# Patient Record
Sex: FEMALE | Race: WHITE | Employment: OTHER | URBAN - METROPOLITAN AREA
[De-identification: names, ages, dates, MRNs, and addresses within clinical notes are randomized per-mention and may not be internally consistent; named-entity substitution may affect disease eponyms.]

---

## 2017-01-13 ENCOUNTER — APPOINTMENT (OUTPATIENT)
Dept: LAB | Age: 82
End: 2017-01-13
Attending: INTERNAL MEDICINE
Payer: MEDICARE

## 2017-01-13 ENCOUNTER — TELEPHONE (OUTPATIENT)
Dept: INTERNAL MEDICINE CLINIC | Facility: CLINIC | Age: 82
End: 2017-01-13

## 2017-01-13 DIAGNOSIS — E53.8 VITAMIN B12 DEFICIENCY: ICD-10-CM

## 2017-01-13 DIAGNOSIS — D50.0 IRON DEFICIENCY ANEMIA DUE TO CHRONIC BLOOD LOSS: Primary | ICD-10-CM

## 2017-01-13 DIAGNOSIS — N30.00 ACUTE CYSTITIS WITHOUT HEMATURIA: ICD-10-CM

## 2017-01-13 PROCEDURE — 83540 ASSAY OF IRON: CPT | Performed by: INTERNAL MEDICINE

## 2017-01-13 PROCEDURE — 36415 COLL VENOUS BLD VENIPUNCTURE: CPT | Performed by: INTERNAL MEDICINE

## 2017-01-13 PROCEDURE — 84466 ASSAY OF TRANSFERRIN: CPT | Performed by: INTERNAL MEDICINE

## 2017-01-13 PROCEDURE — 87077 CULTURE AEROBIC IDENTIFY: CPT

## 2017-01-13 PROCEDURE — 87186 SC STD MICRODIL/AGAR DIL: CPT

## 2017-01-13 PROCEDURE — 87086 URINE CULTURE/COLONY COUNT: CPT

## 2017-01-13 PROCEDURE — 85025 COMPLETE CBC W/AUTO DIFF WBC: CPT | Performed by: INTERNAL MEDICINE

## 2017-01-13 PROCEDURE — 82728 ASSAY OF FERRITIN: CPT | Performed by: INTERNAL MEDICINE

## 2017-01-13 PROCEDURE — 82306 VITAMIN D 25 HYDROXY: CPT | Performed by: INTERNAL MEDICINE

## 2017-01-13 PROCEDURE — 82607 VITAMIN B-12: CPT | Performed by: INTERNAL MEDICINE

## 2017-01-13 NOTE — TELEPHONE ENCOUNTER
1/13/17–hemoglobin 10.2 with low iron saturation 5% and low ferritin 5. Patient has a known history of iron deficiency anemia. This can wait for Dr. Lora Willard to her.

## 2017-01-18 NOTE — TELEPHONE ENCOUNTER
Please call pt with labs. Hgb is lower, along with low iron levels. Vit B12 is low. Vitamin D is low. I suspect that she is not taking her supplement vitamins.   She should be taking ferrous sulfate 325mg (ideally BID), Vitamin B12 1000mcg/day, and Domenica

## 2017-01-18 NOTE — TELEPHONE ENCOUNTER
Called patient and relayed message. Patient states she has not been taking any of the supplements. She does have Rx for Vitamin D2, but keeps forgetting. Thoroughly went over each supplement and instructions regarding dosage.  Patient states she was writing

## 2017-01-19 ENCOUNTER — TELEPHONE (OUTPATIENT)
Dept: INTERNAL MEDICINE CLINIC | Facility: CLINIC | Age: 82
End: 2017-01-19

## 2017-01-19 RX ORDER — HYDROCODONE BITARTRATE AND ACETAMINOPHEN 5; 325 MG/1; MG/1
1 TABLET ORAL 2 TIMES DAILY PRN
Qty: 40 TABLET | Refills: 0 | Status: SHIPPED | OUTPATIENT
Start: 2017-01-19 | End: 2017-02-28

## 2017-01-19 NOTE — TELEPHONE ENCOUNTER
Pt requesting refill for Hydrocodone  Pt will  when ready, please call to advise  Pt is low on medication (2)  Tasked to [employer]

## 2017-01-31 PROCEDURE — 87077 CULTURE AEROBIC IDENTIFY: CPT | Performed by: UROLOGY

## 2017-01-31 PROCEDURE — 87186 SC STD MICRODIL/AGAR DIL: CPT | Performed by: UROLOGY

## 2017-01-31 PROCEDURE — 87086 URINE CULTURE/COLONY COUNT: CPT | Performed by: UROLOGY

## 2017-01-31 PROCEDURE — 81001 URINALYSIS AUTO W/SCOPE: CPT | Performed by: UROLOGY

## 2017-02-27 ENCOUNTER — LAB ENCOUNTER (OUTPATIENT)
Dept: LAB | Age: 82
End: 2017-02-27
Attending: INTERNAL MEDICINE
Payer: MEDICARE

## 2017-02-27 DIAGNOSIS — E53.8 VITAMIN B12 DEFICIENCY: ICD-10-CM

## 2017-02-27 DIAGNOSIS — D50.0 IRON DEFICIENCY ANEMIA DUE TO CHRONIC BLOOD LOSS: ICD-10-CM

## 2017-02-27 LAB
BASOPHILS # BLD: 0.1 K/UL (ref 0–0.2)
BASOPHILS NFR BLD: 1 %
EOSINOPHIL # BLD: 0.1 K/UL (ref 0–0.7)
EOSINOPHIL NFR BLD: 1 %
ERYTHROCYTE [DISTWIDTH] IN BLOOD BY AUTOMATED COUNT: 21.9 % (ref 11–15)
FERRITIN SERPL IA-MCNC: 55 NG/ML (ref 11–307)
HCT VFR BLD AUTO: 36.9 % (ref 35–48)
HGB BLD-MCNC: 11.7 G/DL (ref 12–16)
IRON SATN MFR SERPL: 6 % (ref 15–50)
IRON SERPL-MCNC: 21 MCG/DL (ref 28–170)
LYMPHOCYTES # BLD: 1.1 K/UL (ref 1–4)
LYMPHOCYTES NFR BLD: 15 %
MCH RBC QN AUTO: 24.9 PG (ref 27–32)
MCHC RBC AUTO-ENTMCNC: 31.7 G/DL (ref 32–37)
MCV RBC AUTO: 78.6 FL (ref 80–100)
MONOCYTES # BLD: 0.4 K/UL (ref 0–1)
MONOCYTES NFR BLD: 6 %
NEUTROPHILS # BLD AUTO: 5.8 K/UL (ref 1.8–7.7)
NEUTROPHILS NFR BLD: 78 %
PLATELET # BLD AUTO: 200 K/UL (ref 140–400)
PMV BLD AUTO: 9.5 FL (ref 7.4–10.3)
RBC # BLD AUTO: 4.69 M/UL (ref 3.7–5.4)
TIBC SERPL-MCNC: 330 MCG/DL (ref 228–428)
TRANSFERRIN SERPL-MCNC: 250 MG/DL (ref 192–382)
VIT B12 SERPL-MCNC: 497 PG/ML (ref 181–914)
WBC # BLD AUTO: 7.4 K/UL (ref 4–11)

## 2017-02-27 PROCEDURE — 36415 COLL VENOUS BLD VENIPUNCTURE: CPT

## 2017-02-27 PROCEDURE — 85025 COMPLETE CBC W/AUTO DIFF WBC: CPT

## 2017-02-27 PROCEDURE — 83540 ASSAY OF IRON: CPT

## 2017-02-27 PROCEDURE — 84466 ASSAY OF TRANSFERRIN: CPT

## 2017-02-27 PROCEDURE — 82728 ASSAY OF FERRITIN: CPT

## 2017-02-27 PROCEDURE — 82607 VITAMIN B-12: CPT

## 2017-02-28 NOTE — TELEPHONE ENCOUNTER
To MD:  The above refill request is for a controlled substance. Please indicate yes or no to refill 30 days supply plus one refill. If more refills are appropriate, please indicate quantity  Please advise on pending RX - to DR. Chris Samuels

## 2017-03-01 NOTE — TELEPHONE ENCOUNTER
Pt. Is calling to check status of refill for Hydrocodone    Mohs surgery on her lip  Her spouse Jenni Jackson will    Ph.  # [phone]    Routed to Rx

## 2017-03-02 RX ORDER — HYDROCODONE BITARTRATE AND ACETAMINOPHEN 5; 325 MG/1; MG/1
1 TABLET ORAL 2 TIMES DAILY PRN
Qty: 40 TABLET | Refills: 0 | Status: SHIPPED | OUTPATIENT
Start: 2017-03-02 | End: 2017-04-05

## 2017-03-02 NOTE — TELEPHONE ENCOUNTER
Called patient and notified her that script was ready for . Patient verbalized understanding. She will try to have   tonight or tomorrow. Script placed at  [address].

## 2017-03-11 ENCOUNTER — TELEPHONE (OUTPATIENT)
Dept: INTERNAL MEDICINE CLINIC | Facility: CLINIC | Age: 82
End: 2017-03-11

## 2017-03-11 DIAGNOSIS — E53.8 VITAMIN B12 DEFICIENCY: ICD-10-CM

## 2017-03-11 DIAGNOSIS — D50.0 IRON DEFICIENCY ANEMIA DUE TO CHRONIC BLOOD LOSS: Primary | ICD-10-CM

## 2017-03-12 NOTE — TELEPHONE ENCOUNTER
Hgb is better (10.2 to 11.7), as is her iron level --although both still slightly low. The iron is definitely helping, so please ask her to continue it indefinitely. Vit B12 also improved -- continue Vit B12 1000mcg every day.   Let's repeat labs in 3 mon

## 2017-04-05 ENCOUNTER — TELEPHONE (OUTPATIENT)
Dept: INTERNAL MEDICINE CLINIC | Facility: CLINIC | Age: 82
End: 2017-04-05

## 2017-04-05 RX ORDER — HYDROCODONE BITARTRATE AND ACETAMINOPHEN 5; 325 MG/1; MG/1
1 TABLET ORAL 2 TIMES DAILY PRN
Qty: 40 TABLET | Refills: 0 | Status: SHIPPED | OUTPATIENT
Start: 2017-04-05 | End: 2017-05-16

## 2017-04-05 NOTE — TELEPHONE ENCOUNTER
Called patient and notified her script ready for  at . Patient verbalized understanding. Script placed at  [address].

## 2017-04-05 NOTE — TELEPHONE ENCOUNTER
Pt. Is requesting a refill on Norco  She has enough meds until sat.   Ph. # [phone]   Routed to Rx

## 2017-05-02 ENCOUNTER — LAB ENCOUNTER (OUTPATIENT)
Dept: LAB | Age: 82
End: 2017-05-02
Attending: INTERNAL MEDICINE
Payer: MEDICARE

## 2017-05-02 DIAGNOSIS — E53.8 VITAMIN B12 DEFICIENCY: ICD-10-CM

## 2017-05-02 DIAGNOSIS — D50.0 IRON DEFICIENCY ANEMIA DUE TO CHRONIC BLOOD LOSS: ICD-10-CM

## 2017-05-02 PROCEDURE — 36415 COLL VENOUS BLD VENIPUNCTURE: CPT

## 2017-05-02 PROCEDURE — 85025 COMPLETE CBC W/AUTO DIFF WBC: CPT

## 2017-05-02 PROCEDURE — 82607 VITAMIN B-12: CPT

## 2017-05-02 PROCEDURE — 82746 ASSAY OF FOLIC ACID SERUM: CPT

## 2017-05-02 PROCEDURE — 84466 ASSAY OF TRANSFERRIN: CPT

## 2017-05-02 PROCEDURE — 82728 ASSAY OF FERRITIN: CPT

## 2017-05-02 PROCEDURE — 83540 ASSAY OF IRON: CPT

## 2017-05-04 PROCEDURE — 87186 SC STD MICRODIL/AGAR DIL: CPT | Performed by: UROLOGY

## 2017-05-04 PROCEDURE — 87086 URINE CULTURE/COLONY COUNT: CPT | Performed by: UROLOGY

## 2017-05-04 PROCEDURE — 87077 CULTURE AEROBIC IDENTIFY: CPT | Performed by: UROLOGY

## 2017-05-09 ENCOUNTER — TELEPHONE (OUTPATIENT)
Dept: INTERNAL MEDICINE CLINIC | Facility: CLINIC | Age: 82
End: 2017-05-09

## 2017-05-15 ENCOUNTER — TELEPHONE (OUTPATIENT)
Dept: INTERNAL MEDICINE CLINIC | Facility: CLINIC | Age: 82
End: 2017-05-15

## 2017-05-15 NOTE — TELEPHONE ENCOUNTER
Pt. Is calling to request a refill on: Hydrocodone   Pt. Has one pill left   Pt.  Is aware Dr. La Frey will be in tomorrow   Ph. # [phone]    Routed to Rx

## 2017-05-16 RX ORDER — HYDROCODONE BITARTRATE AND ACETAMINOPHEN 5; 325 MG/1; MG/1
1 TABLET ORAL 2 TIMES DAILY PRN
Qty: 40 TABLET | Refills: 0 | Status: SHIPPED | OUTPATIENT
Start: 2017-05-16 | End: 2017-06-13

## 2017-05-16 NOTE — TELEPHONE ENCOUNTER
Pt called again to check status on refill - has no pills left \"does not know how she will be able sleep tonight\"  Was hoping to  Rx today - please janeth [phone]

## 2017-05-28 RX ORDER — ERGOCALCIFEROL 1.25 MG/1
CAPSULE ORAL
Qty: 13 CAPSULE | Refills: 3 | Status: SHIPPED | OUTPATIENT
Start: 2017-05-28 | End: 2020-09-29

## 2017-06-13 ENCOUNTER — TELEPHONE (OUTPATIENT)
Dept: INTERNAL MEDICINE CLINIC | Facility: CLINIC | Age: 82
End: 2017-06-13

## 2017-06-13 RX ORDER — HYDROCODONE BITARTRATE AND ACETAMINOPHEN 5; 325 MG/1; MG/1
1 TABLET ORAL 2 TIMES DAILY PRN
Qty: 40 TABLET | Refills: 0 | Status: SHIPPED | OUTPATIENT
Start: 2017-06-13 | End: 2017-07-13

## 2017-06-13 NOTE — TELEPHONE ENCOUNTER
Pt is asking frefill for  hydrocodone     Pt is out and would like to pick it up tomorrow     plz call pt when ready    [phone]

## 2017-07-05 ENCOUNTER — OFFICE VISIT (OUTPATIENT)
Dept: INTERNAL MEDICINE CLINIC | Facility: CLINIC | Age: 82
End: 2017-07-05

## 2017-07-05 VITALS
BODY MASS INDEX: 22.83 KG/M2 | TEMPERATURE: 98 F | HEART RATE: 79 BPM | SYSTOLIC BLOOD PRESSURE: 132 MMHG | HEIGHT: 63.5 IN | WEIGHT: 130.5 LBS | DIASTOLIC BLOOD PRESSURE: 70 MMHG | OXYGEN SATURATION: 93 %

## 2017-07-05 DIAGNOSIS — D50.0 IRON DEFICIENCY ANEMIA DUE TO CHRONIC BLOOD LOSS: ICD-10-CM

## 2017-07-05 DIAGNOSIS — E78.00 HYPERCHOLESTEROLEMIA: Primary | ICD-10-CM

## 2017-07-05 DIAGNOSIS — E55.9 VITAMIN D DEFICIENCY: ICD-10-CM

## 2017-07-05 DIAGNOSIS — M81.0 OSTEOPOROSIS, UNSPECIFIED OSTEOPOROSIS TYPE, UNSPECIFIED PATHOLOGICAL FRACTURE PRESENCE: ICD-10-CM

## 2017-07-05 DIAGNOSIS — E53.8 VITAMIN B12 DEFICIENCY: ICD-10-CM

## 2017-07-05 DIAGNOSIS — J44.9 CHRONIC OBSTRUCTIVE PULMONARY DISEASE, UNSPECIFIED COPD TYPE (HCC): ICD-10-CM

## 2017-07-05 DIAGNOSIS — M85.80 OSTEOPENIA, UNSPECIFIED LOCATION: ICD-10-CM

## 2017-07-05 DIAGNOSIS — Z12.31 ENCOUNTER FOR SCREENING MAMMOGRAM FOR BREAST CANCER: ICD-10-CM

## 2017-07-05 DIAGNOSIS — R06.00 DYSPNEA ON EXERTION: ICD-10-CM

## 2017-07-05 PROCEDURE — 99215 OFFICE O/P EST HI 40 MIN: CPT | Performed by: INTERNAL MEDICINE

## 2017-07-05 PROCEDURE — G0009 ADMIN PNEUMOCOCCAL VACCINE: HCPCS | Performed by: INTERNAL MEDICINE

## 2017-07-05 PROCEDURE — G0463 HOSPITAL OUTPT CLINIC VISIT: HCPCS | Performed by: INTERNAL MEDICINE

## 2017-07-05 PROCEDURE — 90732 PPSV23 VACC 2 YRS+ SUBQ/IM: CPT | Performed by: INTERNAL MEDICINE

## 2017-07-05 NOTE — PROGRESS NOTES
Ladell Ganser is a 80year old female. Patient presents with:  Physical: Patient presents for annual physical. States she feels like she hasn't been as functional as she wants. Due for mammo and DEXA. Pt c/o of SOB on exertion (mostly on stairs).        HPI Calculus, kidney    • Esophageal reflux    • Hematuria    • Hypercholesterolemia    • Kidney stone    • Osteopenia    • Other and unspecified hyperlipidemia    • Recurrent UTI    • Scoliosis    • Spinal stenosis    • Squamous cell cancer of lip    • [employer]  10/29/2015: PATIENT DOCUMENTED NOT TO HAVE EXPERIENCED ANY* Right      Comment: Procedure: CYSTOSCOPY/URETEROSCOPY/STONE                EXTRACTION;  Surgeon: Edwin Hanks MD;                 Location: [address] [place] [address]  9/24/2015: PATIENT WI axillary LAD  LUNGS: clear to auscultation  CARDIO: RRR, normal S1S2, no gallops or murmurs  GI: soft, NT, ND, NABS, no HSM  EXTREMITIES: no cyanosis, clubbing or edema, +2 DP pulses        ASSESSMENT AND PLAN:     1.  Hypercholesterolemia  Cont with health

## 2017-07-11 PROCEDURE — 87077 CULTURE AEROBIC IDENTIFY: CPT | Performed by: UROLOGY

## 2017-07-11 PROCEDURE — 87086 URINE CULTURE/COLONY COUNT: CPT | Performed by: UROLOGY

## 2017-07-11 PROCEDURE — 87186 SC STD MICRODIL/AGAR DIL: CPT | Performed by: UROLOGY

## 2017-07-13 RX ORDER — HYDROCODONE BITARTRATE AND ACETAMINOPHEN 5; 325 MG/1; MG/1
1 TABLET ORAL 2 TIMES DAILY PRN
Qty: 45 TABLET | Refills: 0 | Status: SHIPPED | OUTPATIENT
Start: 2017-07-13 | End: 2017-08-24

## 2017-07-27 ENCOUNTER — TELEPHONE (OUTPATIENT)
Dept: INTERNAL MEDICINE CLINIC | Facility: CLINIC | Age: 82
End: 2017-07-27

## 2017-07-27 NOTE — TELEPHONE ENCOUNTER
As FYI to DR. DELAROSA called patient and relayed DR. DELAROSA message - verbalized understanding.  She will think about taking fosamax and will call office if she wants to take it

## 2017-07-27 NOTE — TELEPHONE ENCOUNTER
DEXA shows persistent osteoporosis. Pt was briefly on Fosamax in 2014. I recommend that she restart it if she is willing -- Fosamax 70mg qam (with full glass of water; do not lie down or eat for 30min afterwards) -- #13, 3RF.   Continue calcium, vitamin D

## 2017-08-09 PROCEDURE — 87086 URINE CULTURE/COLONY COUNT: CPT | Performed by: UROLOGY

## 2017-08-09 PROCEDURE — 87186 SC STD MICRODIL/AGAR DIL: CPT | Performed by: UROLOGY

## 2017-08-09 PROCEDURE — 87077 CULTURE AEROBIC IDENTIFY: CPT | Performed by: UROLOGY

## 2017-08-24 RX ORDER — HYDROCODONE BITARTRATE AND ACETAMINOPHEN 5; 325 MG/1; MG/1
1 TABLET ORAL 2 TIMES DAILY PRN
Qty: 45 TABLET | Refills: 0 | Status: SHIPPED | OUTPATIENT
Start: 2017-08-24 | End: 2017-10-04

## 2017-08-24 NOTE — TELEPHONE ENCOUNTER
To MD:  The above refill request is for a controlled substance. Please indicate yes or no to refill 30 days supply plus one refill. If more refills are appropriate, please indicate quantity  Pending to DR. Jeannette Dudley

## 2017-08-24 NOTE — TELEPHONE ENCOUNTER
Pt requesting refill for:  Hydrocodone  Pt is low on medication  Pt will  when ready, hoping to  Friday, will run out over the weekend  Please call to advise  Tasked to RX

## 2017-10-04 ENCOUNTER — TELEPHONE (OUTPATIENT)
Dept: INTERNAL MEDICINE CLINIC | Facility: CLINIC | Age: 82
End: 2017-10-04

## 2017-10-04 RX ORDER — HYDROCODONE BITARTRATE AND ACETAMINOPHEN 5; 325 MG/1; MG/1
1 TABLET ORAL 2 TIMES DAILY PRN
Qty: 45 TABLET | Refills: 0 | Status: SHIPPED | OUTPATIENT
Start: 2017-10-04 | End: 2017-11-15

## 2017-10-24 PROCEDURE — 87186 SC STD MICRODIL/AGAR DIL: CPT | Performed by: UROLOGY

## 2017-10-24 PROCEDURE — 81015 MICROSCOPIC EXAM OF URINE: CPT | Performed by: UROLOGY

## 2017-10-24 PROCEDURE — 87077 CULTURE AEROBIC IDENTIFY: CPT | Performed by: UROLOGY

## 2017-10-24 PROCEDURE — 87086 URINE CULTURE/COLONY COUNT: CPT | Performed by: UROLOGY

## 2017-11-15 ENCOUNTER — TELEPHONE (OUTPATIENT)
Dept: INTERNAL MEDICINE CLINIC | Facility: CLINIC | Age: 82
End: 2017-11-15

## 2017-11-15 RX ORDER — HYDROCODONE BITARTRATE AND ACETAMINOPHEN 5; 325 MG/1; MG/1
1 TABLET ORAL 2 TIMES DAILY PRN
Qty: 45 TABLET | Refills: 0 | Status: SHIPPED | OUTPATIENT
Start: 2017-11-15 | End: 2017-12-20

## 2017-11-16 NOTE — TELEPHONE ENCOUNTER
Spoke with patient and informed her that the Rx for her [facility] is ready for pick-up at the  of the office. Patient verbalized understanding.

## 2017-12-20 ENCOUNTER — TELEPHONE (OUTPATIENT)
Dept: INTERNAL MEDICINE CLINIC | Facility: CLINIC | Age: 82
End: 2017-12-20

## 2017-12-20 RX ORDER — HYDROCODONE BITARTRATE AND ACETAMINOPHEN 5; 325 MG/1; MG/1
1 TABLET ORAL 2 TIMES DAILY PRN
Qty: 45 TABLET | Refills: 0 | Status: SHIPPED | OUTPATIENT
Start: 2017-12-20 | End: 2018-01-31

## 2017-12-21 NOTE — TELEPHONE ENCOUNTER
Called patient and left a voice message notifying her that RX for Binta Reyes is ready for pickup. Reminded to have fasting blood work done. RX left at the .

## 2018-01-31 NOTE — TELEPHONE ENCOUNTER
To MD:  The above refill request is for a controlled substance. Please indicate yes or no to refill 30 days supply plus one refill.   If more refills are appropriate, please indicate quantity    Last filled 12/20/17 #45/0

## 2018-01-31 NOTE — TELEPHONE ENCOUNTER
Needs Rx for Hydrocodone refill  Has 3 tablets left - would like to  tomorrow    Please call when ready [phone]

## 2018-02-01 RX ORDER — HYDROCODONE BITARTRATE AND ACETAMINOPHEN 5; 325 MG/1; MG/1
1 TABLET ORAL 2 TIMES DAILY PRN
Qty: 45 TABLET | Refills: 0 | Status: SHIPPED | OUTPATIENT
Start: 2018-02-01 | End: 2018-02-21

## 2018-02-16 PROCEDURE — 81015 MICROSCOPIC EXAM OF URINE: CPT | Performed by: UROLOGY

## 2018-02-16 PROCEDURE — 87086 URINE CULTURE/COLONY COUNT: CPT | Performed by: UROLOGY

## 2018-02-16 PROCEDURE — 87077 CULTURE AEROBIC IDENTIFY: CPT | Performed by: UROLOGY

## 2018-02-16 PROCEDURE — 87186 SC STD MICRODIL/AGAR DIL: CPT | Performed by: UROLOGY

## 2018-02-21 ENCOUNTER — OFFICE VISIT (OUTPATIENT)
Dept: INTERNAL MEDICINE CLINIC | Facility: CLINIC | Age: 83
End: 2018-02-21

## 2018-02-21 ENCOUNTER — LAB ENCOUNTER (OUTPATIENT)
Dept: LAB | Age: 83
End: 2018-02-21
Attending: INTERNAL MEDICINE
Payer: MEDICARE

## 2018-02-21 VITALS
HEIGHT: 63 IN | TEMPERATURE: 98 F | SYSTOLIC BLOOD PRESSURE: 120 MMHG | BODY MASS INDEX: 23.39 KG/M2 | HEART RATE: 88 BPM | DIASTOLIC BLOOD PRESSURE: 68 MMHG | WEIGHT: 132 LBS

## 2018-02-21 DIAGNOSIS — E78.00 HYPERCHOLESTEROLEMIA: Primary | ICD-10-CM

## 2018-02-21 DIAGNOSIS — E55.9 VITAMIN D DEFICIENCY: ICD-10-CM

## 2018-02-21 DIAGNOSIS — D50.0 IRON DEFICIENCY ANEMIA DUE TO CHRONIC BLOOD LOSS: ICD-10-CM

## 2018-02-21 DIAGNOSIS — J44.9 CHRONIC OBSTRUCTIVE PULMONARY DISEASE, UNSPECIFIED COPD TYPE (HCC): ICD-10-CM

## 2018-02-21 DIAGNOSIS — M48.061 SPINAL STENOSIS OF LUMBAR REGION, UNSPECIFIED WHETHER NEUROGENIC CLAUDICATION PRESENT: ICD-10-CM

## 2018-02-21 DIAGNOSIS — E53.8 VITAMIN B12 DEFICIENCY: ICD-10-CM

## 2018-02-21 DIAGNOSIS — E78.00 HYPERCHOLESTEROLEMIA: ICD-10-CM

## 2018-02-21 DIAGNOSIS — M85.80 OSTEOPENIA, UNSPECIFIED LOCATION: ICD-10-CM

## 2018-02-21 DIAGNOSIS — M81.0 OSTEOPOROSIS, UNSPECIFIED OSTEOPOROSIS TYPE, UNSPECIFIED PATHOLOGICAL FRACTURE PRESENCE: ICD-10-CM

## 2018-02-21 LAB
ALBUMIN SERPL BCP-MCNC: 3.9 G/DL (ref 3.5–4.8)
ALBUMIN/GLOB SERPL: 1.2 {RATIO} (ref 1–2)
ALP SERPL-CCNC: 57 U/L (ref 32–100)
ALT SERPL-CCNC: 17 U/L (ref 14–54)
ANION GAP SERPL CALC-SCNC: 6 MMOL/L (ref 0–18)
AST SERPL-CCNC: 20 U/L (ref 15–41)
BASOPHILS # BLD: 0.1 K/UL (ref 0–0.2)
BASOPHILS NFR BLD: 2 %
BILIRUB SERPL-MCNC: 0.6 MG/DL (ref 0.3–1.2)
BUN SERPL-MCNC: 19 MG/DL (ref 8–20)
BUN/CREAT SERPL: 19.2 (ref 10–20)
CALCIUM SERPL-MCNC: 9.6 MG/DL (ref 8.5–10.5)
CHLORIDE SERPL-SCNC: 104 MMOL/L (ref 95–110)
CHOLEST SERPL-MCNC: 233 MG/DL (ref 110–200)
CO2 SERPL-SCNC: 29 MMOL/L (ref 22–32)
CREAT SERPL-MCNC: 0.99 MG/DL (ref 0.5–1.5)
EOSINOPHIL # BLD: 0.2 K/UL (ref 0–0.7)
EOSINOPHIL NFR BLD: 4 %
ERYTHROCYTE [DISTWIDTH] IN BLOOD BY AUTOMATED COUNT: 14.1 % (ref 11–15)
FERRITIN SERPL IA-MCNC: 9 NG/ML (ref 11–307)
GLOBULIN PLAS-MCNC: 3.3 G/DL (ref 2.5–3.7)
GLUCOSE SERPL-MCNC: 105 MG/DL (ref 70–99)
HCT VFR BLD AUTO: 38.4 % (ref 35–48)
HDLC SERPL-MCNC: 74 MG/DL
HGB BLD-MCNC: 12.7 G/DL (ref 12–16)
IRON SATN MFR SERPL: 12 % (ref 15–50)
IRON SERPL-MCNC: 52 MCG/DL (ref 28–170)
LDLC SERPL CALC-MCNC: 133 MG/DL (ref 0–99)
LYMPHOCYTES # BLD: 1.4 K/UL (ref 1–4)
LYMPHOCYTES NFR BLD: 25 %
MCH RBC QN AUTO: 28.3 PG (ref 27–32)
MCHC RBC AUTO-ENTMCNC: 33 G/DL (ref 32–37)
MCV RBC AUTO: 85.9 FL (ref 80–100)
MONOCYTES # BLD: 0.4 K/UL (ref 0–1)
MONOCYTES NFR BLD: 7 %
NEUTROPHILS # BLD AUTO: 3.4 K/UL (ref 1.8–7.7)
NEUTROPHILS NFR BLD: 63 %
NONHDLC SERPL-MCNC: 159 MG/DL
OSMOLALITY UR CALC.SUM OF ELEC: 291 MOSM/KG (ref 275–295)
PATIENT FASTING: YES
PLATELET # BLD AUTO: 212 K/UL (ref 140–400)
PMV BLD AUTO: 9.4 FL (ref 7.4–10.3)
POTASSIUM SERPL-SCNC: 4.8 MMOL/L (ref 3.3–5.1)
PROT SERPL-MCNC: 7.2 G/DL (ref 5.9–8.4)
RBC # BLD AUTO: 4.47 M/UL (ref 3.7–5.4)
SODIUM SERPL-SCNC: 139 MMOL/L (ref 136–144)
TIBC SERPL-MCNC: 430 MCG/DL (ref 228–428)
TRANSFERRIN SERPL-MCNC: 326 MG/DL (ref 192–382)
TRIGL SERPL-MCNC: 130 MG/DL (ref 1–149)
WBC # BLD AUTO: 5.4 K/UL (ref 4–11)

## 2018-02-21 PROCEDURE — 80061 LIPID PANEL: CPT

## 2018-02-21 PROCEDURE — 82306 VITAMIN D 25 HYDROXY: CPT

## 2018-02-21 PROCEDURE — 85025 COMPLETE CBC W/AUTO DIFF WBC: CPT

## 2018-02-21 PROCEDURE — 80053 COMPREHEN METABOLIC PANEL: CPT

## 2018-02-21 PROCEDURE — G0463 HOSPITAL OUTPT CLINIC VISIT: HCPCS | Performed by: INTERNAL MEDICINE

## 2018-02-21 PROCEDURE — 84466 ASSAY OF TRANSFERRIN: CPT

## 2018-02-21 PROCEDURE — 99214 OFFICE O/P EST MOD 30 MIN: CPT | Performed by: INTERNAL MEDICINE

## 2018-02-21 PROCEDURE — 82728 ASSAY OF FERRITIN: CPT

## 2018-02-21 PROCEDURE — 83540 ASSAY OF IRON: CPT

## 2018-02-21 PROCEDURE — 36415 COLL VENOUS BLD VENIPUNCTURE: CPT

## 2018-02-21 RX ORDER — HYDROCODONE BITARTRATE AND ACETAMINOPHEN 5; 325 MG/1; MG/1
1 TABLET ORAL NIGHTLY PRN
Qty: 45 TABLET | Refills: 0 | COMMUNITY
Start: 2018-02-21 | End: 2018-03-13

## 2018-02-21 NOTE — PROGRESS NOTES
Eder Lay is a 80year old female. Patient presents with:  Checkup: Patient is here today for a 6 month checkup (overdue) and to discuss weaning off [address]. Patient states that she has been feeling good lately.  She notes that she deals with chronic back Urinary frequency    • Vitamin D deficiency       Social History:  Smoking status: Former Smoker                                                              Packs/day: 0.25      Years: 10.00        Types: Cigarettes     Quit date: 8/22/2016  Smokeless tob vaccine at [facility] when she was treated for a scalp lac. Check fasting labs.  Had flu shot this year. Rec Shingrix shingles vaccine. 7. Hx of iron deficiency anemia  Had EGD/C-scope in 2010 (Dr. Rosendo Yi) -- negative.  Prob GI blood loss from SB

## 2018-02-23 LAB — 25(OH)D3 SERPL-MCNC: 47.4 NG/ML

## 2018-03-11 ENCOUNTER — TELEPHONE (OUTPATIENT)
Dept: INTERNAL MEDICINE CLINIC | Facility: CLINIC | Age: 83
End: 2018-03-11

## 2018-03-11 DIAGNOSIS — R73.9 HYPERGLYCEMIA: ICD-10-CM

## 2018-03-11 DIAGNOSIS — D50.0 IRON DEFICIENCY ANEMIA DUE TO CHRONIC BLOOD LOSS: Primary | ICD-10-CM

## 2018-03-11 DIAGNOSIS — E53.8 VITAMIN B12 DEFICIENCY: ICD-10-CM

## 2018-03-12 NOTE — TELEPHONE ENCOUNTER
Please call with labs. Iron levels still low, although Hgb okay. Is she taking the iron every day? If so, then I recommend increasing ferrous sulfate to twice a day. If not, then please remind her to take it every day (along with her Vitamin B12).   Oth

## 2018-03-12 NOTE — TELEPHONE ENCOUNTER
Patient needs refill for:    Hydrocodone 5/325mg      Call patient when ready for pickup. Would like before Thursday.

## 2018-03-13 RX ORDER — HYDROCODONE BITARTRATE AND ACETAMINOPHEN 5; 325 MG/1; MG/1
0.5 TABLET ORAL NIGHTLY PRN
Qty: 15 TABLET | Refills: 0 | Status: SHIPPED | OUTPATIENT
Start: 2018-03-13 | End: 2018-04-03

## 2018-03-13 NOTE — TELEPHONE ENCOUNTER
Patient wanted us to know that she has tried cutting the pills in half, per Dr. Valeri Kendall' recommendation at the last office visit. Patient has not been able to sleep the last 3 nights.     Patient has 3 procedures this month & would like to hold off cuttin

## 2018-03-13 NOTE — TELEPHONE ENCOUNTER
To Dr. Enrique Iqbal - see below - OK for full tablet this month? Pt having seeing knee dr this AM, MOHS procedure tomorrow - pt states a full tablet helps her sleep at night. Pt would like \"to just get thru this month\" due to nervousness.   Will try 0.5 tab

## 2018-03-14 NOTE — TELEPHONE ENCOUNTER
Donel Nett is NOT to be used as a sleeping pill. She can take 50mg of Benadryl or 10mg of melatonin if sleep is an issue. I will refill the norco only for 1/2 tablet at bedtime for pain, with the plan to wean her off altogether.

## 2018-03-19 NOTE — TELEPHONE ENCOUNTER
Message relayed to patient who verbalized understanding. Stated she had not been taking the iron supplement every day; she will start now and have repeat labs in 3 months. Nothing further at this time. Routed to Dr. Donya Chang as an Vicky Diop.

## 2018-04-02 NOTE — TELEPHONE ENCOUNTER
Pt called back like to clarify she do not take Norco for sleeping she takes it for pain and half of pill it not helping her. She is ok to take 1 full tab once daily. She have 1 in a half pill left.  Please advise

## 2018-04-03 NOTE — TELEPHONE ENCOUNTER
To Dr. Enrique Iqbal - pt would like to go back to a full tablet of Norco - pt takes 0.5-1 tab for pain. Needs whole tablet daily to relieve pain. Pain has worsened over the years. Pt has 0.5 tablet left.

## 2018-04-05 NOTE — TELEPHONE ENCOUNTER
Pt called to check status refill  She is out medication & would like to  today     Please advise home# [phone] or cell# [phone]

## 2018-04-05 NOTE — TELEPHONE ENCOUNTER
Pt asked that we contact her on her cell [phone] when RX ready to be picked up today if possible

## 2018-04-06 RX ORDER — HYDROCODONE BITARTRATE AND ACETAMINOPHEN 5; 325 MG/1; MG/1
0.5 TABLET ORAL NIGHTLY PRN
Qty: 30 TABLET | Refills: 0 | Status: ON HOLD | OUTPATIENT
Start: 2018-04-06 | End: 2019-04-19

## 2018-04-06 NOTE — TELEPHONE ENCOUNTER
Rx ready for  240; To [place] to call pt to let her know she will need to see DR. DELAROSA prior to next prescription

## 2018-04-06 NOTE — TELEPHONE ENCOUNTER
Patient was called, told the script was ready for  and that she had to make an appointment with Dr. Nakita Villanueva to discuss this medication. Patient was also informed that this would be her last refill till she sees Dr. Nakita Villanueva.     Patient declined to

## 2018-04-09 NOTE — TELEPHONE ENCOUNTER
Note rec'd from [address] [place] [address]  \"we filled only #15 on this RX per state law (30 days)\"  Form placed in purple folder  Tasked to [employer]

## 2018-04-18 NOTE — TELEPHONE ENCOUNTER
I will only fill Norco 1/2 tablet qhs. We need to wean her off the Norco altogether as discussed. If she is able to function all day without Norco, then she should be able to tolerate being without it at night.   She can take an extra 500mg of tylenol wit

## 2018-04-18 NOTE — TELEPHONE ENCOUNTER
To DR. Amparo Davila - called patient and relayed DR. DELAROSA message - patient does not agree with plan - she will talk to her back specialist then get back with our office .

## 2018-04-30 PROCEDURE — 87086 URINE CULTURE/COLONY COUNT: CPT | Performed by: UROLOGY

## 2018-04-30 PROCEDURE — 87186 SC STD MICRODIL/AGAR DIL: CPT | Performed by: UROLOGY

## 2018-04-30 PROCEDURE — 87077 CULTURE AEROBIC IDENTIFY: CPT | Performed by: UROLOGY

## 2018-04-30 PROCEDURE — 81015 MICROSCOPIC EXAM OF URINE: CPT | Performed by: UROLOGY

## 2018-05-22 PROCEDURE — 87186 SC STD MICRODIL/AGAR DIL: CPT | Performed by: PHYSICIAN ASSISTANT

## 2018-05-22 PROCEDURE — 87086 URINE CULTURE/COLONY COUNT: CPT | Performed by: PHYSICIAN ASSISTANT

## 2018-05-22 PROCEDURE — 87077 CULTURE AEROBIC IDENTIFY: CPT | Performed by: PHYSICIAN ASSISTANT

## 2018-06-19 RX ORDER — HYDROCODONE BITARTRATE AND ACETAMINOPHEN 5; 325 MG/1; MG/1
0.5 TABLET ORAL NIGHTLY PRN
Qty: 30 TABLET | Refills: 0 | Status: CANCELLED | OUTPATIENT
Start: 2018-06-19

## 2018-06-19 NOTE — TELEPHONE ENCOUNTER
To Dr. Rachel Bhatia MD:  The above refill request is for a controlled substance. Please indicate yes or no to refill 30 days supply plus one refill.   If more refills are appropriate, please indicate quantity

## 2018-06-19 NOTE — TELEPHONE ENCOUNTER
Patient needs refill for:      Hydrocodone 5/325mg    (Patient will do her stress test sometime soon)      Call patient when ready

## 2018-06-20 NOTE — TELEPHONE ENCOUNTER
Pt stated she is taking Norco 5 - 325mg Sig 1/2 tab a day #15 tab for 30 days. She also reports she has an appt at a pain clinic next Wednesday. Relayed information to Dr. Zina Vilchis.      Informed pt that MD is not comfortable prescribing any more pa

## 2018-09-26 ENCOUNTER — OFFICE VISIT (OUTPATIENT)
Dept: INTERNAL MEDICINE CLINIC | Facility: CLINIC | Age: 83
End: 2018-09-26
Payer: MEDICARE

## 2018-09-26 VITALS
WEIGHT: 133.81 LBS | DIASTOLIC BLOOD PRESSURE: 79 MMHG | TEMPERATURE: 98 F | SYSTOLIC BLOOD PRESSURE: 110 MMHG | HEIGHT: 63 IN | OXYGEN SATURATION: 95 % | BODY MASS INDEX: 23.71 KG/M2 | HEART RATE: 90 BPM

## 2018-09-26 DIAGNOSIS — E53.8 VITAMIN B12 DEFICIENCY: ICD-10-CM

## 2018-09-26 DIAGNOSIS — E78.00 HYPERCHOLESTEROLEMIA: ICD-10-CM

## 2018-09-26 DIAGNOSIS — J44.9 CHRONIC OBSTRUCTIVE PULMONARY DISEASE, UNSPECIFIED COPD TYPE (HCC): Primary | ICD-10-CM

## 2018-09-26 DIAGNOSIS — R92.2 DENSE BREASTS: ICD-10-CM

## 2018-09-26 DIAGNOSIS — E55.9 VITAMIN D DEFICIENCY: ICD-10-CM

## 2018-09-26 DIAGNOSIS — R06.00 DYSPNEA ON EXERTION: ICD-10-CM

## 2018-09-26 DIAGNOSIS — M85.80 OSTEOPENIA, UNSPECIFIED LOCATION: ICD-10-CM

## 2018-09-26 DIAGNOSIS — Z12.31 ENCOUNTER FOR SCREENING MAMMOGRAM FOR BREAST CANCER: ICD-10-CM

## 2018-09-26 DIAGNOSIS — M48.061 SPINAL STENOSIS OF LUMBAR REGION, UNSPECIFIED WHETHER NEUROGENIC CLAUDICATION PRESENT: ICD-10-CM

## 2018-09-26 DIAGNOSIS — Z87.440 HISTORY OF RECURRENT UTIS: ICD-10-CM

## 2018-09-26 DIAGNOSIS — D50.0 IRON DEFICIENCY ANEMIA DUE TO CHRONIC BLOOD LOSS: ICD-10-CM

## 2018-09-26 DIAGNOSIS — M81.0 AGE-RELATED OSTEOPOROSIS WITHOUT CURRENT PATHOLOGICAL FRACTURE: ICD-10-CM

## 2018-09-26 PROCEDURE — G0008 ADMIN INFLUENZA VIRUS VAC: HCPCS | Performed by: INTERNAL MEDICINE

## 2018-09-26 PROCEDURE — G0463 HOSPITAL OUTPT CLINIC VISIT: HCPCS | Performed by: INTERNAL MEDICINE

## 2018-09-26 PROCEDURE — 90653 IIV ADJUVANT VACCINE IM: CPT | Performed by: INTERNAL MEDICINE

## 2018-09-26 PROCEDURE — 99215 OFFICE O/P EST HI 40 MIN: CPT | Performed by: INTERNAL MEDICINE

## 2018-09-26 RX ORDER — OMEPRAZOLE 20 MG/1
20 CAPSULE, DELAYED RELEASE ORAL
COMMUNITY

## 2018-09-26 NOTE — PROGRESS NOTES
Jorge Moreira is a 80year old female. Patient presents with: Annual: pt in for annual px    HPI:     Here for annual physical.  Started seeing a new urologist thru [facility] b/c Dr. Allison Kirk retired.   On prophylactic Keflex which has not really b stone    • Osteopenia    • Other and unspecified hyperlipidemia    • Recurrent UTI    • Scoliosis    • Spinal stenosis    • Squamous cell cancer of lip    • Thyroid nodule    • Urinary frequency    • Vitamin D deficiency       Social History:  Social Histo B12 deficiency    Level normal (499) in 5/2017. Cont daily Vit B12 1000mcg/day -- has not been taking. Check repeat level. 6. Health maintenance    Mammo normal 7/2017.   Rx given for repeat.  Encouraged exercise.  Prevnar 13 given 4/2016 -- PPSV23 7/5/

## 2018-10-17 ENCOUNTER — LAB ENCOUNTER (OUTPATIENT)
Dept: LAB | Age: 83
End: 2018-10-17
Attending: INTERNAL MEDICINE
Payer: MEDICARE

## 2018-10-17 DIAGNOSIS — D50.0 IRON DEFICIENCY ANEMIA DUE TO CHRONIC BLOOD LOSS: ICD-10-CM

## 2018-10-17 DIAGNOSIS — E53.8 VITAMIN B12 DEFICIENCY: ICD-10-CM

## 2018-10-17 DIAGNOSIS — E55.9 VITAMIN D DEFICIENCY: ICD-10-CM

## 2018-10-17 DIAGNOSIS — E78.00 HYPERCHOLESTEROLEMIA: ICD-10-CM

## 2018-10-17 PROCEDURE — 84466 ASSAY OF TRANSFERRIN: CPT

## 2018-10-17 PROCEDURE — 84443 ASSAY THYROID STIM HORMONE: CPT | Performed by: INTERNAL MEDICINE

## 2018-10-17 PROCEDURE — 82607 VITAMIN B-12: CPT

## 2018-10-17 PROCEDURE — 85025 COMPLETE CBC W/AUTO DIFF WBC: CPT

## 2018-10-17 PROCEDURE — 80053 COMPREHEN METABOLIC PANEL: CPT

## 2018-10-17 PROCEDURE — 83540 ASSAY OF IRON: CPT

## 2018-10-17 PROCEDURE — 82728 ASSAY OF FERRITIN: CPT

## 2018-10-17 PROCEDURE — 80061 LIPID PANEL: CPT

## 2018-10-17 PROCEDURE — 82306 VITAMIN D 25 HYDROXY: CPT

## 2018-10-17 PROCEDURE — 36415 COLL VENOUS BLD VENIPUNCTURE: CPT

## 2018-10-18 ENCOUNTER — TELEPHONE (OUTPATIENT)
Dept: INTERNAL MEDICINE CLINIC | Facility: CLINIC | Age: 83
End: 2018-10-18

## 2018-10-18 DIAGNOSIS — E53.8 VITAMIN B12 DEFICIENCY: Primary | ICD-10-CM

## 2018-10-18 DIAGNOSIS — D50.0 IRON DEFICIENCY ANEMIA DUE TO CHRONIC BLOOD LOSS: ICD-10-CM

## 2018-10-19 NOTE — TELEPHONE ENCOUNTER
Please call pt with labs -- all normal except she is anemic again. Her iron and Vit B12 levels are low. Please ask her to start taking her iron and vitamin B12 (1000mcg) every day (pt had stopped taking). Let's repeat labs (nonfasting) in 3 months.   I'l

## 2019-01-22 ENCOUNTER — LAB ENCOUNTER (OUTPATIENT)
Dept: LAB | Age: 84
End: 2019-01-22
Attending: INTERNAL MEDICINE
Payer: MEDICARE

## 2019-01-22 ENCOUNTER — OFFICE VISIT (OUTPATIENT)
Dept: INTERNAL MEDICINE CLINIC | Facility: CLINIC | Age: 84
End: 2019-01-22
Payer: MEDICARE

## 2019-01-22 VITALS
BODY MASS INDEX: 23.74 KG/M2 | HEIGHT: 63 IN | OXYGEN SATURATION: 97 % | DIASTOLIC BLOOD PRESSURE: 70 MMHG | HEART RATE: 97 BPM | WEIGHT: 134 LBS | TEMPERATURE: 98 F | SYSTOLIC BLOOD PRESSURE: 122 MMHG

## 2019-01-22 DIAGNOSIS — E53.8 VITAMIN B12 DEFICIENCY: ICD-10-CM

## 2019-01-22 DIAGNOSIS — R53.83 OTHER FATIGUE: ICD-10-CM

## 2019-01-22 DIAGNOSIS — D50.0 IRON DEFICIENCY ANEMIA DUE TO CHRONIC BLOOD LOSS: Primary | ICD-10-CM

## 2019-01-22 DIAGNOSIS — D50.0 IRON DEFICIENCY ANEMIA DUE TO CHRONIC BLOOD LOSS: ICD-10-CM

## 2019-01-22 LAB
ALBUMIN SERPL BCP-MCNC: 3.8 G/DL (ref 3.5–4.8)
ALBUMIN/GLOB SERPL: 1.2 {RATIO} (ref 1–2)
ALP SERPL-CCNC: 55 U/L (ref 32–100)
ALT SERPL-CCNC: 15 U/L (ref 14–54)
ANION GAP SERPL CALC-SCNC: 11 MMOL/L (ref 0–18)
AST SERPL-CCNC: 18 U/L (ref 15–41)
BASOPHILS # BLD: 0.1 K/UL (ref 0–0.2)
BASOPHILS NFR BLD: 1 %
BILIRUB SERPL-MCNC: 0.5 MG/DL (ref 0.3–1.2)
BUN SERPL-MCNC: 15 MG/DL (ref 8–20)
BUN/CREAT SERPL: 17.6 (ref 10–20)
CALCIUM SERPL-MCNC: 9.3 MG/DL (ref 8.5–10.5)
CHLORIDE SERPL-SCNC: 102 MMOL/L (ref 95–110)
CO2 SERPL-SCNC: 27 MMOL/L (ref 22–32)
CREAT SERPL-MCNC: 0.85 MG/DL (ref 0.5–1.5)
EOSINOPHIL # BLD: 0.2 K/UL (ref 0–0.7)
EOSINOPHIL NFR BLD: 3 %
ERYTHROCYTE [DISTWIDTH] IN BLOOD BY AUTOMATED COUNT: 14.8 % (ref 11–15)
FERRITIN SERPL IA-MCNC: 11 NG/ML (ref 11–307)
GLOBULIN PLAS-MCNC: 3.2 G/DL (ref 2.5–3.7)
GLUCOSE SERPL-MCNC: 91 MG/DL (ref 70–99)
HCT VFR BLD AUTO: 37.6 % (ref 35–48)
HGB BLD-MCNC: 12 G/DL (ref 12–16)
IRON SATN MFR SERPL: 11 % (ref 15–50)
IRON SERPL-MCNC: 46 MCG/DL (ref 28–170)
LYMPHOCYTES # BLD: 1.4 K/UL (ref 1–4)
LYMPHOCYTES NFR BLD: 23 %
MCH RBC QN AUTO: 27.2 PG (ref 27–32)
MCHC RBC AUTO-ENTMCNC: 32 G/DL (ref 32–37)
MCV RBC AUTO: 84.8 FL (ref 80–100)
MONOCYTES # BLD: 0.3 K/UL (ref 0–1)
MONOCYTES NFR BLD: 5 %
NEUTROPHILS # BLD AUTO: 4.1 K/UL (ref 1.8–7.7)
NEUTROPHILS NFR BLD: 67 %
OSMOLALITY UR CALC.SUM OF ELEC: 290 MOSM/KG (ref 275–295)
PATIENT FASTING: NO
PLATELET # BLD AUTO: 241 K/UL (ref 140–400)
PMV BLD AUTO: 9.2 FL (ref 7.4–10.3)
POTASSIUM SERPL-SCNC: 4.5 MMOL/L (ref 3.3–5.1)
PROT SERPL-MCNC: 7 G/DL (ref 5.9–8.4)
RBC # BLD AUTO: 4.43 M/UL (ref 3.7–5.4)
SODIUM SERPL-SCNC: 140 MMOL/L (ref 136–144)
TIBC SERPL-MCNC: 413 MCG/DL (ref 228–428)
TRANSFERRIN SERPL-MCNC: 313 MG/DL (ref 192–382)
TSH SERPL-ACNC: 1.97 UIU/ML (ref 0.45–5.33)
VIT B12 SERPL-MCNC: 311 PG/ML (ref 181–914)
WBC # BLD AUTO: 6.1 K/UL (ref 4–11)

## 2019-01-22 PROCEDURE — 83540 ASSAY OF IRON: CPT

## 2019-01-22 PROCEDURE — 80053 COMPREHEN METABOLIC PANEL: CPT

## 2019-01-22 PROCEDURE — 82607 VITAMIN B-12: CPT

## 2019-01-22 PROCEDURE — 82728 ASSAY OF FERRITIN: CPT

## 2019-01-22 PROCEDURE — 84443 ASSAY THYROID STIM HORMONE: CPT | Performed by: INTERNAL MEDICINE

## 2019-01-22 PROCEDURE — 36415 COLL VENOUS BLD VENIPUNCTURE: CPT

## 2019-01-22 PROCEDURE — 99214 OFFICE O/P EST MOD 30 MIN: CPT | Performed by: INTERNAL MEDICINE

## 2019-01-22 PROCEDURE — G0463 HOSPITAL OUTPT CLINIC VISIT: HCPCS | Performed by: INTERNAL MEDICINE

## 2019-01-22 PROCEDURE — 85025 COMPLETE CBC W/AUTO DIFF WBC: CPT

## 2019-01-22 PROCEDURE — 84466 ASSAY OF TRANSFERRIN: CPT

## 2019-02-06 ENCOUNTER — TELEPHONE (OUTPATIENT)
Dept: INTERNAL MEDICINE CLINIC | Facility: CLINIC | Age: 84
End: 2019-02-06

## 2019-02-06 DIAGNOSIS — E53.8 VITAMIN B12 DEFICIENCY: Primary | ICD-10-CM

## 2019-02-06 DIAGNOSIS — D50.0 IRON DEFICIENCY ANEMIA DUE TO CHRONIC BLOOD LOSS: ICD-10-CM

## 2019-02-07 NOTE — TELEPHONE ENCOUNTER
Spoke to pt regarding lad levels and advised on MD message below; pt verbalizes understanding and states will start recommended medications. Advised repeat labs in 3 months; pt verbalized understanding. New medications added to epic.

## 2019-02-07 NOTE — TELEPHONE ENCOUNTER
Please call with lab results. Vit B12 still low -- please restart the OTC Vit B12 1000 mcg daily. Hgb is better but iron still low -- restart the ferrous sulfate 325mg BID. Let's repeat labs in 3 months. I'll order.

## 2019-03-27 PROCEDURE — 87086 URINE CULTURE/COLONY COUNT: CPT | Performed by: PHYSICIAN ASSISTANT

## 2019-03-27 PROCEDURE — 87186 SC STD MICRODIL/AGAR DIL: CPT | Performed by: PHYSICIAN ASSISTANT

## 2019-03-27 PROCEDURE — 81015 MICROSCOPIC EXAM OF URINE: CPT | Performed by: PHYSICIAN ASSISTANT

## 2019-03-27 PROCEDURE — 87077 CULTURE AEROBIC IDENTIFY: CPT | Performed by: PHYSICIAN ASSISTANT

## 2019-04-18 ENCOUNTER — APPOINTMENT (OUTPATIENT)
Dept: MRI IMAGING | Facility: HOSPITAL | Age: 84
DRG: 563 | End: 2019-04-18
Attending: EMERGENCY MEDICINE
Payer: MEDICARE

## 2019-04-18 ENCOUNTER — HOSPITAL ENCOUNTER (INPATIENT)
Facility: HOSPITAL | Age: 84
LOS: 3 days | Discharge: NURSING, REHAB OR LONG-TERM CARE | DRG: 563 | End: 2019-04-23
Attending: EMERGENCY MEDICINE | Admitting: INTERNAL MEDICINE
Payer: MEDICARE

## 2019-04-18 ENCOUNTER — OFFICE VISIT (OUTPATIENT)
Dept: INTERNAL MEDICINE CLINIC | Facility: CLINIC | Age: 84
End: 2019-04-18
Payer: MEDICARE

## 2019-04-18 VITALS
WEIGHT: 130 LBS | TEMPERATURE: 98 F | BODY MASS INDEX: 23.04 KG/M2 | HEART RATE: 118 BPM | SYSTOLIC BLOOD PRESSURE: 112 MMHG | HEIGHT: 63 IN | DIASTOLIC BLOOD PRESSURE: 62 MMHG

## 2019-04-18 DIAGNOSIS — M54.59 INTRACTABLE LOW BACK PAIN: Primary | ICD-10-CM

## 2019-04-18 PROCEDURE — 99222 1ST HOSP IP/OBS MODERATE 55: CPT | Performed by: INTERNAL MEDICINE

## 2019-04-18 PROCEDURE — 72148 MRI LUMBAR SPINE W/O DYE: CPT | Performed by: EMERGENCY MEDICINE

## 2019-04-18 RX ORDER — ACETAMINOPHEN 500 MG
1000 TABLET ORAL EVERY 8 HOURS PRN
Status: DISCONTINUED | OUTPATIENT
Start: 2019-04-18 | End: 2019-04-23

## 2019-04-18 RX ORDER — MORPHINE SULFATE 2 MG/ML
2 INJECTION, SOLUTION INTRAMUSCULAR; INTRAVENOUS EVERY 2 HOUR PRN
Status: DISCONTINUED | OUTPATIENT
Start: 2019-04-18 | End: 2019-04-20

## 2019-04-18 RX ORDER — MORPHINE SULFATE 2 MG/ML
1 INJECTION, SOLUTION INTRAMUSCULAR; INTRAVENOUS EVERY 2 HOUR PRN
Status: DISCONTINUED | OUTPATIENT
Start: 2019-04-18 | End: 2019-04-20

## 2019-04-18 RX ORDER — MORPHINE SULFATE 4 MG/ML
4 INJECTION, SOLUTION INTRAMUSCULAR; INTRAVENOUS ONCE
Status: COMPLETED | OUTPATIENT
Start: 2019-04-18 | End: 2019-04-18

## 2019-04-18 RX ORDER — MORPHINE SULFATE 4 MG/ML
4 INJECTION, SOLUTION INTRAMUSCULAR; INTRAVENOUS EVERY 2 HOUR PRN
Status: DISCONTINUED | OUTPATIENT
Start: 2019-04-18 | End: 2019-04-20

## 2019-04-18 RX ORDER — HYDROCODONE BITARTRATE AND ACETAMINOPHEN 7.5; 325 MG/1; MG/1
1 TABLET ORAL EVERY 6 HOURS PRN
Status: DISCONTINUED | OUTPATIENT
Start: 2019-04-18 | End: 2019-04-20

## 2019-04-18 RX ORDER — LATANOPROST 50 UG/ML
1 SOLUTION/ DROPS OPHTHALMIC NIGHTLY
Status: DISCONTINUED | OUTPATIENT
Start: 2019-04-18 | End: 2019-04-23

## 2019-04-18 RX ORDER — HEPARIN SODIUM 5000 [USP'U]/ML
5000 INJECTION, SOLUTION INTRAVENOUS; SUBCUTANEOUS EVERY 8 HOURS SCHEDULED
Status: DISCONTINUED | OUTPATIENT
Start: 2019-04-18 | End: 2019-04-23

## 2019-04-18 RX ORDER — ESCITALOPRAM OXALATE 10 MG/1
20 TABLET ORAL DAILY
Status: DISCONTINUED | OUTPATIENT
Start: 2019-04-19 | End: 2019-04-23

## 2019-04-18 RX ORDER — ONDANSETRON 2 MG/ML
4 INJECTION INTRAMUSCULAR; INTRAVENOUS EVERY 4 HOURS PRN
Status: DISCONTINUED | OUTPATIENT
Start: 2019-04-18 | End: 2019-04-23

## 2019-04-18 RX ORDER — ALPRAZOLAM 0.25 MG/1
0.25 TABLET ORAL 2 TIMES DAILY PRN
Status: DISCONTINUED | OUTPATIENT
Start: 2019-04-18 | End: 2019-04-23

## 2019-04-18 RX ORDER — MELATONIN
325 2 TIMES DAILY WITH MEALS
Status: DISCONTINUED | OUTPATIENT
Start: 2019-04-18 | End: 2019-04-23

## 2019-04-18 RX ORDER — NITROFURANTOIN MACROCRYSTALS 50 MG/1
50 CAPSULE ORAL DAILY
Status: DISCONTINUED | OUTPATIENT
Start: 2019-04-19 | End: 2019-04-19

## 2019-04-18 RX ORDER — KETOROLAC TROMETHAMINE 15 MG/ML
15 INJECTION, SOLUTION INTRAMUSCULAR; INTRAVENOUS EVERY 6 HOURS PRN
Status: ACTIVE | OUTPATIENT
Start: 2019-04-18 | End: 2019-04-20

## 2019-04-18 RX ORDER — PANTOPRAZOLE SODIUM 40 MG/1
40 TABLET, DELAYED RELEASE ORAL
Status: DISCONTINUED | OUTPATIENT
Start: 2019-04-19 | End: 2019-04-23

## 2019-04-18 RX ORDER — CHOLECALCIFEROL (VITAMIN D3) 125 MCG
1000 CAPSULE ORAL DAILY
Status: DISCONTINUED | OUTPATIENT
Start: 2019-04-19 | End: 2019-04-23

## 2019-04-18 RX ORDER — LIDOCAINE 50 MG/G
1 PATCH TOPICAL EVERY 24 HOURS
Status: DISCONTINUED | OUTPATIENT
Start: 2019-04-18 | End: 2019-04-23

## 2019-04-18 RX ORDER — HYDROCODONE BITARTRATE AND ACETAMINOPHEN 5; 325 MG/1; MG/1
1 TABLET ORAL EVERY 6 HOURS PRN
Status: DISCONTINUED | OUTPATIENT
Start: 2019-04-18 | End: 2019-04-23

## 2019-04-18 NOTE — ED INITIAL ASSESSMENT (HPI)
Pt sent in by Dr. Blayne Mcdermott for mri of spine and pain control, pt c/o R lower back pain. Pt's son states pt had a fall approx 4 weeks ago.

## 2019-04-18 NOTE — PROGRESS NOTES
La Ball is a 80year old female. Patient presents with:  Back Pain: Patient is here today with right lower back pain for the past week. No injuries occurred. Pain is constant-- worse with sitting, standing, or moving. Laying down helps slightly.  She daily for 7 days. Disp: 14 tablet Rfl: 0   omeprazole 20 MG Oral Capsule Delayed Release Take 20 mg by mouth every morning before breakfast. Disp:  Rfl:    HYDROcodone-acetaminophen 5-325 MG Oral Tab Take 0.5 tablets by mouth nightly as needed for Pain.  Marisa Aleman nausea, vomiting, diarrhea    Wt Readings from Last 5 Encounters:  04/18/19 : 128 lb (58.1 kg)  04/18/19 : 130 lb (59 kg)  04/13/19 : 130 lb (59 kg)  03/27/19 : 128 lb (58.1 kg)  01/22/19 : 134 lb (60.8 kg)    Body mass index is 23.03 kg/m².       EXAM:   B

## 2019-04-19 ENCOUNTER — APPOINTMENT (OUTPATIENT)
Dept: GENERAL RADIOLOGY | Facility: HOSPITAL | Age: 84
DRG: 563 | End: 2019-04-19
Attending: INTERNAL MEDICINE
Payer: MEDICARE

## 2019-04-19 ENCOUNTER — APPOINTMENT (OUTPATIENT)
Dept: NUCLEAR MEDICINE | Facility: HOSPITAL | Age: 84
DRG: 563 | End: 2019-04-19
Attending: INTERNAL MEDICINE
Payer: MEDICARE

## 2019-04-19 PROCEDURE — 99232 SBSQ HOSP IP/OBS MODERATE 35: CPT | Performed by: INTERNAL MEDICINE

## 2019-04-19 PROCEDURE — 71045 X-RAY EXAM CHEST 1 VIEW: CPT | Performed by: INTERNAL MEDICINE

## 2019-04-19 PROCEDURE — 78582 LUNG VENTILAT&PERFUS IMAGING: CPT | Performed by: INTERNAL MEDICINE

## 2019-04-19 RX ORDER — SULFAMETHOXAZOLE AND TRIMETHOPRIM 800; 160 MG/1; MG/1
1 TABLET ORAL EVERY 12 HOURS SCHEDULED
Status: COMPLETED | OUTPATIENT
Start: 2019-04-19 | End: 2019-04-21

## 2019-04-19 RX ORDER — HYDROCODONE BITARTRATE AND ACETAMINOPHEN 7.5; 325 MG/1; MG/1
1 TABLET ORAL EVERY 6 HOURS PRN
Qty: 30 TABLET | Refills: 0 | Status: SHIPPED | OUTPATIENT
Start: 2019-04-19 | End: 2019-05-14

## 2019-04-19 RX ORDER — CYCLOBENZAPRINE HCL 10 MG
10 TABLET ORAL 3 TIMES DAILY
Status: DISCONTINUED | OUTPATIENT
Start: 2019-04-19 | End: 2019-04-19

## 2019-04-19 RX ORDER — POTASSIUM CHLORIDE 20 MEQ/1
40 TABLET, EXTENDED RELEASE ORAL EVERY 4 HOURS
Status: ACTIVE | OUTPATIENT
Start: 2019-04-19 | End: 2019-04-19

## 2019-04-19 NOTE — ED PROVIDER NOTES
Patient Seen in: [facility] Emergency Department    History   Patient presents with:  Back Pain      HPI    Patient presents to the ED after being sent by her primary care physician for low back pain for the past 4 weeks.   She states pain is uncon left   • OTHER SURGICAL HISTORY  07/19/00    Cystourethroscopy with Urethral Calibration and Dilation with Bilateral Retrograde Pyelogram and Left Ureteral Stone Manipulation with insertion of Double-J Stent   • OTHER SURGICAL HISTORY  June 2015    R eye s exhibits no discharge. Neck: No tracheal deviation present. Cardiovascular: Normal rate and intact distal pulses. Pulmonary/Chest: Effort normal. No stridor. No respiratory distress. Abdominal: Soft. She exhibits no distension.    Musculoskeletal: S prominent at L4-L5, where there is severe narrowing of the canal and bilateral neural foramen. Severe narrowing of the left neural foramen at L5-S1. Bony encroachment upon the exiting bilateral L4 and left L5 nerve roots.   Acute appearing hide-grade stra Plan     Clinical Impression:  Intractable low back pain  (primary encounter diagnosis)    Disposition:  Admit    Follow-up:  No follow-up provider specified.     Medications Prescribed:  Current Discharge Medication List        Present on Admission  Date R

## 2019-04-19 NOTE — H&P
[facility] HOSP - [place] [facility]    History and Physical    Benedict Hayley Patient Status:  Emergency    1935 MRN [id number]   Location [address] Attending Farhan Aldrich MD   Hosp Day # 0 PCP Fredo Ugarte MD     Date: L4-L5, where there is severe narrowing of the canal and bilateral neural foramen. Severe narrowing of the left neural foramen at L5-S1. Bony encroachment upon the exiting bilateral L4 and left L5 nerve roots.      Acute appearing hide-grade strain o Tobacco Use      Smoking status: Former Smoker        Packs/day: 0.25        Years: 10.00        Pack years: 2.5        Types: Cigarettes        Quit date: 2016        Years since quittin.6      Smokeless tobacco: Never Used    Alcohol use:  No at L5-S1. Bony encroachment upon the exiting bilateral L4 and left L5 nerve roots. Acute appearing hide-grade strain of the left paraspinal musculature at L1-L2.     Dictated by (CST): Denia Pope MD on 4/18/2019 at 18:32     Approved by (CST): Grant Odell pain    Has seen Dr. Rodolfo Ricardo (ortho spine at [address]). Referred to pain specialist, Dr. Alonso Vidales at [facility] [address] [place] [address]. Had facet injections in 8/2018 which helped tremendously.   Saw Dr. Jamal Bustamante again 9/25/18; he ordered

## 2019-04-19 NOTE — PROGRESS NOTES
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 Severe narrowing of the left neural foramen at L5-S1.   Bony encroachment upon the exiting normal; suspect this is related to large hiatal hernia and COPD; patient to get up in chair, added incentive spirometry today. Hypercholesterolemia  Cont with healthy diet. History of recurrent UTIs, nephrolithiasis   Followed by [facility].  On

## 2019-04-19 NOTE — PLAN OF CARE
Pt desat on room air 88%, placed on O2-2L. Now spo2 93-95%. Denies SOB/CP or respiratory distress. Dr. Tamia Beth notified, ordered routine portable chest x-ray.

## 2019-04-19 NOTE — CM/SW NOTE
Addend 518p:     SW met w/ pt, pt's  Prudencio Jacobsen and daughter to discuss eventual discharge plans. SW discussed 24hr care & supervision, caregivers, HHC, and respite care at [facility]. Resources provided.     Pt's family requesting respite care referrals to [facility]

## 2019-04-19 NOTE — PHYSICAL THERAPY NOTE
PHYSICAL THERAPY EVALUATION - INPATIENT     Room Number: 515/515-A  Evaluation Date: 4/19/2019  Type of Evaluation: Initial   Physician Order: PT Eval and Treat(low back pain)    Presenting Problem: p/w acute R sided LBP  Reason for Therapy: Mobility Dysf appropriate. Pt left seated in bedside chair, all needs in place, ALARM ON, RN aware. Patient's current functional deficits include acute low back pain, decreased activity tolerance, balance deficits, which are below the patient's pre-admission status. 10/29/2015    Performed by Jessika Kimball MD at [address] [place] [address]   • ESWL LITHOTRIPSY WITH CYSTOSCOPY, [address] [place] [address] Drive,[facility] [address] Right 9/24/2015    Performed by Jessika Kimball MD at [address] [place] [address]   • HOLMIUM  LITHOTRIPSY LASER WITH CYSTOSCOPY Ri ASSESSMENT  Upper extremity ROM and strength are within functional limits     Lower extremity ROM is within functional limits     Lower extremity strength is within functional limits     BALANCE  Static Sitting: Fair -  Dynamic Sitting: Fair -  Static Mitchell training  spine precautions, pacing, walking program    Patient End of Session: Up in chair;Needs met;Call light within reach;RN aware of session/findings; All patient questions and concerns addressed; Alarm set(reclined)    CURRENT GOALS    Goals to be met

## 2019-04-19 NOTE — PLAN OF CARE
Problem: Patient/Family Goals  Goal: Patient/Family Long Term Goal  Description  Patient's Long Term Goal: Return to baseline level of function    Interventions:  - PT/OT ordered. - Pain management.   - Assist with ADLs  - Follow POC  - See additional Ca

## 2019-04-19 NOTE — PLAN OF CARE
Problem: GENITOURINARY - ADULT  Goal: Absence of urinary retention  Description  INTERVENTIONS:  - Assess patient’s ability to void and empty bladder  - Monitor intake/output and perform bladder scan as needed  - Follow urinary retention protocol/standar done     Problem: Patient/Family Goals  Goal: Patient/Family Long Term Goal  Description  Patient's Long Term Goal: Return to baseline level of function    Interventions:  - PT/OT ordered. - Pain management.   - Assist with ADLs  - Follow POC  - See additi

## 2019-04-20 ENCOUNTER — APPOINTMENT (OUTPATIENT)
Dept: CV DIAGNOSTICS | Facility: HOSPITAL | Age: 84
DRG: 563 | End: 2019-04-20
Attending: INTERNAL MEDICINE
Payer: MEDICARE

## 2019-04-20 PROBLEM — J96.01 ACUTE RESPIRATORY FAILURE WITH HYPOXIA (HCC): Status: ACTIVE | Noted: 2019-04-20

## 2019-04-20 PROCEDURE — 93306 TTE W/DOPPLER COMPLETE: CPT | Performed by: INTERNAL MEDICINE

## 2019-04-20 PROCEDURE — 99232 SBSQ HOSP IP/OBS MODERATE 35: CPT | Performed by: INTERNAL MEDICINE

## 2019-04-20 RX ORDER — IPRATROPIUM BROMIDE AND ALBUTEROL SULFATE 2.5; .5 MG/3ML; MG/3ML
3 SOLUTION RESPIRATORY (INHALATION) 2 TIMES DAILY
Status: DISCONTINUED | OUTPATIENT
Start: 2019-04-20 | End: 2019-04-23

## 2019-04-20 NOTE — PROGRESS NOTES
[facility]    Progress Note    Rachel Patel Patient Status:  Observation    1935 MRN [id number]   Location [facility]5W Attending Delmy Spear MD   Hosp Day # 0 PCP Marcus Dickens MD       SUBJECTIVE:  Did not sleep Corrected on: 4/19/2019;   PROCEDURE: MRI SPINE LUMBAR (CPT=72148)  COMPARISON: None. INDICATIONS: Chronic lower right sided back pain with new onset of incontinence.   TECHNIQUE: A variety of imaging planes and parameters were utilized for visualization o desiccation with bulging disk, facet, and ligamentous hypertrophy results in moderate central canal narrowing and moderate right and severe left neural foraminal narrowing. There is effacement of the left lateral and subarticular recesses.  There is bony e Perfusion Scan  (JXO=63666)    Result Date: 4/19/2019  CONCLUSION: Ventilation/perfusion lung scan findings are grossly unchanged from the previous study. There is no evidence for acute pulmonary embolism.  The retained radiotracer is most suggestive of pr started on bactrim)     Osteoporosis  DEXA in 7/2017 shows osteoporosis of left femoral neck (T -2.9).  Pt was briefly on fosamax in 2014.  She was advised in 7/2017 to restart fosamax but declined.    Vitamin D deficiency    On weekly vitamin D   Vitamin B

## 2019-04-20 NOTE — DIETARY NOTE
ADULT NUTRITION INITIAL ASSESSMENT    Pt is at moderate nutrition risk. Pt does not meet malnutrition criteria.       RECOMMENDATIONS TO MD:  See Nutrition Intervention     NUTRITION DIAGNOSIS/PROBLEM:  Unintentional weight loss related to decreased abilit Spinal stenosis    • Squamous cell cancer of lip    • Thyroid nodule    • Urinary frequency    • Vitamin D deficiency        ANTHROPOMETRICS:  HT: 160 cm (5' 3\")  WT: 56 kg (123 lb 6.4 oz)   BMI:  21.86  BMI CLASSIFICATION: 19-24.9 kg/m2 - WNL   IBW: 115 Accumulation: none   - Skin Integrity: intact.  - Daryl score 18    NUTRITION PRESCRIPTION:  Diet: Regular/General  Oral Supplements: Strawberry Ensure at Bk and Liu Tate at D  ESTIMATED NUTRITION NEEDS:  Calories: 3053-4940 calories/day (26-30 calori

## 2019-04-20 NOTE — CM/SW NOTE
Pt has been made inpatient as of 4/20. Dtr stated she would like pt to go to [facility] at discharge. SW discussed limited beds and recommended a back up plan.      Dtr is aware that previous referrals have already been made to [facility], due to

## 2019-04-20 NOTE — PHYSICAL THERAPY NOTE
PHYSICAL THERAPY TREATMENT NOTE - INPATIENT     Room Number: 853/779-V       Presenting Problem: p/w acute R sided LBP    Problem List  Principal Problem:    Intractable low back pain  Active Problems:    Muscle strain    Acute respiratory failure with hyp '6-Clicks' INPATIENT SHORT FORM - BASIC MOBILITY  How much difficulty does the patient currently have. ..  -   Turning over in bed (including adjusting bedclothes, sheets and blankets)?: A Little   -   Sitting down on and standing up from a chair with arms Status NOT TESTED   Goal #5 Patient to demonstrate independence with home activity/exercise instructions provided to patient in preparation for discharge.    Goal #5   Current Status N/T   Goal #6     Goal #6  Current Status

## 2019-04-21 PROCEDURE — 99232 SBSQ HOSP IP/OBS MODERATE 35: CPT | Performed by: INTERNAL MEDICINE

## 2019-04-21 RX ORDER — QUETIAPINE 25 MG/1
25 TABLET, FILM COATED ORAL NIGHTLY
Status: DISCONTINUED | OUTPATIENT
Start: 2019-04-21 | End: 2019-04-23

## 2019-04-21 NOTE — PLAN OF CARE
Problem: GENITOURINARY - ADULT  Goal: Absence of urinary retention  Description  INTERVENTIONS:  - Assess patient?s ability to void and empty bladder  - Monitor intake/output and perform bladder scan as needed  - Follow urinary retention protocol/standar mobility, tolerated well, will continue to monitor, family at bedside. Alarms and audible. Call light within reach.

## 2019-04-21 NOTE — PROGRESS NOTES
[facility] HOSP - [facility]    Progress Note    Mitchell Galeazzi Patient Status:  Inpatient    1935 MRN [id number]   Location [facility]5W Attending Vamshi Grace MD   Hosp Day # 1 PCP Demarcus Yu MD       SUBJECTIVE:  Reports pain to BILT  --   --   --  0.3   TP  --   --   --  6.7         Imaging:  Mri Spine Lumbar (rav=[id number])    Addendum Date: 4/19/2019    CORRECTION Corrected on: 4/19/2019;   PROCEDURE: MRI SPINE LUMBAR (CPT=72148)  COMPARISON: None.   INDICATIONS: Chronic lower rig encroachment upon the exiting bilateral L4 nerve roots. There is effacement of the bilateral subarticular and lateral recesses.  L5-S1: Disk desiccation with bulging disk, facet, and ligamentous hypertrophy results in moderate central canal narrowing and m (CST): Bertram Adame MD on 4/19/2019 at 7:00     Approved by (CST):  Bertram Adame MD on 4/19/2019 at 7:06          Nm Lung Vq Vent / Perfusion Scan  (POP=25819)    Result Date: 4/19/2019  CONCLUSION: Ventilation/perfusion lung scan findings are bebeto recurrent UTIs, nephrolithiasis   Followed by Gallo Jara. On macrobid 50mg qd for prophylaxis. (held as pt started on bactrim)     Osteoporosis  DEXA in 7/2017 shows osteoporosis of left femoral neck (T -2.9).   Pt was briefly on fosamax in 2014.  She w made inpatient admission due to hypoxic respiratory failure. SW consulted for rehab placement.  Stable for discharge when bed available        Sean Vergara DO  4/21/2019  10:35 AM

## 2019-04-22 RX ORDER — NITROFURANTOIN 25; 75 MG/1; MG/1
50 CAPSULE ORAL DAILY
Status: DISCONTINUED | OUTPATIENT
Start: 2019-04-22 | End: 2019-04-22

## 2019-04-22 RX ORDER — NITROFURANTOIN MACROCRYSTALS 50 MG/1
50 CAPSULE ORAL NIGHTLY
Status: DISCONTINUED | OUTPATIENT
Start: 2019-04-22 | End: 2019-04-23

## 2019-04-22 RX ORDER — 0.9 % SODIUM CHLORIDE 0.9 %
VIAL (ML) INJECTION
Status: COMPLETED
Start: 2019-04-22 | End: 2019-04-22

## 2019-04-22 NOTE — PLAN OF CARE
Problem: GENITOURINARY - ADULT  Goal: Absence of urinary retention  Description  INTERVENTIONS:  - Assess patient?s ability to void and empty bladder  - Monitor intake/output and perform bladder scan as needed  - Follow urinary retention protocol/standar ordered  - Assess for signs and symptoms of hyperglycemia and hypoglycemia  - Administer ordered medications to maintain glucose within target range  - Assess barriers to adequate nutritional intake and initiate nutrition consult as needed  - Instruct surekha

## 2019-04-22 NOTE — PHYSICAL THERAPY NOTE
PHYSICAL THERAPY TREATMENT NOTE - INPATIENT     Room Number: 289/583-S       Presenting Problem: p/w acute R sided LBP    Problem List  Principal Problem:    Intractable low back pain  Active Problems:    Muscle strain    Acute respiratory failure with hyp Plan: Bed mobility; Body mechanics; Endurance; Energy conservation;Patient education;Gait training;Strengthening;Transfer training;Balance training;Stair training(reclined in chair)    SUBJECTIVE  \"I didn't want to walk but I did because you said I should. \" verbal/tactile cues for walker management and safety    Patient End of Session: Up in chair;Needs met;Call light within reach;RN aware of session/findings; All patient questions and concerns addressed; Alarm set; Other (Comment)(reclined in chair)    CURRENT

## 2019-04-22 NOTE — PLAN OF CARE
Problem: Patient/Family Goals  Goal: Patient/Family Long Term Goal  Description  Patient's Long Term Goal: Return to baseline level of function    Interventions:  - PT/OT ordered. - Pain management.   - Assist with ADLs  - Follow POC  - See additional Ca response  - Implement non-pharmacological measures as appropriate and evaluate response  - Consider cultural and social influences on pain and pain management  - Manage/alleviate anxiety  - Utilize distraction and/or relaxation techniques  - Monitor for op

## 2019-04-22 NOTE — CM/SW NOTE
Previous notation indicates plan for snf placement at [place]. Top preference is [facility]. SW spoke with Taina who states they may have a bed if pt is stable for dc tomorrow. Backup plan indicated at [facility] or [facility].  Pt has been accept

## 2019-04-22 NOTE — PROGRESS NOTES
[facility]    Progress Note    Benedict Hazy Patient Status:  Inpatient    1935 MRN [id number]   Location [facility]5W Attending Valentin Greenberg MD   Hosp Day # 2 PCP Fredo Ugarte MD       SUBJECTIVE:  Pt has a little Intractable right low back pain  Pt with significant lumbar disc disease on MRI but no radicular symptoms.  The MRI also revealed extensive edema within the right paraspinal musculature at L1-L2 compatible with high-grade muscle strain -- this would normal in past, but iron levels low (ferritin 8, sats 15%).   On FeSO4 325mg BID.  Hgb normal currently 12.7.   Lumbar spinal stenosis, chronic back pain    Has seen Dr. Rakan Iglesias (ortho spine at [address]).  Referred to pain specialist, Dr. Carolina Cohen

## 2019-04-23 VITALS
TEMPERATURE: 98 F | HEIGHT: 63 IN | DIASTOLIC BLOOD PRESSURE: 62 MMHG | WEIGHT: 123.38 LBS | BODY MASS INDEX: 21.86 KG/M2 | OXYGEN SATURATION: 92 % | HEART RATE: 102 BPM | SYSTOLIC BLOOD PRESSURE: 128 MMHG | RESPIRATION RATE: 18 BRPM

## 2019-04-23 PROCEDURE — 99239 HOSP IP/OBS DSCHRG MGMT >30: CPT | Performed by: INTERNAL MEDICINE

## 2019-04-23 RX ORDER — ALPRAZOLAM 0.25 MG/1
0.25 TABLET ORAL 2 TIMES DAILY PRN
Qty: 60 TABLET | Refills: 0 | Status: SHIPPED | OUTPATIENT
Start: 2019-04-23 | End: 2019-05-14

## 2019-04-23 RX ORDER — IPRATROPIUM BROMIDE AND ALBUTEROL SULFATE 2.5; .5 MG/3ML; MG/3ML
3 SOLUTION RESPIRATORY (INHALATION) EVERY 6 HOURS PRN
Qty: 1 CONTAINER | Refills: 0 | Status: SHIPPED | COMMUNITY
Start: 2019-04-23 | End: 2019-12-19

## 2019-04-23 RX ORDER — IPRATROPIUM BROMIDE AND ALBUTEROL SULFATE 2.5; .5 MG/3ML; MG/3ML
3 SOLUTION RESPIRATORY (INHALATION) EVERY 6 HOURS PRN
Status: DISCONTINUED | OUTPATIENT
Start: 2019-04-23 | End: 2019-04-23

## 2019-04-23 RX ORDER — QUETIAPINE 25 MG/1
25 TABLET, FILM COATED ORAL NIGHTLY
Qty: 30 TABLET | Refills: 0 | Status: SHIPPED | OUTPATIENT
Start: 2019-04-23 | End: 2019-05-14

## 2019-04-23 RX ORDER — ACETAMINOPHEN 500 MG
1000 TABLET ORAL EVERY 8 HOURS PRN
Qty: 30 TABLET | Refills: 0 | Status: SHIPPED | COMMUNITY
Start: 2019-04-23

## 2019-04-23 RX ORDER — LIDOCAINE 50 MG/G
1 PATCH TOPICAL EVERY 24 HOURS
Qty: 30 PATCH | Refills: 0 | Status: SHIPPED | COMMUNITY
Start: 2019-04-23 | End: 2020-12-08 | Stop reason: ALTCHOICE

## 2019-04-23 NOTE — CM/SW NOTE
RN informed SW that pt is medically stable to discharge today and can transport via [facility]. Davis Hein from [facility] stated they do not have beds available. Pt's family agreeable w/ [facility].  JAMES spoke w/ Efren Matias from [facility] and arranged medicar transport to [facility]

## 2019-04-23 NOTE — DISCHARGE SUMMARY
[facility] - [facility]    Discharge Summary    Date of Admission:  4/18/2019  Date of Discharge;  4/23/19    Discharge dx:  Acute right paraspinal muscle strain with intractable pain  UTI  Acute respiratory failure 2/2 COPD     History provided by:pa neural foramen.     Severe narrowing of the left neural foramen at L5-S1.     Bony encroachment upon the exiting bilateral L4 and left L5 nerve roots.     Acute appearing hide-grade strain of the right paraspinal musculature at L1-L2.     Pt admitted for p Use      Smoking status: Former Smoker        Packs/day: 0.25        Years: 10.00        Pack years: 2.5        Types: Cigarettes        Quit date: 2016        Years since quittin.6      Smokeless tobacco: Never Used    Alcohol use: No      Alcoho BP: 134 / 60 ------------------------------------------------------------------- Indications:      Dyspnea.  ------------------------------------------------------------------- Ordering MD: Eliza Wilkerson  Interpreting physician:       Rogelio Fong MD Son parameters are consistent with abnormal left ventricular relaxation (grade 1 diastolic dysfunction). Aortic valve:   Structurally normal valve. Trileaflet; normal thickened leaflets. Doppler: There was no stenosis. Mild regurgitation. Aorta:   Aortic 43    ml     46 - 775  LV end-systolic volume, 2-p                 18    ml     14 - 42  LV ejection fraction, 2-p                   58    %      54 - 74  LV end-diastolic volume, Teichholz  (L)     31    ml     56 - 104  MM  LV end-diastolic volume/bsa, 70.6  cm/sec ---------  Mitral A-wave peak velocity                 133   cm/sec ---------  Mitral deceleration time                    383   ms     ---------  Mitral pressure half-time                   86    ms     ---------  Mitral mean gradient, D Vitamin B-12 (VITAMIN B12) tab 1,000 mcg 1,000 mcg Oral Daily   Pantoprazole Sodium (PROTONIX) EC tab 40 mg 40 mg Oral QAM AC   HYDROcodone-acetaminophen (NORCO) 5-325 MG per tab 1 tablet 1 tablet Oral Q6H PRN   acetaminophen (TYLENOL EXTRA STRENGTH) tab Vitamin D deficiency    On weekly vitamin D   Vitamin B12 deficiency    On Vit B12 1000mcg/day  Hx of iron deficiency anemia  Had EGD/C-scope in 2010 (Dr. Santos Fails) -- negative.  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  Discussed with pt as well as with  and daughter, Fransisco Bruner, by phone. Pt may ultimate need a caregiver at home. Pt has been trying to convince her  to move to a senior living facility but  states he is not ready.     >30 min spent Quantity:  13 capsule  Refills:  3     ferrous sulfate 325 (65 FE) MG Tbec      Take 325 mg by mouth 2 (two) times daily.    Quantity:  1 tablet  Refills:  0     latanoprost 0.005 % Soln  Commonly known as:  XALATAN      Place 1 drop into both eyes every ev

## 2019-04-24 ENCOUNTER — TELEPHONE (OUTPATIENT)
Dept: INTERNAL MEDICINE CLINIC | Facility: CLINIC | Age: 84
End: 2019-04-24

## 2019-04-24 NOTE — TELEPHONE ENCOUNTER
Daughter Socorro Méndez calling. Patient is in [facility], will be transferring to [facility] in a few days. Daughter is asking for something to help patient with her anxiety at night to help her sleep the next two nights. Taking Sweroquel at night now.  If pa

## 2019-04-24 NOTE — TELEPHONE ENCOUNTER
Spoke with Lory Lefort. Pt was disoriented at [facility]. At 2:45am she was given Xanax. Rec that she discuss with the nurse that she receive xanax at bedtime for anxiety, along with the Seroquel. Lory Lefort will keep me updated.

## 2019-04-25 NOTE — TELEPHONE ENCOUNTER
Pt's daughter, Brando Mccoy, is calling regarding the pt being seen at [facility] today. Pt does not know who she saw, but daughter is thinking it was Dr Tatianna Castillo. Daughter is looking for a call back regarding some medication changes per the pt.      Best call back

## 2019-04-25 NOTE — TELEPHONE ENCOUNTER
Tram Pichardo would love to fill you in about this patient, but I dropped the Seroquel to 12.5 mg nightly.   Her daytimes have been a little more somnolent than normal, and if she is adjusting well to the environment at [facility], so hopefully I can get rid of

## 2019-04-26 NOTE — TELEPHONE ENCOUNTER
Noted, spoke with ES, patient may transfer from my facility to [facility], I will help coordinate this discharge if they choose so, I have cut down the Seroquel to 12.5 mg per now at night, no other complaints based on reports from any nursing staff or the

## 2019-04-26 NOTE — TELEPHONE ENCOUNTER
King Wero, that sounds fine except that the daughter really felt the seroquel helped with her messed up sleep-wake cycle and helped with her agitation. Maybe the 12.5mg will be better for her? I mean I'll certainly defer to psychiatry.   I think she's start

## 2019-05-03 ENCOUNTER — TELEPHONE (OUTPATIENT)
Dept: INTERNAL MEDICINE CLINIC | Facility: CLINIC | Age: 84
End: 2019-05-03

## 2019-05-03 NOTE — TELEPHONE ENCOUNTER
Patient daughter Jessica Prior calling for patient. Sunday patient being transferred back home with home care. Asking that Dr. Desiree Trimble and psychiatrist discuss with each other patients medications to have a control medication setup.    Would like to talk to

## 2019-05-06 ENCOUNTER — TELEPHONE (OUTPATIENT)
Dept: INTERNAL MEDICINE CLINIC | Facility: CLINIC | Age: 84
End: 2019-05-06

## 2019-05-06 NOTE — TELEPHONE ENCOUNTER
To Dr. Nayely Harris - would you please address verbal approval for home visit in Dr. Patricia Fuentes' absence since home visit scheduled for tomorrow?   To Dr. Patricia Fuentes -

## 2019-05-06 NOTE — TELEPHONE ENCOUNTER
Please call [facility]  Will Dr Dakotah Chaudhari follow and sign orders/plan of care for skilled nursing, PT and OT?   Pt discharged from [facility] on 5/5/19  Need approval for home visit scheduled for tomorrow, 5/7/19  Verbal order ok, ok to

## 2019-05-06 NOTE — TELEPHONE ENCOUNTER
To Dr. Nito Polanco - pt's psychiatrist will call you. Daughter states pt is on lexapro for depression, xanex for anxiety, ambien to sleep. She will call back with dosages.   Pt has weaned off [address].  Pt has been eating 3 well-rounded nutritious meals daily,

## 2019-05-06 NOTE — TELEPHONE ENCOUNTER
Called Sara Stout from [facility] and relayed DR. DC message - left on Confidential VM

## 2019-05-07 NOTE — TELEPHONE ENCOUNTER
To Dr. Nito Polanco - see all below - looks like we missed the 1:00PM time . Tania Adkins  .Dr. Pako Friedman: [phone]

## 2019-05-07 NOTE — TELEPHONE ENCOUNTER
Pt psychiatrist Kimberly Valentin like to speak to  today around 1pm just to give her some updates on what's going on with pt.

## 2019-05-14 ENCOUNTER — OFFICE VISIT (OUTPATIENT)
Dept: INTERNAL MEDICINE CLINIC | Facility: CLINIC | Age: 84
End: 2019-05-14
Payer: MEDICARE

## 2019-05-14 VITALS
DIASTOLIC BLOOD PRESSURE: 68 MMHG | TEMPERATURE: 98 F | BODY MASS INDEX: 22.36 KG/M2 | WEIGHT: 126.19 LBS | HEIGHT: 63 IN | SYSTOLIC BLOOD PRESSURE: 128 MMHG

## 2019-05-14 DIAGNOSIS — E78.00 HYPERCHOLESTEROLEMIA: ICD-10-CM

## 2019-05-14 DIAGNOSIS — F41.9 ANXIETY AND DEPRESSION: ICD-10-CM

## 2019-05-14 DIAGNOSIS — F32.A ANXIETY AND DEPRESSION: ICD-10-CM

## 2019-05-14 DIAGNOSIS — J44.9 CHRONIC OBSTRUCTIVE PULMONARY DISEASE, UNSPECIFIED COPD TYPE (HCC): Primary | ICD-10-CM

## 2019-05-14 DIAGNOSIS — T14.8XXA MUSCLE STRAIN: ICD-10-CM

## 2019-05-14 DIAGNOSIS — E53.8 VITAMIN B12 DEFICIENCY: ICD-10-CM

## 2019-05-14 DIAGNOSIS — Z87.440 HISTORY OF RECURRENT UTIS: ICD-10-CM

## 2019-05-14 DIAGNOSIS — D50.9 IRON DEFICIENCY ANEMIA, UNSPECIFIED IRON DEFICIENCY ANEMIA TYPE: ICD-10-CM

## 2019-05-14 PROCEDURE — 99214 OFFICE O/P EST MOD 30 MIN: CPT | Performed by: INTERNAL MEDICINE

## 2019-05-14 PROCEDURE — 1111F DSCHRG MED/CURRENT MED MERGE: CPT | Performed by: INTERNAL MEDICINE

## 2019-05-14 PROCEDURE — G0463 HOSPITAL OUTPT CLINIC VISIT: HCPCS | Performed by: INTERNAL MEDICINE

## 2019-05-14 RX ORDER — ZOLPIDEM TARTRATE 5 MG/1
5 TABLET ORAL EVERY EVENING
Qty: 30 TABLET | Refills: 5 | Status: SHIPPED
Start: 2019-05-14 | End: 2020-09-10

## 2019-05-14 RX ORDER — ESCITALOPRAM OXALATE 20 MG/1
20 TABLET ORAL DAILY
Qty: 30 TABLET | Refills: 0 | Status: CANCELLED | OUTPATIENT
Start: 2019-05-14

## 2019-05-14 RX ORDER — ALPRAZOLAM 0.25 MG/1
0.25 TABLET ORAL 2 TIMES DAILY PRN
Qty: 60 TABLET | Refills: 5 | Status: SHIPPED
Start: 2019-05-14 | End: 2019-12-02

## 2019-05-14 RX ORDER — ZOLPIDEM TARTRATE 5 MG/1
5 TABLET ORAL EVERY EVENING
Refills: 0 | COMMUNITY
Start: 2019-05-05 | End: 2019-05-14

## 2019-05-14 NOTE — PROGRESS NOTES
Yasemin Moyer is a 80year old female. Patient presents with:  Hospital F/U  Medication Request: Pended     HPI:     Was in hospital last month for right paraspinal lumbar muscle strain.    Sleep wake cycle had been reversed, so was given seroquel which see 1 drop into both eyes every evening. Disp:  Rfl: 3   Escitalopram Oxalate (LEXAPRO) 20 MG Oral Tab Take 20 mg by mouth daily.  Disp:  Rfl:       Past Medical History:   Diagnosis Date   • Anemia    • Basal cell carcinoma     Mouth   • Calculus, kidney muscle strain      s/p hypoxic respiratory failure  CXR normal; hypoxia likely related to large hiatal hernia and COPD; VQ scan low probability of PE; started on breo ellipta; not using, but has it at home.      Hypercholesterolemia  Cont with healthy diet schedule.  She agreed to schedule at appt in 9/2018 but still has not done so. Discussed with  that patient has not followed through on testing; will likely also need PFTs as outpatient.  Echo done this admission (4/20/19) -- LVEF 55-60%, normal wall

## 2019-05-21 ENCOUNTER — TELEPHONE (OUTPATIENT)
Dept: INTERNAL MEDICINE CLINIC | Facility: CLINIC | Age: 84
End: 2019-05-21

## 2019-05-21 NOTE — TELEPHONE ENCOUNTER
Mariusz Easton / [facility] [phone]  Pt has a non-productive cough, raspy voice from coughing, ringing in ear from coughing  Faint crackles on right side  Does Dr Mello Clay want to order cough syrup, tasked to nursing

## 2019-05-22 RX ORDER — BENZONATATE 100 MG/1
100 CAPSULE ORAL 3 TIMES DAILY PRN
Qty: 30 CAPSULE | Refills: 0 | Status: SHIPPED | OUTPATIENT
Start: 2019-05-22 | End: 2019-12-19

## 2019-05-22 NOTE — TELEPHONE ENCOUNTER
Relayed MD's message to Tran---verbalized understanding.  Noy Ko also states that pt will be picking up nebulizer today from pharmacy as well    Requested Prescriptions     Signed Prescriptions Disp Refills   • benzonatate (TESSALON PERLES) 100 MG Oral Ca

## 2019-05-29 ENCOUNTER — TELEPHONE (OUTPATIENT)
Dept: INTERNAL MEDICINE CLINIC | Facility: CLINIC | Age: 84
End: 2019-05-29

## 2019-05-29 NOTE — TELEPHONE ENCOUNTER
Hermes Chavez so---apparently urology got back to them as well. You can see their encounter in Epic. They already ordered ua and culture but no new antibiotic. Urology to handle?

## 2019-05-29 NOTE — TELEPHONE ENCOUNTER
Spoke with Daljit Slight. Family called her on Saturday for acute confusion and paranoia. She did dipstick at home on Sunday with +leuk and +nitrites. No fever. Renetta Mars is more confused and weak. She has been aggressive towards .  She has had two falls ove

## 2019-05-29 NOTE — TELEPHONE ENCOUNTER
Tarun Dixon @  :  Orders for UA and culture. Pt has had problems with urination and some irritation.      Best call back: [phone]

## 2019-06-04 ENCOUNTER — TELEPHONE (OUTPATIENT)
Dept: INTERNAL MEDICINE CLINIC | Facility: CLINIC | Age: 84
End: 2019-06-04

## 2019-06-04 NOTE — TELEPHONE ENCOUNTER
Spoke with Danya Ace from [facility] and relayed MD message, she verbalized understanding. She reports that she is still waiting to hear back from the urologist about what kind of treatment they will do since patient has persistent e.  Coli infections even

## 2019-06-04 NOTE — TELEPHONE ENCOUNTER
Tyson Cartwright / [facility] calling to request an order for more skilled nursing visits  She will be dealing with urologist regarding UTI    Tasked to nursing

## 2019-06-05 NOTE — TELEPHONE ENCOUNTER
Spoke to pt who states she has heard from her Urologist and was started on Keflex.     Update to Dr. Junior Kelley

## 2019-06-05 NOTE — TELEPHONE ENCOUNTER
Just received the results of the urine culture. She has a multidrug resistant E.coli -- the only oral med it is sensitive to is Keflex (resistant to cipro, nitrofurantoin, bactrim). Please ask pt if she has heard from urology yet re treatment.

## 2019-06-12 ENCOUNTER — TELEPHONE (OUTPATIENT)
Dept: INTERNAL MEDICINE CLINIC | Facility: CLINIC | Age: 84
End: 2019-06-12

## 2019-06-12 NOTE — TELEPHONE ENCOUNTER
Spoke with pt's psychiatrist, Dr. Jony Garcia, and confirmed pt's current psychiatric meds. Nursing, could you please fax a current med list to Dr. Mackenzie Montano? ([facility] [phone]).   Thank you

## 2019-07-16 ENCOUNTER — MED REC SCAN ONLY (OUTPATIENT)
Dept: INTERNAL MEDICINE CLINIC | Facility: CLINIC | Age: 84
End: 2019-07-16

## 2019-08-06 NOTE — TELEPHONE ENCOUNTER
Patient called for Breo ellipta 100/25 inhaler   Has COPD and she uses daily    Inhaler was given to her in April while she was in the hospital  Seeing Dr Leela Calvillo next week but needs prescription sent to pharmacy prior to her appt    Pt can be reached at

## 2019-08-06 NOTE — TELEPHONE ENCOUNTER
Spoke to pt - tried to review pt history of use; She was using nebulizer more after hosp/rehab and then appr. 2-3 weeks ago started using Breo daily; She denies acute problems; She feels better using the [facility]; We discussed how to properly use ;   She wi

## 2019-08-14 ENCOUNTER — OFFICE VISIT (OUTPATIENT)
Dept: INTERNAL MEDICINE CLINIC | Facility: CLINIC | Age: 84
End: 2019-08-14
Payer: MEDICARE

## 2019-08-14 ENCOUNTER — LAB ENCOUNTER (OUTPATIENT)
Dept: LAB | Age: 84
End: 2019-08-14
Attending: INTERNAL MEDICINE
Payer: MEDICARE

## 2019-08-14 VITALS
BODY MASS INDEX: 21.83 KG/M2 | SYSTOLIC BLOOD PRESSURE: 102 MMHG | OXYGEN SATURATION: 95 % | HEART RATE: 97 BPM | HEIGHT: 63 IN | TEMPERATURE: 98 F | DIASTOLIC BLOOD PRESSURE: 68 MMHG | WEIGHT: 123.19 LBS

## 2019-08-14 DIAGNOSIS — E78.00 HYPERCHOLESTEROLEMIA: Primary | ICD-10-CM

## 2019-08-14 DIAGNOSIS — M48.061 SPINAL STENOSIS OF LUMBAR REGION, UNSPECIFIED WHETHER NEUROGENIC CLAUDICATION PRESENT: ICD-10-CM

## 2019-08-14 DIAGNOSIS — E53.8 VITAMIN B12 DEFICIENCY: ICD-10-CM

## 2019-08-14 DIAGNOSIS — M85.80 OSTEOPENIA, UNSPECIFIED LOCATION: ICD-10-CM

## 2019-08-14 DIAGNOSIS — E78.00 HYPERCHOLESTEROLEMIA: ICD-10-CM

## 2019-08-14 DIAGNOSIS — D50.0 IRON DEFICIENCY ANEMIA DUE TO CHRONIC BLOOD LOSS: ICD-10-CM

## 2019-08-14 DIAGNOSIS — N39.0 RECURRENT UTI: ICD-10-CM

## 2019-08-14 DIAGNOSIS — F32.A ANXIETY AND DEPRESSION: ICD-10-CM

## 2019-08-14 DIAGNOSIS — R92.2 DENSE BREASTS: ICD-10-CM

## 2019-08-14 DIAGNOSIS — E55.9 VITAMIN D DEFICIENCY: ICD-10-CM

## 2019-08-14 DIAGNOSIS — F41.9 ANXIETY AND DEPRESSION: ICD-10-CM

## 2019-08-14 DIAGNOSIS — D50.9 IRON DEFICIENCY ANEMIA, UNSPECIFIED IRON DEFICIENCY ANEMIA TYPE: ICD-10-CM

## 2019-08-14 DIAGNOSIS — M81.0 AGE-RELATED OSTEOPOROSIS WITHOUT CURRENT PATHOLOGICAL FRACTURE: ICD-10-CM

## 2019-08-14 DIAGNOSIS — J44.9 CHRONIC OBSTRUCTIVE PULMONARY DISEASE, UNSPECIFIED COPD TYPE (HCC): ICD-10-CM

## 2019-08-14 DIAGNOSIS — Z87.440 HISTORY OF RECURRENT UTIS: ICD-10-CM

## 2019-08-14 DIAGNOSIS — Z12.31 ENCOUNTER FOR SCREENING MAMMOGRAM FOR BREAST CANCER: ICD-10-CM

## 2019-08-14 LAB
ALBUMIN SERPL-MCNC: 3.5 G/DL (ref 3.4–5)
ALBUMIN/GLOB SERPL: 0.9 {RATIO} (ref 1–2)
ALP LIVER SERPL-CCNC: 67 U/L (ref 55–142)
ALT SERPL-CCNC: 17 U/L (ref 13–56)
ANION GAP SERPL CALC-SCNC: 6 MMOL/L (ref 0–18)
AST SERPL-CCNC: 12 U/L (ref 15–37)
BASOPHILS # BLD AUTO: 0.06 X10(3) UL (ref 0–0.2)
BASOPHILS NFR BLD AUTO: 0.8 %
BILIRUB SERPL-MCNC: 0.3 MG/DL (ref 0.1–2)
BILIRUB UR QL: NEGATIVE
BUN BLD-MCNC: 24 MG/DL (ref 7–18)
BUN/CREAT SERPL: 27.6 (ref 10–20)
CALCIUM BLD-MCNC: 9.9 MG/DL (ref 8.5–10.1)
CHLORIDE SERPL-SCNC: 106 MMOL/L (ref 98–112)
CHOLEST SMN-MCNC: 221 MG/DL (ref ?–200)
CO2 SERPL-SCNC: 30 MMOL/L (ref 21–32)
COLOR UR: YELLOW
CREAT BLD-MCNC: 0.87 MG/DL (ref 0.55–1.02)
DEPRECATED HBV CORE AB SER IA-ACNC: 20.4 NG/ML (ref 18–340)
DEPRECATED RDW RBC AUTO: 44.5 FL (ref 35.1–46.3)
EOSINOPHIL # BLD AUTO: 0.18 X10(3) UL (ref 0–0.7)
EOSINOPHIL NFR BLD AUTO: 2.3 %
ERYTHROCYTE [DISTWIDTH] IN BLOOD BY AUTOMATED COUNT: 13.8 % (ref 11–15)
GLOBULIN PLAS-MCNC: 3.8 G/DL (ref 2.8–4.4)
GLUCOSE BLD-MCNC: 92 MG/DL (ref 70–99)
GLUCOSE UR-MCNC: NEGATIVE MG/DL
HCT VFR BLD AUTO: 41 % (ref 35–48)
HDLC SERPL-MCNC: 76 MG/DL (ref 40–59)
HGB BLD-MCNC: 12.7 G/DL (ref 12–16)
HYALINE CASTS #/AREA URNS AUTO: 1 /LPF
IMM GRANULOCYTES # BLD AUTO: 0.02 X10(3) UL (ref 0–1)
IMM GRANULOCYTES NFR BLD: 0.3 %
IRON SATURATION: 22 % (ref 15–50)
IRON SERPL-MCNC: 98 UG/DL (ref 50–170)
KETONES UR-MCNC: NEGATIVE MG/DL
LDLC SERPL CALC-MCNC: 124 MG/DL (ref ?–100)
LYMPHOCYTES # BLD AUTO: 1.37 X10(3) UL (ref 1–4)
LYMPHOCYTES NFR BLD AUTO: 17.3 %
M PROTEIN MFR SERPL ELPH: 7.3 G/DL (ref 6.4–8.2)
MCH RBC QN AUTO: 27.5 PG (ref 26–34)
MCHC RBC AUTO-ENTMCNC: 31 G/DL (ref 31–37)
MCV RBC AUTO: 88.9 FL (ref 80–100)
MONOCYTES # BLD AUTO: 0.49 X10(3) UL (ref 0.1–1)
MONOCYTES NFR BLD AUTO: 6.2 %
NEUTROPHILS # BLD AUTO: 5.81 X10 (3) UL (ref 1.5–7.7)
NEUTROPHILS # BLD AUTO: 5.81 X10(3) UL (ref 1.5–7.7)
NEUTROPHILS NFR BLD AUTO: 73.1 %
NITRITE UR QL STRIP.AUTO: NEGATIVE
NONHDLC SERPL-MCNC: 145 MG/DL (ref ?–130)
OSMOLALITY SERPL CALC.SUM OF ELEC: 298 MOSM/KG (ref 275–295)
PATIENT FASTING: NO
PATIENT FASTING: NO
PH UR: 5 [PH] (ref 5–8)
PLATELET # BLD AUTO: 222 10(3)UL (ref 150–450)
POTASSIUM SERPL-SCNC: 4.3 MMOL/L (ref 3.5–5.1)
PROT UR-MCNC: NEGATIVE MG/DL
RBC # BLD AUTO: 4.61 X10(6)UL (ref 3.8–5.3)
RBC #/AREA URNS AUTO: 52 /HPF
SODIUM SERPL-SCNC: 142 MMOL/L (ref 136–145)
SP GR UR STRIP: 1.02 (ref 1–1.03)
TOTAL IRON BINDING CAPACITY: 443 UG/DL (ref 240–450)
TRANSFERRIN SERPL-MCNC: 297 MG/DL (ref 200–360)
TRIGL SERPL-MCNC: 104 MG/DL (ref 30–149)
TSI SER-ACNC: 1.75 MIU/ML (ref 0.36–3.74)
UROBILINOGEN UR STRIP-ACNC: <2
VIT B12 SERPL-MCNC: 348 PG/ML (ref 193–986)
VIT C UR-MCNC: 40 MG/DL
VLDLC SERPL CALC-MCNC: 21 MG/DL (ref 0–30)
WBC # BLD AUTO: 7.9 X10(3) UL (ref 4–11)
WBC #/AREA URNS AUTO: 25 /HPF

## 2019-08-14 PROCEDURE — 84443 ASSAY THYROID STIM HORMONE: CPT

## 2019-08-14 PROCEDURE — G0463 HOSPITAL OUTPT CLINIC VISIT: HCPCS | Performed by: INTERNAL MEDICINE

## 2019-08-14 PROCEDURE — 83540 ASSAY OF IRON: CPT

## 2019-08-14 PROCEDURE — 36415 COLL VENOUS BLD VENIPUNCTURE: CPT

## 2019-08-14 PROCEDURE — 80061 LIPID PANEL: CPT

## 2019-08-14 PROCEDURE — 82728 ASSAY OF FERRITIN: CPT

## 2019-08-14 PROCEDURE — 84466 ASSAY OF TRANSFERRIN: CPT

## 2019-08-14 PROCEDURE — 82607 VITAMIN B-12: CPT

## 2019-08-14 PROCEDURE — 81001 URINALYSIS AUTO W/SCOPE: CPT

## 2019-08-14 PROCEDURE — 85025 COMPLETE CBC W/AUTO DIFF WBC: CPT

## 2019-08-14 PROCEDURE — 99214 OFFICE O/P EST MOD 30 MIN: CPT | Performed by: INTERNAL MEDICINE

## 2019-08-14 PROCEDURE — 80053 COMPREHEN METABOLIC PANEL: CPT

## 2019-08-14 NOTE — PROGRESS NOTES
Yasemin Moyer is a 80year old female. Patient presents with:  Checkup: 3 month - very lethargic since April. Complete Form: Would like parking placard     HPI:     Here for PE. Sleeping better; takes xanax and ambien at night which help.      Breathing mouth daily. Disp:  Rfl:    cephALEXin 250 MG Oral Cap Take 1 capsule (250 mg total) by mouth every other day.  Disp: 45 capsule Rfl: 1   benzonatate (TESSALON PERLES) 100 MG Oral Cap Take 1 capsule (100 mg total) by mouth 3 (three) times daily as needed fo NABS, no HSM  EXTREMITIES: no cyanosis, clubbing or edema  BREASTS: no masses, no axill LAD    ASSESSMENT AND PLAN:      Hypercholesterolemia  Cont with healthy diet. Lipids normal in 10/2018. Check repeat.   History of recurrent UTIs, nephrolithiasis   Fo Shaina Collazo). Hx of Dyspnea on exertion  Pt with known COPD, smoking hx.  Echo done (4/20/19) -- LVEF 55-60%, normal wall motion, grade 1 diastolic dysfunction, no signif pulm HTN. VQ negative 4/2019. Pt did not do nuclear GXT as ordered in 2017 and 2018.

## 2019-08-28 ENCOUNTER — TELEPHONE (OUTPATIENT)
Dept: INTERNAL MEDICINE CLINIC | Facility: CLINIC | Age: 84
End: 2019-08-28

## 2019-08-28 DIAGNOSIS — D50.0 IRON DEFICIENCY ANEMIA DUE TO CHRONIC BLOOD LOSS: ICD-10-CM

## 2019-08-28 DIAGNOSIS — E78.00 HYPERCHOLESTEROLEMIA: Primary | ICD-10-CM

## 2019-08-28 DIAGNOSIS — E53.8 VITAMIN B12 DEFICIENCY: ICD-10-CM

## 2019-08-28 PROBLEM — J96.01 ACUTE RESPIRATORY FAILURE WITH HYPOXIA (HCC): Status: RESOLVED | Noted: 2019-04-20 | Resolved: 2019-08-28

## 2019-08-28 NOTE — TELEPHONE ENCOUNTER
Please call with blood test results -- Vit B12 level still low -- Is she taking the 1000mcg of Vit B12 every day? If not, please do so. If so, I recommend increasing the Vitamin B12 to 2000mcg/day.      Hgb is actually normal now, although iron levels on t

## 2019-08-28 NOTE — TELEPHONE ENCOUNTER
I spoke with patient and relayed Dr. Jennifer Velasco message. She verbalized understanding. She says she is not taking any B12 supplement. She will go to the pharmacy for B12. She says she is taking iron BID. She says she still feels tired.  Encouraged her to st

## 2019-10-02 RX ORDER — FLUTICASONE FUROATE AND VILANTEROL TRIFENATATE 100; 25 UG/1; UG/1
POWDER RESPIRATORY (INHALATION)
Qty: 3 EACH | Refills: 3 | Status: SHIPPED | OUTPATIENT
Start: 2019-10-02 | End: 2020-10-14

## 2019-12-02 NOTE — TELEPHONE ENCOUNTER
To MD:  The above refill request is for a controlled substance. Please review pended medication order. Print and sign for staff to fax to pharmacy or prescribe electronically.     [facility] -  9/3/2019 #60     Last refilled - 5/14/2019 #60/5

## 2019-12-04 RX ORDER — ALPRAZOLAM 0.25 MG/1
TABLET ORAL
Qty: 60 TABLET | Refills: 3 | Status: SHIPPED | OUTPATIENT
Start: 2019-12-04 | End: 2021-09-29

## 2019-12-04 NOTE — TELEPHONE ENCOUNTER
Please confirm with pt how often she is actually taking the xanax.   I thought she was only taking it for sleep at this point

## 2019-12-04 NOTE — TELEPHONE ENCOUNTER
Spoke to patient who reports she usually only takes one at nighttime before she goes to bed. If she wakes up at 3am or in the middle of the night and can't fall back asleep she may take another. She has occasionally taken one tablet during the day time when she has felt anxious but this causes her to feel sleepy so she rarely does it. She confirms she will need a refill soon, she is unsure how many tablet she has left. She would also like me to ask about a parking placard for her car. She states we gave her one for her license plate but she never followed through with it and would prefer to have the tag for her car instead. She is aware she will have to  this form to fill out personal info like drivers license after Dr. Va Rajan signs. To Dr. Va Rajan to advise (I put the partially filled out form on your desk).

## 2019-12-05 ENCOUNTER — PATIENT OUTREACH (OUTPATIENT)
Dept: CASE MANAGEMENT | Age: 84
End: 2019-12-05

## 2019-12-05 NOTE — TELEPHONE ENCOUNTER
Spoke with patient and notified her that form was completed. Patient prefers to come and fill out the form here, then we will mail in for her. Form and envelope (with Sec of State address filled out) left at . Once completed by patient, nursing please mail. Copy of form to be sent to scanning once completed by patient.

## 2019-12-05 NOTE — TELEPHONE ENCOUNTER
Rx refilled. Parking placard filled out. Pt will need to pick it up (or we can mail it to her), as she needs to sign it.

## 2019-12-05 NOTE — PROGRESS NOTES
Discussed with pt CCM program details and benefits as well as costs associated. Pt would like to think about it. Pt will be in the office to get parking placard and asked to  information on the program at that time.    I advised pt I will generate a

## 2019-12-06 NOTE — TELEPHONE ENCOUNTER
Chavo Currie Figures in to complete form copy made and placed in scanning original placed in mail to go out

## 2019-12-12 ENCOUNTER — TELEPHONE (OUTPATIENT)
Dept: INTERNAL MEDICINE CLINIC | Facility: CLINIC | Age: 84
End: 2019-12-12

## 2019-12-12 NOTE — TELEPHONE ENCOUNTER
Pt is calling she is seeing Dr Kalyn Burns on 12/19,  Pt is asking for lab work she feels so exhausted, she can't do anything  Please call pt [phone]

## 2019-12-12 NOTE — TELEPHONE ENCOUNTER
To Dr. Zina Vilchis - Saw psychiatrist yesterday who advised appt with you which is 12/19/19. Pt c/o chronic left lower back pain, walking with walker. Has to cancel plans to go somewhere afraid she will fall.   Pt has anxiety, wondering if this is the issue

## 2019-12-17 ENCOUNTER — LAB ENCOUNTER (OUTPATIENT)
Dept: LAB | Age: 84
End: 2019-12-17
Attending: INTERNAL MEDICINE
Payer: MEDICARE

## 2019-12-17 DIAGNOSIS — E78.00 HYPERCHOLESTEROLEMIA: ICD-10-CM

## 2019-12-17 DIAGNOSIS — N39.0 RECURRENT UTI: ICD-10-CM

## 2019-12-17 DIAGNOSIS — D50.0 IRON DEFICIENCY ANEMIA DUE TO CHRONIC BLOOD LOSS: ICD-10-CM

## 2019-12-17 DIAGNOSIS — E53.8 VITAMIN B12 DEFICIENCY: ICD-10-CM

## 2019-12-17 PROCEDURE — 87086 URINE CULTURE/COLONY COUNT: CPT

## 2019-12-17 PROCEDURE — 83540 ASSAY OF IRON: CPT

## 2019-12-17 PROCEDURE — 80053 COMPREHEN METABOLIC PANEL: CPT

## 2019-12-17 PROCEDURE — 87077 CULTURE AEROBIC IDENTIFY: CPT

## 2019-12-17 PROCEDURE — 84466 ASSAY OF TRANSFERRIN: CPT

## 2019-12-17 PROCEDURE — 85025 COMPLETE CBC W/AUTO DIFF WBC: CPT

## 2019-12-17 PROCEDURE — 82607 VITAMIN B-12: CPT

## 2019-12-17 PROCEDURE — 82728 ASSAY OF FERRITIN: CPT

## 2019-12-17 PROCEDURE — 87186 SC STD MICRODIL/AGAR DIL: CPT

## 2019-12-17 PROCEDURE — 36415 COLL VENOUS BLD VENIPUNCTURE: CPT

## 2019-12-19 ENCOUNTER — OFFICE VISIT (OUTPATIENT)
Dept: INTERNAL MEDICINE CLINIC | Facility: CLINIC | Age: 84
End: 2019-12-19
Payer: MEDICARE

## 2019-12-19 VITALS
HEART RATE: 72 BPM | TEMPERATURE: 98 F | HEIGHT: 63 IN | DIASTOLIC BLOOD PRESSURE: 70 MMHG | SYSTOLIC BLOOD PRESSURE: 120 MMHG | BODY MASS INDEX: 22.15 KG/M2 | WEIGHT: 125 LBS

## 2019-12-19 DIAGNOSIS — R26.89 BALANCE PROBLEM: Primary | ICD-10-CM

## 2019-12-19 PROCEDURE — G0463 HOSPITAL OUTPT CLINIC VISIT: HCPCS | Performed by: INTERNAL MEDICINE

## 2019-12-19 PROCEDURE — 99214 OFFICE O/P EST MOD 30 MIN: CPT | Performed by: INTERNAL MEDICINE

## 2019-12-19 RX ORDER — CEPHALEXIN 250 MG/1
250 CAPSULE ORAL EVERY OTHER DAY
Qty: 45 CAPSULE | Refills: 1 | Status: SHIPPED | OUTPATIENT
Start: 2019-12-19 | End: 2020-09-10

## 2019-12-19 RX ORDER — SULFAMETHOXAZOLE AND TRIMETHOPRIM 800; 160 MG/1; MG/1
1 TABLET ORAL 2 TIMES DAILY
Qty: 10 TABLET | Refills: 0 | Status: SHIPPED | OUTPATIENT
Start: 2019-12-19 | End: 2020-02-26

## 2019-12-19 RX ORDER — CYANOCOBALAMIN (VITAMIN B-12) 1000 MCG
1 TABLET, EXTENDED RELEASE ORAL DAILY
Qty: 1 TABLET | Refills: 0 | COMMUNITY
Start: 2019-12-19 | End: 2020-09-29

## 2019-12-19 NOTE — PROGRESS NOTES
Please notify patient that ucx positive. She was prescribed Bactrim by PCP. Recommend follow up visit.

## 2019-12-19 NOTE — PROGRESS NOTES
Wanda Ayala is a 80year old female. Patient presents with:  Checkup: very weak and unsteady    HPI:   Here with . Feels like her balance is off for the past several months; feels like it is worse lately; has to use a walker to ambulate.      Note unspecified hyperlipidemia    • Recurrent UTI    • Scoliosis    • Spinal stenosis    • Squamous cell cancer of lip    • Thyroid nodule    • Urinary frequency    • Vitamin D deficiency       Social History:  Social History    Tobacco Use      Smoking status difficulty  Vitamin B12 low normal (432). Rec restarting the vitamin B12 1000mcg/day. Rx given for PT eval.    Insomnia, anxiety  Pt takes xanax at night but also takes [place]. Discussed risk of falls with patient.   Strongly advised her to stop the Ambi

## 2020-01-06 ENCOUNTER — PATIENT OUTREACH (OUTPATIENT)
Dept: CASE MANAGEMENT | Age: 85
End: 2020-01-06

## 2020-02-03 ENCOUNTER — TELEPHONE (OUTPATIENT)
Dept: INTERNAL MEDICINE CLINIC | Facility: CLINIC | Age: 85
End: 2020-02-03

## 2020-02-03 NOTE — TELEPHONE ENCOUNTER
Daughter Jeremy Young like to speak to  regarding pt care. Her dementia is progressing and like pt to stop driving.

## 2020-02-03 NOTE — TELEPHONE ENCOUNTER
To Dr. Sharona Pemberton----    Spoke to Dtr and advised she is not on HIPPA. If she would like this changed to have patient come in to office to file new HIPPA; dtr reports this would never be possible as pt would not agree.      Daughter is OK with MD speaking to Derrick Peng

## 2020-02-03 NOTE — TELEPHONE ENCOUNTER
Dr Jony Garcia also requests to speak with you  Office # [phone] or cell# [phone]    Will be available before 1pm tomorrw

## 2020-02-04 NOTE — TELEPHONE ENCOUNTER
Spoke with Dr. Kaylie Rodriguez. Pt canceled her appt with Dr. Kaylie Rodriguez yesterday. Dr. Kaylie Rodriguez got a call from the daughter, Mann Byers.  Pt and  were accepted at [facility] living.  still working (with his son Maurice Heath).   Family told Emilia Allred not to drive

## 2020-02-05 ENCOUNTER — PATIENT OUTREACH (OUTPATIENT)
Dept: CASE MANAGEMENT | Age: 85
End: 2020-02-05

## 2020-02-05 NOTE — PROGRESS NOTES
Called pt to follow up on CCM intro, pt stated she is very busy right now as they are getting ready to move to [employer]. Pt stated she is interested in the program but can't concentrate on it right now.    I advised pt I will call her back in a few months

## 2020-09-10 ENCOUNTER — OFFICE VISIT (OUTPATIENT)
Dept: INTERNAL MEDICINE CLINIC | Facility: CLINIC | Age: 85
End: 2020-09-10
Payer: MEDICARE

## 2020-09-10 VITALS
WEIGHT: 123 LBS | DIASTOLIC BLOOD PRESSURE: 70 MMHG | SYSTOLIC BLOOD PRESSURE: 122 MMHG | OXYGEN SATURATION: 96 % | HEART RATE: 95 BPM | BODY MASS INDEX: 21.79 KG/M2 | TEMPERATURE: 98 F | HEIGHT: 63 IN

## 2020-09-10 DIAGNOSIS — D50.0 IRON DEFICIENCY ANEMIA DUE TO CHRONIC BLOOD LOSS: ICD-10-CM

## 2020-09-10 DIAGNOSIS — M25.562 PAIN IN BOTH KNEES, UNSPECIFIED CHRONICITY: ICD-10-CM

## 2020-09-10 DIAGNOSIS — R53.83 OTHER FATIGUE: Primary | ICD-10-CM

## 2020-09-10 DIAGNOSIS — E55.9 VITAMIN D DEFICIENCY: ICD-10-CM

## 2020-09-10 DIAGNOSIS — M25.561 PAIN IN BOTH KNEES, UNSPECIFIED CHRONICITY: ICD-10-CM

## 2020-09-10 DIAGNOSIS — E53.8 VITAMIN B12 DEFICIENCY: ICD-10-CM

## 2020-09-10 PROCEDURE — 99214 OFFICE O/P EST MOD 30 MIN: CPT | Performed by: INTERNAL MEDICINE

## 2020-09-10 PROCEDURE — G0008 ADMIN INFLUENZA VIRUS VAC: HCPCS | Performed by: INTERNAL MEDICINE

## 2020-09-10 PROCEDURE — 90662 IIV NO PRSV INCREASED AG IM: CPT | Performed by: INTERNAL MEDICINE

## 2020-09-10 PROCEDURE — G0463 HOSPITAL OUTPT CLINIC VISIT: HCPCS | Performed by: INTERNAL MEDICINE

## 2020-09-10 NOTE — PROGRESS NOTES
Eder Lay is a 80year old female. Patient presents with:  Fatigue  Knee Pain: RYAN knee pain    HPI:     Pt states she is exhausted all the time. Sometimes can barely get out of bed. She notes mild SPEARS with climbing stairs.      States she is NOT taki 30 tablet 5   • lidocaine 5 % External Patch Place 1 patch onto the skin daily.  30 patch 0   • omeprazole 20 MG Oral Capsule Delayed Release Take 20 mg by mouth every morning before breakfast.     • ERGOCALCIFEROL 13170 units Oral Cap TAKE 1 CAPSULE BY NANCY or gallop  GI: soft, NT, ND, NABS, no HSM  EXTREMITIES: no cyanosis, clubbing or edema    ASSESSMENT AND PLAN:     Fatigue  Multifactorial.  Pt has long hx of fatigue, though sx seem worse now. She stopped her iron and vitamin B12.   Has hx of chronic iron

## 2020-09-15 ENCOUNTER — LAB ENCOUNTER (OUTPATIENT)
Dept: LAB | Age: 85
End: 2020-09-15
Attending: INTERNAL MEDICINE
Payer: MEDICARE

## 2020-09-15 DIAGNOSIS — R53.83 OTHER FATIGUE: ICD-10-CM

## 2020-09-15 DIAGNOSIS — E55.9 VITAMIN D DEFICIENCY: ICD-10-CM

## 2020-09-15 DIAGNOSIS — E53.8 VITAMIN B12 DEFICIENCY: ICD-10-CM

## 2020-09-15 DIAGNOSIS — D50.0 IRON DEFICIENCY ANEMIA DUE TO CHRONIC BLOOD LOSS: ICD-10-CM

## 2020-09-15 LAB
ALBUMIN SERPL-MCNC: 3.4 G/DL (ref 3.4–5)
ALBUMIN/GLOB SERPL: 0.9 {RATIO} (ref 1–2)
ALP LIVER SERPL-CCNC: 70 U/L (ref 55–142)
ALT SERPL-CCNC: 29 U/L (ref 13–56)
ANION GAP SERPL CALC-SCNC: 4 MMOL/L (ref 0–18)
AST SERPL-CCNC: 14 U/L (ref 15–37)
BASOPHILS # BLD AUTO: 0.06 X10(3) UL (ref 0–0.2)
BASOPHILS NFR BLD AUTO: 0.9 %
BILIRUB SERPL-MCNC: 0.2 MG/DL (ref 0.1–2)
BUN BLD-MCNC: 24 MG/DL (ref 7–18)
BUN/CREAT SERPL: 26.7 (ref 10–20)
CALCIUM BLD-MCNC: 9.6 MG/DL (ref 8.5–10.1)
CHLORIDE SERPL-SCNC: 106 MMOL/L (ref 98–112)
CO2 SERPL-SCNC: 30 MMOL/L (ref 21–32)
CREAT BLD-MCNC: 0.9 MG/DL (ref 0.55–1.02)
DEPRECATED HBV CORE AB SER IA-ACNC: 43.3 NG/ML (ref 18–340)
DEPRECATED RDW RBC AUTO: 47.1 FL (ref 35.1–46.3)
EOSINOPHIL # BLD AUTO: 0.12 X10(3) UL (ref 0–0.7)
EOSINOPHIL NFR BLD AUTO: 1.8 %
ERYTHROCYTE [DISTWIDTH] IN BLOOD BY AUTOMATED COUNT: 13.6 % (ref 11–15)
GLOBULIN PLAS-MCNC: 4 G/DL (ref 2.8–4.4)
GLUCOSE BLD-MCNC: 97 MG/DL (ref 70–99)
HCT VFR BLD AUTO: 42.2 % (ref 35–48)
HGB BLD-MCNC: 13.3 G/DL (ref 12–16)
IMM GRANULOCYTES # BLD AUTO: 0.01 X10(3) UL (ref 0–1)
IMM GRANULOCYTES NFR BLD: 0.1 %
IRON SATURATION: 16 % (ref 15–50)
IRON SERPL-MCNC: 62 UG/DL (ref 50–170)
LYMPHOCYTES # BLD AUTO: 1.46 X10(3) UL (ref 1–4)
LYMPHOCYTES NFR BLD AUTO: 21.8 %
M PROTEIN MFR SERPL ELPH: 7.4 G/DL (ref 6.4–8.2)
MCH RBC QN AUTO: 29.6 PG (ref 26–34)
MCHC RBC AUTO-ENTMCNC: 31.5 G/DL (ref 31–37)
MCV RBC AUTO: 93.8 FL (ref 80–100)
MONOCYTES # BLD AUTO: 0.46 X10(3) UL (ref 0.1–1)
MONOCYTES NFR BLD AUTO: 6.9 %
NEUTROPHILS # BLD AUTO: 4.59 X10 (3) UL (ref 1.5–7.7)
NEUTROPHILS # BLD AUTO: 4.59 X10(3) UL (ref 1.5–7.7)
NEUTROPHILS NFR BLD AUTO: 68.5 %
OSMOLALITY SERPL CALC.SUM OF ELEC: 294 MOSM/KG (ref 275–295)
PATIENT FASTING Y/N/NP: NO
PLATELET # BLD AUTO: 213 10(3)UL (ref 150–450)
POTASSIUM SERPL-SCNC: 4.4 MMOL/L (ref 3.5–5.1)
RBC # BLD AUTO: 4.5 X10(6)UL (ref 3.8–5.3)
SODIUM SERPL-SCNC: 140 MMOL/L (ref 136–145)
TOTAL IRON BINDING CAPACITY: 393 UG/DL (ref 240–450)
TRANSFERRIN SERPL-MCNC: 264 MG/DL (ref 200–360)
TSI SER-ACNC: 1.58 MIU/ML (ref 0.36–3.74)
VIT B12 SERPL-MCNC: 310 PG/ML (ref 193–986)
WBC # BLD AUTO: 6.7 X10(3) UL (ref 4–11)

## 2020-09-15 PROCEDURE — 82306 VITAMIN D 25 HYDROXY: CPT

## 2020-09-15 PROCEDURE — 36415 COLL VENOUS BLD VENIPUNCTURE: CPT

## 2020-09-15 PROCEDURE — 84466 ASSAY OF TRANSFERRIN: CPT

## 2020-09-15 PROCEDURE — 82607 VITAMIN B-12: CPT

## 2020-09-15 PROCEDURE — 80053 COMPREHEN METABOLIC PANEL: CPT

## 2020-09-15 PROCEDURE — 84443 ASSAY THYROID STIM HORMONE: CPT | Performed by: INTERNAL MEDICINE

## 2020-09-15 PROCEDURE — 82728 ASSAY OF FERRITIN: CPT

## 2020-09-15 PROCEDURE — 83540 ASSAY OF IRON: CPT

## 2020-09-15 PROCEDURE — 85025 COMPLETE CBC W/AUTO DIFF WBC: CPT

## 2020-09-16 LAB — 25(OH)D3 SERPL-MCNC: 13.1 NG/ML (ref 30–100)

## 2020-09-25 ENCOUNTER — TELEPHONE (OUTPATIENT)
Dept: INTERNAL MEDICINE CLINIC | Facility: CLINIC | Age: 85
End: 2020-09-25

## 2020-09-29 RX ORDER — MELATONIN
325
COMMUNITY

## 2020-09-29 RX ORDER — MELATONIN
1000 DAILY
COMMUNITY

## 2020-09-29 NOTE — TELEPHONE ENCOUNTER
Labs overall good except iron is borderline low, Vit B12 is low, and Vitamin D is very low.   Recommend:  Ferrous sulfate 325mg once daily  Vitamin B12 1000 mcg once daily  Vitamin D 5000 units once daily    (All OTC)

## 2020-09-29 NOTE — TELEPHONE ENCOUNTER
I don't even know how to respond to that. She can listen to her doctor or to her thinks-he's-a-doctor . Her choice.

## 2020-09-29 NOTE — TELEPHONE ENCOUNTER
Spoke with patient and relayed Dr. Nicole Olson' message. Patient verbalized understanding and was able to repeat back instructions.      I attempted to add new supplements to med list, but noted that patient already had all 3 supplements on list.     Called pa

## 2020-09-29 NOTE — TELEPHONE ENCOUNTER
Patient called and informed based on her lab results Dr Davion Roe recommends the dosage for Vitamin D and her  may not have the same levels. Patient verbalized understanding with no further questions noted.

## 2020-10-01 NOTE — TELEPHONE ENCOUNTER
Requests call back to advise how much vitamin d she is supposed to take  [phone]  Hoping to be called before 1:30pm today

## 2020-10-01 NOTE — TELEPHONE ENCOUNTER
Patient calling asking which Vitamin D is she to get?      Best number to contact patient at [phone]

## 2020-10-01 NOTE — TELEPHONE ENCOUNTER
Reviewed pt's Vitamin D level and Dr. Amy Green' recommendation that pt take 5000iu daily - understanding verbalized.

## 2020-10-14 NOTE — TELEPHONE ENCOUNTER
Pt returned call  No physical appointments available until late December or January for Dr Lianna Diop not feeling well, scheduled appt for Tuesday, 10/20/20  Tasked to [employer]

## 2020-10-20 ENCOUNTER — OFFICE VISIT (OUTPATIENT)
Dept: INTERNAL MEDICINE CLINIC | Facility: CLINIC | Age: 85
End: 2020-10-20
Payer: MEDICARE

## 2020-10-20 VITALS
OXYGEN SATURATION: 96 % | BODY MASS INDEX: 22.5 KG/M2 | SYSTOLIC BLOOD PRESSURE: 118 MMHG | TEMPERATURE: 98 F | DIASTOLIC BLOOD PRESSURE: 70 MMHG | WEIGHT: 127 LBS | HEART RATE: 94 BPM | HEIGHT: 63 IN

## 2020-10-20 DIAGNOSIS — E53.8 VITAMIN B12 DEFICIENCY: ICD-10-CM

## 2020-10-20 DIAGNOSIS — Z87.440 HISTORY OF RECURRENT UTIS: ICD-10-CM

## 2020-10-20 DIAGNOSIS — M81.0 AGE-RELATED OSTEOPOROSIS WITHOUT CURRENT PATHOLOGICAL FRACTURE: ICD-10-CM

## 2020-10-20 DIAGNOSIS — F41.9 ANXIETY AND DEPRESSION: ICD-10-CM

## 2020-10-20 DIAGNOSIS — F32.A ANXIETY AND DEPRESSION: ICD-10-CM

## 2020-10-20 DIAGNOSIS — Z12.31 ENCOUNTER FOR SCREENING MAMMOGRAM FOR MALIGNANT NEOPLASM OF BREAST: Primary | ICD-10-CM

## 2020-10-20 DIAGNOSIS — E55.9 VITAMIN D DEFICIENCY: ICD-10-CM

## 2020-10-20 DIAGNOSIS — E78.00 HYPERCHOLESTEROLEMIA: ICD-10-CM

## 2020-10-20 DIAGNOSIS — J44.9 CHRONIC OBSTRUCTIVE PULMONARY DISEASE, UNSPECIFIED COPD TYPE (HCC): ICD-10-CM

## 2020-10-20 DIAGNOSIS — D50.0 IRON DEFICIENCY ANEMIA DUE TO CHRONIC BLOOD LOSS: ICD-10-CM

## 2020-10-20 PROCEDURE — 99214 OFFICE O/P EST MOD 30 MIN: CPT | Performed by: INTERNAL MEDICINE

## 2020-10-20 PROCEDURE — G0463 HOSPITAL OUTPT CLINIC VISIT: HCPCS | Performed by: INTERNAL MEDICINE

## 2020-10-20 RX ORDER — CHOLECALCIFEROL (VITAMIN D3) 125 MCG
5000 CAPSULE ORAL DAILY
Qty: 30 CAPSULE | Refills: 0 | COMMUNITY
Start: 2020-10-20

## 2020-10-20 NOTE — PROGRESS NOTES
Saira Rice is a 80year old female. Patient presents with:  Checkup    HPI:     Here for complete physical.  Started taking her vitamins last month as directed. Actually feels good for the past 2 days. Still struggles with the concept of getting old. Smoking status: Former Smoker        Packs/day: 0.25        Years: 10.00        Pack years: 2.5        Types: Cigarettes        Quit date: 2016        Years since quittin.1      Smokeless tobacco: Never Used    Alcohol use: No      Alcohol/week: 0 low yield.  Hgb normal in past, but iron levels low (ferritin 8, sats 15%).   Recent iron levels in 9/2020 were borderline low; Hgb normal 13.3 -- pt had not been taking her iron -- restarted FeSO4 325mg once daily  Lumbar spinal stenosis, chronic back pain

## 2020-12-06 ENCOUNTER — TELEPHONE (OUTPATIENT)
Dept: INTERNAL MEDICINE CLINIC | Facility: CLINIC | Age: 85
End: 2020-12-06

## 2020-12-06 RX ORDER — HYDROCODONE BITARTRATE AND ACETAMINOPHEN 5; 325 MG/1; MG/1
1 TABLET ORAL EVERY 6 HOURS PRN
Qty: 20 TABLET | Refills: 0 | Status: SHIPPED | OUTPATIENT
Start: 2020-12-06

## 2020-12-06 NOTE — TELEPHONE ENCOUNTER
She phoned about 12 noon. She had been in [facility] emergency department on 11/3/2020 with pain in the lower portion of her back. She had fallen 1 week prior striking her coccyx.   She relates to me that the emergency room doctor told her that [facility]

## 2020-12-08 ENCOUNTER — OFFICE VISIT (OUTPATIENT)
Dept: INTERNAL MEDICINE CLINIC | Facility: CLINIC | Age: 85
End: 2020-12-08
Payer: MEDICARE

## 2020-12-08 VITALS
BODY MASS INDEX: 22.5 KG/M2 | TEMPERATURE: 97 F | WEIGHT: 127 LBS | HEART RATE: 107 BPM | HEIGHT: 63 IN | OXYGEN SATURATION: 95 % | DIASTOLIC BLOOD PRESSURE: 62 MMHG | SYSTOLIC BLOOD PRESSURE: 118 MMHG

## 2020-12-08 DIAGNOSIS — M54.50 ACUTE RIGHT-SIDED LOW BACK PAIN WITHOUT SCIATICA: Primary | ICD-10-CM

## 2020-12-08 PROCEDURE — G0463 HOSPITAL OUTPT CLINIC VISIT: HCPCS | Performed by: INTERNAL MEDICINE

## 2020-12-08 PROCEDURE — 99213 OFFICE O/P EST LOW 20 MIN: CPT | Performed by: INTERNAL MEDICINE

## 2020-12-08 RX ORDER — DOCUSATE SODIUM 100 MG
CAPSULE ORAL
COMMUNITY
Start: 2020-12-03

## 2020-12-08 NOTE — PROGRESS NOTES
Damon Lo is a 80year old female. Patient presents with:  Checkup: Pt here for checkup s/p fall. Pt was at [facility] and brought medical records.     HPI:     Was at the [facility] ER on 12/3 after she had fallen last Sunday 11/30 -- fell backwar Anemia    • Basal cell carcinoma     Mouth   • Calculus, kidney    • Esophageal reflux    • Hematuria    • Hypercholesterolemia    • Kidney stone    • Osteopenia    • Other and unspecified hyperlipidemia    • Recurrent UTI    • Scoliosis    • Spinal stenos plan.    Ese Donnelly, 12/08/20, 2:18 PM

## 2021-02-10 ENCOUNTER — TELEPHONE (OUTPATIENT)
Dept: INTERNAL MEDICINE CLINIC | Facility: CLINIC | Age: 86
End: 2021-02-10

## 2021-02-10 NOTE — TELEPHONE ENCOUNTER
Daughter Neto Carrizales called back. She is asking Dr. Freddie Brown NOT to call her mother, to disregard the message but If she had any questions or comments please call Stephanie direct.

## 2021-02-19 RX ORDER — FLUTICASONE FUROATE AND VILANTEROL TRIFENATATE 100; 25 UG/1; UG/1
1 POWDER RESPIRATORY (INHALATION) DAILY
Qty: 3 EACH | Refills: 3 | Status: SHIPPED | OUTPATIENT
Start: 2021-02-19 | End: 2021-09-29

## 2021-03-09 DIAGNOSIS — Z23 NEED FOR VACCINATION: ICD-10-CM

## 2021-03-11 ENCOUNTER — IMMUNIZATION (OUTPATIENT)
Dept: LAB | Facility: HOSPITAL | Age: 86
End: 2021-03-11
Attending: HOSPITALIST
Payer: MEDICARE

## 2021-03-11 DIAGNOSIS — Z23 NEED FOR VACCINATION: Primary | ICD-10-CM

## 2021-03-11 PROCEDURE — 0011A SARSCOV2 VAC 100MCG/0.5ML IM: CPT

## 2021-04-08 ENCOUNTER — IMMUNIZATION (OUTPATIENT)
Dept: LAB | Facility: HOSPITAL | Age: 86
End: 2021-04-08
Attending: EMERGENCY MEDICINE
Payer: MEDICARE

## 2021-04-08 DIAGNOSIS — Z23 NEED FOR VACCINATION: Primary | ICD-10-CM

## 2021-04-08 PROCEDURE — 0012A SARSCOV2 VAC 100MCG/0.5ML IM: CPT

## 2021-09-02 ENCOUNTER — OFFICE VISIT (OUTPATIENT)
Dept: INTERNAL MEDICINE CLINIC | Facility: CLINIC | Age: 86
End: 2021-09-02
Payer: COMMERCIAL

## 2021-09-02 ENCOUNTER — LAB ENCOUNTER (OUTPATIENT)
Dept: LAB | Age: 86
End: 2021-09-02
Attending: INTERNAL MEDICINE
Payer: MEDICARE

## 2021-09-02 VITALS
SYSTOLIC BLOOD PRESSURE: 152 MMHG | HEIGHT: 63 IN | BODY MASS INDEX: 21.62 KG/M2 | DIASTOLIC BLOOD PRESSURE: 80 MMHG | OXYGEN SATURATION: 96 % | HEART RATE: 68 BPM | TEMPERATURE: 98 F | WEIGHT: 122 LBS

## 2021-09-02 DIAGNOSIS — E78.00 HYPERCHOLESTEROLEMIA: ICD-10-CM

## 2021-09-02 DIAGNOSIS — E53.8 VITAMIN B12 DEFICIENCY: ICD-10-CM

## 2021-09-02 DIAGNOSIS — M81.0 AGE-RELATED OSTEOPOROSIS WITHOUT CURRENT PATHOLOGICAL FRACTURE: ICD-10-CM

## 2021-09-02 DIAGNOSIS — E55.9 VITAMIN D DEFICIENCY: ICD-10-CM

## 2021-09-02 DIAGNOSIS — Z12.31 ENCOUNTER FOR SCREENING MAMMOGRAM FOR MALIGNANT NEOPLASM OF BREAST: Primary | ICD-10-CM

## 2021-09-02 DIAGNOSIS — D50.0 IRON DEFICIENCY ANEMIA DUE TO CHRONIC BLOOD LOSS: ICD-10-CM

## 2021-09-02 DIAGNOSIS — M85.80 OSTEOPENIA, UNSPECIFIED LOCATION: ICD-10-CM

## 2021-09-02 DIAGNOSIS — F41.9 ANXIETY AND DEPRESSION: ICD-10-CM

## 2021-09-02 DIAGNOSIS — F32.A ANXIETY AND DEPRESSION: ICD-10-CM

## 2021-09-02 LAB
ALBUMIN SERPL-MCNC: 3.8 G/DL (ref 3.4–5)
ALBUMIN/GLOB SERPL: 0.9 {RATIO} (ref 1–2)
ALP LIVER SERPL-CCNC: 69 U/L
ALT SERPL-CCNC: 17 U/L
ANION GAP SERPL CALC-SCNC: 3 MMOL/L (ref 0–18)
AST SERPL-CCNC: 12 U/L (ref 15–37)
BASOPHILS # BLD AUTO: 0.07 X10(3) UL (ref 0–0.2)
BASOPHILS NFR BLD AUTO: 0.8 %
BILIRUB SERPL-MCNC: 0.7 MG/DL (ref 0.1–2)
BUN BLD-MCNC: 21 MG/DL (ref 7–18)
BUN/CREAT SERPL: 24.7 (ref 10–20)
CALCIUM BLD-MCNC: 9.7 MG/DL (ref 8.5–10.1)
CHLORIDE SERPL-SCNC: 105 MMOL/L (ref 98–112)
CO2 SERPL-SCNC: 33 MMOL/L (ref 21–32)
CREAT BLD-MCNC: 0.85 MG/DL
DEPRECATED HBV CORE AB SER IA-ACNC: 66.7 NG/ML
DEPRECATED RDW RBC AUTO: 43.2 FL (ref 35.1–46.3)
EOSINOPHIL # BLD AUTO: 0.06 X10(3) UL (ref 0–0.7)
EOSINOPHIL NFR BLD AUTO: 0.7 %
ERYTHROCYTE [DISTWIDTH] IN BLOOD BY AUTOMATED COUNT: 12.3 % (ref 11–15)
GLOBULIN PLAS-MCNC: 4.1 G/DL (ref 2.8–4.4)
GLUCOSE BLD-MCNC: 100 MG/DL (ref 70–99)
HCT VFR BLD AUTO: 45 %
HGB BLD-MCNC: 14.1 G/DL
IMM GRANULOCYTES # BLD AUTO: 0.01 X10(3) UL (ref 0–1)
IMM GRANULOCYTES NFR BLD: 0.1 %
IRON SATURATION: 25 %
IRON SERPL-MCNC: 91 UG/DL
LYMPHOCYTES # BLD AUTO: 1.5 X10(3) UL (ref 1–4)
LYMPHOCYTES NFR BLD AUTO: 18 %
M PROTEIN MFR SERPL ELPH: 7.9 G/DL (ref 6.4–8.2)
MCH RBC QN AUTO: 29.4 PG (ref 26–34)
MCHC RBC AUTO-ENTMCNC: 31.3 G/DL (ref 31–37)
MCV RBC AUTO: 93.9 FL
MONOCYTES # BLD AUTO: 0.43 X10(3) UL (ref 0.1–1)
MONOCYTES NFR BLD AUTO: 5.2 %
NEUTROPHILS # BLD AUTO: 6.26 X10 (3) UL (ref 1.5–7.7)
NEUTROPHILS # BLD AUTO: 6.26 X10(3) UL (ref 1.5–7.7)
NEUTROPHILS NFR BLD AUTO: 75.2 %
OSMOLALITY SERPL CALC.SUM OF ELEC: 295 MOSM/KG (ref 275–295)
PATIENT FASTING Y/N/NP: NO
PLATELET # BLD AUTO: 237 10(3)UL (ref 150–450)
POTASSIUM SERPL-SCNC: 4.2 MMOL/L (ref 3.5–5.1)
RBC # BLD AUTO: 4.79 X10(6)UL
SODIUM SERPL-SCNC: 141 MMOL/L (ref 136–145)
TOTAL IRON BINDING CAPACITY: 370 UG/DL (ref 240–450)
TRANSFERRIN SERPL-MCNC: 248 MG/DL (ref 200–360)
VIT B12 SERPL-MCNC: 861 PG/ML (ref 193–986)
VIT D+METAB SERPL-MCNC: 39.2 NG/ML (ref 30–100)
WBC # BLD AUTO: 8.3 X10(3) UL (ref 4–11)

## 2021-09-02 PROCEDURE — 84466 ASSAY OF TRANSFERRIN: CPT

## 2021-09-02 PROCEDURE — 82728 ASSAY OF FERRITIN: CPT

## 2021-09-02 PROCEDURE — 3079F DIAST BP 80-89 MM HG: CPT | Performed by: INTERNAL MEDICINE

## 2021-09-02 PROCEDURE — 3077F SYST BP >= 140 MM HG: CPT | Performed by: INTERNAL MEDICINE

## 2021-09-02 PROCEDURE — 82306 VITAMIN D 25 HYDROXY: CPT

## 2021-09-02 PROCEDURE — 82607 VITAMIN B-12: CPT

## 2021-09-02 PROCEDURE — 3008F BODY MASS INDEX DOCD: CPT | Performed by: INTERNAL MEDICINE

## 2021-09-02 PROCEDURE — 85025 COMPLETE CBC W/AUTO DIFF WBC: CPT

## 2021-09-02 PROCEDURE — 83540 ASSAY OF IRON: CPT

## 2021-09-02 PROCEDURE — 36415 COLL VENOUS BLD VENIPUNCTURE: CPT

## 2021-09-02 PROCEDURE — 99214 OFFICE O/P EST MOD 30 MIN: CPT | Performed by: INTERNAL MEDICINE

## 2021-09-02 PROCEDURE — 80053 COMPREHEN METABOLIC PANEL: CPT

## 2021-09-02 NOTE — PROGRESS NOTES
Markell Godwin is a 80year old female. Patient presents with:  Checkup: She says it took her a very long time to get ready today. She feels this is from old age. Patient asks if Dr. Freddie Brown will refill her medications from Dr. Estelita Lopez, Rahul and Lexapro.  Cruz Bates 0.005 % Ophthalmic Solution Place 1 drop into both eyes every evening. 3   • Escitalopram Oxalate (LEXAPRO) 20 MG Oral Tab Take 20 mg by mouth daily.         Past Medical History:   Diagnosis Date   • Anemia    • Basal cell carcinoma     Mouth   • Calcul normal in 10/2018. Will not repeat, as statin not indicated given age.   History of recurrent UTIs, nephrolithiasis   Seeing urologist, Dr. Natan Ojeda -- started on methenamine with good results.     Osteoporosis  DEXA in 7/2017- osteoporosis of left femora done.  Discussed with pt that she can stop mammogram screening given her age.  Encouraged exercise.  Prevnar 15 given 4/2016 -- PPSV23 7/5/17.  Pt thinks she may have had a tetanus vaccine at [facility] when she was treated for a scalp lac.  Brittney River

## 2021-09-22 NOTE — TELEPHONE ENCOUNTER
[phone]  Pt needs Rx Hydrocodone. Pt has 4 pills left.  Pt is concerned that she will run out over weekend and Dr Adi Hoyos is only in office today  To Rx low
LM notifying patient script is ready for PU. Script placed at the .
Pt picked up today.
Rx ready to 
To MD:  The above refill request is for a controlled substance. Please indicate yes or no to refill 30 days supply plus one refill. If more refills are appropriate, please indicate quantity  Pending - to DR. Rosemary Garcia
DISPLAY PLAN FREE TEXT

## 2021-09-28 ENCOUNTER — TELEPHONE (OUTPATIENT)
Dept: INTERNAL MEDICINE CLINIC | Facility: CLINIC | Age: 86
End: 2021-09-28

## 2021-09-29 RX ORDER — ESCITALOPRAM OXALATE 20 MG/1
TABLET ORAL
Qty: 90 TABLET | Refills: 3 | Status: SHIPPED | OUTPATIENT
Start: 2021-09-29

## 2021-09-29 RX ORDER — FLUTICASONE FUROATE AND VILANTEROL TRIFENATATE 100; 25 UG/1; UG/1
1 POWDER RESPIRATORY (INHALATION) DAILY
Qty: 180 EACH | Refills: 3 | Status: SHIPPED | OUTPATIENT
Start: 2021-09-29

## 2021-09-29 RX ORDER — ALPRAZOLAM 0.25 MG/1
TABLET ORAL
Qty: 30 TABLET | Refills: 5 | Status: SHIPPED | OUTPATIENT
Start: 2021-09-29

## 2021-09-29 NOTE — TELEPHONE ENCOUNTER
To  for initial Rx (per OV)  Depression, anxiety  Has seen psychiatry, Dr. Ricarda Olson for many years. On Lexapro. Also sees therapist Barbie Monroe. Takes xanax every night for sleep, which helps. Dr. Ricarda Olson is retiring.   Pt asks that I take over Rx'es

## 2021-11-30 NOTE — PROGRESS NOTES
[address] [facility] HOSPITALIST CONSULT      Patient ID:  NAME:  Shi Rudolph. Age/Sex: 64 y.o. male  :   1960   MRN:   [id number]    Admission Date: 2021  Consult Date: 2021  Chief Complaint: Left knee pain and swelling  Reason for Admission: Septic arthritis of knee, left ([facility]  Reason for Consult: HTN and TRINA    Active Assessment/Plan (MDM):      HLD (hyperlipidemia) (2012)  - Continue Lipitor      HTN (hypertension) (2012)  - Continue Chlorthalidone    : Will monitor. TRINA on CPAP (2012)  - Continue CPAP with resting  - Currently hypoxic after anesthesia  - Check CXR  - Denies SOB  : X-ray 1 view negative. Not using oxygen in the morning. Uses CPAP at night. Denies any respiratory symptoms. Will monitor. : Not requiring oxygen during daytime. Gout (2012)  - Continue Allopurinol      GERD (gastroesophageal reflux disease) (2013)  - Continue PPI         Stage 3 chronic kidney disease ([facility] (7/15/2021)  - Creatinine stable    : Lab work ordered. : Creatinine at baseline. #Septic joint: Ortho and ID on board. ID adjusting antibiotics. Surgery and surgery related management including DVT prophylaxis, drop in hemoglobin and pain management per primary team.    Rest management per primary team.    Please notify medicine team if drop in hemoglobin is more than expected for the surgery. Thanks for consultation. We will continue to follow along on daily basis. Please notify on-call medicine physician with questions or if patient clinical condition changes.      All Medical Diagnoses:     Hospital Problems as of 2021 Date Reviewed: 7/15/2021          Codes Class Noted - Resolved POA    * (Principal) Septic arthritis of knee, left ([facility] ICD-10-CM: M00.9  ICD-9-CM: 711.06  2021 - Present Yes        Left knee pain ICD-10-CM: M25.562  ICD-9-CM: 719.46  2021 - Present Yes Saad Reyes is a 80year old female. Patient presents with:  Fatigue    HPI:     Pt here with c/o fatigue. Sometimes stays up until 2am b/c she can't fall asleep. Reads in bed until she is tired. Can sometimes sleep up to 9 hours/night.   If she doesn' Acute pain of left knee ICD-10-CM: M25.562  ICD-9-CM: 719.46  7/15/2021 - Present Yes        Stage 3 chronic kidney disease ([facility] ICD-10-CM: N18.30  ICD-9-CM: 585.3  7/15/2021 - Present Yes        Obesity, Class III, BMI 40-49.9 (morbid obesity) ([facility] ICD-10-CM: E66.01  ICD-9-CM: 278.01  8/27/2019 - Present Yes        GERD (gastroesophageal reflux disease) ICD-10-CM: K21.9  ICD-9-CM: 530.81  5/14/2013 - Present Yes        HLD (hyperlipidemia) ICD-10-CM: E78.5  ICD-9-CM: 272.4  11/14/2012 - Present Yes        HTN (hypertension) ICD-10-CM: I10  ICD-9-CM: 401.9  11/14/2012 - Present Yes        TRINA on CPAP ICD-10-CM: G47.33, Z99.89  ICD-9-CM: 327.23, V46.8  11/14/2012 - Present Yes        Gout ICD-10-CM: M10.9  ICD-9-CM: 274.9  11/14/2012 - Present Yes              HPI:     Trevor Lopes. is a 64year old CM with a PMH of CKD (Stage III), CAD, HTN, HLD, TRINA on CPAP, and left TKA on 10/29/21 who presented from an outside ER with acute right knee pain, swelling, and erythema after going to [place] and walking on it and getting in hot tub/pool. He presented to the ER with pain and swelling. Orthopedics admitted and took to the OR for I&D with washout of purulent drainage. A drain was placed. Currently he is on his CPAP post-surgery but alert and offers only complaint of left knee pain of 6/10. Denies CP/SOB/cough. Denies F/C. Denies N/V/D. November 29: Patient admitted to Ortho service with septic arthritis status post I&D. ID was consulted. Medicine consulted for management of hypertension. Patient was on CPAP he took CPAP for. Feeling better. As per patient is dealing with this infection for a month now. Denies any nausea, vomiting, chest pain or palpitation. November 30: Patient's wife at bedside. As per patient he is feeling good. He is waiting for PICC line placement for his antibiotics.   Denies any nausea, vomiting, constipation, headache, chest pain or shortness of mouth daily.  Disp:  Rfl:       Past Medical History:   Diagnosis Date   • Anemia    • Basal cell carcinoma     Mouth   • Calculus, kidney    • Esophageal reflux    • Hematuria    • Hypercholesterolemia    • Kidney stone    • Osteopenia    • Other and unspe breath. Respiratory system negative except mentioned above. Objective:       Visit Vitals  /87 (BP 1 Location: Left upper arm, BP Patient Position: Sitting)   Pulse 69   Temp 97.4 °F (36.3 °C)   Resp 18   Ht 5' 9\" (1.753 m)   Wt 130.2 kg (287 lb)   SpO2 94%   BMI 42.38 kg/m²        Temp (24hrs), Av.6 °F (36.4 °C), Min:97.2 °F (36.2 °C), Max:98.3 °F (36.8 °C)      Oxygen Therapy  O2 Sat (%): 94 % (21 1150)  Pulse via Oximetry: 103 beats per minute (21 1731)  O2 Device: None (Room air) (21 1030)  O2 Flow Rate (L/min): 2 l/min (21 0548)      Physical Exam:    General:    Alert, cooperative, no distress, appears stated age. Eyes:   PERRLA EOMI Anicteric  Head:   Normocephalic, without obvious abnormality, atraumatic. Lungs:   Decrease entry bilateral lower lobe otherwise clear to auscultation bilaterally. No Wheezing or Rhonchi. No rales. Heart:  Tachy, reg rhythm,  no murmur, rub or gallop. No JVD. Abdomen:   Soft, non-tender. Not distended. Bowel sounds normal.   MSK:  Left knee with edema, LIAM drain in place. No clubbing or cyanosis. No deformities/lesions. Skin:     Texture, turgor normal. No rashes or lesions. No Jaundice. Neurologic: Alert and oriented x 3, moving all 4 extremities except the left one is limited secondary to surgery. Speech normal.  Psychiatry:      No abnormal mood or behavior noted.     Lab/Data Review:  Recent Results (from the past 24 hour(s))   CBC WITH AUTOMATED DIFF    Collection Time: 21  6:31 AM   Result Value Ref Range    WBC 7.6 4.3 - 11.1 K/uL    RBC 3.27 (L) 4.23 - 5.6 M/uL    HGB 10.1 (L) 13.6 - 17.2 g/dL    HCT 31.8 (L) 41.1 - 50.3 %    MCV 97.2 79.6 - 97.8 FL    MCH 30.9 26.1 - 32.9 PG    MCHC 31.8 31.4 - 35.0 g/dL    RDW 14.0 11.9 - 14.6 %    PLATELET 811 463 - 510 K/uL    MPV 10.8 9.4 - 12.3 FL    ABSOLUTE NRBC 0.00 0.0 - 0.2 K/uL    DF AUTOMATED      NEUTROPHILS 66 43 - 78 %    LYMPHOCYTES 22 13 - 44 %    MONOCYTES 8 4.0 understanding of these issues and agrees to the plan. - 12.0 %    EOSINOPHILS 3 0.5 - 7.8 %    BASOPHILS 0 0.0 - 2.0 %    IMMATURE GRANULOCYTES 2 0.0 - 5.0 %    ABS. NEUTROPHILS 5.0 1.7 - 8.2 K/UL    ABS. LYMPHOCYTES 1.7 0.5 - 4.6 K/UL    ABS. MONOCYTES 0.6 0.1 - 1.3 K/UL    ABS. EOSINOPHILS 0.2 0.0 - 0.8 K/UL    ABS. BASOPHILS 0.0 0.0 - 0.2 K/UL    ABS. IMM. GRANS. 0.1 0.0 - 0.5 K/UL   METABOLIC PANEL, BASIC    Collection Time: 11/30/21  6:31 AM   Result Value Ref Range    Sodium 139 138 - 145 mmol/L    Potassium 4.0 3.5 - 5.1 mmol/L    Chloride 107 98 - 107 mmol/L    CO2 26 21 - 32 mmol/L    Anion gap 6 (L) 7 - 16 mmol/L    Glucose 108 (H) 65 - 100 mg/dL    BUN 34 (H) 8 - 23 MG/DL    Creatinine 1.23 0.8 - 1.5 MG/DL    GFR est AA >60 >60 ml/min/1.73m2    GFR est non-AA >60 >60 ml/min/1.73m2    Calcium 8.2 (L) 8.3 - 10.4 MG/DL   VANCOMYCIN, RANDOM    Collection Time: 11/30/21  6:31 AM   Result Value Ref Range    Vancomycin, random 13.1 UG/ML       Imaging:  XR CHEST SNGL V    Result Date: 11/28/2021  1. No acute cardiopulmonary process. CPT code(s) 27396     DUPLEX LOWER EXT VENOUS LEFT    Result Date: 11/28/2021  No evidence of deep venous thrombosis in the left lower extremity. Cultures: All Micro Results     Procedure Component Value Units Date/Time    CULTURE, BODY FLUID [place]ally [place] STAIN [[id number]]  (Abnormal)  (Susceptibility) Collected: 11/28/21 0900    Order Status: Completed Specimen: Body Fluid from Joint Fluid Updated: 11/30/21 3781     Special Requests: PATIENT HAD A DOSE OF VANCOMYCIN ON 11/28/21     GRAM STAIN 40 TO 80  WBCS/OIF            MODERATE GRAM POSITIVE COCCI           Culture result:       HEAVY STAPHYLOCOCCUS AUREUS          COVID-19 RAPID TEST [[id number]] Collected: 11/28/21 0930    Order Status: Completed Specimen: Nasopharyngeal Updated: 11/28/21 1047     Specimen source Nasopharyngeal        COVID-19 rapid test Not detected        Comment:      The specimen is NEGATIVE for SARS-CoV-2, the novel coronavirus associated with COVID-19.   A negative result does not rule out COVID-19. This test has been authorized by the FDA under an Emergency Use Authorization (EUA) for use by authorized laboratories. Fact sheet for Healthcare Providers: ConventionUpdate.co.nz  Fact sheet for Patients: ConventionUpdate.co.nz       Methodology: Isothermal Nucleic Acid Amplification         CULTURE, BODY FLUID Aram Edmundo STAIN [[id number]]     Order Status: Canceled Specimen:  Body Fluid from Joint Fluid           Active Problems:     Principal Diagnosis Septic arthritis of knee, left [facility]    Hospital Problems as of 11/30/2021 Date Reviewed: 7/15/2021          Codes Class Noted - Resolved POA    * (Principal) Septic arthritis of knee, left ([facility] ICD-10-CM: M00.9  ICD-9-CM: 711.06  11/28/2021 - Present Yes        Left knee pain ICD-10-CM: M25.562  ICD-9-CM: 719.46  11/27/2021 - Present Yes        Acute pain of left knee ICD-10-CM: M25.562  ICD-9-CM: 719.46  7/15/2021 - Present Yes        Stage 3 chronic kidney disease ([facility] ICD-10-CM: N18.30  ICD-9-CM: 585.3  7/15/2021 - Present Yes        Obesity, Class III, BMI 40-49.9 (morbid obesity) ([facility] ICD-10-CM: E66.01  ICD-9-CM: 278.01  8/27/2019 - Present Yes        GERD (gastroesophageal reflux disease) ICD-10-CM: K21.9  ICD-9-CM: 530.81  5/14/2013 - Present Yes        HLD (hyperlipidemia) ICD-10-CM: E78.5  ICD-9-CM: 272.4  11/14/2012 - Present Yes        HTN (hypertension) ICD-10-CM: I10  ICD-9-CM: 401.9  11/14/2012 - Present Yes        TRINA on CPAP ICD-10-CM: G47.33, Z99.89  ICD-9-CM: 327.23, V46.8  11/14/2012 - Present Yes        Gout ICD-10-CM: M10.9  ICD-9-CM: 274.9  11/14/2012 - Present Yes                  Brock Cisneros MD  [employer] [facility]  11/30/2021

## 2022-03-24 PROBLEM — M54.59 INTRACTABLE LOW BACK PAIN: Status: RESOLVED | Noted: 2019-04-18 | Resolved: 2022-03-24

## 2022-04-27 ENCOUNTER — TELEPHONE (OUTPATIENT)
Dept: INTERNAL MEDICINE CLINIC | Facility: CLINIC | Age: 87
End: 2022-04-27

## 2022-04-27 NOTE — TELEPHONE ENCOUNTER
To MD:  The above refill request is for a controlled substance. Please review pended medication order. Print and sign for staff to fax to pharmacy or prescribe electronically.     Last office visit: 3/24/22  Last time refill sent and quantity/refills: 9/29/21 #30 with 5

## 2022-04-28 RX ORDER — ALPRAZOLAM 0.25 MG/1
0.25 TABLET ORAL DAILY PRN
Qty: 30 TABLET | Refills: 5 | Status: SHIPPED | OUTPATIENT
Start: 2022-04-28

## 2022-06-07 ENCOUNTER — TELEPHONE (OUTPATIENT)
Dept: INTERNAL MEDICINE CLINIC | Facility: CLINIC | Age: 87
End: 2022-06-07

## 2022-06-07 NOTE — TELEPHONE ENCOUNTER
Respiratory infection triage:    Fever:  [x]  No fever  []  Fever>100.4    Cough:  [] Tight cough  [] Cough with exertion  [] Dry cough  [x] Sputum production, Color: grey mucus    Breathing:  [x] Mild shortness of breath interfering with activity  [x] Wheezing  [] Pain with deep breathing  [x] Using inhaler    Other symptoms:  [] Sore throat  [] Difficulty swallowing  [x] Nasal drainage/congestion  [] Sinus congestion/pressure  [] Ear pain  [x] Body aches:left side of back   [x] Poor appetite  [] Loss of sense of smell   [] Loss of sense of taste  []Conjunctivitis? [] Any recent travel? [] Any sick contacts? [] Are you a healthcare worker? ADDITIONAL NOTES:  Please advise - called patient with above SX , also weakness and fatigue. Yesterday she took home covid test which was negative . To DR. DELAROSA          Notified patient that we will route this message to the doctor and see what their recommendations would be. In the meantime, if anything worsens, they were advised to call back or seek emergent evaluation.

## 2022-06-07 NOTE — TELEPHONE ENCOUNTER
Pt is calling and states she is just exhausted and weak all the time. Pt is not eating good and has no appetite. Pt is congested and has a little cough. Pt does not have a fever. Patient states that this has been going on for the past 10 day. Patient states she is just not feeling well at all.     Please call and advise

## 2022-06-08 ENCOUNTER — TELEPHONE (OUTPATIENT)
Dept: INTERNAL MEDICINE CLINIC | Facility: CLINIC | Age: 87
End: 2022-06-08

## 2022-06-08 ENCOUNTER — OFFICE VISIT (OUTPATIENT)
Dept: INTERNAL MEDICINE CLINIC | Facility: CLINIC | Age: 87
End: 2022-06-08
Payer: COMMERCIAL

## 2022-06-08 VITALS
HEIGHT: 60.5 IN | DIASTOLIC BLOOD PRESSURE: 72 MMHG | OXYGEN SATURATION: 96 % | TEMPERATURE: 100 F | HEART RATE: 113 BPM | WEIGHT: 122.63 LBS | BODY MASS INDEX: 23.45 KG/M2 | SYSTOLIC BLOOD PRESSURE: 130 MMHG

## 2022-06-08 DIAGNOSIS — R21 RASH: Primary | ICD-10-CM

## 2022-06-08 PROCEDURE — 3078F DIAST BP <80 MM HG: CPT | Performed by: INTERNAL MEDICINE

## 2022-06-08 PROCEDURE — 1126F AMNT PAIN NOTED NONE PRSNT: CPT | Performed by: INTERNAL MEDICINE

## 2022-06-08 PROCEDURE — 3075F SYST BP GE 130 - 139MM HG: CPT | Performed by: INTERNAL MEDICINE

## 2022-06-08 PROCEDURE — 3008F BODY MASS INDEX DOCD: CPT | Performed by: INTERNAL MEDICINE

## 2022-06-08 PROCEDURE — 99213 OFFICE O/P EST LOW 20 MIN: CPT | Performed by: INTERNAL MEDICINE

## 2022-06-08 RX ORDER — FAMCICLOVIR 500 MG/1
500 TABLET, FILM COATED ORAL 3 TIMES DAILY
Qty: 21 TABLET | Refills: 0 | Status: SHIPPED | OUTPATIENT
Start: 2022-06-08

## 2022-06-08 NOTE — TELEPHONE ENCOUNTER
Spoke to patient and relayed MD message, patient verbalized understanding.  Scheduled for Thursday 6/9/22 at 5pm.

## 2022-06-08 NOTE — TELEPHONE ENCOUNTER
Duplicate TE created from [address]:  \"Please call patient   She has shingles  Pt has not been feeling good for about a week  On Monday lower back patient had pain, this morning she has line of rash  Pt is in pain  Pt has appt with Dr Donna Conner tomorrow at 5:00  Should patient be seen sooner  What should patient do today? Please call to discuss/advise  Tasked to nursing\"    Spoke to pt/spouse who reports pt developed back pain on Monday and this morning woke up with rash noted. Spouse reports rash startsat  L armpit, towards L shoulder towards middle of spine. Pain is centralized where rash is and is about 5/10. Reports rash is itching and burning, and red fluid filled blisters. Also c/o fatigue, cough and nasal congestion. She has tested negative for covid 2x (last being last night). Per below, OK for office visit per PCP. Pt has been taking mucinex prn with minimal relief. Requesting to be seen today instead of tomorrow. PCP schedule full. Scheduled today with associate.

## 2022-06-08 NOTE — TELEPHONE ENCOUNTER
Please call patient   She has shingles  Pt has not been feeling good for about a week  On Monday lower back patient had pain, this morning she has line of rash  Pt is in pain  Pt has appt with Dr Bunny Hernandez tomorrow at 5:00  Should patient be seen sooner  What should patient do today?   Please call to discuss/advise  Tasked to nursing

## 2022-06-13 ENCOUNTER — TELEPHONE (OUTPATIENT)
Dept: INTERNAL MEDICINE CLINIC | Facility: CLINIC | Age: 87
End: 2022-06-13

## 2022-06-13 RX ORDER — GABAPENTIN 100 MG/1
100 CAPSULE ORAL 3 TIMES DAILY
Qty: 30 CAPSULE | Refills: 0 | Status: SHIPPED | OUTPATIENT
Start: 2022-06-13

## 2022-06-13 NOTE — TELEPHONE ENCOUNTER
Pt was in the office last week, diagnosed with shingles  Pt is taking medication but experiencing a great deal of pain  Please call to discuss/advise  Tasked to nursing

## 2022-06-13 NOTE — TELEPHONE ENCOUNTER
Discussed with patient. Reviewed options. Patient is having a fair amount of neuropathic pain. We will start gabapentin low-dose. We will have her take 1 tablet 100 mg today. Then tomorrow 1 tablet twice a day and on Wednesday if she does not have any ill side effects such as drowsiness she can go to 1 tablet 3 times a day. I do realize that this is a low dose but both of us wish to start with a low dose. I just gave her 10 days worth. She will start this and I have asked her to call Thursday with how she feels and this way we can decide on refills.

## 2022-06-15 NOTE — TELEPHONE ENCOUNTER
Pt. called stating she is still having shingles pain. She still has some Gabapentin left because she is only taking it when she really needs it, not 3 times a day as directed. She is now out of the Famciclovir. She is asking does she need to continue taking more Famciclovir? What does she do next? Pt. Stated her computer is down. Please call her, DO NOT send [email or web address] message. Pt. Can be reached at [phone].

## 2022-06-15 NOTE — TELEPHONE ENCOUNTER
Does not need more famvir---would recommend taking the gabapentin TID as directed and let us know if this helps

## 2022-06-19 NOTE — TELEPHONE ENCOUNTER
Called to schedule for cardiac rehab, patient is familiar with program has attended previously and was in phase III prior to Covid. He stated he would think about attending and would call back if interested.    Pt is overdue for her 6 month appt (last seen in 7/2017). We need to talk about weaning her off the norco.  Please ask her to schedule. I will refill once now.

## 2022-06-27 RX ORDER — ALPRAZOLAM 0.25 MG/1
TABLET ORAL
Qty: 30 TABLET | Refills: 0 | OUTPATIENT
Start: 2022-06-27

## 2022-06-27 NOTE — TELEPHONE ENCOUNTER
Patient called to check on the status of the refill for Alprazolam. Patient was informed per  that the medication was last refilled in April for a 30 day supply and five refills. Patient was informed she should not need a new script form the office. Patient stated she was informed that the pharmacy did not have the script and needed a new one.  than placed the patient on hold and called [facility] in [place] on [address], after speaking with a pharmacist, it was clarified that the patient does not need a new script and that the refill is ready for  at this time. Patient was made aware of this. No refill needed at this time. Please disregard the request. FYI to nursing.

## 2022-06-28 ENCOUNTER — TELEPHONE (OUTPATIENT)
Dept: INTERNAL MEDICINE CLINIC | Facility: CLINIC | Age: 87
End: 2022-06-28

## 2022-06-28 NOTE — TELEPHONE ENCOUNTER
Reason for call:  Shingles - 4 weeks      Allergies:    Shellfish-Derived P*    NAUSEA AND VOMITING  Erythromycin            UNKNOWN  Iodine (Topical)        OTHER (SEE COMMENTS)  Ioversol                UNKNOWN  Other                   OTHER (SEE COMMENTS)  Radiology Contrast *    DIZZINESS        Discussed with patient :  Ongoing for  4 weeks. L arm that wraps around the back. Sores are still there but scabbed over. She did finish course of famciclovir as recommended by Dr. Abigail Lizarraga 6/8/2022. Per chart review, she was still having pain for which she did not tolerate gabapentin. States that she takes escitalopram for anxiety. Alprazolam seems to help with pain. Recommendations:  Of note, patient hard of hearing. Advised that alprazolam does not use for pain, rather anxiety and sleep. She has not yet tried Tylenol, would recommend this now on an as-needed basis. She should make an appointment if still no improvement.

## 2022-06-30 ENCOUNTER — OFFICE VISIT (OUTPATIENT)
Dept: INTERNAL MEDICINE CLINIC | Facility: CLINIC | Age: 87
End: 2022-06-30
Payer: COMMERCIAL

## 2022-06-30 ENCOUNTER — TELEPHONE (OUTPATIENT)
Dept: INTERNAL MEDICINE CLINIC | Facility: CLINIC | Age: 87
End: 2022-06-30

## 2022-06-30 VITALS
OXYGEN SATURATION: 93 % | BODY MASS INDEX: 22.5 KG/M2 | DIASTOLIC BLOOD PRESSURE: 84 MMHG | SYSTOLIC BLOOD PRESSURE: 150 MMHG | TEMPERATURE: 99 F | WEIGHT: 117.63 LBS | HEIGHT: 60.5 IN | HEART RATE: 116 BPM

## 2022-06-30 DIAGNOSIS — F32.A ANXIETY AND DEPRESSION: ICD-10-CM

## 2022-06-30 DIAGNOSIS — B02.29 POST HERPETIC NEURALGIA: Primary | ICD-10-CM

## 2022-06-30 DIAGNOSIS — F41.9 ANXIETY AND DEPRESSION: ICD-10-CM

## 2022-06-30 PROCEDURE — 99214 OFFICE O/P EST MOD 30 MIN: CPT | Performed by: INTERNAL MEDICINE

## 2022-06-30 PROCEDURE — 1126F AMNT PAIN NOTED NONE PRSNT: CPT | Performed by: INTERNAL MEDICINE

## 2022-06-30 PROCEDURE — 3077F SYST BP >= 140 MM HG: CPT | Performed by: INTERNAL MEDICINE

## 2022-06-30 PROCEDURE — 3008F BODY MASS INDEX DOCD: CPT | Performed by: INTERNAL MEDICINE

## 2022-06-30 PROCEDURE — 3079F DIAST BP 80-89 MM HG: CPT | Performed by: INTERNAL MEDICINE

## 2022-06-30 RX ORDER — LIDOCAINE 4 G/G
1 PATCH TOPICAL EVERY 24 HOURS
Qty: 14 PATCH | Refills: 2 | Status: SHIPPED | OUTPATIENT
Start: 2022-06-30

## 2022-06-30 RX ORDER — PREGABALIN 50 MG/1
50 CAPSULE ORAL 2 TIMES DAILY
Qty: 60 CAPSULE | Refills: 3 | Status: SHIPPED | OUTPATIENT
Start: 2022-06-30

## 2022-07-05 ENCOUNTER — TELEPHONE (OUTPATIENT)
Dept: INTERNAL MEDICINE CLINIC | Facility: CLINIC | Age: 87
End: 2022-07-05

## 2022-07-06 NOTE — TELEPHONE ENCOUNTER
I spoke with pt. Notes continued pain from shigles  Has not been taking tylenol as previously prescribed. Unable to tolerate tramadol. She is taking lyrica bid. Has not taken her xanax. Urged pt to be very consistent with bid lyrica, tid tylenol and xanax at night before bed. She will call if no symptomatic improvement over the next 2-3 days. Pt understands and agrees with plan.

## 2022-07-06 NOTE — TELEPHONE ENCOUNTER
Spoke to patient and relayed MD message. Patient verbalized understanding. Pt insistent and audibly crying during call that she would like to schedule an appt with Dr. Christiano PEREZ in-person.  Scheduled tomorrow 7/7/22 with Dr. Christiano Wright.

## 2022-07-06 NOTE — TELEPHONE ENCOUNTER
Pt. Called stating she has had the Shingles for 4 weeks. She is asking to speak with Dr. Coleen Alcala. She has continued pain going down her left side of her back toward the shoulder/arm pit towards the left breast.  It has been  very difficult for her. She is still taking Pregabalin twice a day. She thinks it's helps a little. Is there something else she should be doing? She is asking to speak with Dr. Coleen Alcala. Pt. Can be reached at [phone].

## 2022-07-06 NOTE — TELEPHONE ENCOUNTER
1. Increase Lyrica (pregabalin) from 50mg BID to 50mg TID  2. Continue lidocaine patch (OTC) if not already using  3.  See me if still not improved

## 2022-07-07 ENCOUNTER — OFFICE VISIT (OUTPATIENT)
Dept: INTERNAL MEDICINE CLINIC | Facility: CLINIC | Age: 87
End: 2022-07-07
Payer: COMMERCIAL

## 2022-07-07 VITALS
BODY MASS INDEX: 22.57 KG/M2 | DIASTOLIC BLOOD PRESSURE: 82 MMHG | WEIGHT: 118 LBS | TEMPERATURE: 100 F | SYSTOLIC BLOOD PRESSURE: 134 MMHG | OXYGEN SATURATION: 95 % | HEART RATE: 121 BPM | HEIGHT: 60.5 IN

## 2022-07-07 DIAGNOSIS — B02.29 POST HERPETIC NEURALGIA: Primary | ICD-10-CM

## 2022-07-07 PROCEDURE — 99213 OFFICE O/P EST LOW 20 MIN: CPT | Performed by: INTERNAL MEDICINE

## 2022-07-07 PROCEDURE — 3075F SYST BP GE 130 - 139MM HG: CPT | Performed by: INTERNAL MEDICINE

## 2022-07-07 PROCEDURE — 1125F AMNT PAIN NOTED PAIN PRSNT: CPT | Performed by: INTERNAL MEDICINE

## 2022-07-07 PROCEDURE — 3079F DIAST BP 80-89 MM HG: CPT | Performed by: INTERNAL MEDICINE

## 2022-07-07 PROCEDURE — 3008F BODY MASS INDEX DOCD: CPT | Performed by: INTERNAL MEDICINE

## 2022-07-14 NOTE — TELEPHONE ENCOUNTER
Pt was seen last week  Pt asked that Dr Holly Bell please call her regarding her shingles  How long will it last?  Pt hoping to speak to Dr Holly Bell today, would prefer not to speak to nursing, rather Dr Holly Bell if she has a few minutes  Tasked to nursing

## 2022-07-15 NOTE — TELEPHONE ENCOUNTER
We covered all of this last week at her visit. She has post-herpetic neuralgia, which can last for months to years.   If the current regimen is still not helping, schedule a phone visit to discuss titration of medication

## 2022-07-21 ENCOUNTER — OFFICE VISIT (OUTPATIENT)
Dept: INTERNAL MEDICINE CLINIC | Facility: CLINIC | Age: 87
End: 2022-07-21
Payer: COMMERCIAL

## 2022-07-21 VITALS
HEIGHT: 60.5 IN | WEIGHT: 118 LBS | DIASTOLIC BLOOD PRESSURE: 68 MMHG | OXYGEN SATURATION: 94 % | BODY MASS INDEX: 22.57 KG/M2 | TEMPERATURE: 99 F | SYSTOLIC BLOOD PRESSURE: 108 MMHG | HEART RATE: 111 BPM

## 2022-07-21 DIAGNOSIS — B02.29 PHN (POSTHERPETIC NEURALGIA): Primary | ICD-10-CM

## 2022-07-21 PROCEDURE — 1125F AMNT PAIN NOTED PAIN PRSNT: CPT | Performed by: INTERNAL MEDICINE

## 2022-07-21 PROCEDURE — 3078F DIAST BP <80 MM HG: CPT | Performed by: INTERNAL MEDICINE

## 2022-07-21 PROCEDURE — 99214 OFFICE O/P EST MOD 30 MIN: CPT | Performed by: INTERNAL MEDICINE

## 2022-07-21 PROCEDURE — 3008F BODY MASS INDEX DOCD: CPT | Performed by: INTERNAL MEDICINE

## 2022-07-21 PROCEDURE — 3074F SYST BP LT 130 MM HG: CPT | Performed by: INTERNAL MEDICINE

## 2022-07-21 RX ORDER — OXYCODONE HYDROCHLORIDE 5 MG/1
5 TABLET ORAL EVERY 6 HOURS PRN
Qty: 30 TABLET | Refills: 0 | Status: SHIPPED | OUTPATIENT
Start: 2022-07-21

## 2022-07-21 RX ORDER — PREGABALIN 50 MG/1
50 CAPSULE ORAL 3 TIMES DAILY
Qty: 90 CAPSULE | Refills: 3 | Status: SHIPPED | OUTPATIENT
Start: 2022-07-21

## 2022-07-21 RX ORDER — NALOXONE HYDROCHLORIDE 4 MG/.1ML
4 SPRAY, METERED NASAL AS NEEDED
Qty: 1 KIT | Refills: 0 | Status: SHIPPED | OUTPATIENT
Start: 2022-07-21

## 2022-08-02 ENCOUNTER — APPOINTMENT (OUTPATIENT)
Dept: CT IMAGING | Facility: HOSPITAL | Age: 87
End: 2022-08-02
Attending: STUDENT IN AN ORGANIZED HEALTH CARE EDUCATION/TRAINING PROGRAM
Payer: MEDICARE

## 2022-08-02 ENCOUNTER — APPOINTMENT (OUTPATIENT)
Dept: GENERAL RADIOLOGY | Facility: HOSPITAL | Age: 87
End: 2022-08-02
Attending: STUDENT IN AN ORGANIZED HEALTH CARE EDUCATION/TRAINING PROGRAM
Payer: MEDICARE

## 2022-08-02 ENCOUNTER — HOSPITAL ENCOUNTER (EMERGENCY)
Facility: HOSPITAL | Age: 87
Discharge: HOME | End: 2022-08-02
Attending: STUDENT IN AN ORGANIZED HEALTH CARE EDUCATION/TRAINING PROGRAM
Payer: MEDICARE

## 2022-08-02 VITALS
HEIGHT: 63 IN | SYSTOLIC BLOOD PRESSURE: 150 MMHG | BODY MASS INDEX: 20.37 KG/M2 | HEART RATE: 84 BPM | WEIGHT: 115 LBS | DIASTOLIC BLOOD PRESSURE: 62 MMHG | OXYGEN SATURATION: 90 % | RESPIRATION RATE: 18 BRPM | TEMPERATURE: 98 F

## 2022-08-02 DIAGNOSIS — N30.00 ACUTE CYSTITIS WITHOUT HEMATURIA: Primary | ICD-10-CM

## 2022-08-02 LAB
ANION GAP SERPL CALC-SCNC: 7 MMOL/L (ref 0–18)
BASOPHILS # BLD AUTO: 0.05 X10(3) UL (ref 0–0.2)
BASOPHILS NFR BLD AUTO: 0.5 %
BILIRUB UR QL: NEGATIVE
BUN BLD-MCNC: 16 MG/DL (ref 7–18)
BUN/CREAT SERPL: 19.3 (ref 10–20)
CALCIUM BLD-MCNC: 9.7 MG/DL (ref 8.5–10.1)
CHLORIDE SERPL-SCNC: 103 MMOL/L (ref 98–112)
CO2 SERPL-SCNC: 29 MMOL/L (ref 21–32)
CREAT BLD-MCNC: 0.83 MG/DL
DEPRECATED RDW RBC AUTO: 45.3 FL (ref 35.1–46.3)
EOSINOPHIL # BLD AUTO: 0.03 X10(3) UL (ref 0–0.7)
EOSINOPHIL NFR BLD AUTO: 0.3 %
ERYTHROCYTE [DISTWIDTH] IN BLOOD BY AUTOMATED COUNT: 13.2 % (ref 11–15)
GFR SERPLBLD BASED ON 1.73 SQ M-ARVRAT: 68 ML/MIN/1.73M2 (ref 60–?)
GLUCOSE BLD-MCNC: 113 MG/DL (ref 70–99)
GLUCOSE UR-MCNC: NEGATIVE MG/DL
HCT VFR BLD AUTO: 44.2 %
HGB BLD-MCNC: 13.8 G/DL
IMM GRANULOCYTES # BLD AUTO: 0.02 X10(3) UL (ref 0–1)
IMM GRANULOCYTES NFR BLD: 0.2 %
KETONES UR-MCNC: NEGATIVE MG/DL
LYMPHOCYTES # BLD AUTO: 1.49 X10(3) UL (ref 1–4)
LYMPHOCYTES NFR BLD AUTO: 14 %
MCH RBC QN AUTO: 29.4 PG (ref 26–34)
MCHC RBC AUTO-ENTMCNC: 31.2 G/DL (ref 31–37)
MCV RBC AUTO: 94 FL
MONOCYTES # BLD AUTO: 0.72 X10(3) UL (ref 0.1–1)
MONOCYTES NFR BLD AUTO: 6.7 %
NEUTROPHILS # BLD AUTO: 8.37 X10 (3) UL (ref 1.5–7.7)
NEUTROPHILS # BLD AUTO: 8.37 X10(3) UL (ref 1.5–7.7)
NEUTROPHILS NFR BLD AUTO: 78.3 %
NITRITE UR QL STRIP.AUTO: POSITIVE
OSMOLALITY SERPL CALC.SUM OF ELEC: 290 MOSM/KG (ref 275–295)
PH UR: 6.5 [PH] (ref 5–8)
PLATELET # BLD AUTO: 167 10(3)UL (ref 150–450)
POTASSIUM SERPL-SCNC: 4 MMOL/L (ref 3.5–5.1)
RBC # BLD AUTO: 4.7 X10(6)UL
SODIUM SERPL-SCNC: 139 MMOL/L (ref 136–145)
SP GR UR STRIP: >=1.03 (ref 1–1.03)
UROBILINOGEN UR STRIP-ACNC: 0.2
WBC # BLD AUTO: 10.7 X10(3) UL (ref 4–11)
WBC CLUMPS UR QL AUTO: PRESENT /HPF
WBC CLUMPS UR QL AUTO: PRESENT /HPF

## 2022-08-02 PROCEDURE — 72125 CT NECK SPINE W/O DYE: CPT | Performed by: STUDENT IN AN ORGANIZED HEALTH CARE EDUCATION/TRAINING PROGRAM

## 2022-08-02 PROCEDURE — 99285 EMERGENCY DEPT VISIT HI MDM: CPT

## 2022-08-02 PROCEDURE — 71045 X-RAY EXAM CHEST 1 VIEW: CPT | Performed by: STUDENT IN AN ORGANIZED HEALTH CARE EDUCATION/TRAINING PROGRAM

## 2022-08-02 PROCEDURE — 87077 CULTURE AEROBIC IDENTIFY: CPT | Performed by: STUDENT IN AN ORGANIZED HEALTH CARE EDUCATION/TRAINING PROGRAM

## 2022-08-02 PROCEDURE — 93005 ELECTROCARDIOGRAM TRACING: CPT

## 2022-08-02 PROCEDURE — 93010 ELECTROCARDIOGRAM REPORT: CPT | Performed by: STUDENT IN AN ORGANIZED HEALTH CARE EDUCATION/TRAINING PROGRAM

## 2022-08-02 PROCEDURE — 87186 SC STD MICRODIL/AGAR DIL: CPT | Performed by: STUDENT IN AN ORGANIZED HEALTH CARE EDUCATION/TRAINING PROGRAM

## 2022-08-02 PROCEDURE — 70450 CT HEAD/BRAIN W/O DYE: CPT | Performed by: STUDENT IN AN ORGANIZED HEALTH CARE EDUCATION/TRAINING PROGRAM

## 2022-08-02 PROCEDURE — 81015 MICROSCOPIC EXAM OF URINE: CPT | Performed by: STUDENT IN AN ORGANIZED HEALTH CARE EDUCATION/TRAINING PROGRAM

## 2022-08-02 PROCEDURE — 96361 HYDRATE IV INFUSION ADD-ON: CPT

## 2022-08-02 PROCEDURE — 96365 THER/PROPH/DIAG IV INF INIT: CPT

## 2022-08-02 PROCEDURE — 87086 URINE CULTURE/COLONY COUNT: CPT | Performed by: STUDENT IN AN ORGANIZED HEALTH CARE EDUCATION/TRAINING PROGRAM

## 2022-08-02 PROCEDURE — 85025 COMPLETE CBC W/AUTO DIFF WBC: CPT | Performed by: STUDENT IN AN ORGANIZED HEALTH CARE EDUCATION/TRAINING PROGRAM

## 2022-08-02 PROCEDURE — 80048 BASIC METABOLIC PNL TOTAL CA: CPT | Performed by: STUDENT IN AN ORGANIZED HEALTH CARE EDUCATION/TRAINING PROGRAM

## 2022-08-02 RX ORDER — CEPHALEXIN 500 MG/1
500 CAPSULE ORAL 4 TIMES DAILY
Qty: 40 CAPSULE | Refills: 0 | Status: SHIPPED | OUTPATIENT
Start: 2022-08-02 | End: 2022-08-05

## 2022-08-02 NOTE — ED INITIAL ASSESSMENT (HPI)
Pt states she has no appetite and is very fatigue. Pt states she is getting over shingles. Pt fell last night states she hit head and does not remember if she Loc. Pt has black eye on her right eye.

## 2022-08-02 NOTE — CM/SW NOTE
Spoke with patient's son Shabbir Houser, at bedside, regarding the needs of patient and the discharge plan, if all medical testing is negative. Shabbir Houser stated that patient lives with her  at home and has used a walker for the past 2 years. Her cognition has declined in recent months as has her appetite and she falls asleep while talking to her. Patient in 6 - 7 weeks into a shingles diagnosis. Patient is extremely fatigued and has fallen multiple times recently. Patient has a care giver at home during the day but the falls have all happened at night. Shabbir Houser stated that they are planning on increasing care giver to cover nights too. Shabbir Houser is just trying to find out what is wrong with her that she has declined so rapidly, or is it just old age. EDCM discussed SNF placement for rehab and Shabbir Houser was wondering if rehab would be of any benefit to patient at all. EDCM also discussed Home Healthcare but Shabbir Houser does not feel that patient would be up for [facility] due to her extreme fatigue. MD and RN updated. MD will keep [facility] informed of test results.

## 2022-08-02 NOTE — ED QUICK NOTES
Pt to ED with son who states last night around 0200 she fell out of bed getting to bedside commode,  heard her scream and found her on ground. Pt had another unwitnessed fall yesterday + bruising to R eye. Pt denies dizziness, CP, SOB, HA. Per son she has been more weak and uninterested in eating the past few days, pt c/o L back /shoulder pain but currently has shingles in the area where she feels pain. GCS 15, AXOX4. No GI/ sx or complaints. Pt remembers falling today but not yesterday.

## 2022-08-03 ENCOUNTER — HOSPITAL ENCOUNTER (INPATIENT)
Facility: HOSPITAL | Age: 87
LOS: 2 days | Discharge: HOME | End: 2022-08-05
Attending: EMERGENCY MEDICINE | Admitting: INTERNAL MEDICINE
Payer: MEDICARE

## 2022-08-03 ENCOUNTER — APPOINTMENT (OUTPATIENT)
Dept: CT IMAGING | Facility: HOSPITAL | Age: 87
End: 2022-08-03
Attending: EMERGENCY MEDICINE
Payer: MEDICARE

## 2022-08-03 ENCOUNTER — HOSPITAL ENCOUNTER (OUTPATIENT)
Facility: HOSPITAL | Age: 87
Setting detail: OBSERVATION
Discharge: HOME | End: 2022-08-05
Attending: EMERGENCY MEDICINE | Admitting: INTERNAL MEDICINE
Payer: MEDICARE

## 2022-08-03 DIAGNOSIS — N39.0 URINARY TRACT INFECTION WITHOUT HEMATURIA, SITE UNSPECIFIED: Primary | ICD-10-CM

## 2022-08-03 DIAGNOSIS — F41.9 ANXIETY AND DEPRESSION: ICD-10-CM

## 2022-08-03 DIAGNOSIS — R53.83 OTHER FATIGUE: ICD-10-CM

## 2022-08-03 DIAGNOSIS — F32.A ANXIETY AND DEPRESSION: ICD-10-CM

## 2022-08-03 LAB
ANION GAP SERPL CALC-SCNC: 7 MMOL/L (ref 0–18)
BASOPHILS # BLD AUTO: 0.03 X10(3) UL (ref 0–0.2)
BASOPHILS NFR BLD AUTO: 0.4 %
BUN BLD-MCNC: 13 MG/DL (ref 7–18)
BUN/CREAT SERPL: 19.4 (ref 10–20)
CALCIUM BLD-MCNC: 9.8 MG/DL (ref 8.5–10.1)
CHLORIDE SERPL-SCNC: 103 MMOL/L (ref 98–112)
CO2 SERPL-SCNC: 27 MMOL/L (ref 21–32)
CREAT BLD-MCNC: 0.67 MG/DL
DEPRECATED RDW RBC AUTO: 43.8 FL (ref 35.1–46.3)
EOSINOPHIL # BLD AUTO: 0.03 X10(3) UL (ref 0–0.7)
EOSINOPHIL NFR BLD AUTO: 0.4 %
ERYTHROCYTE [DISTWIDTH] IN BLOOD BY AUTOMATED COUNT: 13 % (ref 11–15)
GFR SERPLBLD BASED ON 1.73 SQ M-ARVRAT: 85 ML/MIN/1.73M2 (ref 60–?)
GLUCOSE BLD-MCNC: 111 MG/DL (ref 70–99)
HCT VFR BLD AUTO: 42.6 %
HGB BLD-MCNC: 13.4 G/DL
IMM GRANULOCYTES # BLD AUTO: 0.03 X10(3) UL (ref 0–1)
IMM GRANULOCYTES NFR BLD: 0.4 %
LYMPHOCYTES # BLD AUTO: 0.83 X10(3) UL (ref 1–4)
LYMPHOCYTES NFR BLD AUTO: 10.4 %
MCH RBC QN AUTO: 29.1 PG (ref 26–34)
MCHC RBC AUTO-ENTMCNC: 31.5 G/DL (ref 31–37)
MCV RBC AUTO: 92.4 FL
MONOCYTES # BLD AUTO: 0.6 X10(3) UL (ref 0.1–1)
MONOCYTES NFR BLD AUTO: 7.5 %
NEUTROPHILS # BLD AUTO: 6.45 X10 (3) UL (ref 1.5–7.7)
NEUTROPHILS # BLD AUTO: 6.45 X10(3) UL (ref 1.5–7.7)
NEUTROPHILS NFR BLD AUTO: 80.9 %
OSMOLALITY SERPL CALC.SUM OF ELEC: 285 MOSM/KG (ref 275–295)
PLATELET # BLD AUTO: 175 10(3)UL (ref 150–450)
POTASSIUM SERPL-SCNC: 3.8 MMOL/L (ref 3.5–5.1)
RBC # BLD AUTO: 4.61 X10(6)UL
SARS-COV-2 RNA RESP QL NAA+PROBE: NOT DETECTED
SODIUM SERPL-SCNC: 137 MMOL/L (ref 136–145)
TROPONIN I HIGH SENSITIVITY: 10 NG/L
WBC # BLD AUTO: 8 X10(3) UL (ref 4–11)

## 2022-08-03 PROCEDURE — 70450 CT HEAD/BRAIN W/O DYE: CPT | Performed by: EMERGENCY MEDICINE

## 2022-08-03 RX ORDER — ESCITALOPRAM OXALATE 10 MG/1
10 TABLET ORAL DAILY
Status: DISCONTINUED | OUTPATIENT
Start: 2022-08-04 | End: 2022-08-05

## 2022-08-03 RX ORDER — ACETAMINOPHEN 500 MG
1000 TABLET ORAL EVERY 8 HOURS PRN
Status: DISCONTINUED | OUTPATIENT
Start: 2022-08-03 | End: 2022-08-05

## 2022-08-03 RX ORDER — ALPRAZOLAM 0.25 MG/1
0.25 TABLET ORAL DAILY PRN
Status: DISCONTINUED | OUTPATIENT
Start: 2022-08-03 | End: 2022-08-05

## 2022-08-03 RX ORDER — MELATONIN
5000 DAILY
Status: DISCONTINUED | OUTPATIENT
Start: 2022-08-04 | End: 2022-08-05

## 2022-08-03 RX ORDER — HEPARIN SODIUM 5000 [USP'U]/ML
5000 INJECTION, SOLUTION INTRAVENOUS; SUBCUTANEOUS EVERY 12 HOURS SCHEDULED
Status: DISCONTINUED | OUTPATIENT
Start: 2022-08-04 | End: 2022-08-05

## 2022-08-03 RX ORDER — MELATONIN
325
Status: DISCONTINUED | OUTPATIENT
Start: 2022-08-04 | End: 2022-08-05

## 2022-08-03 RX ORDER — SODIUM CHLORIDE 9 MG/ML
INJECTION, SOLUTION INTRAVENOUS CONTINUOUS
Status: DISCONTINUED | OUTPATIENT
Start: 2022-08-03 | End: 2022-08-05

## 2022-08-03 RX ORDER — PREGABALIN 25 MG/1
25 CAPSULE ORAL 2 TIMES DAILY
Status: DISCONTINUED | OUTPATIENT
Start: 2022-08-03 | End: 2022-08-05

## 2022-08-03 RX ORDER — ONDANSETRON 2 MG/ML
4 INJECTION INTRAMUSCULAR; INTRAVENOUS EVERY 6 HOURS PRN
Status: DISCONTINUED | OUTPATIENT
Start: 2022-08-03 | End: 2022-08-05

## 2022-08-03 RX ORDER — CHOLECALCIFEROL (VITAMIN D3) 125 MCG
1000 CAPSULE ORAL DAILY
Status: DISCONTINUED | OUTPATIENT
Start: 2022-08-04 | End: 2022-08-05

## 2022-08-03 NOTE — ED INITIAL ASSESSMENT (HPI)
Patient to ER with family and caregiver states seen here yesterday diagnosed with UTI. Patient reports weakness and poor appetite. Patient wants admission today. Took 2 doses of antibiotics so far.

## 2022-08-03 NOTE — TELEPHONE ENCOUNTER
Called patient ,spoke with son per hipaa who states pain from shingles not so bad anymore , patient had to go to ER yesterday - has UTI and is antibiotics needs F/U with DR. DELAROSA - transferred to [facility] to schedule F/U visit

## 2022-08-04 LAB
ALBUMIN SERPL-MCNC: 2.6 G/DL (ref 3.4–5)
ALBUMIN/GLOB SERPL: 0.7 {RATIO} (ref 1–2)
ALP LIVER SERPL-CCNC: 61 U/L
ALT SERPL-CCNC: 20 U/L
ANION GAP SERPL CALC-SCNC: 6 MMOL/L (ref 0–18)
AST SERPL-CCNC: 11 U/L (ref 15–37)
BASOPHILS # BLD AUTO: 0.05 X10(3) UL (ref 0–0.2)
BASOPHILS NFR BLD AUTO: 0.9 %
BILIRUB SERPL-MCNC: 0.5 MG/DL (ref 0.1–2)
BUN BLD-MCNC: 11 MG/DL (ref 7–18)
BUN/CREAT SERPL: 18 (ref 10–20)
CALCIUM BLD-MCNC: 9 MG/DL (ref 8.5–10.1)
CHLORIDE SERPL-SCNC: 105 MMOL/L (ref 98–112)
CO2 SERPL-SCNC: 29 MMOL/L (ref 21–32)
CREAT BLD-MCNC: 0.61 MG/DL
DEPRECATED RDW RBC AUTO: 42.5 FL (ref 35.1–46.3)
EOSINOPHIL # BLD AUTO: 0.07 X10(3) UL (ref 0–0.7)
EOSINOPHIL NFR BLD AUTO: 1.3 %
ERYTHROCYTE [DISTWIDTH] IN BLOOD BY AUTOMATED COUNT: 12.6 % (ref 11–15)
GFR SERPLBLD BASED ON 1.73 SQ M-ARVRAT: 86 ML/MIN/1.73M2 (ref 60–?)
GLOBULIN PLAS-MCNC: 3.6 G/DL (ref 2.8–4.4)
GLUCOSE BLD-MCNC: 85 MG/DL (ref 70–99)
HCT VFR BLD AUTO: 36.7 %
HGB BLD-MCNC: 11.7 G/DL
IMM GRANULOCYTES # BLD AUTO: 0.03 X10(3) UL (ref 0–1)
IMM GRANULOCYTES NFR BLD: 0.5 %
LYMPHOCYTES # BLD AUTO: 0.96 X10(3) UL (ref 1–4)
LYMPHOCYTES NFR BLD AUTO: 17.3 %
MCH RBC QN AUTO: 29.3 PG (ref 26–34)
MCHC RBC AUTO-ENTMCNC: 31.9 G/DL (ref 31–37)
MCV RBC AUTO: 91.8 FL
MONOCYTES # BLD AUTO: 0.46 X10(3) UL (ref 0.1–1)
MONOCYTES NFR BLD AUTO: 8.3 %
NEUTROPHILS # BLD AUTO: 3.98 X10 (3) UL (ref 1.5–7.7)
NEUTROPHILS # BLD AUTO: 3.98 X10(3) UL (ref 1.5–7.7)
NEUTROPHILS NFR BLD AUTO: 71.7 %
OSMOLALITY SERPL CALC.SUM OF ELEC: 289 MOSM/KG (ref 275–295)
PLATELET # BLD AUTO: 154 10(3)UL (ref 150–450)
POTASSIUM SERPL-SCNC: 3.4 MMOL/L (ref 3.5–5.1)
PROT SERPL-MCNC: 6.2 G/DL (ref 6.4–8.2)
RBC # BLD AUTO: 4 X10(6)UL
SODIUM SERPL-SCNC: 140 MMOL/L (ref 136–145)
VIT B12 SERPL-MCNC: 1307 PG/ML (ref 193–986)
WBC # BLD AUTO: 5.6 X10(3) UL (ref 4–11)

## 2022-08-04 PROCEDURE — 99222 1ST HOSP IP/OBS MODERATE 55: CPT | Performed by: INTERNAL MEDICINE

## 2022-08-04 NOTE — PLAN OF CARE
Patient came to hospital with care giver. Charbel Trinidad 2 days ago noted bruise on her right eye. VSS. All safety measures in place. Brett light in use. Educated patient on call light usage. Will continue to monitor. Problem: Patient Centered Care  Goal: Patient preferences are identified and integrated in the patient's plan of care  Description: Interventions:  - What would you like us to know as we care for you? From home with  and care giver  - Provide timely, complete, and accurate information to patient/family  - Incorporate patient and family knowledge, values, beliefs, and cultural backgrounds into the planning and delivery of care  - Encourage patient/family to participate in care and decision-making at the level they choose  - Honor patient and family perspectives and choices  8/4/2022 0349 by Ivan Birmingham RN  Outcome: Progressing  8/4/2022 0348 by Ivan Birmingham RN  Outcome: Progressing     Problem: Patient/Family Goals  Goal: Patient/Family Long Term Goal  Description: Patient's Long Term Goal: To go home    Interventions:   Follow doctors orders   - See additional Care Plan goals for specific interventions  8/4/2022 0349 by Ivan Birmingham RN  Outcome: Progressing  8/4/2022 0348 by Ivan Birmingham RN  Outcome: Progressing  Goal: Patient/Family Short Term Goal  Description: Patient's Short Term Goal: Get rid of UTI    Interventions:   See orders   - See additional Care Plan goals for specific interventions  8/4/2022 0349 by Ivan Birmingham RN  Outcome: Progressing  8/4/2022 0348 by Ivan Birmingham RN  Outcome: Progressing     Problem: PAIN - ADULT  Goal: Verbalizes/displays adequate comfort level or patient's stated pain goal  Description: INTERVENTIONS:  - Encourage pt to monitor pain and request assistance  - Assess pain using appropriate pain scale  - Administer analgesics based on type and severity of pain and evaluate response  - Implement non-pharmacological measures as appropriate and evaluate response  - Consider cultural and social influences on pain and pain management  - Manage/alleviate anxiety  - Utilize distraction and/or relaxation techniques  - Monitor for opioid side effects  - Notify MD/LIP if interventions unsuccessful or patient reports new pain  - Anticipate increased pain with activity and pre-medicate as appropriate  8/4/2022 0349 by Maciel Felix RN  Outcome: Progressing  8/4/2022 0348 by Maciel Felix RN  Outcome: Progressing     Problem: RISK FOR INFECTION - ADULT  Goal: Absence of fever/infection during anticipated neutropenic period  Description: INTERVENTIONS  - Monitor WBC  - Administer growth factors as ordered  - Implement neutropenic guidelines  8/4/2022 0349 by Maciel Felix RN  Outcome: Progressing  8/4/2022 0348 by Maciel Felix RN  Outcome: Progressing     Problem: SAFETY ADULT - FALL  Goal: Free from fall injury  Description: INTERVENTIONS:  - Assess pt frequently for physical needs  - Identify cognitive and physical deficits and behaviors that affect risk of falls.   - [place] fall precautions as indicated by assessment.  - Educate pt/family on patient safety including physical limitations  - Instruct pt to call for assistance with activity based on assessment  - Modify environment to reduce risk of injury  - Provide assistive devices as appropriate  - Consider OT/PT consult to assist with strengthening/mobility  - Encourage toileting schedule  8/4/2022 0349 by Maciel Felix RN  Outcome: Progressing  8/4/2022 0348 by Maciel Felix RN  Outcome: Progressing     Problem: DISCHARGE PLANNING  Goal: Discharge to home or other facility with appropriate resources  Description: INTERVENTIONS:  - Identify barriers to discharge w/pt and caregiver  - Include patient/family/discharge partner in discharge planning  - Arrange for needed discharge resources and transportation as appropriate  - Identify discharge learning needs (meds, wound care, etc)  - Arrange for interpreters to assist at discharge as needed  - Consider post-discharge preferences of patient/family/discharge partner  - Complete POLST form as appropriate  - Assess patient's ability to be responsible for managing their own health  - Refer to Case Management Department for coordinating discharge planning if the patient needs post-hospital services based on physician/LIP order or complex needs related to functional status, cognitive ability or social support system  8/4/2022 0349 by Jayda Vasquez, RN  Outcome: Progressing  8/4/2022 0348 by Jayda Vasquez, RN  Outcome: Progressing

## 2022-08-04 NOTE — ED QUICK NOTES
Pt to ED with family with c/o decreased appetite/po intake, intermittent confusion, and generalized fatigue. Pt denies cough or fever. Pt denies body aches/chills. Pt dx with UTI here yesterday and sent home on PO antibiotics. Pt states fall 2 days ago. Right eye orbit with healing bruise noted. Pt denies thinners or asa. Pt lives at home with family and caregiver. Pt denies N/V/D. Pt states mild dysuria without hematuria. Abdomen soft and non distended. Pt skin warm and dry. Pt is alert and oriented x4. Pt denies chest pain or sob. Bilateral lungs diminished at bases. No respiratory distress noted. IV established in triage to RFA #[id number]. Awaiting labs and imaging. Will continue to monitor.

## 2022-08-04 NOTE — CM/SW NOTE
Home w/   Fall at home a&O x4, weakness    Has home private pay caregiver    PT/OT to eval       Plan:  tbd    CM/SW to remain available for support and/or discharge planning.     Arian Gonzalez RN, CCM    Ext.  [phone]

## 2022-08-04 NOTE — PLAN OF CARE
Patient is alert and oriented x2. On room air, denies SOB, vitals stable. Educated patient on plan of care, general admission education. Call light within reach. Bed in lowest position. All needs addressed. Hourly rounding maintained. Dietary consulted on pt today, psych consult also placed and will assess tomorrow. Pt replaced with 40mEQ IV K for low potassium. Report given to night shift RN. Pt stable at change of shift. Problem: Patient Centered Care  Goal: Patient preferences are identified and integrated in the patient's plan of care  Description: Interventions:  - What would you like us to know as we care for you? From home with  and care giver  - Provide timely, complete, and accurate information to patient/family  - Incorporate patient and family knowledge, values, beliefs, and cultural backgrounds into the planning and delivery of care  - Encourage patient/family to participate in care and decision-making at the level they choose  - Honor patient and family perspectives and choices  Outcome: Progressing     Problem: SAFETY ADULT - FALL  Goal: Free from fall injury  Description: INTERVENTIONS:  - Assess pt frequently for physical needs  - Identify cognitive and physical deficits and behaviors that affect risk of falls.   - [place] fall precautions as indicated by assessment.  - Educate pt/family on patient safety including physical limitations  - Instruct pt to call for assistance with activity based on assessment  - Modify environment to reduce risk of injury  - Provide assistive devices as appropriate  - Consider OT/PT consult to assist with strengthening/mobility  - Encourage toileting schedule  Outcome: Progressing

## 2022-08-04 NOTE — ED QUICK NOTES
Orders for admission, patient is aware of plan and ready to go upstairs. Any questions, please call ED RN Franco Barnett  at extension [phone]. Patient Covid vaccination status: Fully vaccinated     COVID Test Ordered in ED: Rapid SARS-CoV-2 by PCR-negative    COVID Suspicion at Admission: N/A    Running Infusions:      Mental Status/LOC at time of transport:A&OX4    Other pertinent information: Pt ambulates with a walker.    CIWA score: N/A   NIH score:  N/A

## 2022-08-05 ENCOUNTER — TELEPHONE (OUTPATIENT)
Dept: INTERNAL MEDICINE CLINIC | Facility: CLINIC | Age: 87
End: 2022-08-05

## 2022-08-05 VITALS
RESPIRATION RATE: 18 BRPM | HEART RATE: 84 BPM | WEIGHT: 117.19 LBS | SYSTOLIC BLOOD PRESSURE: 171 MMHG | BODY MASS INDEX: 20.77 KG/M2 | OXYGEN SATURATION: 93 % | TEMPERATURE: 98 F | DIASTOLIC BLOOD PRESSURE: 60 MMHG | HEIGHT: 63 IN

## 2022-08-05 PROBLEM — F41.1 GENERALIZED ANXIETY DISORDER: Status: ACTIVE | Noted: 2022-08-05

## 2022-08-05 PROBLEM — F33.2 SEVERE EPISODE OF RECURRENT MAJOR DEPRESSIVE DISORDER, WITHOUT PSYCHOTIC FEATURES (HCC): Status: ACTIVE | Noted: 2022-08-05

## 2022-08-05 LAB — POTASSIUM SERPL-SCNC: 3.7 MMOL/L (ref 3.5–5.1)

## 2022-08-05 PROCEDURE — 99239 HOSP IP/OBS DSCHRG MGMT >30: CPT | Performed by: INTERNAL MEDICINE

## 2022-08-05 PROCEDURE — 90792 PSYCH DIAG EVAL W/MED SRVCS: CPT | Performed by: OTHER

## 2022-08-05 RX ORDER — SULFAMETHOXAZOLE AND TRIMETHOPRIM 800; 160 MG/1; MG/1
1 TABLET ORAL 2 TIMES DAILY
Qty: 10 TABLET | Refills: 0 | Status: SHIPPED | OUTPATIENT
Start: 2022-08-06 | End: 2022-08-11

## 2022-08-05 RX ORDER — ESCITALOPRAM OXALATE 10 MG/1
10 TABLET ORAL NIGHTLY
Status: DISCONTINUED | OUTPATIENT
Start: 2022-08-05 | End: 2022-08-05

## 2022-08-05 RX ORDER — OLANZAPINE 2.5 MG/1
1.25 TABLET ORAL NIGHTLY
Qty: 15 TABLET | Refills: 1 | Status: SHIPPED | OUTPATIENT
Start: 2022-08-05

## 2022-08-05 RX ORDER — OLANZAPINE 2.5 MG/1
1.25 TABLET ORAL NIGHTLY
Status: DISCONTINUED | OUTPATIENT
Start: 2022-08-05 | End: 2022-08-05

## 2022-08-05 RX ORDER — ALPRAZOLAM 0.25 MG/1
0.25 TABLET ORAL DAILY PRN
Qty: 30 TABLET | Refills: 5 | Status: SHIPPED | OUTPATIENT
Start: 2022-08-05

## 2022-08-05 RX ORDER — ESCITALOPRAM OXALATE 10 MG/1
10 TABLET ORAL NIGHTLY
Qty: 30 TABLET | Refills: 0 | Status: SHIPPED | OUTPATIENT
Start: 2022-08-05

## 2022-08-05 NOTE — PLAN OF CARE
Xanax given for anxiety this shift with good effect. Safety precautions in place. Problem: Patient Centered Care  Goal: Patient preferences are identified and integrated in the patient's plan of care  Description: Interventions:  - What would you like us to know as we care for you? From home with  and care giver  - Provide timely, complete, and accurate information to patient/family  - Incorporate patient and family knowledge, values, beliefs, and cultural backgrounds into the planning and delivery of care  - Encourage patient/family to participate in care and decision-making at the level they choose  - Honor patient and family perspectives and choices  Outcome: Progressing     Problem: Patient/Family Goals  Goal: Patient/Family Long Term Goal  Description: Patient's Long Term Goal: To go home    Interventions:   Follow doctors orders   - See additional Care Plan goals for specific interventions  Outcome: Progressing  Goal: Patient/Family Short Term Goal  Description: Patient's Short Term Goal: Get rid of UTI    Interventions:   See orders   - See additional Care Plan goals for specific interventions  Outcome: Progressing     Problem: PAIN - ADULT  Goal: Verbalizes/displays adequate comfort level or patient's stated pain goal  Description: INTERVENTIONS:  - Encourage pt to monitor pain and request assistance  - Assess pain using appropriate pain scale  - Administer analgesics based on type and severity of pain and evaluate response  - Implement non-pharmacological measures as appropriate and evaluate response  - Consider cultural and social influences on pain and pain management  - Manage/alleviate anxiety  - Utilize distraction and/or relaxation techniques  - Monitor for opioid side effects  - Notify MD/LIP if interventions unsuccessful or patient reports new pain  - Anticipate increased pain with activity and pre-medicate as appropriate  Outcome: Progressing     Problem: RISK FOR INFECTION - ADULT  Goal: Absence of fever/infection during anticipated neutropenic period  Description: INTERVENTIONS  - Monitor WBC  - Administer growth factors as ordered  - Implement neutropenic guidelines  Outcome: Progressing     Problem: SAFETY ADULT - FALL  Goal: Free from fall injury  Description: INTERVENTIONS:  - Assess pt frequently for physical needs  - Identify cognitive and physical deficits and behaviors that affect risk of falls.   - [place] fall precautions as indicated by assessment.  - Educate pt/family on patient safety including physical limitations  - Instruct pt to call for assistance with activity based on assessment  - Modify environment to reduce risk of injury  - Provide assistive devices as appropriate  - Consider OT/PT consult to assist with strengthening/mobility  - Encourage toileting schedule  Outcome: Progressing     Problem: DISCHARGE PLANNING  Goal: Discharge to home or other facility with appropriate resources  Description: INTERVENTIONS:  - Identify barriers to discharge w/pt and caregiver  - Include patient/family/discharge partner in discharge planning  - Arrange for needed discharge resources and transportation as appropriate  - Identify discharge learning needs (meds, wound care, etc)  - Arrange for interpreters to assist at discharge as needed  - Consider post-discharge preferences of patient/family/discharge partner  - Complete POLST form as appropriate  - Assess patient's ability to be responsible for managing their own health  - Refer to Case Management Department for coordinating discharge planning if the patient needs post-hospital services based on physician/LIP order or complex needs related to functional status, cognitive ability or social support system  Outcome: Progressing

## 2022-08-05 NOTE — CM/SW NOTE
Department  notified of request for [facility], aidin referrals started. Assigned CM/SW to follow up with pt/family on further discharge planning.      Isauro Reyes   August 05, 2022   10:49

## 2022-08-05 NOTE — CM/SW NOTE
Per chart review, PT/OT rec'd home with home therapy yesterday. SW requested for [facility] [place] referrals to be placed. SW to discuss [facility] with pt. @1PM  SW met with pt, spouse and son at bedside to discuss DC planning. Pt will DC home today. Son confirmed pt will have 24h care. Pt has private duty caregivers that family is currently working on increasing time. SW provided information in AVS for [facility] for LTC planning. SW discussed HH. Choices provided. Alcides Diaz res'd, placed in AVS. F2F entered - PT/OT/RN. SW notified HH of DC for today and noted for [facility] to call , contact info sent. PLAN: home with spouse, caregivers and JACKY Cruz info in AVS    SW remains available for support and/or discharge planning. Please do not hesitate to call/chat SW if further DC needs arise.      Tran JOEL, [place], [place]   Ext [phone]

## 2022-08-05 NOTE — PROGRESS NOTES
Pt discharged, family provided transportation home. Discharge paperwork and prescriptions reviewed, all questions and concerns addressed. IV access and tele discontinued.

## 2022-08-05 NOTE — TELEPHONE ENCOUNTER
Sarika Ibarra called back and informed Dr Dari Stewart will sign orders per protocol. 7/21/22 office visit notes faxed as requested.

## 2022-08-05 NOTE — TELEPHONE ENCOUNTER
Osmin Ellis called  Will Dr Aniceto Escalante sign home health orders for patient?   Please fax most recent office visit notes  FAX#:  [phone]  Tasked to nursing

## 2022-08-05 NOTE — BH PROGRESS NOTE
Behavioral Health Note:  REASON FOR ADMISSION:   Weakness    REASON FOR CONSULT:  Psychiatry consultation requested for evaluation and advice for family requested consult for sustained depression, med recs, thoughts of mortality    OBJECTIVE:  Mauri Sims is a  80year old female who presents d/t weakness, she was found to have UTI, increased falls. She has PMH significant for recent shingles infection, recurrent UTIs, anemia, MDD, FRANCY, suspected but undiagnosed mood disorder and narcissism, HL, scoliosis, spinal stenosis, hx hospital based delirium in 2019, vitamin D deficiency. Psych consult ordered for Dr. Magdi Moreira and [facility] met with Mauri Sims in presence of her family with permission and consent for initial consultation and primarily gathered information from Mireya's family d/t a/o x2.5. CHIEF COMPLAINT:   Mauri Sims today presents as alert and oriented x2.5 and was repetitive in fixation in being \"dismissed\" from the hospital and is \"too busy\" to spend this much time talking to somebody. Her family reports that she is a/o x3 at baseline, has hx of hospital based delirium in 2019 and they suspect that there has been an onset of mild dementia sx over the past couple of years, she has been more repetitive and forgetful, forgetting names and conversation she had within 24 hours. The family reported the most recent example being that she would call [employer] daily to ensure she can take two medications together that she had been told she can take together within the past 24 hours and was also reassured by her family as well. They reports episodes of tangential rambling and will have 1 good day for every 3 bad days. She has no hx AVH. Mauri Sims and her family report that she's been depressed and anxious about her medical condition, loss of friends, and loss of control over situations in her life. Her family reports that her desire for control and being right will trigger anxiety if she doesn't get her way.  Mauri Sims and her family report depressive sx including decreased energy/motivation, increased irritability, loss of interest, isolation, impaired concentration, feelings of helplessness/hopelessness about medical condition, impaired sleep/appetite. Family reports anxious sx were most recently onset with shingles infection on 6/8/2022 including racing thoughts, rumination, fixation of medication and lack of control over situations and her memory. They report when she's more forgetful she is much more irritable and anxious, often talking over people and picking/choosing when she talks or when family talks. Neal Esteves reports that her sleep is poor, her family reports that she gets into bed around 2100 and won't get out of bed until [id number] and will then just get onto the couch. They report this is the closest she's had to a sleep pattern over the past couple of years. She and her family report that her appetite was good until about a week ago with onset of medical issues and exacerbation with depression and anxiety. Neal Esteves uses a walker at baseline, has a caretaker M/W/F and her /family takes care of her T/TH/weekends. She needs to be bathed by her caregivers and is otherwise independent with other ADLS. Family reports that Neal Esteves has always had labile mood/affect and has had been suspected to have undiagnosed bipolar disorder and narcissistic personality disorder for as long as they can remember. Family reports that caretakers often \"don't know which [facility] going to get\". Neal Esteves and her family reports that over the past two weeks she's been talking about death in the context of end of life wishes and feeling that this was it for her and her current illness would be what took her and that she was ready to go when it was her time. They both deny that she's explicitly stated or expressed desire to die and has never reported any thoughts/plans/intent to harm herself.     PAST PSYCHIATRIC HISTORY:  Past diagnosis: hospital based delirium, MDD, FRANCY, undiagnosed but suspected mood disorder and/or narcissism  Past inpatient: none  Past outpatient: was seeing psychiatrist Dr. Gilmer Bell for years who retired in 12/2021, another provider name was given to them but she hasn't set up an appointment she reported that the name was Dr. Tami Gonzalez but [facility] cannot find that name anywhere, hx therapy  Medication: was on lexapro 20mg but stopped taking 2 months ago - restarted on lexapro 10mg during this admission, prn xanax . 25mg, hx prozac (only medication family saw improvement with), zoloft, wellbutrin    SOCIAL HISTORY:  Mike Montes is a  80year old female who lives in [place] in her  and has three kids who lives outside of the home. 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 She is a former tobacco user.     MENTAL STATUS EXAM:  Appearance: stated age female sitting/laying in hospital bed  Attitude/Coorperation: cooperative, somewhat receptive given medical condition  Behavior: intermittent eye contact, fidgeting with glasses case, finishing breakfast upon arrival  Speech: normal rate, rhythm, and fluctuating volume, episodes of pressured speech  Mood: anxious  Affect: congruent to mood  Conscious/Orientation: alert and oriented x2.5 with fair/poor attention and fair memory  Thought process: mixed linear and circumstantial  Thought content:  Intermittent fixation on \"being dismissed from the hospital\" \"being too busy to do this\" and \"is this part of my dismissal?\"  Perceptions: no hallucinations reported or observed  Insight/judgment: limited/impaired d/t medical condition    SUICIDAL RISK ASSESSMENT  Per Mike Montes and her family, she has no hx SI/HI/SIB but has been talking about end of life much more often over the past couple weeks with onset of infections and thoughts of a close friend who had passed ~1 year ago    [address] has a dx r/o unspecified recurrent depressive disorder, r/o unspecified anxiety disorder, r/o unspecified mood disorder, r/o unspecified delirium d/t other medical condition, r/o unspecified cognitive impairment    PLAN  1. Psych consult ordered for Dr. Magdi Moreira  2. Referrals provided for outpatient follow up in network with [address] [place] [address] [facility]     Note to patient:  The [facility] makes medical notes like these available to patients in the interest of transparency. However, be advised this is a medical document. It is intended as peer to peer communication. It is written in medical language and may contain abbreviations or verbiage that are unfamiliar. It may appear blunt or direct. Medical documents are intended to carry relevant information, facts as evident, and the clinical opinion of the practitioner.

## 2022-08-05 NOTE — PLAN OF CARE
Problem: Patient Centered Care  Goal: Patient preferences are identified and integrated in the patient's plan of care  Description: Interventions:  - What would you like us to know as we care for you? From home with  and care giver  - Provide timely, complete, and accurate information to patient/family  - Incorporate patient and family knowledge, values, beliefs, and cultural backgrounds into the planning and delivery of care  - Encourage patient/family to participate in care and decision-making at the level they choose  - Honor patient and family perspectives and choices  Outcome: Progressing     Problem: Patient/Family Goals  Goal: Patient/Family Long Term Goal  Description: Patient's Long Term Goal: To go home    Interventions:   Follow doctors orders   - See additional Care Plan goals for specific interventions  Outcome: Progressing  Goal: Patient/Family Short Term Goal  Description: Patient's Short Term Goal: Get rid of UTI    Interventions:   See orders   - See additional Care Plan goals for specific interventions  Outcome: Progressing     Problem: PAIN - ADULT  Goal: Verbalizes/displays adequate comfort level or patient's stated pain goal  Description: INTERVENTIONS:  - Encourage pt to monitor pain and request assistance  - Assess pain using appropriate pain scale  - Administer analgesics based on type and severity of pain and evaluate response  - Implement non-pharmacological measures as appropriate and evaluate response  - Consider cultural and social influences on pain and pain management  - Manage/alleviate anxiety  - Utilize distraction and/or relaxation techniques  - Monitor for opioid side effects  - Notify MD/LIP if interventions unsuccessful or patient reports new pain  - Anticipate increased pain with activity and pre-medicate as appropriate  Outcome: Progressing     Problem: RISK FOR INFECTION - ADULT  Goal: Absence of fever/infection during anticipated neutropenic period  Description: INTERVENTIONS  - Monitor WBC  - Administer growth factors as ordered  - Implement neutropenic guidelines  Outcome: Progressing     Problem: SAFETY ADULT - FALL  Goal: Free from fall injury  Description: INTERVENTIONS:  - Assess pt frequently for physical needs  - Identify cognitive and physical deficits and behaviors that affect risk of falls.   - [place] fall precautions as indicated by assessment.  - Educate pt/family on patient safety including physical limitations  - Instruct pt to call for assistance with activity based on assessment  - Modify environment to reduce risk of injury  - Provide assistive devices as appropriate  - Consider OT/PT consult to assist with strengthening/mobility  - Encourage toileting schedule  Outcome: Progressing     Problem: DISCHARGE PLANNING  Goal: Discharge to home or other facility with appropriate resources  Description: INTERVENTIONS:  - Identify barriers to discharge w/pt and caregiver  - Include patient/family/discharge partner in discharge planning  - Arrange for needed discharge resources and transportation as appropriate  - Identify discharge learning needs (meds, wound care, etc)  - Arrange for interpreters to assist at discharge as needed  - Consider post-discharge preferences of patient/family/discharge partner  - Complete POLST form as appropriate  - Assess patient's ability to be responsible for managing their own health  - Refer to Case Management Department for coordinating discharge planning if the patient needs post-hospital services based on physician/LIP order or complex needs related to functional status, cognitive ability or social support system  Outcome: Progressing

## 2022-08-07 ENCOUNTER — TELEPHONE (OUTPATIENT)
Dept: INTERNAL MEDICINE CLINIC | Facility: CLINIC | Age: 87
End: 2022-08-07

## 2022-08-07 NOTE — TELEPHONE ENCOUNTER
called. Wife not sleeping well. Has 24 hour caregiver. However, pt is not taking her olanzapine or alprazolam and she is taking lexapro at night. I suggested pt take lexapro in am and restart olanzapine at night before bed. If no improvement with sleep over the next 2-3 days, would then add current dose of alprazolam before bed.    understands and agrees with plan

## 2022-08-08 ENCOUNTER — TELEPHONE (OUTPATIENT)
Dept: INTERNAL MEDICINE CLINIC | Facility: CLINIC | Age: 87
End: 2022-08-08

## 2022-08-08 ENCOUNTER — PATIENT OUTREACH (OUTPATIENT)
Dept: CASE MANAGEMENT | Age: 87
End: 2022-08-08

## 2022-08-08 DIAGNOSIS — Z02.9 ENCOUNTERS FOR UNSPECIFIED ADMINISTRATIVE PURPOSE: ICD-10-CM

## 2022-08-08 PROCEDURE — 1111F DSCHRG MED/CURRENT MED MERGE: CPT

## 2022-08-08 RX ORDER — FLUTICASONE FUROATE AND VILANTEROL 100; 25 UG/1; UG/1
1 POWDER RESPIRATORY (INHALATION) DAILY
COMMUNITY

## 2022-08-08 RX ORDER — ESCITALOPRAM OXALATE 10 MG/1
TABLET ORAL
Qty: 90 TABLET | Refills: 0 | OUTPATIENT
Start: 2022-08-08

## 2022-08-08 NOTE — TELEPHONE ENCOUNTER
Called spouse per hipaa who states patient started to be delirious last night , does not recognize caregivers . Yesterday she complained of stomach discomfort but they do not know when she last had BM. She hardly eats and drinks  , if so Ensure or apple juice. Spouse spoke with  last night who instructed him to see that she takes the Olanzapine and spouse gave it to her.  Patient has TCM tomorrow at 5 pm.  Rn instructed spouse to keep giving her the medications as prescribed, trying to keep fluid intake  up and monitor for fever , abdominal pain , any new SX - and if that would happen to go back to ER -verbalized understanding

## 2022-08-08 NOTE — TELEPHONE ENCOUNTER
is calling  Wife was discharged from the hospital last Friday -  states she is delirious right now and he is not sure what to do     Transferred call to triage

## 2022-08-12 ENCOUNTER — TELEPHONE (OUTPATIENT)
Dept: INTERNAL MEDICINE CLINIC | Facility: CLINIC | Age: 87
End: 2022-08-12

## 2022-08-12 NOTE — TELEPHONE ENCOUNTER
Message noted. Reviewed last hospitalization August 3. We can let patient know that in Dr. Magali padilla I received her message. She can always go to the hospital to be evaluated if her symptoms seem to be worsening. Otherwise I will forward information on to Dr. Arturo Andino. ( Lazaro Molina.  America Bee )

## 2022-08-12 NOTE — TELEPHONE ENCOUNTER
Please call patient  She is really not feeling well today  Saw Dr Holly Bell on Wednesday  Pt doesn't know what to do  Not sure if she has a bowel obstruction  Pt seems very confused   Tasked to nursing

## 2022-08-12 NOTE — TELEPHONE ENCOUNTER
Pt. Called back. She is not feeling well. Having pain, hardly eating. She said she took a Lexapro this morning and is not feeling well. She can be reached at [phone] or [phone].

## 2022-08-12 NOTE — TELEPHONE ENCOUNTER
I spoke with Estelle Hernandez. She says she is not good at all. She tells me she saw Dr. Ethan Gambino on Wednesday. She is not eating well. Tried to eat today but could not. She says she ate a better dinner last night. She took a Lexapro today. She is not sure if this upset her stomach. She tells me she is at friend's house. Her  is in the background saying she is at home. She feels she is \"going crazy,\" sick to her stomach, very small BM this morning. She does not want to try to eat lunch right now. Suggested that her  make her something to eat and she is not interested in this. She asks if Dr. Adolfo Harris is here so she could talk to him. She thinks the lack of food is the problem. She feels she needs to be admitted. She feels it is hard to explain. She says her son is going to head over to her house. She declines ER evaluation. She feels she needs to see someone. She says they feel she is \"half crazy. \" She asks if she can be evaluated a night or two in the hospital. She then says this may be a waste of time. She feels sick and she does not know what else to do. She does not feel well since the hospital. She feels her weight is down. Her  is asking to speaking with the office. Patient is yelling at him. She asks for a call back.

## 2022-08-16 ENCOUNTER — PATIENT MESSAGE (OUTPATIENT)
Dept: INTERNAL MEDICINE CLINIC | Facility: CLINIC | Age: 87
End: 2022-08-16

## 2022-08-16 ENCOUNTER — TELEPHONE (OUTPATIENT)
Dept: INTERNAL MEDICINE CLINIC | Facility: CLINIC | Age: 87
End: 2022-08-16

## 2022-08-16 DIAGNOSIS — Z20.822 ENCOUNTER FOR SCREENING LABORATORY TESTING FOR COVID-19 VIRUS IN ASYMPTOMATIC PATIENT: Primary | ICD-10-CM

## 2022-08-16 NOTE — TELEPHONE ENCOUNTER
From: Jorge Danielle  Sent: 8/16/2022 1:01 PM CDT  To: [facility] Clinical Staff  Subject: H&P and Progress notes for Srinivas Blanco    Thank you Garcia Smith.

## 2022-08-16 NOTE — TELEPHONE ENCOUNTER
From: Dana Price  To: Tania Garcia MD  Sent: 8/16/2022 1:07 PM CDT  Subject: PCR Test for Pepper Lan    Hi everyone. Before Quin Lopez goes to [facility] this week, she has to have a PCR text. Do you provide those in your office?     Thank you,    King Eddy

## 2022-08-16 NOTE — TELEPHONE ENCOUNTER
Advised pt to review medications with pharmacist prior to covid booster. Pt verbalized understanding.

## 2022-08-16 NOTE — TELEPHONE ENCOUNTER
Pt is taking a new medication - Pregabalin     Pt  is scheduled this afternoon for the Covid Booster     Requests call back to advise if new med is compatible with the booster     [phone]

## 2022-08-16 NOTE — TELEPHONE ENCOUNTER
From: Kirstin Campos  Sent: 8/16/2022 3:14 PM CDT  To: [facility] Clinical Staff  Subject: PCR Test for Marda Shanks    Thanks so much!

## 2022-08-16 NOTE — TELEPHONE ENCOUNTER
From: Kirstin Jacobs  To: Marybeth Anderson MD  Sent: 8/16/2022 11:09 AM CDT  Subject: H&P and Progress notes for Farzaneh Stewart,    Have you copies of Sahra's H&P and Progress notes that you can share with us? She will be going to [facility] for a two-week \"respite retreat\" on Thursday and they require this information.     Thank you,    Nae Alcantara

## 2022-08-17 ENCOUNTER — TELEPHONE (OUTPATIENT)
Dept: INTERNAL MEDICINE CLINIC | Facility: CLINIC | Age: 87
End: 2022-08-17

## 2022-08-17 NOTE — TELEPHONE ENCOUNTER
Alejandrina/Federica [facility] called   to follow up on Plan of Care faxed to   Dr Lani Cruz on Aug12  Was it received?   Please send back to [facility]#[phone]

## 2022-08-19 ENCOUNTER — INITIAL APN SNF VISIT (OUTPATIENT)
Dept: INTERNAL MEDICINE CLINIC | Facility: SKILLED NURSING FACILITY | Age: 87
End: 2022-08-19

## 2022-08-19 DIAGNOSIS — F32.A ANXIETY AND DEPRESSION: ICD-10-CM

## 2022-08-19 DIAGNOSIS — R53.1 WEAKNESS: ICD-10-CM

## 2022-08-19 DIAGNOSIS — F41.9 ANXIETY AND DEPRESSION: ICD-10-CM

## 2022-08-19 DIAGNOSIS — N39.0 URINARY TRACT INFECTION WITHOUT HEMATURIA, SITE UNSPECIFIED: ICD-10-CM

## 2022-08-19 PROCEDURE — 99310 SBSQ NF CARE HIGH MDM 45: CPT | Performed by: NURSE PRACTITIONER

## 2022-08-19 PROCEDURE — 1111F DSCHRG MED/CURRENT MED MERGE: CPT | Performed by: NURSE PRACTITIONER

## 2022-08-20 ENCOUNTER — LAB REQUISITION (OUTPATIENT)
Dept: LAB | Facility: HOSPITAL | Age: 87
End: 2022-08-20
Payer: MEDICARE

## 2022-08-20 DIAGNOSIS — M81.0 AGE-RELATED OSTEOPOROSIS WITHOUT CURRENT PATHOLOGICAL FRACTURE: ICD-10-CM

## 2022-08-20 DIAGNOSIS — J44.9 CHRONIC OBSTRUCTIVE PULMONARY DISEASE, UNSPECIFIED (HCC): ICD-10-CM

## 2022-08-20 DIAGNOSIS — D64.9 ANEMIA, UNSPECIFIED: ICD-10-CM

## 2022-08-20 LAB
ALBUMIN SERPL-MCNC: 3 G/DL (ref 3.4–5)
ALBUMIN/GLOB SERPL: 0.9 {RATIO} (ref 1–2)
ALP LIVER SERPL-CCNC: 60 U/L
ALT SERPL-CCNC: 29 U/L
ANION GAP SERPL CALC-SCNC: 6 MMOL/L (ref 0–18)
AST SERPL-CCNC: 16 U/L (ref 15–37)
BASOPHILS # BLD AUTO: 0.06 X10(3) UL (ref 0–0.2)
BASOPHILS NFR BLD AUTO: 1.1 %
BILIRUB SERPL-MCNC: 0.4 MG/DL (ref 0.1–2)
BUN BLD-MCNC: 15 MG/DL (ref 7–18)
BUN/CREAT SERPL: 21.1 (ref 10–20)
CALCIUM BLD-MCNC: 9.9 MG/DL (ref 8.5–10.1)
CHLORIDE SERPL-SCNC: 106 MMOL/L (ref 98–112)
CO2 SERPL-SCNC: 32 MMOL/L (ref 21–32)
CREAT BLD-MCNC: 0.71 MG/DL
DEPRECATED RDW RBC AUTO: 43.8 FL (ref 35.1–46.3)
EOSINOPHIL # BLD AUTO: 0.36 X10(3) UL (ref 0–0.7)
EOSINOPHIL NFR BLD AUTO: 6.9 %
ERYTHROCYTE [DISTWIDTH] IN BLOOD BY AUTOMATED COUNT: 12.9 % (ref 11–15)
GFR SERPLBLD BASED ON 1.73 SQ M-ARVRAT: 82 ML/MIN/1.73M2 (ref 60–?)
GLOBULIN PLAS-MCNC: 3.3 G/DL (ref 2.8–4.4)
GLUCOSE BLD-MCNC: 82 MG/DL (ref 70–99)
HCT VFR BLD AUTO: 40.8 %
HGB BLD-MCNC: 13.1 G/DL
IMM GRANULOCYTES # BLD AUTO: 0.01 X10(3) UL (ref 0–1)
IMM GRANULOCYTES NFR BLD: 0.2 %
LYMPHOCYTES # BLD AUTO: 1.65 X10(3) UL (ref 1–4)
LYMPHOCYTES NFR BLD AUTO: 31.5 %
MCH RBC QN AUTO: 29.4 PG (ref 26–34)
MCHC RBC AUTO-ENTMCNC: 32.1 G/DL (ref 31–37)
MCV RBC AUTO: 91.7 FL
MONOCYTES # BLD AUTO: 0.41 X10(3) UL (ref 0.1–1)
MONOCYTES NFR BLD AUTO: 7.8 %
NEUTROPHILS # BLD AUTO: 2.75 X10 (3) UL (ref 1.5–7.7)
NEUTROPHILS # BLD AUTO: 2.75 X10(3) UL (ref 1.5–7.7)
NEUTROPHILS NFR BLD AUTO: 52.5 %
OSMOLALITY SERPL CALC.SUM OF ELEC: 298 MOSM/KG (ref 275–295)
PLATELET # BLD AUTO: 184 10(3)UL (ref 150–450)
POTASSIUM SERPL-SCNC: 4.6 MMOL/L (ref 3.5–5.1)
PROT SERPL-MCNC: 6.3 G/DL (ref 6.4–8.2)
RBC # BLD AUTO: 4.45 X10(6)UL
SODIUM SERPL-SCNC: 144 MMOL/L (ref 136–145)
WBC # BLD AUTO: 5.2 X10(3) UL (ref 4–11)

## 2022-08-20 PROCEDURE — 85025 COMPLETE CBC W/AUTO DIFF WBC: CPT | Performed by: INTERNAL MEDICINE

## 2022-08-20 PROCEDURE — 80053 COMPREHEN METABOLIC PANEL: CPT | Performed by: INTERNAL MEDICINE

## 2022-09-05 ENCOUNTER — TELEPHONE (OUTPATIENT)
Dept: INTERNAL MEDICINE CLINIC | Facility: CLINIC | Age: 87
End: 2022-09-05

## 2022-09-05 NOTE — TELEPHONE ENCOUNTER
Spoke with the patient had a short conversation, claims she has wrist pain that has gotten bad over the past few days, she had the same type of pain during her recent case of shingles, but weaned and stopped the Lyrica she was on after she was improving there. Now the pain has returned. She does have doses of Lyrica left, so she was instructed to take 1 of those now possibly 1 of those tonight before she goes to bed, resume and continue all the rest of her medications, and follow-up with the office tomorrow, if the wrist pain is not related to the shingles, she will need to be seen in the office for an evaluation, nursing if you can reach out to her in the morning offer her an appointment in the office if available.   With her primary or treating doctor would be most appropriate, but first available physician would suffice

## 2022-09-07 ENCOUNTER — TELEPHONE (OUTPATIENT)
Dept: INTERNAL MEDICINE CLINIC | Facility: CLINIC | Age: 87
End: 2022-09-07

## 2022-09-07 NOTE — TELEPHONE ENCOUNTER
Patient is calling she has stopped taking Lyrica, the pain from her shingles has stopped. Patient is now having difficulty sleeping, she took a Xanax during the night, it didn't help her sleep. Patient says she is loosing sleep and asking if she can take a 1/2 Xanax during the day? Then take 1/2 to 1 at night.     Please call patient [phone]

## 2022-09-07 NOTE — TELEPHONE ENCOUNTER
Dr. Raúl Frazier- please advise - Script reads alprazolam 0.25 mg Take one tablet PO q day prn anxiety, pt would like to increase dosage to help her sleep. Pt would like to take 1/2 tablet q day and 1/2-1 tablet at bedtime prn. Please advise?

## 2022-09-08 RX ORDER — ESCITALOPRAM OXALATE 10 MG/1
10 TABLET ORAL DAILY
Qty: 90 TABLET | Refills: 3 | Status: SHIPPED | OUTPATIENT
Start: 2022-09-08

## 2022-09-08 RX ORDER — PREGABALIN 25 MG/1
CAPSULE ORAL
COMMUNITY
Start: 2022-08-18

## 2022-09-08 NOTE — TELEPHONE ENCOUNTER
Patient is calling she is checking on the status of the refill called     Patient said she is still very sick with the shingles

## 2022-09-08 NOTE — TELEPHONE ENCOUNTER
Spoke to pt, C/O occasional electrical shock sensation to lt axilla since June. Dx with shingles in August.  Prescribed Lyrica in August  Stopped taking med abruptly 1 week ago. Pt encouraged to continue taking meds as prescribed, appt sched for Tues 9/13 at 1500. Lexapro refill done.

## 2022-09-08 NOTE — TELEPHONE ENCOUNTER
Patient is calling back again she is at her wits end with the shingles she is going on 3 months with shingles. Patient is requesting Dr Pal Martin call her to discuss.

## 2022-09-21 ENCOUNTER — MED REC SCAN ONLY (OUTPATIENT)
Dept: INTERNAL MEDICINE CLINIC | Facility: CLINIC | Age: 87
End: 2022-09-21

## 2022-09-21 ENCOUNTER — TELEPHONE (OUTPATIENT)
Dept: INTERNAL MEDICINE CLINIC | Facility: CLINIC | Age: 87
End: 2022-09-21

## 2022-12-06 ENCOUNTER — OFFICE VISIT (OUTPATIENT)
Dept: INTERNAL MEDICINE CLINIC | Facility: CLINIC | Age: 87
End: 2022-12-06
Payer: COMMERCIAL

## 2022-12-06 VITALS
HEART RATE: 107 BPM | WEIGHT: 121 LBS | HEIGHT: 63 IN | DIASTOLIC BLOOD PRESSURE: 78 MMHG | BODY MASS INDEX: 21.44 KG/M2 | TEMPERATURE: 98 F | SYSTOLIC BLOOD PRESSURE: 130 MMHG | OXYGEN SATURATION: 97 % | RESPIRATION RATE: 18 BRPM

## 2022-12-06 DIAGNOSIS — F41.9 ANXIETY AND DEPRESSION: ICD-10-CM

## 2022-12-06 DIAGNOSIS — R53.81 PHYSICAL DECONDITIONING: ICD-10-CM

## 2022-12-06 DIAGNOSIS — F32.A ANXIETY AND DEPRESSION: ICD-10-CM

## 2022-12-06 DIAGNOSIS — B02.29 POSTHERPETIC NEURALGIA: Primary | ICD-10-CM

## 2022-12-06 PROCEDURE — 3008F BODY MASS INDEX DOCD: CPT | Performed by: INTERNAL MEDICINE

## 2022-12-06 PROCEDURE — 3075F SYST BP GE 130 - 139MM HG: CPT | Performed by: INTERNAL MEDICINE

## 2022-12-06 PROCEDURE — 99214 OFFICE O/P EST MOD 30 MIN: CPT | Performed by: INTERNAL MEDICINE

## 2022-12-06 PROCEDURE — 3078F DIAST BP <80 MM HG: CPT | Performed by: INTERNAL MEDICINE

## 2022-12-06 PROCEDURE — 1125F AMNT PAIN NOTED PAIN PRSNT: CPT | Performed by: INTERNAL MEDICINE

## 2022-12-06 RX ORDER — LATANOPROST 50 UG/ML
SOLUTION/ DROPS OPHTHALMIC
COMMUNITY
Start: 2022-11-30

## 2022-12-06 RX ORDER — DONEPEZIL HYDROCHLORIDE 5 MG/1
TABLET, FILM COATED ORAL
COMMUNITY
Start: 2022-09-30

## 2022-12-20 NOTE — TELEPHONE ENCOUNTER
Daughter Neto Carrizales called. She states her mother decided to have some lady friends over for lunch today. Daughter concerned that this has now put her mother and father at risk. Looking for direction on what they should do from this point? Quarantine? ?  She's Comment: Pt. previously had labs drawn 1 month ago. Reviewed and WNL. Currently on Synthroid-Euthyroid. Detail Level: Simple Render Risk Assessment In Note?: no

## 2023-01-02 ENCOUNTER — PATIENT MESSAGE (OUTPATIENT)
Dept: INTERNAL MEDICINE CLINIC | Facility: CLINIC | Age: 88
End: 2023-01-02

## 2023-01-03 NOTE — TELEPHONE ENCOUNTER
From: Washington Orantes  To: Zulema Beltran MD  Sent: 1/2/2023 11:03 AM CST  Subject: Gudelia Wolfe's ALPRAZolam 0.25 MG Tabs Prescription    Good morning Adriana Handley! We are wondering if Sahra's ALPRAZolam 0.25 MG Tabs Prescription can be changed from 30 days to 31 days. She is very anxious that she will run out of Xanax the months that have 31 days and would feel much more comfortable knowing that she has this supply on hand each month. She takes one each evening before bed to alleviate night time anxiety. I look forward to hearing from you and thank you.     Kishan Carvalho

## 2023-01-04 NOTE — TELEPHONE ENCOUNTER
Amira Ramon per Dr. Lam Fitting to script for 31 but how does this work with insurance?  Please assist

## 2023-01-25 RX ORDER — PREGABALIN 50 MG/1
CAPSULE ORAL
Qty: 90 CAPSULE | Refills: 1 | Status: SHIPPED | OUTPATIENT
Start: 2023-01-25

## 2023-01-25 NOTE — TELEPHONE ENCOUNTER
Per 12/2022 ov: Post-herpetic neuralgia  -still with pain but significantly improved  -cont Lyrica BID    To Dr. Siddharth Baeza: documentation frequency is different than last prescribed, TID

## 2023-01-31 NOTE — TELEPHONE ENCOUNTER
Patient is calling she said she would like the rx changed to qty 32.     She said she wants to make sure there are 31 pills in her pill bottle when she picks it up every month

## 2023-02-01 RX ORDER — ALPRAZOLAM 0.25 MG/1
0.25 TABLET ORAL DAILY PRN
Qty: 30 TABLET | Refills: 5 | Status: SHIPPED | OUTPATIENT
Start: 2023-02-01

## 2023-02-01 NOTE — TELEPHONE ENCOUNTER
Patient is calling checking on the status of her   Alprazolam.  Patient states she is completley out of the medicaiton. Patient does not have anymore medication and would like to  from pharmacy today.   Please send to [facility] in [place]

## 2023-02-23 NOTE — TELEPHONE ENCOUNTER
S/w pt. Pt states she realized she stopped Breo ellipta on her own and just resumed 3 days ago for her COPD but she sounds unsure whether she should be using this or not. She does feel it helps her COPD - Taran Pryor please advise.

## 2023-02-24 NOTE — TELEPHONE ENCOUNTER
To  - I read the OV notes, etc;   Not sure if pt does need to continue ICS/LABA as pt stopped it (see below) but COPD not a focus of visits recently

## 2023-02-27 RX ORDER — FLUTICASONE FUROATE AND VILANTEROL 100; 25 UG/1; UG/1
1 POWDER RESPIRATORY (INHALATION) DAILY
Qty: 180 EACH | Refills: 3 | Status: SHIPPED | OUTPATIENT
Start: 2023-02-27

## 2023-03-01 RX ORDER — FLUTICASONE FUROATE AND VILANTEROL 100; 25 UG/1; UG/1
1 POWDER RESPIRATORY (INHALATION) DAILY
Refills: 0 | OUTPATIENT
Start: 2023-03-01

## 2023-03-01 NOTE — TELEPHONE ENCOUNTER
Called patient to follow up. Patient confused about taking it or not. Relayed MD's message several times. Patient verbalized understanding.

## 2023-04-24 ENCOUNTER — TELEPHONE (OUTPATIENT)
Dept: INTERNAL MEDICINE CLINIC | Facility: CLINIC | Age: 88
End: 2023-04-24

## 2023-04-25 RX ORDER — PREGABALIN 50 MG/1
CAPSULE ORAL
Qty: 90 CAPSULE | Refills: 1 | Status: SHIPPED | OUTPATIENT
Start: 2023-04-25

## 2023-04-25 NOTE — TELEPHONE ENCOUNTER
To MD:  The above refill request is for a controlled substance. Please review pended medication order. Print and sign for staff to fax to pharmacy or prescribe electronically. Last office visit: 12/6/22  Last time refill sent and quantity/refills: 1/25/23 #90/1  [place] 3[place] #90    To FD: pt is due for binu JONES call versus sending [employer] as patient does not read [employer] messages.

## 2023-05-09 ENCOUNTER — LAB ENCOUNTER (OUTPATIENT)
Dept: LAB | Age: 88
End: 2023-05-09
Attending: INTERNAL MEDICINE
Payer: MEDICARE

## 2023-05-09 DIAGNOSIS — E55.9 VITAMIN D DEFICIENCY: ICD-10-CM

## 2023-05-09 DIAGNOSIS — R53.83 OTHER FATIGUE: ICD-10-CM

## 2023-05-09 DIAGNOSIS — D50.0 IRON DEFICIENCY ANEMIA DUE TO CHRONIC BLOOD LOSS: ICD-10-CM

## 2023-05-09 DIAGNOSIS — E53.8 VITAMIN B12 DEFICIENCY: ICD-10-CM

## 2023-05-09 PROBLEM — F41.1 GENERALIZED ANXIETY DISORDER: Status: RESOLVED | Noted: 2022-08-05 | Resolved: 2023-05-09

## 2023-05-09 PROBLEM — F33.2 SEVERE EPISODE OF RECURRENT MAJOR DEPRESSIVE DISORDER, WITHOUT PSYCHOTIC FEATURES (HCC): Status: RESOLVED | Noted: 2022-08-05 | Resolved: 2023-05-09

## 2023-05-09 PROBLEM — N39.0 URINARY TRACT INFECTION WITHOUT HEMATURIA: Status: RESOLVED | Noted: 2022-08-03 | Resolved: 2023-05-09

## 2023-05-09 PROBLEM — N39.0 URINARY TRACT INFECTION WITHOUT HEMATURIA, SITE UNSPECIFIED: Status: RESOLVED | Noted: 2022-08-03 | Resolved: 2023-05-09

## 2023-05-09 LAB
ALBUMIN SERPL-MCNC: 3.4 G/DL (ref 3.4–5)
ALBUMIN/GLOB SERPL: 0.8 {RATIO} (ref 1–2)
ALP LIVER SERPL-CCNC: 79 U/L
ALT SERPL-CCNC: 18 U/L
ANION GAP SERPL CALC-SCNC: 5 MMOL/L (ref 0–18)
AST SERPL-CCNC: 12 U/L (ref 15–37)
BASOPHILS # BLD AUTO: 0.06 X10(3) UL (ref 0–0.2)
BASOPHILS NFR BLD AUTO: 0.9 %
BILIRUB SERPL-MCNC: 0.4 MG/DL (ref 0.1–2)
BUN BLD-MCNC: 16 MG/DL (ref 7–18)
BUN/CREAT SERPL: 19.8 (ref 10–20)
CALCIUM BLD-MCNC: 9.4 MG/DL (ref 8.5–10.1)
CHLORIDE SERPL-SCNC: 105 MMOL/L (ref 98–112)
CO2 SERPL-SCNC: 31 MMOL/L (ref 21–32)
CREAT BLD-MCNC: 0.81 MG/DL
DEPRECATED RDW RBC AUTO: 42.3 FL (ref 35.1–46.3)
EOSINOPHIL # BLD AUTO: 0.08 X10(3) UL (ref 0–0.7)
EOSINOPHIL NFR BLD AUTO: 1.1 %
ERYTHROCYTE [DISTWIDTH] IN BLOOD BY AUTOMATED COUNT: 13 % (ref 11–15)
FASTING STATUS PATIENT QL REPORTED: NO
GFR SERPLBLD BASED ON 1.73 SQ M-ARVRAT: 70 ML/MIN/1.73M2 (ref 60–?)
GLOBULIN PLAS-MCNC: 4.2 G/DL (ref 2.8–4.4)
GLUCOSE BLD-MCNC: 103 MG/DL (ref 70–99)
HCT VFR BLD AUTO: 44.3 %
HGB BLD-MCNC: 13.8 G/DL
IMM GRANULOCYTES # BLD AUTO: 0.01 X10(3) UL (ref 0–1)
IMM GRANULOCYTES NFR BLD: 0.1 %
LYMPHOCYTES # BLD AUTO: 1.34 X10(3) UL (ref 1–4)
LYMPHOCYTES NFR BLD AUTO: 19.1 %
MCH RBC QN AUTO: 27.8 PG (ref 26–34)
MCHC RBC AUTO-ENTMCNC: 31.2 G/DL (ref 31–37)
MCV RBC AUTO: 89.3 FL
MONOCYTES # BLD AUTO: 0.43 X10(3) UL (ref 0.1–1)
MONOCYTES NFR BLD AUTO: 6.1 %
NEUTROPHILS # BLD AUTO: 5.11 X10 (3) UL (ref 1.5–7.7)
NEUTROPHILS # BLD AUTO: 5.11 X10(3) UL (ref 1.5–7.7)
NEUTROPHILS NFR BLD AUTO: 72.7 %
OSMOLALITY SERPL CALC.SUM OF ELEC: 293 MOSM/KG (ref 275–295)
PLATELET # BLD AUTO: 214 10(3)UL (ref 150–450)
POTASSIUM SERPL-SCNC: 4 MMOL/L (ref 3.5–5.1)
PROT SERPL-MCNC: 7.6 G/DL (ref 6.4–8.2)
RBC # BLD AUTO: 4.96 X10(6)UL
SODIUM SERPL-SCNC: 141 MMOL/L (ref 136–145)
VIT B12 SERPL-MCNC: 605 PG/ML (ref 193–986)
VIT D+METAB SERPL-MCNC: 26.8 NG/ML (ref 30–100)
WBC # BLD AUTO: 7 X10(3) UL (ref 4–11)

## 2023-05-09 PROCEDURE — 84443 ASSAY THYROID STIM HORMONE: CPT | Performed by: INTERNAL MEDICINE

## 2023-05-09 PROCEDURE — 80053 COMPREHEN METABOLIC PANEL: CPT

## 2023-05-09 PROCEDURE — 82607 VITAMIN B-12: CPT

## 2023-05-09 PROCEDURE — 36415 COLL VENOUS BLD VENIPUNCTURE: CPT

## 2023-05-09 PROCEDURE — 85025 COMPLETE CBC W/AUTO DIFF WBC: CPT

## 2023-05-09 PROCEDURE — 82306 VITAMIN D 25 HYDROXY: CPT

## 2023-05-29 ENCOUNTER — PATIENT MESSAGE (OUTPATIENT)
Dept: INTERNAL MEDICINE CLINIC | Facility: CLINIC | Age: 88
End: 2023-05-29

## 2023-06-29 RX ORDER — ALPRAZOLAM 0.25 MG/1
0.25 TABLET ORAL DAILY PRN
Qty: 30 TABLET | Refills: 5 | Status: CANCELLED | OUTPATIENT
Start: 2023-06-29

## 2023-07-03 ENCOUNTER — TELEPHONE (OUTPATIENT)
Dept: INTERNAL MEDICINE CLINIC | Facility: CLINIC | Age: 88
End: 2023-07-03

## 2023-07-14 ENCOUNTER — HOSPITAL ENCOUNTER (INPATIENT)
Facility: HOSPITAL | Age: 88
LOS: 14 days | Discharge: NURSING, REHAB OR LONG-TERM CARE | DRG: 193 | End: 2023-07-28
Attending: STUDENT IN AN ORGANIZED HEALTH CARE EDUCATION/TRAINING PROGRAM | Admitting: INTERNAL MEDICINE
Payer: MEDICARE

## 2023-07-14 ENCOUNTER — APPOINTMENT (OUTPATIENT)
Dept: CT IMAGING | Facility: HOSPITAL | Age: 88
DRG: 193 | End: 2023-07-14
Attending: STUDENT IN AN ORGANIZED HEALTH CARE EDUCATION/TRAINING PROGRAM
Payer: MEDICARE

## 2023-07-14 ENCOUNTER — TELEPHONE (OUTPATIENT)
Dept: INTERNAL MEDICINE CLINIC | Facility: CLINIC | Age: 88
End: 2023-07-14

## 2023-07-14 ENCOUNTER — APPOINTMENT (OUTPATIENT)
Dept: GENERAL RADIOLOGY | Facility: HOSPITAL | Age: 88
DRG: 193 | End: 2023-07-14
Attending: STUDENT IN AN ORGANIZED HEALTH CARE EDUCATION/TRAINING PROGRAM
Payer: MEDICARE

## 2023-07-14 DIAGNOSIS — R53.1 GENERALIZED WEAKNESS: Primary | ICD-10-CM

## 2023-07-14 LAB
ANION GAP SERPL CALC-SCNC: 6 MMOL/L (ref 0–18)
BASOPHILS # BLD AUTO: 0.04 X10(3) UL (ref 0–0.2)
BASOPHILS NFR BLD AUTO: 0.5 %
BILIRUB UR QL: NEGATIVE
BUN BLD-MCNC: 12 MG/DL (ref 7–18)
BUN/CREAT SERPL: 13.5 (ref 10–20)
CALCIUM BLD-MCNC: 9.8 MG/DL (ref 8.5–10.1)
CHLORIDE SERPL-SCNC: 102 MMOL/L (ref 98–112)
CO2 SERPL-SCNC: 29 MMOL/L (ref 21–32)
COLOR UR: YELLOW
CREAT BLD-MCNC: 0.89 MG/DL
D DIMER PPP FEU-MCNC: 1.63 UG/ML FEU (ref ?–0.88)
DEPRECATED RDW RBC AUTO: 43.3 FL (ref 35.1–46.3)
EOSINOPHIL # BLD AUTO: 0.04 X10(3) UL (ref 0–0.7)
EOSINOPHIL NFR BLD AUTO: 0.5 %
ERYTHROCYTE [DISTWIDTH] IN BLOOD BY AUTOMATED COUNT: 13 % (ref 11–15)
FLUAV + FLUBV RNA SPEC NAA+PROBE: NEGATIVE
FLUAV + FLUBV RNA SPEC NAA+PROBE: NEGATIVE
GFR SERPLBLD BASED ON 1.73 SQ M-ARVRAT: 62 ML/MIN/1.73M2 (ref 60–?)
GLUCOSE BLD-MCNC: 121 MG/DL (ref 70–99)
GLUCOSE UR-MCNC: NORMAL MG/DL
HCT VFR BLD AUTO: 38.9 %
HGB BLD-MCNC: 12.4 G/DL
IMM GRANULOCYTES # BLD AUTO: 0.02 X10(3) UL (ref 0–1)
IMM GRANULOCYTES NFR BLD: 0.2 %
LEUKOCYTE ESTERASE UR QL STRIP.AUTO: 500
LYMPHOCYTES # BLD AUTO: 0.99 X10(3) UL (ref 1–4)
LYMPHOCYTES NFR BLD AUTO: 12 %
MCH RBC QN AUTO: 28.6 PG (ref 26–34)
MCHC RBC AUTO-ENTMCNC: 31.9 G/DL (ref 31–37)
MCV RBC AUTO: 89.8 FL
MONOCYTES # BLD AUTO: 0.71 X10(3) UL (ref 0.1–1)
MONOCYTES NFR BLD AUTO: 8.6 %
NEUTROPHILS # BLD AUTO: 6.48 X10 (3) UL (ref 1.5–7.7)
NEUTROPHILS # BLD AUTO: 6.48 X10(3) UL (ref 1.5–7.7)
NEUTROPHILS NFR BLD AUTO: 78.2 %
OSMOLALITY SERPL CALC.SUM OF ELEC: 285 MOSM/KG (ref 275–295)
PH UR: 5.5 [PH] (ref 5–8)
PLATELET # BLD AUTO: 166 10(3)UL (ref 150–450)
POTASSIUM SERPL-SCNC: 3.4 MMOL/L (ref 3.5–5.1)
PROCALCITONIN SERPL-MCNC: 0.2 NG/ML (ref ?–0.16)
PROT UR-MCNC: 20 MG/DL
RBC # BLD AUTO: 4.33 X10(6)UL
RSV RNA SPEC NAA+PROBE: NEGATIVE
SARS-COV-2 RNA RESP QL NAA+PROBE: NOT DETECTED
SODIUM SERPL-SCNC: 137 MMOL/L (ref 136–145)
SP GR UR STRIP: 1.01 (ref 1–1.03)
UROBILINOGEN UR STRIP-ACNC: NORMAL
WBC # BLD AUTO: 8.3 X10(3) UL (ref 4–11)

## 2023-07-14 PROCEDURE — 5A0955Z ASSISTANCE WITH RESPIRATORY VENTILATION, GREATER THAN 96 CONSECUTIVE HOURS: ICD-10-PCS | Performed by: INTERNAL MEDICINE

## 2023-07-14 PROCEDURE — 71045 X-RAY EXAM CHEST 1 VIEW: CPT | Performed by: STUDENT IN AN ORGANIZED HEALTH CARE EDUCATION/TRAINING PROGRAM

## 2023-07-14 PROCEDURE — 71260 CT THORAX DX C+: CPT | Performed by: STUDENT IN AN ORGANIZED HEALTH CARE EDUCATION/TRAINING PROGRAM

## 2023-07-14 RX ORDER — HEPARIN SODIUM 1000 [USP'U]/ML
1000 INJECTION, SOLUTION INTRAVENOUS; SUBCUTANEOUS 2 TIMES DAILY
Status: DISCONTINUED | OUTPATIENT
Start: 2023-07-14 | End: 2023-07-15

## 2023-07-14 RX ORDER — MELATONIN
325
Status: DISCONTINUED | OUTPATIENT
Start: 2023-07-15 | End: 2023-07-28

## 2023-07-14 RX ORDER — PREGABALIN 50 MG/1
50 CAPSULE ORAL 2 TIMES DAILY
Status: DISCONTINUED | OUTPATIENT
Start: 2023-07-14 | End: 2023-07-28

## 2023-07-14 RX ORDER — ALPRAZOLAM 0.25 MG/1
0.25 TABLET ORAL NIGHTLY
Status: DISCONTINUED | OUTPATIENT
Start: 2023-07-14 | End: 2023-07-22

## 2023-07-14 RX ORDER — DIPHENHYDRAMINE HYDROCHLORIDE 50 MG/ML
25 INJECTION INTRAMUSCULAR; INTRAVENOUS ONCE
Status: COMPLETED | OUTPATIENT
Start: 2023-07-14 | End: 2023-07-14

## 2023-07-14 RX ORDER — CHOLECALCIFEROL (VITAMIN D3) 125 MCG
1000 CAPSULE ORAL DAILY
Status: DISCONTINUED | OUTPATIENT
Start: 2023-07-15 | End: 2023-07-28

## 2023-07-14 RX ORDER — FLUTICASONE FUROATE AND VILANTEROL 200; 25 UG/1; UG/1
1 POWDER RESPIRATORY (INHALATION) DAILY
Status: DISCONTINUED | OUTPATIENT
Start: 2023-07-14 | End: 2023-07-28

## 2023-07-14 RX ORDER — DOXYCYCLINE HYCLATE 100 MG/1
100 CAPSULE ORAL ONCE
Status: COMPLETED | OUTPATIENT
Start: 2023-07-14 | End: 2023-07-14

## 2023-07-14 RX ORDER — IPRATROPIUM BROMIDE AND ALBUTEROL SULFATE 2.5; .5 MG/3ML; MG/3ML
3 SOLUTION RESPIRATORY (INHALATION) ONCE
Status: COMPLETED | OUTPATIENT
Start: 2023-07-14 | End: 2023-07-14

## 2023-07-14 RX ORDER — METHYLPREDNISOLONE SODIUM SUCCINATE 125 MG/2ML
60 INJECTION, POWDER, LYOPHILIZED, FOR SOLUTION INTRAMUSCULAR; INTRAVENOUS ONCE
Status: COMPLETED | OUTPATIENT
Start: 2023-07-14 | End: 2023-07-14

## 2023-07-14 RX ORDER — METHYLPREDNISOLONE SODIUM SUCCINATE 125 MG/2ML
60 INJECTION, POWDER, LYOPHILIZED, FOR SOLUTION INTRAMUSCULAR; INTRAVENOUS EVERY 12 HOURS
Status: DISCONTINUED | OUTPATIENT
Start: 2023-07-15 | End: 2023-07-15

## 2023-07-14 RX ORDER — ACETAMINOPHEN 500 MG
1000 TABLET ORAL ONCE
Status: COMPLETED | OUTPATIENT
Start: 2023-07-14 | End: 2023-07-14

## 2023-07-14 RX ORDER — IPRATROPIUM BROMIDE AND ALBUTEROL SULFATE 2.5; .5 MG/3ML; MG/3ML
3 SOLUTION RESPIRATORY (INHALATION)
Status: DISCONTINUED | OUTPATIENT
Start: 2023-07-14 | End: 2023-07-15

## 2023-07-14 RX ORDER — ESCITALOPRAM OXALATE 10 MG/1
10 TABLET ORAL DAILY
Status: DISCONTINUED | OUTPATIENT
Start: 2023-07-15 | End: 2023-07-22

## 2023-07-14 NOTE — ED QUICK NOTES
Orders for admission, patient is aware of plan and ready to go upstairs. Any questions, please call ED RN KK at [address].      Patient Covid vaccination status: Fully vaccinated     COVID Test Ordered in ED: SARS-CoV-2/Flu A and B/RSV by PCR (GeneXpert)    COVID Suspicion at Admission: N/A    Running Infusions:      Mental Status/LOC at time of transport: AXOX4    Other pertinent information:   CIWA score: N/A   NIH score:  N/A

## 2023-07-14 NOTE — ED INITIAL ASSESSMENT (HPI)
Patient ambulatory to ED with complaint of fatigue x 2 days. Endorses non productive cough. Denies fevers or chills. Denies dysuria. Patient is AXOX4.

## 2023-07-14 NOTE — TELEPHONE ENCOUNTER
Patient is calling patient has not been taking her Vitamin B, Iron & Vitamin D she is starting to feel worn down and tired. Does Dr Louise Bonilla want to see her or run blood work? Please call patient [phone]     Patient is aware Dr Julisa Cochran okay to wait until she returns.

## 2023-07-15 LAB
ANION GAP SERPL CALC-SCNC: 10 MMOL/L (ref 0–18)
BASOPHILS # BLD AUTO: 0.01 X10(3) UL (ref 0–0.2)
BASOPHILS NFR BLD AUTO: 0.1 %
BUN BLD-MCNC: 16 MG/DL (ref 7–18)
BUN/CREAT SERPL: 17.4 (ref 10–20)
CALCIUM BLD-MCNC: 10.4 MG/DL (ref 8.5–10.1)
CHLORIDE SERPL-SCNC: 107 MMOL/L (ref 98–112)
CO2 SERPL-SCNC: 27 MMOL/L (ref 21–32)
CREAT BLD-MCNC: 0.92 MG/DL
DEPRECATED RDW RBC AUTO: 43 FL (ref 35.1–46.3)
EOSINOPHIL # BLD AUTO: 0 X10(3) UL (ref 0–0.7)
EOSINOPHIL NFR BLD AUTO: 0 %
ERYTHROCYTE [DISTWIDTH] IN BLOOD BY AUTOMATED COUNT: 13 % (ref 11–15)
GFR SERPLBLD BASED ON 1.73 SQ M-ARVRAT: 60 ML/MIN/1.73M2 (ref 60–?)
GLUCOSE BLD-MCNC: 178 MG/DL (ref 70–99)
HCT VFR BLD AUTO: 41.4 %
HGB BLD-MCNC: 13 G/DL
IMM GRANULOCYTES # BLD AUTO: 0.03 X10(3) UL (ref 0–1)
IMM GRANULOCYTES NFR BLD: 0.4 %
LYMPHOCYTES # BLD AUTO: 0.4 X10(3) UL (ref 1–4)
LYMPHOCYTES NFR BLD AUTO: 5 %
MAGNESIUM SERPL-MCNC: 2.3 MG/DL (ref 1.6–2.6)
MCH RBC QN AUTO: 28.3 PG (ref 26–34)
MCHC RBC AUTO-ENTMCNC: 31.4 G/DL (ref 31–37)
MCV RBC AUTO: 90 FL
MONOCYTES # BLD AUTO: 0.06 X10(3) UL (ref 0.1–1)
MONOCYTES NFR BLD AUTO: 0.7 %
NEUTROPHILS # BLD AUTO: 7.58 X10 (3) UL (ref 1.5–7.7)
NEUTROPHILS # BLD AUTO: 7.58 X10(3) UL (ref 1.5–7.7)
NEUTROPHILS NFR BLD AUTO: 93.8 %
OSMOLALITY SERPL CALC.SUM OF ELEC: 304 MOSM/KG (ref 275–295)
PLATELET # BLD AUTO: 230 10(3)UL (ref 150–450)
POTASSIUM SERPL-SCNC: 3.8 MMOL/L (ref 3.5–5.1)
RBC # BLD AUTO: 4.6 X10(6)UL
SODIUM SERPL-SCNC: 144 MMOL/L (ref 136–145)
T3FREE SERPL-MCNC: 1.37 PG/ML (ref 2.4–4.2)
T4 FREE SERPL-MCNC: 1.1 NG/DL (ref 0.8–1.7)
TSI SER-ACNC: 0.35 MIU/ML (ref 0.36–3.74)
WBC # BLD AUTO: 8.1 X10(3) UL (ref 4–11)

## 2023-07-15 PROCEDURE — 99223 1ST HOSP IP/OBS HIGH 75: CPT | Performed by: INTERNAL MEDICINE

## 2023-07-15 RX ORDER — METHYLPREDNISOLONE SODIUM SUCCINATE 40 MG/ML
40 INJECTION, POWDER, LYOPHILIZED, FOR SOLUTION INTRAMUSCULAR; INTRAVENOUS EVERY 12 HOURS
Status: DISCONTINUED | OUTPATIENT
Start: 2023-07-15 | End: 2023-07-16

## 2023-07-15 RX ORDER — HEPARIN SODIUM 1000 [USP'U]/ML
5000 INJECTION, SOLUTION INTRAVENOUS; SUBCUTANEOUS 2 TIMES DAILY
Status: DISCONTINUED | OUTPATIENT
Start: 2023-07-15 | End: 2023-07-15 | Stop reason: ALTCHOICE

## 2023-07-15 RX ORDER — IPRATROPIUM BROMIDE AND ALBUTEROL SULFATE 2.5; .5 MG/3ML; MG/3ML
3 SOLUTION RESPIRATORY (INHALATION)
Status: DISCONTINUED | OUTPATIENT
Start: 2023-07-15 | End: 2023-07-23

## 2023-07-15 RX ORDER — HEPARIN SODIUM 5000 [USP'U]/ML
5000 INJECTION, SOLUTION INTRAVENOUS; SUBCUTANEOUS EVERY 12 HOURS SCHEDULED
Status: DISCONTINUED | OUTPATIENT
Start: 2023-07-15 | End: 2023-07-28

## 2023-07-15 RX ORDER — IPRATROPIUM BROMIDE AND ALBUTEROL SULFATE 2.5; .5 MG/3ML; MG/3ML
3 SOLUTION RESPIRATORY (INHALATION)
Status: DISCONTINUED | OUTPATIENT
Start: 2023-07-15 | End: 2023-07-15

## 2023-07-15 NOTE — PLAN OF CARE
Patient is a new admit from the ED, AxO x 4 on 4.5L, baseline RA. No c/o pain. IV Solu-medrol given. Safety precautions in place, call light within reach, no acute changes. Problem: Patient Centered Care  Goal: Patient preferences are identified and integrated in the patient's plan of care  Description: Interventions:  - What would you like us to know as we care for you? From home with  with a caregiver x3 a week.   - Provide timely, complete, and accurate information to patient/family  - Incorporate patient and family knowledge, values, beliefs, and cultural backgrounds into the planning and delivery of care  - Encourage patient/family to participate in care and decision-making at the level they choose  - Honor patient and family perspectives and choices  Outcome: Progressing     Problem: RESPIRATORY - ADULT  Goal: Achieves optimal ventilation and oxygenation  Description: INTERVENTIONS:  - Assess for changes in respiratory status  - Assess for changes in mentation and behavior  - Position to facilitate oxygenation and minimize respiratory effort  - Oxygen supplementation based on oxygen saturation or ABGs  - Provide Smoking Cessation handout, if applicable  - Encourage broncho-pulmonary hygiene including cough, deep breathe, Incentive Spirometry  - Assess the need for suctioning and perform as needed  - Assess and instruct to report SOB or any respiratory difficulty  - Respiratory Therapy support as indicated  - Manage/alleviate anxiety  - Monitor for signs/symptoms of CO2 retention  Outcome: Progressing     Problem: MUSCULOSKELETAL - ADULT  Goal: Return mobility to safest level of function  Description: INTERVENTIONS:  - Assess patient stability and activity tolerance for standing, transferring and ambulating w/ or w/o assistive devices  - Assist with transfers and ambulation using safe patient handling equipment as needed  - Ensure adequate protection for wounds/incisions during mobilization  - Obtain PT/OT consults as needed  - Advance activity as appropriate  - Communicate ordered activity level and limitations with patient/family  Outcome: Progressing     Problem: SAFETY ADULT - FALL  Goal: Free from fall injury  Description: INTERVENTIONS:  - Assess pt frequently for physical needs  - Identify cognitive and physical deficits and behaviors that affect risk of falls.   - [place] fall precautions as indicated by assessment.  - Educate pt/family on patient safety including physical limitations  - Instruct pt to call for assistance with activity based on assessment  - Modify environment to reduce risk of injury  - Provide assistive devices as appropriate  - Consider OT/PT consult to assist with strengthening/mobility  - Encourage toileting schedule  Outcome: Progressing     Problem: Patient/Family Goals  Goal: Patient/Family Long Term Goal  Description: Patient's Long Term Goal: To go home    Interventions:  - Take medications as prescribed by MD  - Scheduled tests as needed  - PT/OT  - See additional Care Plan goals for specific interventions  Outcome: Progressing  Goal: Patient/Family Short Term Goal  Description: Patient's Short Term Goal: To get back to room air (no oxygen)    Interventions:   - Wean oxygen as tolerated  - Neb tx as necessary for shortness of breath  - Prescribed medications   - See additional Care Plan goals for specific interventions  Outcome: Progressing

## 2023-07-16 ENCOUNTER — APPOINTMENT (OUTPATIENT)
Dept: GENERAL RADIOLOGY | Facility: HOSPITAL | Age: 88
DRG: 193 | End: 2023-07-16
Attending: INTERNAL MEDICINE
Payer: MEDICARE

## 2023-07-16 PROBLEM — J96.01 ACUTE HYPOXEMIC RESPIRATORY FAILURE (HCC): Status: ACTIVE | Noted: 2023-07-16

## 2023-07-16 PROBLEM — J18.9 PNEUMONIA OF BOTH LOWER LOBES DUE TO INFECTIOUS ORGANISM: Status: ACTIVE | Noted: 2023-07-16

## 2023-07-16 PROBLEM — J44.1 COPD EXACERBATION (HCC): Status: ACTIVE | Noted: 2023-07-16

## 2023-07-16 LAB
ANION GAP SERPL CALC-SCNC: 3 MMOL/L (ref 0–18)
BASOPHILS # BLD AUTO: 0.01 X10(3) UL (ref 0–0.2)
BASOPHILS NFR BLD AUTO: 0.1 %
BUN BLD-MCNC: 24 MG/DL (ref 7–18)
BUN/CREAT SERPL: 26.4 (ref 10–20)
CALCIUM BLD-MCNC: 10 MG/DL (ref 8.5–10.1)
CHLORIDE SERPL-SCNC: 106 MMOL/L (ref 98–112)
CO2 SERPL-SCNC: 32 MMOL/L (ref 21–32)
CREAT BLD-MCNC: 0.91 MG/DL
DEPRECATED RDW RBC AUTO: 43.5 FL (ref 35.1–46.3)
EOSINOPHIL # BLD AUTO: 0 X10(3) UL (ref 0–0.7)
EOSINOPHIL NFR BLD AUTO: 0 %
ERYTHROCYTE [DISTWIDTH] IN BLOOD BY AUTOMATED COUNT: 13 % (ref 11–15)
GFR SERPLBLD BASED ON 1.73 SQ M-ARVRAT: 61 ML/MIN/1.73M2 (ref 60–?)
GLUCOSE BLD-MCNC: 164 MG/DL (ref 70–99)
HCT VFR BLD AUTO: 39.7 %
HGB BLD-MCNC: 12.6 G/DL
IMM GRANULOCYTES # BLD AUTO: 0.07 X10(3) UL (ref 0–1)
IMM GRANULOCYTES NFR BLD: 0.5 %
LYMPHOCYTES # BLD AUTO: 0.4 X10(3) UL (ref 1–4)
LYMPHOCYTES NFR BLD AUTO: 2.9 %
MCH RBC QN AUTO: 29 PG (ref 26–34)
MCHC RBC AUTO-ENTMCNC: 31.7 G/DL (ref 31–37)
MCV RBC AUTO: 91.3 FL
MONOCYTES # BLD AUTO: 0.29 X10(3) UL (ref 0.1–1)
MONOCYTES NFR BLD AUTO: 2.1 %
NEUTROPHILS # BLD AUTO: 13.22 X10 (3) UL (ref 1.5–7.7)
NEUTROPHILS # BLD AUTO: 13.22 X10(3) UL (ref 1.5–7.7)
NEUTROPHILS NFR BLD AUTO: 94.4 %
OSMOLALITY SERPL CALC.SUM OF ELEC: 300 MOSM/KG (ref 275–295)
PLATELET # BLD AUTO: 258 10(3)UL (ref 150–450)
POTASSIUM SERPL-SCNC: 4.3 MMOL/L (ref 3.5–5.1)
RBC # BLD AUTO: 4.35 X10(6)UL
SODIUM SERPL-SCNC: 141 MMOL/L (ref 136–145)
WBC # BLD AUTO: 14 X10(3) UL (ref 4–11)

## 2023-07-16 PROCEDURE — 99232 SBSQ HOSP IP/OBS MODERATE 35: CPT | Performed by: INTERNAL MEDICINE

## 2023-07-16 PROCEDURE — 71045 X-RAY EXAM CHEST 1 VIEW: CPT | Performed by: INTERNAL MEDICINE

## 2023-07-16 RX ORDER — METHYLPREDNISOLONE SODIUM SUCCINATE 40 MG/ML
40 INJECTION, POWDER, LYOPHILIZED, FOR SOLUTION INTRAMUSCULAR; INTRAVENOUS EVERY 24 HOURS
Status: DISCONTINUED | OUTPATIENT
Start: 2023-07-17 | End: 2023-07-17

## 2023-07-16 NOTE — PLAN OF CARE
Problem: Patient Centered Care  Goal: Patient preferences are identified and integrated in the patient's plan of care  Description: Interventions:  - What would you like us to know as we care for you?  From home with    - Provide timely, complete, and accurate information to patient/family  - Incorporate patient and family knowledge, values, beliefs, and cultural backgrounds into the planning and delivery of care  - Encourage patient/family to participate in care and decision-making at the level they choose  - Honor patient and family perspectives and choices  Outcome: Progressing     Problem: RESPIRATORY - ADULT  Goal: Achieves optimal ventilation and oxygenation  Description: INTERVENTIONS:  - Assess for changes in respiratory status  - Assess for changes in mentation and behavior  - Position to facilitate oxygenation and minimize respiratory effort  - Oxygen supplementation based on oxygen saturation or ABGs  - Provide Smoking Cessation handout, if applicable  - Encourage broncho-pulmonary hygiene including cough, deep breathe, Incentive Spirometry  - Assess the need for suctioning and perform as needed  - Assess and instruct to report SOB or any respiratory difficulty  - Respiratory Therapy support as indicated  - Manage/alleviate anxiety  - Monitor for signs/symptoms of CO2 retention  Outcome: Progressing     Problem: MUSCULOSKELETAL - ADULT  Goal: Return mobility to safest level of function  Description: INTERVENTIONS:  - Assess patient stability and activity tolerance for standing, transferring and ambulating w/ or w/o assistive devices  - Assist with transfers and ambulation using safe patient handling equipment as needed  - Ensure adequate protection for wounds/incisions during mobilization  - Obtain PT/OT consults as needed  - Advance activity as appropriate  - Communicate ordered activity level and limitations with patient/family  Outcome: Progressing     Problem: SAFETY ADULT - FALL  Goal: Free from fall injury  Description: INTERVENTIONS:  - Assess pt frequently for physical needs  - Identify cognitive and physical deficits and behaviors that affect risk of falls. - [place] fall precautions as indicated by assessment.  - Educate pt/family on patient safety including physical limitations  - Instruct pt to call for assistance with activity based on assessment  - Modify environment to reduce risk of injury  - Provide assistive devices as appropriate  - Consider OT/PT consult to assist with strengthening/mobility  - Encourage toileting schedule  Outcome: Progressing     Problem: Patient/Family Goals  Goal: Patient/Family Long Term Goal  Description: Patient's Long Term Goal: To go home    Interventions:  - Take medications as prescribed by MD  - Scheduled tests as needed  - PT/OT  - See additional Care Plan goals for specific interventions  Outcome: Progressing  Goal: Patient/Family Short Term Goal  Description: Patient's Short Term Goal: To get back to room air (no oxygen)    Interventions:   - Wean oxygen as tolerated  - Neb tx as necessary for shortness of breath  - Prescribed medications   - See additional Care Plan goals for specific interventions  Outcome: Progressing     No acute changes overnight, vital signs being monitored, frequent rounding by nursing staff, appropriate safety precautions in place, call light within reach.

## 2023-07-16 NOTE — SLP NOTE
ADULT SWALLOWING EVALUATION    ASSESSMENT    ASSESSMENT/OVERALL IMPRESSION:  PPE REQUIRED. THIS SLP WORE GLOVES AND DROPLET MASK. HANDS SANITIZED/WASHED UPON ENTRANCE/EXIT. SLP BSSE orders received and acknowledged. A swallow evaluation warranted as pt presents with new of difficulty breathing and Chest CT results of bibasilar pulmonary opacities. The pt was admitted with diagnosis of generalized weakness. Chart reviewed and collaborated with RN, Erik Fuentes. Swallowing evaluation approved and RN reports pt is tolerating current diet and medications without overt clinical signs of aspiration. Pt seen at bedside and agreeable to participate in BSSE. Pt alert, oriented x3, afebrile with clear vocal quality, tolerating 7L/min NC with oxygen saturation at 92% prior to the start of po trials. Pt with no hx of dysphagia at [address] [place] [address]. The pt reports using a straw at home. Pt positioned upright to 90 degrees in bed. Oral [facility] examination completed. Face symmetrical at rest with adequate ROM/strength with labial, lingual, and buccal movements. Pt with missing dentition on upper left side. Assessed the pt's swallowing with po trials of puree, soft/hard solids, and thin liquids via open cup/straw. Pt with functional oral acceptance and bilabial seal across all trials. Oral phase of swallow appears timely with adequate bolus control and propulsion. Pt with intact bite but prolonged mastication of solids. With increased mastication time the pt was able to masticated hard solids and clear with no significant oral stasis. Pharyngeal phase of the swallow appears timely with inconsistently slightly delayed about 1 second with adequate hyolaryngeal elevation/excursion. Oxygen status varied from 91-94% throughout the entire evaluation. Overt clinical signs of aspiration observed on thin liquids via straw with a throat clear.   No overt clinical signs of aspiration observed puree, hard/soft solids, or thin liquids via open cup (no coughing, no throat clearing, and vocal quality remains dry). Pt denies any globus sensation post the swallow. Pt left at bedside with call light in hand. At this time, pt presents with minmal oral dysphagia and probable pharyngeal dysfunction. Recommend a regular solid diet and thin liquids/no straw with strict adherence to safe swallowing compensatory strategies. Results and recommendations reviewed with pt and RN. SLP collaborated with RN for diet orders. Diet recommendations and swallowing precautions/strategies posted on white board at bedside. FCM SCORE: 6/7       PLAN: Will follow up 2-3x  to ensure safety with diet and educate pt on compensatory strategies/swallow precautions. Video swallow study to be completed if CXR declines, increase in clinical signs of aspiration, and/or MD desires to R/O silent aspiration. RECOMMENDATIONS   Diet Recommendations - Solids: Regular  Diet Recommendations - Liquids: Thin Liquids (No Straw)                        Compensatory Strategies Recommended: No straws; Slow rate; Alternate consistencies;Small bites and sips;Multiple swallows  Aspiration Precautions: Upright position; Slow rate;Small bites and sips; No straw  Medication Administration Recommendations: Whole in puree  Treatment Plan/Recommendations: Dysphagia therapy; Aspiration precautions (VFSS if MD desires to R/O silent aspiration)  Discharge Recommendations/Plan: Undetermined    HISTORY   MEDICAL HISTORY  Reason for Referral: R/O aspiration    Problem List  Principal Problem:    Generalized weakness  Active Problems:    Hiatal hernia    Pneumonia of both lower lobes due to infectious organism    COPD exacerbation ([address]    Acute hypoxemic respiratory failure [facility])      Past Medical History  Past Medical History:   Diagnosis Date    Anemia     Basal cell carcinoma     Mouth    Calculus, kidney     Esophageal reflux     Hematuria     History of recurrent UTIs 2/26/2015    Hypercholesterolemia     Kidney stone Osteopenia     Other and unspecified hyperlipidemia     Recurrent UTI     Scoliosis     Spinal stenosis     Squamous cell cancer of lip     Thyroid nodule     Urinary frequency     Vitamin D deficiency           Diet Prior to Admission: Regular; Thin liquids (Pt reports using a straw at home)  Precautions: Aspiration    Patient/Family Goals: To eat    SWALLOWING HISTORY  Current Diet Consistency: Regular; Thin liquids  Dysphagia History: No history of dysphagia at [facility]  Imaging Results:   CXR 7/14/23:  CONCLUSION:      No edema. Clear right lung      Large hiatal hernia which obscures evaluation of the lower half of the left lung         Dictated by (CST): Fermní Moralez MD on 7/14/2023 at 2:23 PM       Finalized by (CST): Fermín Moralez MD on 7/14/2023 at 2:25 PM       CT chest 7/14/23:    CONCLUSION:   1. No acute pulmonary embolus to the subsegmental pulmonary artery level. No acute aortic injury; no dissection. 2.  Large hiatal hernia. 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 Majority the stomach is located above the diaphragm. The pancreatic tail and body are located above the diaphragm. This finding is similar to   prior imaging from 2015.   3.  Bibasilar pulmonary opacities, which have heterogeneous enhancement suggesting they represent a combination of atelectasis and pneumonia. Given the bibasilar distribution correlate for aspiration. Small left pleural effusion is also present. 4.  Coronary atherosclerosis. 5.  12 mm left lobe thyroid nodule. Correlate with thyroid ultrasound if not recently assessed. 6.  Nonobstructing urinary stones in the visualized right kidney.           Dictated by (CST): Teresa Luis MD on 7/14/2023 at 4:53 PM       Finalized by (CST): Teresa Luis MD on 7/14/2023 at 5:01 PM     OBJECTIVE   ORAL MOTOR EXAMINATION  Dentition: Natural;Functional (Missing dentition on upper left side)  Symmetry: Within Functional Limits  Strength: Within Functional Limits  Tone: Within Functional Limits  Range of Motion: Within Functional Limits  Rate of Motion: Within Functional Limits    Voice Quality: Clear  Respiratory Status: Supplemental O2;Nasal cannula  Consistencies Trialed: Thin liquids;Puree;Hard solid  Method of Presentation: Self presentation;Cup;Spoon;Straw;Single sips  Patient Positioning: Upright;Midline    Oral Phase of Swallow: Impaired  Bolus Retrieval: Intact  Bilabial Seal: Intact  Bolus Formation: Intact  Bolus Propulsion: Impaired  Mastication: Intact  Retention: Intact    Pharyngeal Phase of Swallow: Impaired  Laryngeal Elevation Timing: Appears impaired  Laryngeal Elevation Strength: Appears intact  Laryngeal Elevation Coordination: Appears intact  (Please note: Silent aspiration cannot be evaluated clinically. Videofluoroscopic Swallow Study is required to rule-out silent aspiration.)    Esophageal Phase of Swallow: No complaints consistent with possible esophageal involvement                GOALS  Goal #1 The patient will tolerate regular solid consistency and thin liquids without overt signs or symptoms of aspiration with 100 % accuracy over 2 session(s). In Progress   Goal #2 The patient/family/caregiver will demonstrate understanding and implementation of aspiration precautions and swallow strategies independently over 2 session(s). In Progress   Goal #3 The patient will utilize compensatory strategies as outlined by  BSSE (clinical evaluation) including Slow rate, Small bites, Small sips, Multiple swallows, Alternate liquids/solids, No straws, Upright 90 degrees, Upright 90 degrees 30 mins after meal with mild assistance 95 % of the time across 2 sessions. In Progress     FOLLOW UP  Treatment Plan/Recommendations: Dysphagia therapy; Aspiration precautions (VFSS if MD desires to R/O silent aspiration)  Number of Visits to Meet Established Goals: 2  Follow Up Needed (Documentation Required): Yes  SLP Follow-up Date: 07/17/23    Thank you for your referral.   If you have any questions, please contact     Alonzo Monterroso MS/CCC-SLP  Speech Language Pathologist  [address] [facility]  [address].  [phone]

## 2023-07-16 NOTE — PHYSICAL THERAPY NOTE
PHYSICAL THERAPY EVALUATION - INPATIENT     Room Number: 180/261-Q  Evaluation Date: 7/16/2023  Type of Evaluation: Initial   Physician Order: PT Eval and Treat    Presenting Problem: Generalized weakness  Co-Morbidities : COPD, hiatel hernia  Reason for Therapy: Mobility Dysfunction and Discharge Planning    PHYSICAL THERAPY ASSESSMENT     Patient is a very pleasant 80year old female admitted 7/14/2023 for generalized weakness. Patient's current functional deficits include bed mobility, functional tranfers, balance, strength, endurance, gait and stair navigation, which are below the patient's pre-admission status. Patient received sitting up in chair, agreeable to PT evaluation. Pt tolerates ambulation in room fairly well, but did require increased O2 from 6 to 7L for ambulation due to desaturation into upper 80's. Restored to >90% upon return to chair on 6L. She required Min/Mod A with sit > stand, otherwise CGA with support of walker for ambulation. She needs occasional redirection with conversation but is very pleasant with good sense of humor. Pt ended session up in chair with all needs in reach, RN updated. Bed Mobility: NT as pt begins/ends session in chair. Transfers: Sit > stand from recliner with Min A to RW. Sit > stand from toilet needed Mod A to RW. Cues for hand placement. Gait: Pt ambulates in room for 30 ft x2 reps with RW and CGA - cues for staying within walker JANET. The patient's Approx Degree of Impairment: 50.57% has been calculated based on documentation in the [facility] '6 clicks' Inpatient Basic Mobility Short Form. Research supports that patients with this level of impairment may benefit from [facility] PT. Recommending 24 hour supervision for optimized safety. Patient will benefit from continued IP PT services to address these deficits in preparation for discharge.     DISCHARGE RECOMMENDATIONS  PT Discharge Recommendations: 24 hour care/supervision;Home with home health PT    PLAN  PT Treatment Plan: Bed mobility; Body mechanics; Endurance; Patient education; Energy conservation; Family education;Gait training;Strengthening;Stoop training;Transfer training;Balance training  Rehab Potential : Good  Frequency (Obs): 3-5x/week       PHYSICAL THERAPY MEDICAL/SOCIAL HISTORY       Problem List  Principal Problem:    Generalized weakness  Active Problems:    Hiatal hernia    Pneumonia of both lower lobes due to infectious organism    COPD exacerbation ([address]    Acute hypoxemic respiratory failure ([facility]      HOME SITUATION  Home Situation  Type of Home: House  Home Layout: Two level;Stairlift  Stairs to Enter : 2  Railing: Yes  Lives With: Spouse  Drives: No  Patient Owned Equipment: Rolling walker;Rollator     Prior Level of [place]: Pt reports independence with use of walker in her home, has one on both levels, uses stair lift inside home. She needs some assist with dressing/shopping/cleaning, has caretaker 3x/week. SUBJECTIVE  \"How much does that weight say? Oh I don't like that! \"    PHYSICAL THERAPY EXAMINATION     OBJECTIVE  Precautions: Bed/chair alarm;Hard of hearing  Fall Risk: Standard fall risk    WEIGHT BEARING RESTRICTION                PAIN ASSESSMENT  Ratin          COGNITION  Overall Cognitive Status:  Needs some redirection  Safety Judgement:  Needs occasional reminders for safe use of walker    RANGE OF MOTION AND STRENGTH ASSESSMENT  Upper extremity ROM and strength are within functional limits   Lower extremity ROM is within functional limits  Lower extremity strength with functional limitation to brittany LEs    BALANCE  Static Sitting: Fair +  Dynamic Sitting: Fair  Static Standing: Fair -  Dynamic Standing: Fair -         O2 WALK  Oxygen Therapy  SPO2% on Oxygen at Rest: 91  At rest oxygen flow (liters per minute): 6  SPO2% Ambulation on Oxygen: 90  Ambulation oxygen flow (liters per minute): 7    AM-PAC '6-Clicks' INPATIENT SHORT FORM - BASIC MOBILITY  How much difficulty does the patient currently have. .. Patient Difficulty: Turning over in bed (including adjusting bedclothes, sheets and blankets)?: A Little   Patient Difficulty: Sitting down on and standing up from a chair with arms (e.g., wheelchair, bedside commode, etc.): A Lot   Patient Difficulty: Moving from lying on back to sitting on the side of the bed?: A Little   How much help from another person does the patient currently need. .. Help from Another: Moving to and from a bed to a chair (including a wheelchair)?: A Little   Help from Another: Need to walk in hospital room?: A Little   Help from Another: Climbing 3-5 steps with a railing?: A Little     AM-PAC Score:  Raw Score: 17   Approx Degree of Impairment: 50.57%   Standardized Score (AM-PAC Scale): 42.13   CMS Modifier (G-Code): CK    FUNCTIONAL ABILITY STATUS  Functional Mobility/Gait Assessment  Gait Assistance: Contact guard assist  Distance (ft): 30 ft x2  Assistive Device: Rolling walker  Pattern: Shuffle (slow tran)      Exercise/Education Provided:  Energy conservation  Functional activity tolerated  Gait training  Transfer training  Safety precautions, pursed lip breathing, role of IP PT    Patient End of Session: Up in chair;Needs met;Call light within reach;RN aware of session/findings; All patient questions and concerns addressed; Alarm set    CURRENT GOALS    Goals to be met by: 7/23/23  Patient Goal Patient's self-stated goal is: return home when safe   Goal #1 Patient is able to demonstrate supine - sit EOB @ level: independent     Goal #1   Current Status    Goal #2 Patient is able to demonstrate transfers Sit to/from Stand at assistance level: modified independent with walker - rolling     Goal #2  Current Status    Goal #3 Patient is able to ambulate 150 feet with assist device: walker - rolling at assistance level: supervision   Goal #3   Current Status    Goal #4 Patient will negotiate 2 stairs/one curb w/ assistive device and CGA Goal #4   Current Status    Goal #5 Patient to demonstrate independence with home activity/exercise instructions provided to patient in preparation for discharge.    Goal #5   Current Status    Goal #6    Goal #6  Current Status      Patient Evaluation Complexity Level:  History Moderate - 1 or 2 personal factors and/or co-morbidities   Examination of body systems Moderate - addressing a total of 3 or more elements   Clinical Presentation Moderate - Evolving   Clinical Decision Making Moderate Complexity       Gait Training: 10 minutes  Therapeutic Activity: 10 minutes  Neuromuscular Re-education:  minutes  Therapeutic Exercise:  minutes  Canalith Repositioning:  minutes  Manual Therapy:  minutes  Eval Mod Complex x8 mins

## 2023-07-16 NOTE — CM/SW NOTE
07/16/23 1700   CM/SW Referral Data   Referral Source Physician   Reason for Referral Discharge planning   Informant Patient;Spouse/Significant Other   Medical Hx   Does patient have an established PCP? Yes  Maximiliano Lopez)   Patient Info   Patient's Current Mental Status at Time of Assessment Alert;Oriented   Patient's [address]   Patient lives with Spouse/Significant other   Patient Status Prior to Admission   Independent with ADLs and Mobility Yes   Discharge Needs   Anticipated D/C needs Home health care;Medical equipment   Choice of Post-Acute Provider   Informed patient of right to choose their preferred provider Yes     Pt discussed during nursing rounds. Dx PNA, weakness, on 6L O2 (no home O2). From home w/spouse, independent w/walker at baseline. PT/OT recommending HH w/PT and OT at dc, pt and spouse agreeable to recommendation. [facility] referrals sent in [place] entered. List of accepting agencies will be needed for choice. SW/CM will need to monitor O2 needs for possible home O2 at dc. Plan: Home w/spouse with HH and possible home O2 pending agency choice, walk test, and medical clearance. / to remain available for support and/or discharge planning.      CHELSI Fernandez    [phone]

## 2023-07-17 LAB
ANION GAP SERPL CALC-SCNC: 6 MMOL/L (ref 0–18)
BASE EXCESS BLD CALC-SCNC: 12.2 MMOL/L (ref ?–2)
BASOPHILS # BLD AUTO: 0.04 X10(3) UL (ref 0–0.2)
BASOPHILS NFR BLD AUTO: 0.3 %
BUN BLD-MCNC: 26 MG/DL (ref 7–18)
BUN/CREAT SERPL: 25.7 (ref 10–20)
CA-I BLD-SCNC: 1.23 MMOL/L (ref 0.95–1.32)
CALCIUM BLD-MCNC: 9.6 MG/DL (ref 8.5–10.1)
CHLORIDE SERPL-SCNC: 107 MMOL/L (ref 98–112)
CO2 SERPL-SCNC: 32 MMOL/L (ref 21–32)
COHGB MFR BLD: 1.6 % (ref 0–3)
CREAT BLD-MCNC: 1.01 MG/DL
DEPRECATED RDW RBC AUTO: 43.5 FL (ref 35.1–46.3)
EOSINOPHIL # BLD AUTO: 0 X10(3) UL (ref 0–0.7)
EOSINOPHIL NFR BLD AUTO: 0 %
ERYTHROCYTE [DISTWIDTH] IN BLOOD BY AUTOMATED COUNT: 12.9 % (ref 11–15)
GFR SERPLBLD BASED ON 1.73 SQ M-ARVRAT: 54 ML/MIN/1.73M2 (ref 60–?)
GLUCOSE BLD-MCNC: 107 MG/DL (ref 70–99)
HCO3 BLDA-SCNC: 34.1 MEQ/L (ref 21–27)
HCT VFR BLD AUTO: 39.7 %
HGB BLD-MCNC: 12.5 G/DL
HGB BLD-MCNC: 14.6 G/DL
IMM GRANULOCYTES # BLD AUTO: 0.18 X10(3) UL (ref 0–1)
IMM GRANULOCYTES NFR BLD: 1.6 %
LACTATE BLD-SCNC: 1.4 MMOL/L (ref 0.5–2)
LYMPHOCYTES # BLD AUTO: 1.2 X10(3) UL (ref 1–4)
LYMPHOCYTES NFR BLD AUTO: 10.5 %
MCH RBC QN AUTO: 28.7 PG (ref 26–34)
MCHC RBC AUTO-ENTMCNC: 31.5 G/DL (ref 31–37)
MCV RBC AUTO: 91.1 FL
METHGB MFR BLD: 0.9 % SAT (ref 0.4–1.5)
MODIFIED ALLEN TEST: POSITIVE
MONOCYTES # BLD AUTO: 0.73 X10(3) UL (ref 0.1–1)
MONOCYTES NFR BLD AUTO: 6.4 %
NEUTROPHILS # BLD AUTO: 9.31 X10 (3) UL (ref 1.5–7.7)
NEUTROPHILS # BLD AUTO: 9.31 X10(3) UL (ref 1.5–7.7)
NEUTROPHILS NFR BLD AUTO: 81.2 %
NT-PROBNP SERPL-MCNC: 520 PG/ML (ref ?–450)
O2 CT BLD-SCNC: 17.6 VOL% (ref 15–23)
O2/TOTAL GAS SETTING VFR VENT: 100 %
OSMOLALITY SERPL CALC.SUM OF ELEC: 305 MOSM/KG (ref 275–295)
OXYGEN LITERS/MINUTE: 15 L/MIN
PCO2 BLDA: 45 MM HG (ref 35–45)
PH BLDA: 7.52 [PH] (ref 7.35–7.45)
PLATELET # BLD AUTO: 233 10(3)UL (ref 150–450)
PO2 BLDA: 49 MM HG (ref 80–100)
POTASSIUM BLD-SCNC: 3.2 MMOL/L (ref 3.6–5.1)
POTASSIUM SERPL-SCNC: 3.5 MMOL/L (ref 3.5–5.1)
PUNCTURE CHARGE: YES
RBC # BLD AUTO: 4.36 X10(6)UL
SAO2 % BLDA: 88.3 % (ref 94–100)
SODIUM BLD-SCNC: 137 MMOL/L (ref 135–145)
SODIUM SERPL-SCNC: 145 MMOL/L (ref 136–145)
WBC # BLD AUTO: 11.5 X10(3) UL (ref 4–11)

## 2023-07-17 PROCEDURE — 99223 1ST HOSP IP/OBS HIGH 75: CPT | Performed by: INTERNAL MEDICINE

## 2023-07-17 PROCEDURE — 99233 SBSQ HOSP IP/OBS HIGH 50: CPT | Performed by: INTERNAL MEDICINE

## 2023-07-17 RX ORDER — LORAZEPAM 2 MG/ML
0.5 INJECTION INTRAMUSCULAR EVERY 4 HOURS PRN
Status: DISCONTINUED | OUTPATIENT
Start: 2023-07-17 | End: 2023-07-17

## 2023-07-17 RX ORDER — LORAZEPAM 2 MG/ML
INJECTION INTRAMUSCULAR
Status: COMPLETED
Start: 2023-07-17 | End: 2023-07-17

## 2023-07-17 RX ORDER — QUETIAPINE FUMARATE 25 MG/1
25 TABLET, FILM COATED ORAL EVERY 6 HOURS PRN
Status: DISCONTINUED | OUTPATIENT
Start: 2023-07-17 | End: 2023-07-23

## 2023-07-17 RX ORDER — HYDRALAZINE HYDROCHLORIDE 20 MG/ML
5 INJECTION INTRAMUSCULAR; INTRAVENOUS EVERY 6 HOURS PRN
Status: DISCONTINUED | OUTPATIENT
Start: 2023-07-17 | End: 2023-07-28

## 2023-07-17 RX ORDER — FUROSEMIDE 10 MG/ML
20 INJECTION INTRAMUSCULAR; INTRAVENOUS ONCE
Status: COMPLETED | OUTPATIENT
Start: 2023-07-17 | End: 2023-07-17

## 2023-07-17 RX ORDER — HALOPERIDOL 5 MG/ML
2 INJECTION INTRAMUSCULAR EVERY 4 HOURS PRN
Status: DISCONTINUED | OUTPATIENT
Start: 2023-07-17 | End: 2023-07-28

## 2023-07-17 RX ORDER — LEVOFLOXACIN 500 MG/1
500 TABLET, FILM COATED ORAL
Status: DISCONTINUED | OUTPATIENT
Start: 2023-07-17 | End: 2023-07-17 | Stop reason: DRUGHIGH

## 2023-07-17 RX ORDER — LEVOFLOXACIN 750 MG/1
750 TABLET ORAL
Status: DISCONTINUED | OUTPATIENT
Start: 2023-07-17 | End: 2023-07-19

## 2023-07-17 RX ORDER — QUETIAPINE FUMARATE 25 MG/1
25 TABLET, FILM COATED ORAL NIGHTLY
Status: CANCELLED | OUTPATIENT
Start: 2023-07-17

## 2023-07-17 NOTE — PROGRESS NOTES
[facility] Pharmacy Note:  Renal Adjustment for cefepime (MAXIPIME)    Dana Price is a 80year old patient who has been prescribed cefepime (MAXIPIME) 1000 mg every 8 hrs. The estimated creatinine clearance is 35.3 mL/min (based on SCr of 0.91 mg/dL). The dose has been adjusted to cefepime (MAXIPIME) 1000 mg every 12 hrs per hospital renal dose adjustment protocol for treatment of pneumonia. Pharmacy will follow and adjust dose as warranted for additional renal function changes.     Thank you,    Ankit Pierce, PharmD  7/17/2023  5:48 AM

## 2023-07-17 NOTE — SLP NOTE
SPEECH DAILY NOTE - INPATIENT    ASSESSMENT & PLAN   ASSESSMENT  PPE REQUIRED. THIS THERAPIST WORE GLOVES, DROPLET MASK, AND GOGGLES FOR DURATION OF EVALUATION. HANDS WASHED UPON ENTRANCE/EXIT. SLP f/u for ongoing dysphagia tx/meal assessment per recommendations of regular/thin per BSE. RN reports pt tolerates diet and medication well with no overt clinical s/s aspiration. Pt denies any swallowing challenges. Pt positioned upright in bed, confused at baseline. Pt afebrile, tolerating 15L/Min O2NC with oxygen status 91 prior to the start of oral trials. SLP reviewed aspiration precautions and safe swallowing compensatory strategies with the patient. Safe swallow guidelines remain written on the white board in purple. Diet recommendations remain on the whiteboard in the room. Patient's RN v/u. Provided moderate cues/assistance, pt tolerates regular and thin liquids via open cup with no overt clinical signs/symptoms of aspiration. Oxygen status remained >90 t/o the entire session. Respiratory Rate WFL. Oral/buccal cavities clear of residue following all trials. MOST RECENT CXR 7/17/23:  CONCLUSION:   Redemonstration of indeterminate bibasilar opacities, which may reflect chronic atelectasis, superimposed pneumonia, and/or small left pleural effusion. A preliminary report was issued by the [address] [place] [address] Radiology teleradiology service. There are no major discrepancies. Dictated by (CST): Erick Kingsley MD on 7/17/2023 at 6:53 AM       Finalized by (CST): Erick Kingsley MD on 7/17/2023 at 6:55 AM       PLAN: SLP to f/u x1 meal assessment, monitor CXR, and VFSS as MD desires to rule out SILENT aspiration. Diet Recommendations - Solids: Regular  Diet Recommendations - Liquids: Thin Liquids (No Straw)    Compensatory Strategies Recommended: No straws; Slow rate; Alternate consistencies;Small bites and sips;Multiple swallows  Aspiration Precautions: Upright position; Slow rate;Small bites and sips; No straw  Medication Administration Recommendations: Whole in puree    Patient Experiencing Pain: No    Discharge Recommendations  Discharge Recommendations/Plan: Undetermined    Treatment Plan  Treatment Plan/Recommendations: Dysphagia therapy; Aspiration precautions (VFSS if MD desires to R/O silent aspiration)    Interdisciplinary Communication: Discussed with RN  Recommendations posted at bedside            GOALS  Goal #1 The patient will tolerate regular solid consistency and thin liquids without overt signs or symptoms of aspiration with 100 % accuracy over 2 session(s). Pt tolerates regular/thin consistencies with no overt signs/symptoms of aspiration observed. Continue x1. In Progress   Goal #2 The patient/family/caregiver will demonstrate understanding and implementation of aspiration precautions and swallow strategies independently over 2 session(s). Education provided regarding aspiration precautions and safe swallow strategies. Pt confused at baseline with poor carryover. Pt's RN v/u to all recommendations. In Progress   Goal #3 The patient will utilize compensatory strategies as outlined by  BSSE (clinical evaluation) including Slow rate, Small bites, Small sips, Multiple swallows, Alternate liquids/solids, No straws, Upright 90 degrees, Upright 90 degrees 30 mins after meal with mild assistance 95 % of the time across 2 sessions. Mod cues/assist provided for slow/small bites/sips while pt upright in bed. No straws utilized. Liquids/solids alternated. In Progress     FOLLOW UP  Follow Up Needed (Documentation Required): Yes  SLP Follow-up Date: 07/18/23  Number of Visits to Meet Established Goals: 1    Session: 1 following BSE    If you have any questions, please contact Ady Aldrich, KALEIGH White  Speech Language Pathologist  Phone Number Ext. [phone]

## 2023-07-17 NOTE — PLAN OF CARE
Problem: Patient Centered Care  Goal: Patient preferences are identified and integrated in the patient's plan of care  Description: Interventions:  - What would you like us to know as we care for you?  From home with    - Provide timely, complete, and accurate information to patient/family  - Incorporate patient and family knowledge, values, beliefs, and cultural backgrounds into the planning and delivery of care  - Encourage patient/family to participate in care and decision-making at the level they choose  - Honor patient and family perspectives and choices  Outcome: Progressing     Problem: RESPIRATORY - ADULT  Goal: Achieves optimal ventilation and oxygenation  Description: INTERVENTIONS:  - Assess for changes in respiratory status  - Assess for changes in mentation and behavior  - Position to facilitate oxygenation and minimize respiratory effort  - Oxygen supplementation based on oxygen saturation or ABGs  - Provide Smoking Cessation handout, if applicable  - Encourage broncho-pulmonary hygiene including cough, deep breathe, Incentive Spirometry  - Assess the need for suctioning and perform as needed  - Assess and instruct to report SOB or any respiratory difficulty  - Respiratory Therapy support as indicated  - Manage/alleviate anxiety  - Monitor for signs/symptoms of CO2 retention  Outcome: Progressing     Problem: MUSCULOSKELETAL - ADULT  Goal: Return mobility to safest level of function  Description: INTERVENTIONS:  - Assess patient stability and activity tolerance for standing, transferring and ambulating w/ or w/o assistive devices  - Assist with transfers and ambulation using safe patient handling equipment as needed  - Ensure adequate protection for wounds/incisions during mobilization  - Obtain PT/OT consults as needed  - Advance activity as appropriate  - Communicate ordered activity level and limitations with patient/family  Outcome: Progressing     Problem: CARDIOVASCULAR - ADULT  Goal: Maintains optimal cardiac output and hemodynamic stability  Description: INTERVENTIONS:  - Monitor vital signs, rhythm, and trends  - Monitor for bleeding, hypotension and signs of decreased cardiac output  - Evaluate effectiveness of vasoactive medications to optimize hemodynamic stability  - Monitor arterial and/or venous puncture sites for bleeding and/or hematoma  - Assess quality of pulses, skin color and temperature  - Assess for signs of decreased coronary artery perfusion - ex.  Angina  - Evaluate fluid balance, assess for edema, trend weights  Outcome: Progressing  Goal: Absence of cardiac arrhythmias or at baseline  Description: INTERVENTIONS:  - Continuous cardiac monitoring, monitor vital signs, obtain 12 lead EKG if indicated  - Evaluate effectiveness of antiarrhythmic and heart rate control medications as ordered  - Initiate emergency measures for life threatening arrhythmias  - Monitor electrolytes and administer replacement therapy as ordered  Outcome: Progressing     Problem: METABOLIC/FLUID AND ELECTROLYTES - ADULT  Goal: Glucose maintained within prescribed range  Description: INTERVENTIONS:  - Monitor Blood Glucose as ordered  - Assess for signs and symptoms of hyperglycemia and hypoglycemia  - Administer ordered medications to maintain glucose within target range  - Assess barriers to adequate nutritional intake and initiate nutrition consult as needed  - Instruct patient on self management of diabetes  Outcome: Progressing  Goal: Electrolytes maintained within normal limits  Description: INTERVENTIONS:  - Monitor labs and rhythm and assess patient for signs and symptoms of electrolyte imbalances  - Administer electrolyte replacement as ordered  - Monitor response to electrolyte replacements, including rhythm and repeat lab results as appropriate  - Fluid restriction as ordered  - Instruct patient on fluid and nutrition restrictions as appropriate  Outcome: Progressing  Goal: Hemodynamic stability and optimal renal function maintained  Description: INTERVENTIONS:  - Monitor labs and assess for signs and symptoms of volume excess or deficit  - Monitor intake, output and patient weight  - Monitor urine specific gravity, serum osmolarity and serum sodium as indicated or ordered  - Monitor response to interventions for patient's volume status, including labs, urine output, blood pressure (other measures as available)  - Encourage oral intake as appropriate  - Instruct patient on fluid and nutrition restrictions as appropriate  Outcome: Progressing     Problem: SAFETY ADULT - FALL  Goal: Free from fall injury  Description: INTERVENTIONS:  - Assess pt frequently for physical needs  - Identify cognitive and physical deficits and behaviors that affect risk of falls.   - [place] fall precautions as indicated by assessment.  - Educate pt/family on patient safety including physical limitations  - Instruct pt to call for assistance with activity based on assessment  - Modify environment to reduce risk of injury  - Provide assistive devices as appropriate  - Consider OT/PT consult to assist with strengthening/mobility  - Encourage toileting schedule  Outcome: Progressing     Problem: Patient/Family Goals  Goal: Patient/Family Long Term Goal  Description: Patient's Long Term Goal: To go home    Interventions:  - Take medications as prescribed by MD  - Scheduled tests as needed  - PT/OT  - See additional Care Plan goals for specific interventions  Outcome: Progressing  Goal: Patient/Family Short Term Goal  Description: Patient's Short Term Goal: To get back to room air (no oxygen)    Interventions:   - Wean oxygen as tolerated  - Neb tx as necessary for shortness of breath  - Prescribed medications   - See additional Care Plan goals for specific interventions  Outcome: Progressing     Problem: PAIN - ADULT  Goal: Verbalizes/displays adequate comfort level or patient's stated pain goal  Description: INTERVENTIONS:  - Encourage pt to monitor pain and request assistance  - Assess pain using appropriate pain scale  - Administer analgesics based on type and severity of pain and evaluate response  - Implement non-pharmacological measures as appropriate and evaluate response  - Consider cultural and social influences on pain and pain management  - Manage/alleviate anxiety  - Utilize distraction and/or relaxation techniques  - Monitor for opioid side effects  - Notify MD/LIP if interventions unsuccessful or patient reports new pain  - Anticipate increased pain with activity and pre-medicate as appropriate  Outcome: Progressing     Problem: DISCHARGE PLANNING  Goal: Discharge to home or other facility with appropriate resources  Description: INTERVENTIONS:  - Identify barriers to discharge w/pt and caregiver  - Include patient/family/discharge partner in discharge planning  - Arrange for needed discharge resources and transportation as appropriate  - Identify discharge learning needs (meds, wound care, etc)  - Arrange for interpreters to assist at discharge as needed  - Consider post-discharge preferences of patient/family/discharge partner  - Complete POLST form as appropriate  - Assess patient's ability to be responsible for managing their own health  - Refer to Case Management Department for coordinating discharge planning if the patient needs post-hospital services based on physician/LIP order or complex needs related to functional status, cognitive ability or social support system  Outcome: Progressing

## 2023-07-17 NOTE — PLAN OF CARE
2100 Received pt on regular nasal canula O2 at 6 L, PO 88/89%. Change to HF/NC 10L PO improved some. 2300- Noted pt face flushed, very wheezy, PO 88/89, RT notified O2 increased to 13L/HF, message also sent to Dr June Ortiz.   2336- Orders received for PCXR and to transfer pt to PCU, orders carried out

## 2023-07-17 NOTE — PROGRESS NOTES
[facility] Pharmacy Note:  Renal Adjustment for levofloxacin [facility])    Marilou Ngo is a 80year old patient who has been prescribed levofloxacin (LEVAQUIN) 500 mg every 24 hrs. The estimated creatinine clearance is 35.3 mL/min (based on SCr of 0.91 mg/dL). The dose has been adjusted to levofloxacin (LEVAQUIN) 750 mg every 48 hrs per hospital renal dose adjustment protocol for treatment of pneumonia. Pharmacy will follow and adjust dose as warranted for additional renal function changes.     Thank you,    Chanel Bennett, PharmD  7/17/2023  6:07 AM

## 2023-07-17 NOTE — PLAN OF CARE
Bilateral wrist restraints applied for patient safety. Attempted to call family but there was no answer.     Problem: Safety Risk - Non-Violent Restraints  Goal: Patient will remain free from self-harm  Description: INTERVENTIONS:  - Apply the least restrictive restraint to prevent harm  - Notify patient and family of reasons restraints applied  - Assess for any contributing factors to confusion (electrolyte disturbances, delirium, medications)  - Discontinue any unnecessary medical devices as soon as possible  - Assess the patient's physical comfort, circulation, skin condition, hydration, nutrition and elimination needs   - Reorient and redirection as needed  - Assess for the need to continue restraints  Outcome: Not Progressing

## 2023-07-17 NOTE — PLAN OF CARE
Restraints dc, pt cooperative   Problem: Safety Risk - Non-Violent Restraints  Goal: Patient will remain free from self-harm  Description: INTERVENTIONS:  - Apply the least restrictive restraint to prevent harm  - Notify patient and family of reasons restraints applied  - Assess for any contributing factors to confusion (electrolyte disturbances, delirium, medications)  - Discontinue any unnecessary medical devices as soon as possible  - Assess the patient's physical comfort, circulation, skin condition, hydration, nutrition and elimination needs   - Reorient and redirection as needed  - Assess for the need to continue restraints  7/17/2023 1212 by Pilo Lerner RN  Outcome: Completed

## 2023-07-17 NOTE — CDS QUERY
How to Answer this Query    1.) Click \"Edit Button\" on the toolbar  2.) Type an \"X\" in the bracket for the diagnosis that applies. (You may also add additional clinical details as you feel necessary to substantiate your response). 3.) Finally click \"Sign\" to complete response. Thank you     Pneumonia Specificity  CLINICAL DOCUMENTATION CLARIFICATION FORM    Dear Doctor Noreen Elmore:  Clinical information (provided below) indicates pneumonia. For accurate ICD-10-CM code assignment to reflect severity of illness and risk of mortality,      1. Please specify type of pneumonia in the progress notes:    (  )  Aspiration pneumonia                  Please document specific aspirate (food, liquids, etc.)    (  )  Bacterial (specify organism)    (  ) Bronchopneumonia (specify organism)    (  )  )Interstitial pneumonia    (  ) Viral pneumonia    ( X ) Other pneumonia (specify organism or type)____________________    Unspecified Community acquired pneumonia     2. Please specify the organism causing the pneumonia, if known   Community-acquired  Note: CAP (Community-acquired), HAP (Hospital-acquired), and HCAP (Healthcare-associated) indicate where the pneumonia was acquired, not a specific type.     _______________________________________________________________________   Clinical Indicators:  ED Impression: Generalized weakness  H&P:Acute hypoxemic respiratory failure  Possible due to basilar pneumonia and underlying COPD   CT chest:  No PE,             Bibasilar pulmonary opacities, which have heterogeneous enhancement suggesting they represent a combination of atelectasis and pneumonia. Given the bibasilar distribution correlate for aspiration. Small left pleural effusion is also present. Meds include cefepime IVPB and solumedrol IV  If you have any questions, please contact Clinical :  Sandra Rowland RN at [phone]     Thank You!      THIS FORM IS A PERMANENT PART OF THE MEDICAL RECORD

## 2023-07-17 NOTE — PLAN OF CARE
in room most of the day. Patient up in the chair. Will DC home with home health and 24 hour care once medically cleared. Problem: Patient Centered Care  Goal: Patient preferences are identified and integrated in the patient's plan of care  Description: Interventions:  - What would you like us to know as we care for you?  From home with    - Provide timely, complete, and accurate information to patient/family  - Incorporate patient and family knowledge, values, beliefs, and cultural backgrounds into the planning and delivery of care  - Encourage patient/family to participate in care and decision-making at the level they choose  - Honor patient and family perspectives and choices  Outcome: Progressing     Problem: RESPIRATORY - ADULT  Goal: Achieves optimal ventilation and oxygenation  Description: INTERVENTIONS:  - Assess for changes in respiratory status  - Assess for changes in mentation and behavior  - Position to facilitate oxygenation and minimize respiratory effort  - Oxygen supplementation based on oxygen saturation or ABGs  - Provide Smoking Cessation handout, if applicable  - Encourage broncho-pulmonary hygiene including cough, deep breathe, Incentive Spirometry  - Assess the need for suctioning and perform as needed  - Assess and instruct to report SOB or any respiratory difficulty  - Respiratory Therapy support as indicated  - Manage/alleviate anxiety  - Monitor for signs/symptoms of CO2 retention  Outcome: Progressing     Problem: MUSCULOSKELETAL - ADULT  Goal: Return mobility to safest level of function  Description: INTERVENTIONS:  - Assess patient stability and activity tolerance for standing, transferring and ambulating w/ or w/o assistive devices  - Assist with transfers and ambulation using safe patient handling equipment as needed  - Ensure adequate protection for wounds/incisions during mobilization  - Obtain PT/OT consults as needed  - Advance activity as appropriate  - Communicate ordered activity level and limitations with patient/family  Outcome: Progressing     Problem: SAFETY ADULT - FALL  Goal: Free from fall injury  Description: INTERVENTIONS:  - Assess pt frequently for physical needs  - Identify cognitive and physical deficits and behaviors that affect risk of falls.   - [place] fall precautions as indicated by assessment.  - Educate pt/family on patient safety including physical limitations  - Instruct pt to call for assistance with activity based on assessment  - Modify environment to reduce risk of injury  - Provide assistive devices as appropriate  - Consider OT/PT consult to assist with strengthening/mobility  - Encourage toileting schedule  Outcome: Progressing     Problem: Patient/Family Goals  Goal: Patient/Family Long Term Goal  Description: Patient's Long Term Goal: To go home    Interventions:  - Take medications as prescribed by MD  - Scheduled tests as needed  - PT/OT  - See additional Care Plan goals for specific interventions  Outcome: Progressing  Goal: Patient/Family Short Term Goal  Description: Patient's Short Term Goal: To get back to room air (no oxygen)    Interventions:   - Wean oxygen as tolerated  - Neb tx as necessary for shortness of breath  - Prescribed medications   - See additional Care Plan goals for specific interventions  Outcome: Progressing

## 2023-07-17 NOTE — RESPIRATORY THERAPY NOTE
Patient is on HFNC 15 LPM, blood gas was drawn at 10:49. Critical result for PO2 was 49 and O2 saturation was 89%. Report of ABG given to JANA Pennington. Latest Reference Range & Units 07/17/23 10:49   ABG PH 7.35 - 7.45  7.52 (H)   ABG PCO2 35 - 45 mm Hg 45   ABG PO2 80 - 100 mm Hg 49 (LL)   ABG HCO3 21.0 - 27.0 mEq/L 34.1 (H)   ABG O2 SATURATION 94.0 - 100.0 % 88.3 (L)   Blood Gas Base Excess -2.0 - 2.0 mmol/L 12.2 (H)   (LL): Data is critically low  (H): Data is abnormally high  (L): Data is abnormally low    JANA Pennington reported ABG critical values to Dr. Briseida Noel. Dr. Ailene Brittle keep the patient on 15L HFNC.  patient's current SPO2 is at 93%

## 2023-07-17 NOTE — PLAN OF CARE
Pt Transfer with belongings accompanied by transporter and nursing staff. Pt in no acute distress.  Will notify family in morning

## 2023-07-17 NOTE — PLAN OF CARE
Patient transferred to [facility] for increase in oxygen needs. Patient arrived being alert and oriented and stable on 12L HFNC. As the night progressed, patient became more forgetful. Despite redirection, patient became very agitated with staff and ripped out her IV, pulse ox, nasal cannula, and EKG leads. Patient requested for a DrLisa To come speak with her in order for her to allow us to monitor her vital signs. Dr. Amber Hamilton came to bedside to help with redirection. Patients agitation then escalated. Bilateral wrist restraints were applied for patient safety. IV ativan PRN was added and administered. Attempted to call family to update on events, no answer. [place] in place, bed locked and in lowest position. Will endorse care to oncoming shift. Problem: Patient Centered Care  Goal: Patient preferences are identified and integrated in the patient's plan of care  Description: Interventions:  - What would you like us to know as we care for you?  From home with    - Provide timely, complete, and accurate information to patient/family  - Incorporate patient and family knowledge, values, beliefs, and cultural backgrounds into the planning and delivery of care  - Encourage patient/family to participate in care and decision-making at the level they choose  - Honor patient and family perspectives and choices  Outcome: Progressing     Problem: RESPIRATORY - ADULT  Goal: Achieves optimal ventilation and oxygenation  Description: INTERVENTIONS:  - Assess for changes in respiratory status  - Assess for changes in mentation and behavior  - Position to facilitate oxygenation and minimize respiratory effort  - Oxygen supplementation based on oxygen saturation or ABGs  - Provide Smoking Cessation handout, if applicable  - Encourage broncho-pulmonary hygiene including cough, deep breathe, Incentive Spirometry  - Assess the need for suctioning and perform as needed  - Assess and instruct to report SOB or any respiratory difficulty  - Respiratory Therapy support as indicated  - Manage/alleviate anxiety  - Monitor for signs/symptoms of CO2 retention  Outcome: Progressing     Problem: MUSCULOSKELETAL - ADULT  Goal: Return mobility to safest level of function  Description: INTERVENTIONS:  - Assess patient stability and activity tolerance for standing, transferring and ambulating w/ or w/o assistive devices  - Assist with transfers and ambulation using safe patient handling equipment as needed  - Ensure adequate protection for wounds/incisions during mobilization  - Obtain PT/OT consults as needed  - Advance activity as appropriate  - Communicate ordered activity level and limitations with patient/family  Outcome: Progressing     Problem: CARDIOVASCULAR - ADULT  Goal: Maintains optimal cardiac output and hemodynamic stability  Description: INTERVENTIONS:  - Monitor vital signs, rhythm, and trends  - Monitor for bleeding, hypotension and signs of decreased cardiac output  - Evaluate effectiveness of vasoactive medications to optimize hemodynamic stability  - Monitor arterial and/or venous puncture sites for bleeding and/or hematoma  - Assess quality of pulses, skin color and temperature  - Assess for signs of decreased coronary artery perfusion - ex.  Angina  - Evaluate fluid balance, assess for edema, trend weights  Outcome: Progressing  Goal: Absence of cardiac arrhythmias or at baseline  Description: INTERVENTIONS:  - Continuous cardiac monitoring, monitor vital signs, obtain 12 lead EKG if indicated  - Evaluate effectiveness of antiarrhythmic and heart rate control medications as ordered  - Initiate emergency measures for life threatening arrhythmias  - Monitor electrolytes and administer replacement therapy as ordered  Outcome: Progressing     Problem: SAFETY ADULT - FALL  Goal: Free from fall injury  Description: INTERVENTIONS:  - Assess pt frequently for physical needs  - Identify cognitive and physical deficits and behaviors that affect risk of falls.   - [place] fall precautions as indicated by assessment.  - Educate pt/family on patient safety including physical limitations  - Instruct pt to call for assistance with activity based on assessment  - Modify environment to reduce risk of injury  - Provide assistive devices as appropriate  - Consider OT/PT consult to assist with strengthening/mobility  - Encourage toileting schedule  Outcome: Progressing     Problem: Safety Risk - Non-Violent Restraints  Goal: Patient will remain free from self-harm  Description: INTERVENTIONS:  - Apply the least restrictive restraint to prevent harm  - Notify patient and family of reasons restraints applied  - Assess for any contributing factors to confusion (electrolyte disturbances, delirium, medications)  - Discontinue any unnecessary medical devices as soon as possible  - Assess the patient's physical comfort, circulation, skin condition, hydration, nutrition and elimination needs   - Reorient and redirection as needed  - Assess for the need to continue restraints  7/17/2023 0549 by Se Conrad, RN  Outcome: Progressing  7/17/2023 [address] [place] by Se Conrad, RN  Outcome: Not Progressing

## 2023-07-18 ENCOUNTER — APPOINTMENT (OUTPATIENT)
Dept: GENERAL RADIOLOGY | Facility: HOSPITAL | Age: 88
DRG: 193 | End: 2023-07-18
Attending: INTERNAL MEDICINE
Payer: MEDICARE

## 2023-07-18 LAB
ANION GAP SERPL CALC-SCNC: 3 MMOL/L (ref 0–18)
BASOPHILS # BLD AUTO: 0.04 X10(3) UL (ref 0–0.2)
BASOPHILS NFR BLD AUTO: 0.5 %
BUN BLD-MCNC: 22 MG/DL (ref 7–18)
BUN/CREAT SERPL: 25.3 (ref 10–20)
CALCIUM BLD-MCNC: 9.3 MG/DL (ref 8.5–10.1)
CHLORIDE SERPL-SCNC: 104 MMOL/L (ref 98–112)
CO2 SERPL-SCNC: 36 MMOL/L (ref 21–32)
CREAT BLD-MCNC: 0.87 MG/DL
DEPRECATED RDW RBC AUTO: 44.9 FL (ref 35.1–46.3)
EOSINOPHIL # BLD AUTO: 0.02 X10(3) UL (ref 0–0.7)
EOSINOPHIL NFR BLD AUTO: 0.3 %
ERYTHROCYTE [DISTWIDTH] IN BLOOD BY AUTOMATED COUNT: 13.4 % (ref 11–15)
GFR SERPLBLD BASED ON 1.73 SQ M-ARVRAT: 64 ML/MIN/1.73M2 (ref 60–?)
GLUCOSE BLD-MCNC: 109 MG/DL (ref 70–99)
HCT VFR BLD AUTO: 41 %
HGB BLD-MCNC: 13 G/DL
IMM GRANULOCYTES # BLD AUTO: 0.18 X10(3) UL (ref 0–1)
IMM GRANULOCYTES NFR BLD: 2.3 %
LYMPHOCYTES # BLD AUTO: 1.24 X10(3) UL (ref 1–4)
LYMPHOCYTES NFR BLD AUTO: 16 %
MCH RBC QN AUTO: 28.7 PG (ref 26–34)
MCHC RBC AUTO-ENTMCNC: 31.7 G/DL (ref 31–37)
MCV RBC AUTO: 90.5 FL
MONOCYTES # BLD AUTO: 0.56 X10(3) UL (ref 0.1–1)
MONOCYTES NFR BLD AUTO: 7.2 %
NEUTROPHILS # BLD AUTO: 5.69 X10 (3) UL (ref 1.5–7.7)
NEUTROPHILS # BLD AUTO: 5.69 X10(3) UL (ref 1.5–7.7)
NEUTROPHILS NFR BLD AUTO: 73.7 %
OSMOLALITY SERPL CALC.SUM OF ELEC: 300 MOSM/KG (ref 275–295)
PLATELET # BLD AUTO: 274 10(3)UL (ref 150–450)
POTASSIUM SERPL-SCNC: 3.4 MMOL/L (ref 3.5–5.1)
RBC # BLD AUTO: 4.53 X10(6)UL
SODIUM SERPL-SCNC: 143 MMOL/L (ref 136–145)
WBC # BLD AUTO: 7.7 X10(3) UL (ref 4–11)

## 2023-07-18 PROCEDURE — 71045 X-RAY EXAM CHEST 1 VIEW: CPT | Performed by: INTERNAL MEDICINE

## 2023-07-18 PROCEDURE — 36410 VNPNXR 3YR/> PHY/QHP DX/THER: CPT | Performed by: NURSE PRACTITIONER

## 2023-07-18 PROCEDURE — 99232 SBSQ HOSP IP/OBS MODERATE 35: CPT | Performed by: INTERNAL MEDICINE

## 2023-07-18 PROCEDURE — 99233 SBSQ HOSP IP/OBS HIGH 50: CPT | Performed by: INTERNAL MEDICINE

## 2023-07-18 RX ORDER — POTASSIUM CHLORIDE 1.5 G/1.58G
40 POWDER, FOR SOLUTION ORAL ONCE
Status: COMPLETED | OUTPATIENT
Start: 2023-07-18 | End: 2023-07-18

## 2023-07-18 NOTE — PLAN OF CARE
Patient alert and oriented x3. Forgetful with time. Forgetful. Patient on 6L high flow nasal cannula. Improved appetite with food brought from home and encouragement from caretaker. Purewick in place. Up to chair stand pivot x1 mod assist. Vitals stable. Afebrile. Problem: Patient Centered Care  Goal: Patient preferences are identified and integrated in the patient's plan of care  Description: Interventions:  - What would you like us to know as we care for you?  From home with    - Provide timely, complete, and accurate information to patient/family  - Incorporate patient and family knowledge, values, beliefs, and cultural backgrounds into the planning and delivery of care  - Encourage patient/family to participate in care and decision-making at the level they choose  - Honor patient and family perspectives and choices  Outcome: Progressing     Problem: RESPIRATORY - ADULT  Goal: Achieves optimal ventilation and oxygenation  Description: INTERVENTIONS:  - Assess for changes in respiratory status  - Assess for changes in mentation and behavior  - Position to facilitate oxygenation and minimize respiratory effort  - Oxygen supplementation based on oxygen saturation or ABGs  - Provide Smoking Cessation handout, if applicable  - Encourage broncho-pulmonary hygiene including cough, deep breathe, Incentive Spirometry  - Assess the need for suctioning and perform as needed  - Assess and instruct to report SOB or any respiratory difficulty  - Respiratory Therapy support as indicated  - Manage/alleviate anxiety  - Monitor for signs/symptoms of CO2 retention  Outcome: Progressing     Problem: MUSCULOSKELETAL - ADULT  Goal: Return mobility to safest level of function  Description: INTERVENTIONS:  - Assess patient stability and activity tolerance for standing, transferring and ambulating w/ or w/o assistive devices  - Assist with transfers and ambulation using safe patient handling equipment as needed  - Ensure adequate protection for wounds/incisions during mobilization  - Obtain PT/OT consults as needed  - Advance activity as appropriate  - Communicate ordered activity level and limitations with patient/family  Outcome: Progressing     Problem: CARDIOVASCULAR - ADULT  Goal: Maintains optimal cardiac output and hemodynamic stability  Description: INTERVENTIONS:  - Monitor vital signs, rhythm, and trends  - Monitor for bleeding, hypotension and signs of decreased cardiac output  - Evaluate effectiveness of vasoactive medications to optimize hemodynamic stability  - Monitor arterial and/or venous puncture sites for bleeding and/or hematoma  - Assess quality of pulses, skin color and temperature  - Assess for signs of decreased coronary artery perfusion - ex.  Angina  - Evaluate fluid balance, assess for edema, trend weights  Outcome: Progressing  Goal: Absence of cardiac arrhythmias or at baseline  Description: INTERVENTIONS:  - Continuous cardiac monitoring, monitor vital signs, obtain 12 lead EKG if indicated  - Evaluate effectiveness of antiarrhythmic and heart rate control medications as ordered  - Initiate emergency measures for life threatening arrhythmias  - Monitor electrolytes and administer replacement therapy as ordered  Outcome: Progressing     Problem: METABOLIC/FLUID AND ELECTROLYTES - ADULT  Goal: Electrolytes maintained within normal limits  Description: INTERVENTIONS:  - Monitor labs and rhythm and assess patient for signs and symptoms of electrolyte imbalances  - Administer electrolyte replacement as ordered  - Monitor response to electrolyte replacements, including rhythm and repeat lab results as appropriate  - Fluid restriction as ordered  - Instruct patient on fluid and nutrition restrictions as appropriate  Outcome: Progressing

## 2023-07-18 NOTE — SLP NOTE
SPEECH DAILY NOTE - INPATIENT    ASSESSMENT & PLAN   ASSESSMENT  PPE REQUIRED. THIS THERAPIST WORE GLOVES, DROPLET MASK, AND GOGGLES FOR DURATION OF EVALUATION. HANDS WASHED UPON ENTRANCE/EXIT. SLP f/u for ongoing dysphagia tx/meal assessment per recommendations of regular/thin per BSE. RN reports pt tolerates diet and medication well with no overt clinical s/s aspiration. Pt denies any swallowing challenges. Pt positioned upright in bed, confused at baseline. Pt afebrile, tolerating 8L/Min O2NC with oxygen status 94 prior to the start of oral trials. SLP reviewed aspiration precautions and safe swallowing compensatory strategies with the patient and RN. Safe swallow guidelines remain written on the white board in purple. Diet recommendations remain on the whiteboard in the room. Patient's RN v/u. Provided moderate cues/assistance, pt tolerates regular and thin liquids via open cup with no overt clinical signs/symptoms of aspiration. Straws removed from bedside. Oxygen status remained >91 t/o the entire session. Respiratory Rate WFL. Oral/buccal cavities clear of residue following all trials. MOST RECENT CXR 7/18/23:  CONCLUSION:   Acquired S-shaped curvature of the spine. Chronic elevation of the left hemidiaphragm. Small left pleural effusion. Small left basal pulmonary opacity which could represent atelectasis or pneumonia. Dictated by (CST): Seth Aquino MD on 7/18/2023 at 8:28 AM       Finalized by (CST): Seth Aquino MD on 7/18/2023 at 8:29 AM         PLAN: SLP to sign off at this time secondary to pt tolerating least restrictive diet with no CSA. Diet Recommendations - Solids: Regular  Diet Recommendations - Liquids: Thin Liquids (No Straw)    Compensatory Strategies Recommended: No straws; Slow rate; Alternate consistencies;Small bites and sips;Multiple swallows  Aspiration Precautions: Upright position; Slow rate;Small bites and sips; No straw  Medication Administration Recommendations: Whole in puree    Patient Experiencing Pain: No         Discharge Recommendations  Discharge Recommendations/Plan: Undetermined    Treatment Plan  Treatment Plan/Recommendations: No further inpatient SLP service warranted    Interdisciplinary Communication: Discussed with RN  Recommendations posted at bedside            GOALS  Goal #1 The patient will tolerate regular solid consistency and thin liquids without overt signs or symptoms of aspiration with 100 % accuracy over 2 session(s). Pt tolerates regular/thin consistencies with no overt signs/symptoms of aspiration observed. Goal met   Goal #2 The patient/family/caregiver will demonstrate understanding and implementation of aspiration precautions and swallow strategies independently over 2 session(s). Education provided regarding aspiration precautions and safe swallow strategies. Pt confused at baseline with poor carryover. Pt's RN v/u to all recommendations. Goal met   Goal #3 The patient will utilize compensatory strategies as outlined by  BSSE (clinical evaluation) including Slow rate, Small bites, Small sips, Multiple swallows, Alternate liquids/solids, No straws, Upright 90 degrees, Upright 90 degrees 30 mins after meal with mild assistance 95 % of the time across 2 sessions. Mod cues/assist provided for slow/small bites/sips while pt upright in bed. Straws removed from bedside. Liquids/solids alternated. Goal met       FOLLOW UP  Follow Up Needed (Documentation Required): No  SLP Follow-up Date: 07/18/23  Number of Visits to Meet Established Goals: 1    Session: 2 following BSE    If you have any questions, please contact Tammie Dennis, SLP    Tamra Soto [place] [address] [facility]  Speech Language Pathologist  Phone Number Ext. [phone]

## 2023-07-18 NOTE — PLAN OF CARE
Pt remained appropriate with periods of confusion and forgetfulness. Oriented x3. No sign of agitation though expressed concerns and anxiety which is her baseline. Frequent supports and assist provided. Son and  was at bedside assisted with dinner. Pt's condition and plan of care discussed. Problem: Patient Centered Care  Goal: Patient preferences are identified and integrated in the patient's plan of care  Description: Interventions:  - What would you like us to know as we care for you?  From home with    - Provide timely, complete, and accurate information to patient/family  - Incorporate patient and family knowledge, values, beliefs, and cultural backgrounds into the planning and delivery of care  - Encourage patient/family to participate in care and decision-making at the level they choose  - Honor patient and family perspectives and choices  Outcome: Progressing     Problem: RESPIRATORY - ADULT  Goal: Achieves optimal ventilation and oxygenation  Description: INTERVENTIONS:  - Assess for changes in respiratory status  - Assess for changes in mentation and behavior  - Position to facilitate oxygenation and minimize respiratory effort  - Oxygen supplementation based on oxygen saturation or ABGs  - Provide Smoking Cessation handout, if applicable  - Encourage broncho-pulmonary hygiene including cough, deep breathe, Incentive Spirometry  - Assess the need for suctioning and perform as needed  - Assess and instruct to report SOB or any respiratory difficulty  - Respiratory Therapy support as indicated  - Manage/alleviate anxiety  - Monitor for signs/symptoms of CO2 retention  Outcome: Progressing     Problem: MUSCULOSKELETAL - ADULT  Goal: Return mobility to safest level of function  Description: INTERVENTIONS:  - Assess patient stability and activity tolerance for standing, transferring and ambulating w/ or w/o assistive devices  - Assist with transfers and ambulation using safe patient handling equipment as needed  - Ensure adequate protection for wounds/incisions during mobilization  - Obtain PT/OT consults as needed  - Advance activity as appropriate  - Communicate ordered activity level and limitations with patient/family  Outcome: Progressing     Problem: SAFETY ADULT - FALL  Goal: Free from fall injury  Description: INTERVENTIONS:  - Assess pt frequently for physical needs  - Identify cognitive and physical deficits and behaviors that affect risk of falls.   - [place] fall precautions as indicated by assessment.  - Educate pt/family on patient safety including physical limitations  - Instruct pt to call for assistance with activity based on assessment  - Modify environment to reduce risk of injury  - Provide assistive devices as appropriate  - Consider OT/PT consult to assist with strengthening/mobility  - Encourage toileting schedule  Outcome: Progressing     Problem: Patient/Family Goals  Goal: Patient/Family Long Term Goal  Description: Patient's Long Term Goal: To go home    Interventions:  - Take medications as prescribed by MD  - Scheduled tests as needed  - PT/OT  - See additional Care Plan goals for specific interventions  Outcome: Progressing  Goal: Patient/Family Short Term Goal  Description: Patient's Short Term Goal: To get back to room air (no oxygen)    Interventions:   - Wean oxygen as tolerated  - Neb tx as necessary for shortness of breath  - Prescribed medications   - See additional Care Plan goals for specific interventions  Outcome: Progressing     Problem: PAIN - ADULT  Goal: Verbalizes/displays adequate comfort level or patient's stated pain goal  Description: INTERVENTIONS:  - Encourage pt to monitor pain and request assistance  - Assess pain using appropriate pain scale  - Administer analgesics based on type and severity of pain and evaluate response  - Implement non-pharmacological measures as appropriate and evaluate response  - Consider cultural and social influences on pain and pain management  - Manage/alleviate anxiety  - Utilize distraction and/or relaxation techniques  - Monitor for opioid side effects  - Notify MD/LIP if interventions unsuccessful or patient reports new pain  - Anticipate increased pain with activity and pre-medicate as appropriate  Outcome: Progressing     Problem: CARDIOVASCULAR - ADULT  Goal: Maintains optimal cardiac output and hemodynamic stability  Description: INTERVENTIONS:  - Monitor vital signs, rhythm, and trends  - Monitor for bleeding, hypotension and signs of decreased cardiac output  - Evaluate effectiveness of vasoactive medications to optimize hemodynamic stability  - Monitor arterial and/or venous puncture sites for bleeding and/or hematoma  - Assess quality of pulses, skin color and temperature  - Assess for signs of decreased coronary artery perfusion - ex.  Angina  - Evaluate fluid balance, assess for edema, trend weights  Outcome: Progressing  Goal: Absence of cardiac arrhythmias or at baseline  Description: INTERVENTIONS:  - Continuous cardiac monitoring, monitor vital signs, obtain 12 lead EKG if indicated  - Evaluate effectiveness of antiarrhythmic and heart rate control medications as ordered  - Initiate emergency measures for life threatening arrhythmias  - Monitor electrolytes and administer replacement therapy as ordered  Outcome: Progressing     Problem: METABOLIC/FLUID AND ELECTROLYTES - ADULT  Goal: Glucose maintained within prescribed range  Description: INTERVENTIONS:  - Monitor Blood Glucose as ordered  - Assess for signs and symptoms of hyperglycemia and hypoglycemia  - Administer ordered medications to maintain glucose within target range  - Assess barriers to adequate nutritional intake and initiate nutrition consult as needed  - Instruct patient on self management of diabetes  Outcome: Progressing  Goal: Electrolytes maintained within normal limits  Description: INTERVENTIONS:  - Monitor labs and rhythm and assess patient for signs and symptoms of electrolyte imbalances  - Administer electrolyte replacement as ordered  - Monitor response to electrolyte replacements, including rhythm and repeat lab results as appropriate  - Fluid restriction as ordered  - Instruct patient on fluid and nutrition restrictions as appropriate  Outcome: Progressing  Goal: Hemodynamic stability and optimal renal function maintained  Description: INTERVENTIONS:  - Monitor labs and assess for signs and symptoms of volume excess or deficit  - Monitor intake, output and patient weight  - Monitor urine specific gravity, serum osmolarity and serum sodium as indicated or ordered  - Monitor response to interventions for patient's volume status, including labs, urine output, blood pressure (other measures as available)  - Encourage oral intake as appropriate  - Instruct patient on fluid and nutrition restrictions as appropriate  Outcome: Progressing     Problem: DISCHARGE PLANNING  Goal: Discharge to home or other facility with appropriate resources  Description: INTERVENTIONS:  - Identify barriers to discharge w/pt and caregiver  - Include patient/family/discharge partner in discharge planning  - Arrange for needed discharge resources and transportation as appropriate  - Identify discharge learning needs (meds, wound care, etc)  - Arrange for interpreters to assist at discharge as needed  - Consider post-discharge preferences of patient/family/discharge partner  - Complete POLST form as appropriate  - Assess patient's ability to be responsible for managing their own health  - Refer to Case Management Department for coordinating discharge planning if the patient needs post-hospital services based on physician/LIP order or complex needs related to functional status, cognitive ability or social support system  Outcome: Not Progressing

## 2023-07-18 NOTE — CM/SW NOTE
Received MDO for home health. [facility]. Met with patient and caregiver at bedside, provided list of options. SW/CM to f/u regarding choice.     Renetta Gooden, [address]

## 2023-07-19 ENCOUNTER — APPOINTMENT (OUTPATIENT)
Dept: GENERAL RADIOLOGY | Facility: HOSPITAL | Age: 88
DRG: 193 | End: 2023-07-19
Attending: INTERNAL MEDICINE
Payer: MEDICARE

## 2023-07-19 LAB
ALBUMIN SERPL-MCNC: 2.3 G/DL (ref 3.4–5)
ANION GAP SERPL CALC-SCNC: 5 MMOL/L (ref 0–18)
BASOPHILS # BLD AUTO: 0.07 X10(3) UL (ref 0–0.2)
BASOPHILS NFR BLD AUTO: 0.8 %
BUN BLD-MCNC: 20 MG/DL (ref 7–18)
BUN/CREAT SERPL: 28.2 (ref 10–20)
CALCIUM BLD-MCNC: 9.1 MG/DL (ref 8.5–10.1)
CHLORIDE SERPL-SCNC: 105 MMOL/L (ref 98–112)
CO2 SERPL-SCNC: 32 MMOL/L (ref 21–32)
CREAT BLD-MCNC: 0.71 MG/DL
DEPRECATED RDW RBC AUTO: 44.9 FL (ref 35.1–46.3)
EOSINOPHIL # BLD AUTO: 0.17 X10(3) UL (ref 0–0.7)
EOSINOPHIL NFR BLD AUTO: 2 %
ERYTHROCYTE [DISTWIDTH] IN BLOOD BY AUTOMATED COUNT: 13.4 % (ref 11–15)
GFR SERPLBLD BASED ON 1.73 SQ M-ARVRAT: 82 ML/MIN/1.73M2 (ref 60–?)
GLUCOSE BLD-MCNC: 109 MG/DL (ref 70–99)
HCT VFR BLD AUTO: 38.5 %
HGB BLD-MCNC: 12.1 G/DL
IMM GRANULOCYTES # BLD AUTO: 0.38 X10(3) UL (ref 0–1)
IMM GRANULOCYTES NFR BLD: 4.4 %
LYMPHOCYTES # BLD AUTO: 1.49 X10(3) UL (ref 1–4)
LYMPHOCYTES NFR BLD AUTO: 17.4 %
MAGNESIUM SERPL-MCNC: 2.4 MG/DL (ref 1.6–2.6)
MCH RBC QN AUTO: 28.5 PG (ref 26–34)
MCHC RBC AUTO-ENTMCNC: 31.4 G/DL (ref 31–37)
MCV RBC AUTO: 90.8 FL
MONOCYTES # BLD AUTO: 0.37 X10(3) UL (ref 0.1–1)
MONOCYTES NFR BLD AUTO: 4.3 %
NEUTROPHILS # BLD AUTO: 6.06 X10 (3) UL (ref 1.5–7.7)
NEUTROPHILS # BLD AUTO: 6.06 X10(3) UL (ref 1.5–7.7)
NEUTROPHILS NFR BLD AUTO: 71.1 %
OSMOLALITY SERPL CALC.SUM OF ELEC: 297 MOSM/KG (ref 275–295)
PHOSPHATE SERPL-MCNC: 2.8 MG/DL (ref 2.5–4.9)
PLATELET # BLD AUTO: 232 10(3)UL (ref 150–450)
POTASSIUM SERPL-SCNC: 3.9 MMOL/L (ref 3.5–5.1)
POTASSIUM SERPL-SCNC: 3.9 MMOL/L (ref 3.5–5.1)
RBC # BLD AUTO: 4.24 X10(6)UL
SODIUM SERPL-SCNC: 142 MMOL/L (ref 136–145)
WBC # BLD AUTO: 8.5 X10(3) UL (ref 4–11)

## 2023-07-19 PROCEDURE — 99232 SBSQ HOSP IP/OBS MODERATE 35: CPT | Performed by: INTERNAL MEDICINE

## 2023-07-19 PROCEDURE — 71045 X-RAY EXAM CHEST 1 VIEW: CPT | Performed by: INTERNAL MEDICINE

## 2023-07-19 PROCEDURE — 99233 SBSQ HOSP IP/OBS HIGH 50: CPT | Performed by: INTERNAL MEDICINE

## 2023-07-19 RX ORDER — POLYETHYLENE GLYCOL 3350 17 G/17G
17 POWDER, FOR SOLUTION ORAL DAILY PRN
Status: DISCONTINUED | OUTPATIENT
Start: 2023-07-19 | End: 2023-07-28

## 2023-07-19 RX ORDER — TRIAMCINOLONE ACETONIDE 1 MG/G
CREAM TOPICAL 2 TIMES DAILY
Status: DISCONTINUED | OUTPATIENT
Start: 2023-07-19 | End: 2023-07-28

## 2023-07-19 RX ORDER — DOCUSATE SODIUM 100 MG/1
100 CAPSULE, LIQUID FILLED ORAL 2 TIMES DAILY
Status: DISCONTINUED | OUTPATIENT
Start: 2023-07-19 | End: 2023-07-28

## 2023-07-19 NOTE — PLAN OF CARE
Patient alert and oriented x3. Forgetful. Patient weaned to 3L high flow nasal cannula. Afebrile. Vitals stable. Bowel movement x1. Up to chair and bathroom x1 mod assist with walker. Problem: Patient Centered Care  Goal: Patient preferences are identified and integrated in the patient's plan of care  Description: Interventions:  - What would you like us to know as we care for you?  From home with    - Provide timely, complete, and accurate information to patient/family  - Incorporate patient and family knowledge, values, beliefs, and cultural backgrounds into the planning and delivery of care  - Encourage patient/family to participate in care and decision-making at the level they choose  - Honor patient and family perspectives and choices  Outcome: Progressing     Problem: RESPIRATORY - ADULT  Goal: Achieves optimal ventilation and oxygenation  Description: INTERVENTIONS:  - Assess for changes in respiratory status  - Assess for changes in mentation and behavior  - Position to facilitate oxygenation and minimize respiratory effort  - Oxygen supplementation based on oxygen saturation or ABGs  - Provide Smoking Cessation handout, if applicable  - Encourage broncho-pulmonary hygiene including cough, deep breathe, Incentive Spirometry  - Assess the need for suctioning and perform as needed  - Assess and instruct to report SOB or any respiratory difficulty  - Respiratory Therapy support as indicated  - Manage/alleviate anxiety  - Monitor for signs/symptoms of CO2 retention  Outcome: Progressing     Problem: MUSCULOSKELETAL - ADULT  Goal: Return mobility to safest level of function  Description: INTERVENTIONS:  - Assess patient stability and activity tolerance for standing, transferring and ambulating w/ or w/o assistive devices  - Assist with transfers and ambulation using safe patient handling equipment as needed  - Ensure adequate protection for wounds/incisions during mobilization  - Obtain PT/OT consults as needed  - Advance activity as appropriate  - Communicate ordered activity level and limitations with patient/family  Outcome: Progressing     Problem: CARDIOVASCULAR - ADULT  Goal: Maintains optimal cardiac output and hemodynamic stability  Description: INTERVENTIONS:  - Monitor vital signs, rhythm, and trends  - Monitor for bleeding, hypotension and signs of decreased cardiac output  - Evaluate effectiveness of vasoactive medications to optimize hemodynamic stability  - Monitor arterial and/or venous puncture sites for bleeding and/or hematoma  - Assess quality of pulses, skin color and temperature  - Assess for signs of decreased coronary artery perfusion - ex.  Angina  - Evaluate fluid balance, assess for edema, trend weights  Outcome: Progressing

## 2023-07-19 NOTE — PLAN OF CARE
Received patient awake/alert/oriented x3 w/ frequent forgetfulness and poor short term memory. Repeating questions multiple times during initial general assessment. 6L NC titrated to 10L over night w/ frequent episodes of apnea. NSR/ST on monitor. Rare PVC occurrence. Purewick intact to suction. Bed alarm active for pt safety d/t confusion. IV Abx given per schedule. Taking pills whole, one at a time, thin liquids no straw per SLP eval. Turning self, Modified assist/set-up.      Problem: RESPIRATORY - ADULT  Goal: Achieves optimal ventilation and oxygenation  Description: INTERVENTIONS:  - Assess for changes in respiratory status  - Assess for changes in mentation and behavior  - Position to facilitate oxygenation and minimize respiratory effort  - Oxygen supplementation based on oxygen saturation or ABGs  - Provide Smoking Cessation handout, if applicable  - Encourage broncho-pulmonary hygiene including cough, deep breathe, Incentive Spirometry  - Assess the need for suctioning and perform as needed  - Assess and instruct to report SOB or any respiratory difficulty  - Respiratory Therapy support as indicated  - Manage/alleviate anxiety  - Monitor for signs/symptoms of CO2 retention  Outcome: Not Progressing     Problem: MUSCULOSKELETAL - ADULT  Goal: Return mobility to safest level of function  Description: INTERVENTIONS:  - Assess patient stability and activity tolerance for standing, transferring and ambulating w/ or w/o assistive devices  - Assist with transfers and ambulation using safe patient handling equipment as needed  - Ensure adequate protection for wounds/incisions during mobilization  - Obtain PT/OT consults as needed  - Advance activity as appropriate  - Communicate ordered activity level and limitations with patient/family  Outcome: Progressing     Problem: CARDIOVASCULAR - ADULT  Goal: Maintains optimal cardiac output and hemodynamic stability  Description: INTERVENTIONS:  - Monitor vital signs, rhythm, and trends  - Monitor for bleeding, hypotension and signs of decreased cardiac output  - Evaluate effectiveness of vasoactive medications to optimize hemodynamic stability  - Monitor arterial and/or venous puncture sites for bleeding and/or hematoma  - Assess quality of pulses, skin color and temperature  - Assess for signs of decreased coronary artery perfusion - ex.  Angina  - Evaluate fluid balance, assess for edema, trend weights  Outcome: Progressing  Goal: Absence of cardiac arrhythmias or at baseline  Description: INTERVENTIONS:  - Continuous cardiac monitoring, monitor vital signs, obtain 12 lead EKG if indicated  - Evaluate effectiveness of antiarrhythmic and heart rate control medications as ordered  - Initiate emergency measures for life threatening arrhythmias  - Monitor electrolytes and administer replacement therapy as ordered  Outcome: Progressing     Problem: METABOLIC/FLUID AND ELECTROLYTES - ADULT  Goal: Glucose maintained within prescribed range  Description: INTERVENTIONS:  - Monitor Blood Glucose as ordered  - Assess for signs and symptoms of hyperglycemia and hypoglycemia  - Administer ordered medications to maintain glucose within target range  - Assess barriers to adequate nutritional intake and initiate nutrition consult as needed  - Instruct patient on self management of diabetes  Outcome: Progressing  Goal: Electrolytes maintained within normal limits  Description: INTERVENTIONS:  - Monitor labs and rhythm and assess patient for signs and symptoms of electrolyte imbalances  - Administer electrolyte replacement as ordered  - Monitor response to electrolyte replacements, including rhythm and repeat lab results as appropriate  - Fluid restriction as ordered  - Instruct patient on fluid and nutrition restrictions as appropriate  Outcome: Progressing  Goal: Hemodynamic stability and optimal renal function maintained  Description: INTERVENTIONS:  - Monitor labs and assess for signs and symptoms of volume excess or deficit  - Monitor intake, output and patient weight  - Monitor urine specific gravity, serum osmolarity and serum sodium as indicated or ordered  - Monitor response to interventions for patient's volume status, including labs, urine output, blood pressure (other measures as available)  - Encourage oral intake as appropriate  - Instruct patient on fluid and nutrition restrictions as appropriate  Outcome: Progressing     Problem: SAFETY ADULT - FALL  Goal: Free from fall injury  Description: INTERVENTIONS:  - Assess pt frequently for physical needs  - Identify cognitive and physical deficits and behaviors that affect risk of falls.   - [place] fall precautions as indicated by assessment.  - Educate pt/family on patient safety including physical limitations  - Instruct pt to call for assistance with activity based on assessment  - Modify environment to reduce risk of injury  - Provide assistive devices as appropriate  - Consider OT/PT consult to assist with strengthening/mobility  - Encourage toileting schedule  Outcome: Progressing     Problem: PAIN - ADULT  Goal: Verbalizes/displays adequate comfort level or patient's stated pain goal  Description: INTERVENTIONS:  - Encourage pt to monitor pain and request assistance  - Assess pain using appropriate pain scale  - Administer analgesics based on type and severity of pain and evaluate response  - Implement non-pharmacological measures as appropriate and evaluate response  - Consider cultural and social influences on pain and pain management  - Manage/alleviate anxiety  - Utilize distraction and/or relaxation techniques  - Monitor for opioid side effects  - Notify MD/LIP if interventions unsuccessful or patient reports new pain  - Anticipate increased pain with activity and pre-medicate as appropriate  Outcome: Progressing

## 2023-07-20 LAB
ANION GAP SERPL CALC-SCNC: 2 MMOL/L (ref 0–18)
BUN BLD-MCNC: 16 MG/DL (ref 7–18)
BUN/CREAT SERPL: 18.8 (ref 10–20)
CALCIUM BLD-MCNC: 9.5 MG/DL (ref 8.5–10.1)
CHLORIDE SERPL-SCNC: 105 MMOL/L (ref 98–112)
CO2 SERPL-SCNC: 36 MMOL/L (ref 21–32)
CREAT BLD-MCNC: 0.85 MG/DL
GFR SERPLBLD BASED ON 1.73 SQ M-ARVRAT: 66 ML/MIN/1.73M2 (ref 60–?)
GLUCOSE BLD-MCNC: 125 MG/DL (ref 70–99)
OSMOLALITY SERPL CALC.SUM OF ELEC: 299 MOSM/KG (ref 275–295)
POTASSIUM SERPL-SCNC: 4.2 MMOL/L (ref 3.5–5.1)
SODIUM SERPL-SCNC: 143 MMOL/L (ref 136–145)

## 2023-07-20 PROCEDURE — 99233 SBSQ HOSP IP/OBS HIGH 50: CPT | Performed by: INTERNAL MEDICINE

## 2023-07-20 PROCEDURE — 99232 SBSQ HOSP IP/OBS MODERATE 35: CPT | Performed by: INTERNAL MEDICINE

## 2023-07-20 NOTE — CM/SW NOTE
Social work met with the patient and her  to follow up on [facility] choice. The pt and her  did not understand home health and its necessity. Social work explained to the pt and her  that PT/OT is recommending HH to help the pt regain strength. Social printed another copy of the [facility] list and advised the pt and her  that we would follow up tomorrow 7/21 on their [facility] choice. Social spoke to their daughter Tyler Mooney over the phone and emailed her a copy of the [facility] list at [email or web address]  She will assist the pt in her [facility] choice. Social will monitor O2 needs incase home O2 is needed. The pt and her  have no further questions at this time. JAMES/CM to remain available for support and/or discharge planning.      Rakesh JOEL, [facility]  Discharge Planner [id number]

## 2023-07-20 NOTE — PLAN OF CARE
Received patient awake/alert/oriented x3, forgetful. Sitting in chair w/  bedside. On 3L NC, titrated to 5L while sleeping overnight. SpO2 maintained>93% w/ encouraging patient to lay w/ Right tilt. NSR/ST on monitor. Up w/ walker and contact guard assist to bathroom. Voided and small BM in toilet, Shyann Skelton changed and placed on patient while in bed. IV Abx given per schedule. Denies pain/discomfort. Topical cream applied per orders to rash on back. Encouraged patient to sleep on Right side but found patient turned and sleeping on left side 1-2 hours after initial positioning on right side. Patient able to turn self.     Problem: RESPIRATORY - ADULT  Goal: Achieves optimal ventilation and oxygenation  Description: INTERVENTIONS:  - Assess for changes in respiratory status  - Assess for changes in mentation and behavior  - Position to facilitate oxygenation and minimize respiratory effort  - Oxygen supplementation based on oxygen saturation or ABGs  - Provide Smoking Cessation handout, if applicable  - Encourage broncho-pulmonary hygiene including cough, deep breathe, Incentive Spirometry  - Assess the need for suctioning and perform as needed  - Assess and instruct to report SOB or any respiratory difficulty  - Respiratory Therapy support as indicated  - Manage/alleviate anxiety  - Monitor for signs/symptoms of CO2 retention  Outcome: Progressing     Problem: MUSCULOSKELETAL - ADULT  Goal: Return mobility to safest level of function  Description: INTERVENTIONS:  - Assess patient stability and activity tolerance for standing, transferring and ambulating w/ or w/o assistive devices  - Assist with transfers and ambulation using safe patient handling equipment as needed  - Ensure adequate protection for wounds/incisions during mobilization  - Obtain PT/OT consults as needed  - Advance activity as appropriate  - Communicate ordered activity level and limitations with patient/family  Outcome: Progressing Problem: CARDIOVASCULAR - ADULT  Goal: Maintains optimal cardiac output and hemodynamic stability  Description: INTERVENTIONS:  - Monitor vital signs, rhythm, and trends  - Monitor for bleeding, hypotension and signs of decreased cardiac output  - Evaluate effectiveness of vasoactive medications to optimize hemodynamic stability  - Monitor arterial and/or venous puncture sites for bleeding and/or hematoma  - Assess quality of pulses, skin color and temperature  - Assess for signs of decreased coronary artery perfusion - ex.  Angina  - Evaluate fluid balance, assess for edema, trend weights  Outcome: Progressing  Goal: Absence of cardiac arrhythmias or at baseline  Description: INTERVENTIONS:  - Continuous cardiac monitoring, monitor vital signs, obtain 12 lead EKG if indicated  - Evaluate effectiveness of antiarrhythmic and heart rate control medications as ordered  - Initiate emergency measures for life threatening arrhythmias  - Monitor electrolytes and administer replacement therapy as ordered  Outcome: Progressing     Problem: METABOLIC/FLUID AND ELECTROLYTES - ADULT  Goal: Glucose maintained within prescribed range  Description: INTERVENTIONS:  - Monitor Blood Glucose as ordered  - Assess for signs and symptoms of hyperglycemia and hypoglycemia  - Administer ordered medications to maintain glucose within target range  - Assess barriers to adequate nutritional intake and initiate nutrition consult as needed  - Instruct patient on self management of diabetes  Outcome: Progressing  Goal: Electrolytes maintained within normal limits  Description: INTERVENTIONS:  - Monitor labs and rhythm and assess patient for signs and symptoms of electrolyte imbalances  - Administer electrolyte replacement as ordered  - Monitor response to electrolyte replacements, including rhythm and repeat lab results as appropriate  - Fluid restriction as ordered  - Instruct patient on fluid and nutrition restrictions as appropriate  Outcome: Progressing  Goal: Hemodynamic stability and optimal renal function maintained  Description: INTERVENTIONS:  - Monitor labs and assess for signs and symptoms of volume excess or deficit  - Monitor intake, output and patient weight  - Monitor urine specific gravity, serum osmolarity and serum sodium as indicated or ordered  - Monitor response to interventions for patient's volume status, including labs, urine output, blood pressure (other measures as available)  - Encourage oral intake as appropriate  - Instruct patient on fluid and nutrition restrictions as appropriate  Outcome: Progressing     Problem: SAFETY ADULT - FALL  Goal: Free from fall injury  Description: INTERVENTIONS:  - Assess pt frequently for physical needs  - Identify cognitive and physical deficits and behaviors that affect risk of falls.   - [place] fall precautions as indicated by assessment.  - Educate pt/family on patient safety including physical limitations  - Instruct pt to call for assistance with activity based on assessment  - Modify environment to reduce risk of injury  - Provide assistive devices as appropriate  - Consider OT/PT consult to assist with strengthening/mobility  - Encourage toileting schedule  Outcome: Progressing     Problem: PAIN - ADULT  Goal: Verbalizes/displays adequate comfort level or patient's stated pain goal  Description: INTERVENTIONS:  - Encourage pt to monitor pain and request assistance  - Assess pain using appropriate pain scale  - Administer analgesics based on type and severity of pain and evaluate response  - Implement non-pharmacological measures as appropriate and evaluate response  - Consider cultural and social influences on pain and pain management  - Manage/alleviate anxiety  - Utilize distraction and/or relaxation techniques  - Monitor for opioid side effects  - Notify MD/LIP if interventions unsuccessful or patient reports new pain  - Anticipate increased pain with activity and pre-medicate as appropriate  Outcome: Progressing

## 2023-07-20 NOTE — PLAN OF CARE
Received patient AO x4 on 5L HFNC. Right tilt encouraged along with incentive spirometry for lung strength promotion. Up to bathroom, up in chair for lunch. Bed available on 5th floor. Phone report given. Patent transferred with all belongings. Spouse aware. Call light within reach. Problem: RESPIRATORY - ADULT  Goal: Achieves optimal ventilation and oxygenation  Description: INTERVENTIONS:  - Assess for changes in respiratory status  - Assess for changes in mentation and behavior  - Position to facilitate oxygenation and minimize respiratory effort  - Oxygen supplementation based on oxygen saturation or ABGs  - Provide Smoking Cessation handout, if applicable  - Encourage broncho-pulmonary hygiene including cough, deep breathe, Incentive Spirometry  - Assess the need for suctioning and perform as needed  - Assess and instruct to report SOB or any respiratory difficulty  - Respiratory Therapy support as indicated  - Manage/alleviate anxiety  - Monitor for signs/symptoms of CO2 retention  Outcome: Progressing     Problem: MUSCULOSKELETAL - ADULT  Goal: Return mobility to safest level of function  Description: INTERVENTIONS:  - Assess patient stability and activity tolerance for standing, transferring and ambulating w/ or w/o assistive devices  - Assist with transfers and ambulation using safe patient handling equipment as needed  - Ensure adequate protection for wounds/incisions during mobilization  - Obtain PT/OT consults as needed  - Advance activity as appropriate  - Communicate ordered activity level and limitations with patient/family  Outcome: Progressing     Problem: SAFETY ADULT - FALL  Goal: Free from fall injury  Description: INTERVENTIONS:  - Assess pt frequently for physical needs  - Identify cognitive and physical deficits and behaviors that affect risk of falls.   - [place] fall precautions as indicated by assessment.  - Educate pt/family on patient safety including physical limitations  - Instruct pt to call for assistance with activity based on assessment  - Modify environment to reduce risk of injury  - Provide assistive devices as appropriate  - Consider OT/PT consult to assist with strengthening/mobility  - Encourage toileting schedule  Outcome: Progressing     Problem: PAIN - ADULT  Goal: Verbalizes/displays adequate comfort level or patient's stated pain goal  Description: INTERVENTIONS:  - Encourage pt to monitor pain and request assistance  - Assess pain using appropriate pain scale  - Administer analgesics based on type and severity of pain and evaluate response  - Implement non-pharmacological measures as appropriate and evaluate response  - Consider cultural and social influences on pain and pain management  - Manage/alleviate anxiety  - Utilize distraction and/or relaxation techniques  - Monitor for opioid side effects  - Notify MD/LIP if interventions unsuccessful or patient reports new pain  - Anticipate increased pain with activity and pre-medicate as appropriate  Outcome: Progressing     Problem: DISCHARGE PLANNING  Goal: Discharge to home or other facility with appropriate resources  Description: INTERVENTIONS:  - Identify barriers to discharge w/pt and caregiver  - Include patient/family/discharge partner in discharge planning  - Arrange for needed discharge resources and transportation as appropriate  - Identify discharge learning needs (meds, wound care, etc)  - Arrange for interpreters to assist at discharge as needed  - Consider post-discharge preferences of patient/family/discharge partner  - Complete POLST form as appropriate  - Assess patient's ability to be responsible for managing their own health  - Refer to Case Management Department for coordinating discharge planning if the patient needs post-hospital services based on physician/LIP order or complex needs related to functional status, cognitive ability or social support system  Outcome: Progressing     Problem: CARDIOVASCULAR - ADULT  Goal: Maintains optimal cardiac output and hemodynamic stability  Description: INTERVENTIONS:  - Monitor vital signs, rhythm, and trends  - Monitor for bleeding, hypotension and signs of decreased cardiac output  - Evaluate effectiveness of vasoactive medications to optimize hemodynamic stability  - Monitor arterial and/or venous puncture sites for bleeding and/or hematoma  - Assess quality of pulses, skin color and temperature  - Assess for signs of decreased coronary artery perfusion - ex.  Angina  - Evaluate fluid balance, assess for edema, trend weights  Outcome: Progressing  Goal: Absence of cardiac arrhythmias or at baseline  Description: INTERVENTIONS:  - Continuous cardiac monitoring, monitor vital signs, obtain 12 lead EKG if indicated  - Evaluate effectiveness of antiarrhythmic and heart rate control medications as ordered  - Initiate emergency measures for life threatening arrhythmias  - Monitor electrolytes and administer replacement therapy as ordered  Outcome: Progressing     Problem: METABOLIC/FLUID AND ELECTROLYTES - ADULT  Goal: Glucose maintained within prescribed range  Description: INTERVENTIONS:  - Monitor Blood Glucose as ordered  - Assess for signs and symptoms of hyperglycemia and hypoglycemia  - Administer ordered medications to maintain glucose within target range  - Assess barriers to adequate nutritional intake and initiate nutrition consult as needed  - Instruct patient on self management of diabetes  Outcome: Progressing  Goal: Electrolytes maintained within normal limits  Description: INTERVENTIONS:  - Monitor labs and rhythm and assess patient for signs and symptoms of electrolyte imbalances  - Administer electrolyte replacement as ordered  - Monitor response to electrolyte replacements, including rhythm and repeat lab results as appropriate  - Fluid restriction as ordered  - Instruct patient on fluid and nutrition restrictions as appropriate  Outcome: Progressing  Goal: Hemodynamic stability and optimal renal function maintained  Description: INTERVENTIONS:  - Monitor labs and assess for signs and symptoms of volume excess or deficit  - Monitor intake, output and patient weight  - Monitor urine specific gravity, serum osmolarity and serum sodium as indicated or ordered  - Monitor response to interventions for patient's volume status, including labs, urine output, blood pressure (other measures as available)  - Encourage oral intake as appropriate  - Instruct patient on fluid and nutrition restrictions as appropriate  Outcome: Progressing

## 2023-07-21 ENCOUNTER — APPOINTMENT (OUTPATIENT)
Dept: GENERAL RADIOLOGY | Facility: HOSPITAL | Age: 88
DRG: 193 | End: 2023-07-21
Attending: INTERNAL MEDICINE
Payer: MEDICARE

## 2023-07-21 PROCEDURE — 99233 SBSQ HOSP IP/OBS HIGH 50: CPT | Performed by: INTERNAL MEDICINE

## 2023-07-21 PROCEDURE — 99232 SBSQ HOSP IP/OBS MODERATE 35: CPT | Performed by: INTERNAL MEDICINE

## 2023-07-21 PROCEDURE — 71045 X-RAY EXAM CHEST 1 VIEW: CPT | Performed by: INTERNAL MEDICINE

## 2023-07-21 NOTE — PHYSICAL THERAPY NOTE
PHYSICAL THERAPY TREATMENT NOTE - INPATIENT     Room Number: 547B/547-B       Presenting Problem: Generalized weakness    Problem List  Principal Problem:    Generalized weakness  Active Problems:    Hiatal hernia    Pneumonia of both lower lobes due to infectious organism    COPD exacerbation ([address]    Acute hypoxemic respiratory failure (HCC)      PHYSICAL THERAPY ASSESSMENT   Chart reviewed. RN approved participation in physical therapy. PPE worn by therapist: mask and gloves. Patient was wearing a mask during session. Patient presented in bed with low SPO2 but it improved upon sitting EOB. Education provided on Physical therapy plan of care and physiological benefits of out of bed mobility, activity pacing, pursed lip breathing, pairing breathing with mobility. Patient with fair carryover. Pt tolerated sitting EOB x12 min. Pt performed seated UE & LE therex. Patient with fair  progress towards goals during this session, pt appears to be weaker today than on the initial evaluation. Bed mobility: MOD A  Transfers: MOD A for low chair Min A from EOB  Gait Assistance: Contact guard assist  Distance (ft): 18  Assistive Device: Rolling walker  Pattern: Shuffle          Patient was left in bedside chair and alarm activated at end of session with all needs in reach. The patient's Approx Degree of Impairment: 54.16% has been calculated based on documentation in the [facility] '6 clicks' Inpatient Basic Mobility Short Form. Research supports that patients with this level of impairment may benefit from Home with home health PT/24 hr caregiver. RN aware of patient status post session. DISCHARGE RECOMMENDATIONS  PT Discharge Recommendations: 24 hour care/supervision;Home with home health PT     PLAN  PT Treatment Plan: Bed mobility; Patient education; Family education;Gait training    SUBJECTIVE  \"I'm in a restful position\"    OBJECTIVE  Precautions: Bed/chair alarm;Hard of hearing    WEIGHT BEARING RESTRICTION  Weight Bearing Restriction: None                PAIN ASSESSMENT   Ratin          BALANCE                                                                                                                       Static Sitting: Fair -  Dynamic Sitting: Fair -           Static Standing: Poor +  Dynamic Standing: Poor +    ACTIVITY TOLERANCE                         O2 WALK  Oxygen Therapy  SPO2% on Oxygen at Rest: 89 (pt in supine, 95% pt sitting EOB)  At rest oxygen flow (liters per minute): 6  SPO2% Ambulation on Oxygen: 90  Ambulation oxygen flow (liters per minute): 6    AM-PAC '6-Clicks' INPATIENT SHORT FORM - BASIC MOBILITY  How much difficulty does the patient currently have. .. Patient Difficulty: Turning over in bed (including adjusting bedclothes, sheets and blankets)?: A Little   Patient Difficulty: Sitting down on and standing up from a chair with arms (e.g., wheelchair, bedside commode, etc.): A Lot   Patient Difficulty: Moving from lying on back to sitting on the side of the bed?: A Lot   How much help from another person does the patient currently need. ..    Help from Another: Moving to and from a bed to a chair (including a wheelchair)?: A Little   Help from Another: Need to walk in hospital room?: A Little   Help from Another: Climbing 3-5 steps with a railing?: A Little     AM-PAC Score:  Raw Score: 16   Approx Degree of Impairment: 54.16%   Standardized Score (AM-PAC Scale): 40.78   CMS Modifier (G-Code): CK        THERAPEUTIC EXERCISES  Lower Extremity LAQ  Overhead reaching  Trunk rotation  Lateral side bend to elbow   Position Sitting EOB x10ea       Patient End of Session: In bed    CURRENT GOALS   URRENT GOALS    Goals to be met by: 23  Patient Goal Patient's self-stated goal is: return home when safe   Goal #1 Patient is able to demonstrate supine - sit EOB @ level: independent     Goal #1   Current Status Not met   Goal #2 Patient is able to demonstrate transfers Sit to/from Stand at assistance level: modified independent with walker - rolling     Goal #2  Current Status Not met   Goal #3 Patient is able to ambulate 150 feet with assist device: walker - rolling at assistance level: supervision   Goal #3   Current Status Not met   Goal #4 Patient will negotiate 2 stairs/one curb w/ assistive device and CGA   Goal #4   Current Status    Goal #5 Patient to demonstrate independence with home activity/exercise instructions provided to patient in preparation for discharge.    Goal #5   Current Status    Goal #6    Goal #6  Current Status      Gait Training: 10 minutes  Therapeutic Activity: 10 minutes  Threx: 10 minutes

## 2023-07-21 NOTE — CM/SW NOTE
MDO received for POLST. POLST completed w/MD signiture. Copy of POLST sent to registration to be added in to medical record. 1715: PT recommending HH w/PT at dc, pt and daughter agreeable to recommendation. List of accepting [facility] agencies emailed to daughter on 7/20. Daughter notified CM that [facility] is chosen agency at [facility]. Agency reserved in [address]. CM entered new F2F which will need to be sent in [address] once signed. Pt currently on 3.5L O2 (no home O2). DME referral sent to HME in [address]. Walk test and order will be needed prior to dc. Plan: Home w/spouse with [facility] and [facility] for possible home O2 pending walk test/HME acceptance and medical clearance.     Samantha Vasquez, MAYRAN    [phone]

## 2023-07-21 NOTE — PROGRESS NOTES
Received patient from PCCU at [phone] in a stable condition. Vital signs are stable. Denies pain or discomfort at this time. Oxygen increased to 6L to maintain saturation in the 90s. Skin check performed. On remote tele. Safety precautions in place. No

## 2023-07-21 NOTE — SPIRITUAL CARE NOTE
Spiritual Care Visit Note    Visited With: Patient and family together    Writer report consulting with JACKY Marroquin at bedside. Writer offered empathic listening and support. Patient thanked writer for visit. No needs at this time. Writer gave patient Spiritual Care Card. Follow up as requested. Spiritual Care Taxonomy:    Intended Effects: Establish rapport and connectedness    Methods: Collaborate with care team member;Offer support    Interventions: Active listening; Ask guided questions;Silent prayer       [address] [facility]   [id number]     On call  services [id number]

## 2023-07-21 NOTE — PLAN OF CARE
Pt a&o x4, room air, on tele, rolling walker x2. Patient complaining of itching to her upper back, cream placed as scheduled and lotion from clean supply also used. Oxygen saturation remains stable with 4L nasal cannula. Plan is continue IV antibiotics and discharge planning. Safety precautions maintained. Call light in reach. Problem: Patient Centered Care  Goal: Patient preferences are identified and integrated in the patient's plan of care  Description: Interventions:  - What would you like us to know as we care for you?  From home with    - Provide timely, complete, and accurate information to patient/family  - Incorporate patient and family knowledge, values, beliefs, and cultural backgrounds into the planning and delivery of care  - Encourage patient/family to participate in care and decision-making at the level they choose  - Honor patient and family perspectives and choices  Outcome: Progressing     Problem: RESPIRATORY - ADULT  Goal: Achieves optimal ventilation and oxygenation  Description: INTERVENTIONS:  - Assess for changes in respiratory status  - Assess for changes in mentation and behavior  - Position to facilitate oxygenation and minimize respiratory effort  - Oxygen supplementation based on oxygen saturation or ABGs  - Provide Smoking Cessation handout, if applicable  - Encourage broncho-pulmonary hygiene including cough, deep breathe, Incentive Spirometry  - Assess the need for suctioning and perform as needed  - Assess and instruct to report SOB or any respiratory difficulty  - Respiratory Therapy support as indicated  - Manage/alleviate anxiety  - Monitor for signs/symptoms of CO2 retention  Outcome: Progressing     Problem: MUSCULOSKELETAL - ADULT  Goal: Return mobility to safest level of function  Description: INTERVENTIONS:  - Assess patient stability and activity tolerance for standing, transferring and ambulating w/ or w/o assistive devices  - Assist with transfers and ambulation using safe patient handling equipment as needed  - Ensure adequate protection for wounds/incisions during mobilization  - Obtain PT/OT consults as needed  - Advance activity as appropriate  - Communicate ordered activity level and limitations with patient/family  Outcome: Progressing     Problem: SAFETY ADULT - FALL  Goal: Free from fall injury  Description: INTERVENTIONS:  - Assess pt frequently for physical needs  - Identify cognitive and physical deficits and behaviors that affect risk of falls.   - [place] fall precautions as indicated by assessment.  - Educate pt/family on patient safety including physical limitations  - Instruct pt to call for assistance with activity based on assessment  - Modify environment to reduce risk of injury  - Provide assistive devices as appropriate  - Consider OT/PT consult to assist with strengthening/mobility  - Encourage toileting schedule  Outcome: Progressing     Problem: Patient/Family Goals  Goal: Patient/Family Long Term Goal  Description: Patient's Long Term Goal: To go home    Interventions:  - Take medications as prescribed by MD  - Scheduled tests as needed  - PT/OT  - See additional Care Plan goals for specific interventions  Outcome: Progressing  Goal: Patient/Family Short Term Goal  Description: Patient's Short Term Goal: To get back to room air (no oxygen)    Interventions:   - Wean oxygen as tolerated  - Neb tx as necessary for shortness of breath  - Prescribed medications   - See additional Care Plan goals for specific interventions  Outcome: Progressing     Problem: PAIN - ADULT  Goal: Verbalizes/displays adequate comfort level or patient's stated pain goal  Description: INTERVENTIONS:  - Encourage pt to monitor pain and request assistance  - Assess pain using appropriate pain scale  - Administer analgesics based on type and severity of pain and evaluate response  - Implement non-pharmacological measures as appropriate and evaluate response  - Consider cultural and social influences on pain and pain management  - Manage/alleviate anxiety  - Utilize distraction and/or relaxation techniques  - Monitor for opioid side effects  - Notify MD/LIP if interventions unsuccessful or patient reports new pain  - Anticipate increased pain with activity and pre-medicate as appropriate  Outcome: Progressing     Problem: DISCHARGE PLANNING  Goal: Discharge to home or other facility with appropriate resources  Description: INTERVENTIONS:  - Identify barriers to discharge w/pt and caregiver  - Include patient/family/discharge partner in discharge planning  - Arrange for needed discharge resources and transportation as appropriate  - Identify discharge learning needs (meds, wound care, etc)  - Arrange for interpreters to assist at discharge as needed  - Consider post-discharge preferences of patient/family/discharge partner  - Complete POLST form as appropriate  - Assess patient's ability to be responsible for managing their own health  - Refer to Case Management Department for coordinating discharge planning if the patient needs post-hospital services based on physician/LIP order or complex needs related to functional status, cognitive ability or social support system  Outcome: Progressing     Problem: CARDIOVASCULAR - ADULT  Goal: Maintains optimal cardiac output and hemodynamic stability  Description: INTERVENTIONS:  - Monitor vital signs, rhythm, and trends  - Monitor for bleeding, hypotension and signs of decreased cardiac output  - Evaluate effectiveness of vasoactive medications to optimize hemodynamic stability  - Monitor arterial and/or venous puncture sites for bleeding and/or hematoma  - Assess quality of pulses, skin color and temperature  - Assess for signs of decreased coronary artery perfusion - ex.  Angina  - Evaluate fluid balance, assess for edema, trend weights  Outcome: Progressing  Goal: Absence of cardiac arrhythmias or at baseline  Description: INTERVENTIONS:  - Continuous cardiac monitoring, monitor vital signs, obtain 12 lead EKG if indicated  - Evaluate effectiveness of antiarrhythmic and heart rate control medications as ordered  - Initiate emergency measures for life threatening arrhythmias  - Monitor electrolytes and administer replacement therapy as ordered  Outcome: Progressing     Problem: METABOLIC/FLUID AND ELECTROLYTES - ADULT  Goal: Glucose maintained within prescribed range  Description: INTERVENTIONS:  - Monitor Blood Glucose as ordered  - Assess for signs and symptoms of hyperglycemia and hypoglycemia  - Administer ordered medications to maintain glucose within target range  - Assess barriers to adequate nutritional intake and initiate nutrition consult as needed  - Instruct patient on self management of diabetes  Outcome: Progressing  Goal: Electrolytes maintained within normal limits  Description: INTERVENTIONS:  - Monitor labs and rhythm and assess patient for signs and symptoms of electrolyte imbalances  - Administer electrolyte replacement as ordered  - Monitor response to electrolyte replacements, including rhythm and repeat lab results as appropriate  - Fluid restriction as ordered  - Instruct patient on fluid and nutrition restrictions as appropriate  Outcome: Progressing  Goal: Hemodynamic stability and optimal renal function maintained  Description: INTERVENTIONS:  - Monitor labs and assess for signs and symptoms of volume excess or deficit  - Monitor intake, output and patient weight  - Monitor urine specific gravity, serum osmolarity and serum sodium as indicated or ordered  - Monitor response to interventions for patient's volume status, including labs, urine output, blood pressure (other measures as available)  - Encourage oral intake as appropriate  - Instruct patient on fluid and nutrition restrictions as appropriate  Outcome: Progressing

## 2023-07-21 NOTE — PLAN OF CARE
Problem: Patient Centered Care  Goal: Patient preferences are identified and integrated in the patient's plan of care  Description: Interventions:  - What would you like us to know as we care for you?  From home with    - Provide timely, complete, and accurate information to patient/family  - Incorporate patient and family knowledge, values, beliefs, and cultural backgrounds into the planning and delivery of care  - Encourage patient/family to participate in care and decision-making at the level they choose  - Honor patient and family perspectives and choices  Outcome: Progressing     Problem: RESPIRATORY - ADULT  Goal: Achieves optimal ventilation and oxygenation  Description: INTERVENTIONS:  - Assess for changes in respiratory status  - Assess for changes in mentation and behavior  - Position to facilitate oxygenation and minimize respiratory effort  - Oxygen supplementation based on oxygen saturation or ABGs  - Provide Smoking Cessation handout, if applicable  - Encourage broncho-pulmonary hygiene including cough, deep breathe, Incentive Spirometry  - Assess the need for suctioning and perform as needed  - Assess and instruct to report SOB or any respiratory difficulty  - Respiratory Therapy support as indicated  - Manage/alleviate anxiety  - Monitor for signs/symptoms of CO2 retention  Outcome: Progressing     Problem: MUSCULOSKELETAL - ADULT  Goal: Return mobility to safest level of function  Description: INTERVENTIONS:  - Assess patient stability and activity tolerance for standing, transferring and ambulating w/ or w/o assistive devices  - Assist with transfers and ambulation using safe patient handling equipment as needed  - Ensure adequate protection for wounds/incisions during mobilization  - Obtain PT/OT consults as needed  - Advance activity as appropriate  - Communicate ordered activity level and limitations with patient/family  Outcome: Progressing     Problem: CARDIOVASCULAR - ADULT  Goal: Maintains optimal cardiac output and hemodynamic stability  Description: INTERVENTIONS:  - Monitor vital signs, rhythm, and trends  - Monitor for bleeding, hypotension and signs of decreased cardiac output  - Evaluate effectiveness of vasoactive medications to optimize hemodynamic stability  - Monitor arterial and/or venous puncture sites for bleeding and/or hematoma  - Assess quality of pulses, skin color and temperature  - Assess for signs of decreased coronary artery perfusion - ex.  Angina  - Evaluate fluid balance, assess for edema, trend weights  Outcome: Progressing

## 2023-07-22 PROBLEM — F41.0 GENERALIZED ANXIETY DISORDER WITH PANIC ATTACKS: Status: ACTIVE | Noted: 2022-08-05

## 2023-07-22 PROBLEM — F41.1 GENERALIZED ANXIETY DISORDER WITH PANIC ATTACKS: Status: ACTIVE | Noted: 2022-08-05

## 2023-07-22 PROBLEM — F33.2 SEVERE EPISODE OF RECURRENT MAJOR DEPRESSIVE DISORDER, WITHOUT PSYCHOTIC FEATURES (HCC): Status: ACTIVE | Noted: 2023-07-22

## 2023-07-22 PROBLEM — F05 DELIRIUM DUE TO ANOTHER MEDICAL CONDITION: Status: ACTIVE | Noted: 2023-07-22

## 2023-07-22 PROCEDURE — 99232 SBSQ HOSP IP/OBS MODERATE 35: CPT | Performed by: INTERNAL MEDICINE

## 2023-07-22 PROCEDURE — 90792 PSYCH DIAG EVAL W/MED SRVCS: CPT | Performed by: OTHER

## 2023-07-22 RX ORDER — LORAZEPAM 0.5 MG/1
0.5 TABLET ORAL NIGHTLY
Status: DISCONTINUED | OUTPATIENT
Start: 2023-07-22 | End: 2023-07-23

## 2023-07-22 RX ORDER — ESCITALOPRAM OXALATE 5 MG/1
5 TABLET ORAL ONCE
Status: COMPLETED | OUTPATIENT
Start: 2023-07-22 | End: 2023-07-22

## 2023-07-22 RX ORDER — OLANZAPINE 2.5 MG/1
2.5 TABLET ORAL NIGHTLY
Status: DISCONTINUED | OUTPATIENT
Start: 2023-07-22 | End: 2023-07-23

## 2023-07-22 RX ORDER — ALPRAZOLAM 0.25 MG/1
0.25 TABLET ORAL 2 TIMES DAILY PRN
Status: DISCONTINUED | OUTPATIENT
Start: 2023-07-22 | End: 2023-07-23

## 2023-07-22 NOTE — PLAN OF CARE
Pt a&o x3, 4L oxygen nasal cannula, tele, rolling walker x2. Patient remains anxious since last night when this RN left; MD aware and psychology placed on consult. Plan is [facility] when medically stable, choice, and insurance authorization. Safety precautions maintained. Call light in reach.     0921: Weaned patient down to 3L oxygen. 1140: Weaned patient to 2L oxygen. Problem: Patient Centered Care  Goal: Patient preferences are identified and integrated in the patient's plan of care  Description: Interventions:  - What would you like us to know as we care for you?  From home with    - Provide timely, complete, and accurate information to patient/family  - Incorporate patient and family knowledge, values, beliefs, and cultural backgrounds into the planning and delivery of care  - Encourage patient/family to participate in care and decision-making at the level they choose  - Honor patient and family perspectives and choices  7/22/2023 1500 by Kelley Mir RN  Outcome: Progressing  7/22/2023 1500 by Kelley Mir RN  Outcome: Progressing     Problem: RESPIRATORY - ADULT  Goal: Achieves optimal ventilation and oxygenation  Description: INTERVENTIONS:  - Assess for changes in respiratory status  - Assess for changes in mentation and behavior  - Position to facilitate oxygenation and minimize respiratory effort  - Oxygen supplementation based on oxygen saturation or ABGs  - Provide Smoking Cessation handout, if applicable  - Encourage broncho-pulmonary hygiene including cough, deep breathe, Incentive Spirometry  - Assess the need for suctioning and perform as needed  - Assess and instruct to report SOB or any respiratory difficulty  - Respiratory Therapy support as indicated  - Manage/alleviate anxiety  - Monitor for signs/symptoms of CO2 retention  7/22/2023 1500 by Kelley Mir RN  Outcome: Progressing  7/22/2023 1500 by Kelley Mir RN  Outcome: Progressing     Problem: MUSCULOSKELETAL - ADULT  Goal: Return mobility to safest level of function  Description: INTERVENTIONS:  - Assess patient stability and activity tolerance for standing, transferring and ambulating w/ or w/o assistive devices  - Assist with transfers and ambulation using safe patient handling equipment as needed  - Ensure adequate protection for wounds/incisions during mobilization  - Obtain PT/OT consults as needed  - Advance activity as appropriate  - Communicate ordered activity level and limitations with patient/family  7/22/2023 1500 by Gianni Toro RN  Outcome: Progressing  7/22/2023 1500 by Gianni Toro RN  Outcome: Progressing     Problem: SAFETY ADULT - FALL  Goal: Free from fall injury  Description: INTERVENTIONS:  - Assess pt frequently for physical needs  - Identify cognitive and physical deficits and behaviors that affect risk of falls.   - [place] fall precautions as indicated by assessment.  - Educate pt/family on patient safety including physical limitations  - Instruct pt to call for assistance with activity based on assessment  - Modify environment to reduce risk of injury  - Provide assistive devices as appropriate  - Consider OT/PT consult to assist with strengthening/mobility  - Encourage toileting schedule  7/22/2023 1500 by Gianni oTro RN  Outcome: Progressing  7/22/2023 1500 by Gianni Toro RN  Outcome: Progressing     Problem: Patient/Family Goals  Goal: Patient/Family Long Term Goal  Description: Patient's Long Term Goal: To go home    Interventions:  - Take medications as prescribed by MD  - Scheduled tests as needed  - PT/OT  - See additional Care Plan goals for specific interventions  7/22/2023 1500 by Gianni Toro RN  Outcome: Progressing  7/22/2023 1500 by Gianni Toro RN  Outcome: Progressing  Goal: Patient/Family Short Term Goal  Description: Patient's Short Term Goal: To get back to room air (no oxygen)    Interventions:   - Wean oxygen as tolerated  - Neb tx as necessary for shortness of breath  - Prescribed medications   - See additional Care Plan goals for specific interventions  7/22/2023 1500 by Елена Jimenez RN  Outcome: Progressing  7/22/2023 1500 by Елена Jimenez RN  Outcome: Progressing     Problem: PAIN - ADULT  Goal: Verbalizes/displays adequate comfort level or patient's stated pain goal  Description: INTERVENTIONS:  - Encourage pt to monitor pain and request assistance  - Assess pain using appropriate pain scale  - Administer analgesics based on type and severity of pain and evaluate response  - Implement non-pharmacological measures as appropriate and evaluate response  - Consider cultural and social influences on pain and pain management  - Manage/alleviate anxiety  - Utilize distraction and/or relaxation techniques  - Monitor for opioid side effects  - Notify MD/LIP if interventions unsuccessful or patient reports new pain  - Anticipate increased pain with activity and pre-medicate as appropriate  7/22/2023 1500 by Елена Jimenez RN  Outcome: Progressing  7/22/2023 1500 by Елена Jimenez RN  Outcome: Progressing     Problem: DISCHARGE PLANNING  Goal: Discharge to home or other facility with appropriate resources  Description: INTERVENTIONS:  - Identify barriers to discharge w/pt and caregiver  - Include patient/family/discharge partner in discharge planning  - Arrange for needed discharge resources and transportation as appropriate  - Identify discharge learning needs (meds, wound care, etc)  - Arrange for interpreters to assist at discharge as needed  - Consider post-discharge preferences of patient/family/discharge partner  - Complete POLST form as appropriate  - Assess patient's ability to be responsible for managing their own health  - Refer to Case Management Department for coordinating discharge planning if the patient needs post-hospital services based on physician/LIP order or complex needs related to functional status, cognitive ability or social support system  7/22/2023 1500 by lAecia Delong RN  Outcome: Progressing  7/22/2023 1500 by Alecia Delong RN  Outcome: Progressing     Problem: CARDIOVASCULAR - ADULT  Goal: Maintains optimal cardiac output and hemodynamic stability  Description: INTERVENTIONS:  - Monitor vital signs, rhythm, and trends  - Monitor for bleeding, hypotension and signs of decreased cardiac output  - Evaluate effectiveness of vasoactive medications to optimize hemodynamic stability  - Monitor arterial and/or venous puncture sites for bleeding and/or hematoma  - Assess quality of pulses, skin color and temperature  - Assess for signs of decreased coronary artery perfusion - ex.  Angina  - Evaluate fluid balance, assess for edema, trend weights  7/22/2023 1500 by Alecia Delong RN  Outcome: Progressing  7/22/2023 1500 by Alecia Delong RN  Outcome: Progressing  Goal: Absence of cardiac arrhythmias or at baseline  Description: INTERVENTIONS:  - Continuous cardiac monitoring, monitor vital signs, obtain 12 lead EKG if indicated  - Evaluate effectiveness of antiarrhythmic and heart rate control medications as ordered  - Initiate emergency measures for life threatening arrhythmias  - Monitor electrolytes and administer replacement therapy as ordered  7/22/2023 1500 by Alecia Delong RN  Outcome: Progressing  7/22/2023 1500 by Alecia Delong RN  Outcome: Progressing     Problem: METABOLIC/FLUID AND ELECTROLYTES - ADULT  Goal: Glucose maintained within prescribed range  Description: INTERVENTIONS:  - Monitor Blood Glucose as ordered  - Assess for signs and symptoms of hyperglycemia and hypoglycemia  - Administer ordered medications to maintain glucose within target range  - Assess barriers to adequate nutritional intake and initiate nutrition consult as needed  - Instruct patient on self management of diabetes  7/22/2023 1500 by Alecia Delong RN  Outcome: Progressing  7/22/2023 1500 by Shayy Ghosh RN  Outcome: Progressing  Goal: Electrolytes maintained within normal limits  Description: INTERVENTIONS:  - Monitor labs and rhythm and assess patient for signs and symptoms of electrolyte imbalances  - Administer electrolyte replacement as ordered  - Monitor response to electrolyte replacements, including rhythm and repeat lab results as appropriate  - Fluid restriction as ordered  - Instruct patient on fluid and nutrition restrictions as appropriate  7/22/2023 1500 by Shayy Ghosh RN  Outcome: Progressing  7/22/2023 1500 by Shayy Ghosh RN  Outcome: Progressing  Goal: Hemodynamic stability and optimal renal function maintained  Description: INTERVENTIONS:  - Monitor labs and assess for signs and symptoms of volume excess or deficit  - Monitor intake, output and patient weight  - Monitor urine specific gravity, serum osmolarity and serum sodium as indicated or ordered  - Monitor response to interventions for patient's volume status, including labs, urine output, blood pressure (other measures as available)  - Encourage oral intake as appropriate  - Instruct patient on fluid and nutrition restrictions as appropriate  7/22/2023 1500 by Shayy Ghosh RN  Outcome: Progressing  7/22/2023 1500 by Shayy Ghosh RN  Outcome: Progressing

## 2023-07-22 NOTE — PLAN OF CARE
Problem: Patient Centered Care  Goal: Patient preferences are identified and integrated in the patient's plan of care  Description: Interventions:  - What would you like us to know as we care for you?  From home with    - Provide timely, complete, and accurate information to patient/family  - Incorporate patient and family knowledge, values, beliefs, and cultural backgrounds into the planning and delivery of care  - Encourage patient/family to participate in care and decision-making at the level they choose  - Honor patient and family perspectives and choices  Outcome: Progressing     Problem: RESPIRATORY - ADULT  Goal: Achieves optimal ventilation and oxygenation  Description: INTERVENTIONS:  - Assess for changes in respiratory status  - Assess for changes in mentation and behavior  - Position to facilitate oxygenation and minimize respiratory effort  - Oxygen supplementation based on oxygen saturation or ABGs  - Provide Smoking Cessation handout, if applicable  - Encourage broncho-pulmonary hygiene including cough, deep breathe, Incentive Spirometry  - Assess the need for suctioning and perform as needed  - Assess and instruct to report SOB or any respiratory difficulty  - Respiratory Therapy support as indicated  - Manage/alleviate anxiety  - Monitor for signs/symptoms of CO2 retention  Outcome: Progressing     Problem: MUSCULOSKELETAL - ADULT  Goal: Return mobility to safest level of function  Description: INTERVENTIONS:  - Assess patient stability and activity tolerance for standing, transferring and ambulating w/ or w/o assistive devices  - Assist with transfers and ambulation using safe patient handling equipment as needed  - Ensure adequate protection for wounds/incisions during mobilization  - Obtain PT/OT consults as needed  - Advance activity as appropriate  - Communicate ordered activity level and limitations with patient/family  Outcome: Not Progressing     Problem: CARDIOVASCULAR - ADULT  Goal: Maintains optimal cardiac output and hemodynamic stability  Description: INTERVENTIONS:  - Monitor vital signs, rhythm, and trends  - Monitor for bleeding, hypotension and signs of decreased cardiac output  - Evaluate effectiveness of vasoactive medications to optimize hemodynamic stability  - Monitor arterial and/or venous puncture sites for bleeding and/or hematoma  - Assess quality of pulses, skin color and temperature  - Assess for signs of decreased coronary artery perfusion - ex.  Angina  - Evaluate fluid balance, assess for edema, trend weights  Outcome: Progressing  Goal: Absence of cardiac arrhythmias or at baseline  Description: INTERVENTIONS:  - Continuous cardiac monitoring, monitor vital signs, obtain 12 lead EKG if indicated  - Evaluate effectiveness of antiarrhythmic and heart rate control medications as ordered  - Initiate emergency measures for life threatening arrhythmias  - Monitor electrolytes and administer replacement therapy as ordered  Outcome: Progressing     Problem: METABOLIC/FLUID AND ELECTROLYTES - ADULT  Goal: Electrolytes maintained within normal limits  Description: INTERVENTIONS:  - Monitor labs and rhythm and assess patient for signs and symptoms of electrolyte imbalances  - Administer electrolyte replacement as ordered  - Monitor response to electrolyte replacements, including rhythm and repeat lab results as appropriate  - Fluid restriction as ordered  - Instruct patient on fluid and nutrition restrictions as appropriate  Outcome: Progressing     Problem: Patient/Family Goals  Goal: Patient/Family Long Term Goal  Description: Patient's Long Term Goal: To go home    Interventions:  - Take medications as prescribed by MD  - Scheduled tests as needed  - PT/OT  - See additional Care Plan goals for specific interventions  Outcome: Progressing  Goal: Patient/Family Short Term Goal  Description: Patient's Short Term Goal: To get back to room air (no oxygen)    Interventions:   - Wean oxygen as tolerated  - Neb tx as necessary for shortness of breath  - Prescribed medications   - See additional Care Plan goals for specific interventions  Outcome: Progressing

## 2023-07-23 LAB
ANION GAP SERPL CALC-SCNC: 5 MMOL/L (ref 0–18)
BUN BLD-MCNC: 14 MG/DL (ref 7–18)
BUN/CREAT SERPL: 18.4 (ref 10–20)
CALCIUM BLD-MCNC: 9.7 MG/DL (ref 8.5–10.1)
CHLORIDE SERPL-SCNC: 104 MMOL/L (ref 98–112)
CO2 SERPL-SCNC: 35 MMOL/L (ref 21–32)
CREAT BLD-MCNC: 0.76 MG/DL
EGFRCR SERPLBLD CKD-EPI 2021: 75 ML/MIN/1.73M2 (ref 60–?)
GLUCOSE BLD-MCNC: 99 MG/DL (ref 70–99)
OSMOLALITY SERPL CALC.SUM OF ELEC: 299 MOSM/KG (ref 275–295)
POTASSIUM SERPL-SCNC: 3.8 MMOL/L (ref 3.5–5.1)
SODIUM SERPL-SCNC: 144 MMOL/L (ref 136–145)

## 2023-07-23 PROCEDURE — 99233 SBSQ HOSP IP/OBS HIGH 50: CPT | Performed by: OTHER

## 2023-07-23 PROCEDURE — 99232 SBSQ HOSP IP/OBS MODERATE 35: CPT | Performed by: INTERNAL MEDICINE

## 2023-07-23 RX ORDER — OLANZAPINE 5 MG/1
5 TABLET ORAL NIGHTLY
Status: DISCONTINUED | OUTPATIENT
Start: 2023-07-23 | End: 2023-07-24

## 2023-07-23 RX ORDER — QUETIAPINE FUMARATE 25 MG/1
12.5 TABLET, FILM COATED ORAL EVERY 6 HOURS PRN
Status: DISCONTINUED | OUTPATIENT
Start: 2023-07-23 | End: 2023-07-28

## 2023-07-23 RX ORDER — ALPRAZOLAM 0.25 MG/1
0.25 TABLET ORAL NIGHTLY PRN
Status: DISCONTINUED | OUTPATIENT
Start: 2023-07-23 | End: 2023-07-28

## 2023-07-23 RX ORDER — IPRATROPIUM BROMIDE AND ALBUTEROL SULFATE 2.5; .5 MG/3ML; MG/3ML
3 SOLUTION RESPIRATORY (INHALATION) EVERY 6 HOURS PRN
Status: DISCONTINUED | OUTPATIENT
Start: 2023-07-23 | End: 2023-07-28

## 2023-07-23 NOTE — PLAN OF CARE
Received pt in stable condition. VSS, on 2 L O2. Tolerated all scheduled medications, no adverse reactions noted. PRN Seroquel given to help w/ anxiety and restlessness. Pt on IV Cefepime Q 12  hrs. Frequent rounding on pt. All fall & safety measures in place for pt. Call light placed within pt.'s reach. Will continue to monitor for any new acute changes. Problem: Patient Centered Care  Goal: Patient preferences are identified and integrated in the patient's plan of care  Description: Interventions:  - What would you like us to know as we care for you?  From home with    - Provide timely, complete, and accurate information to patient/family  - Incorporate patient and family knowledge, values, beliefs, and cultural backgrounds into the planning and delivery of care  - Encourage patient/family to participate in care and decision-making at the level they choose  - Honor patient and family perspectives and choices  Outcome: Progressing     Problem: RESPIRATORY - ADULT  Goal: Achieves optimal ventilation and oxygenation  Description: INTERVENTIONS:  - Assess for changes in respiratory status  - Assess for changes in mentation and behavior  - Position to facilitate oxygenation and minimize respiratory effort  - Oxygen supplementation based on oxygen saturation or ABGs  - Provide Smoking Cessation handout, if applicable  - Encourage broncho-pulmonary hygiene including cough, deep breathe, Incentive Spirometry  - Assess the need for suctioning and perform as needed  - Assess and instruct to report SOB or any respiratory difficulty  - Respiratory Therapy support as indicated  - Manage/alleviate anxiety  - Monitor for signs/symptoms of CO2 retention  Outcome: Progressing     Problem: MUSCULOSKELETAL - ADULT  Goal: Return mobility to safest level of function  Description: INTERVENTIONS:  - Assess patient stability and activity tolerance for standing, transferring and ambulating w/ or w/o assistive devices  - Assist with transfers and ambulation using safe patient handling equipment as needed  - Ensure adequate protection for wounds/incisions during mobilization  - Obtain PT/OT consults as needed  - Advance activity as appropriate  - Communicate ordered activity level and limitations with patient/family  Outcome: Progressing     Problem: CARDIOVASCULAR - ADULT  Goal: Maintains optimal cardiac output and hemodynamic stability  Description: INTERVENTIONS:  - Monitor vital signs, rhythm, and trends  - Monitor for bleeding, hypotension and signs of decreased cardiac output  - Evaluate effectiveness of vasoactive medications to optimize hemodynamic stability  - Monitor arterial and/or venous puncture sites for bleeding and/or hematoma  - Assess quality of pulses, skin color and temperature  - Assess for signs of decreased coronary artery perfusion - ex.  Angina  - Evaluate fluid balance, assess for edema, trend weights  Outcome: Progressing  Goal: Absence of cardiac arrhythmias or at baseline  Description: INTERVENTIONS:  - Continuous cardiac monitoring, monitor vital signs, obtain 12 lead EKG if indicated  - Evaluate effectiveness of antiarrhythmic and heart rate control medications as ordered  - Initiate emergency measures for life threatening arrhythmias  - Monitor electrolytes and administer replacement therapy as ordered  Outcome: Progressing     Problem: METABOLIC/FLUID AND ELECTROLYTES - ADULT  Goal: Electrolytes maintained within normal limits  Description: INTERVENTIONS:  - Monitor labs and rhythm and assess patient for signs and symptoms of electrolyte imbalances  - Administer electrolyte replacement as ordered  - Monitor response to electrolyte replacements, including rhythm and repeat lab results as appropriate  - Fluid restriction as ordered  - Instruct patient on fluid and nutrition restrictions as appropriate  Outcome: Progressing  Goal: Hemodynamic stability and optimal renal function maintained  Description: INTERVENTIONS:  - Monitor labs and assess for signs and symptoms of volume excess or deficit  - Monitor intake, output and patient weight  - Monitor urine specific gravity, serum osmolarity and serum sodium as indicated or ordered  - Monitor response to interventions for patient's volume status, including labs, urine output, blood pressure (other measures as available)  - Encourage oral intake as appropriate  - Instruct patient on fluid and nutrition restrictions as appropriate  Outcome: Progressing     Problem: SAFETY ADULT - FALL  Goal: Free from fall injury  Description: INTERVENTIONS:  - Assess pt frequently for physical needs  - Identify cognitive and physical deficits and behaviors that affect risk of falls.   - [place] fall precautions as indicated by assessment.  - Educate pt/family on patient safety including physical limitations  - Instruct pt to call for assistance with activity based on assessment  - Modify environment to reduce risk of injury  - Provide assistive devices as appropriate  - Consider OT/PT consult to assist with strengthening/mobility  - Encourage toileting schedule  Outcome: Progressing     Problem: Patient/Family Goals  Goal: Patient/Family Long Term Goal  Description: Patient's Long Term Goal: To go home    Interventions:  - Take medications as prescribed by MD  - Scheduled tests as needed  - PT/OT  - See additional Care Plan goals for specific interventions  Outcome: Progressing  Goal: Patient/Family Short Term Goal  Description: Patient's Short Term Goal: To get back to room air (no oxygen)    Interventions:   - Wean oxygen as tolerated  - Neb tx as necessary for shortness of breath  - Prescribed medications   - See additional Care Plan goals for specific interventions  Outcome: Progressing     Problem: PAIN - ADULT  Goal: Verbalizes/displays adequate comfort level or patient's stated pain goal  Description: INTERVENTIONS:  - Encourage pt to monitor pain and request assistance  - Assess pain using appropriate pain scale  - Administer analgesics based on type and severity of pain and evaluate response  - Implement non-pharmacological measures as appropriate and evaluate response  - Consider cultural and social influences on pain and pain management  - Manage/alleviate anxiety  - Utilize distraction and/or relaxation techniques  - Monitor for opioid side effects  - Notify MD/LIP if interventions unsuccessful or patient reports new pain  - Anticipate increased pain with activity and pre-medicate as appropriate  Outcome: Progressing     Problem: DISCHARGE PLANNING  Goal: Discharge to home or other facility with appropriate resources  Description: INTERVENTIONS:  - Identify barriers to discharge w/pt and caregiver  - Include patient/family/discharge partner in discharge planning  - Arrange for needed discharge resources and transportation as appropriate  - Identify discharge learning needs (meds, wound care, etc)  - Arrange for interpreters to assist at discharge as needed  - Consider post-discharge preferences of patient/family/discharge partner  - Complete POLST form as appropriate  - Assess patient's ability to be responsible for managing their own health  - Refer to Case Management Department for coordinating discharge planning if the patient needs post-hospital services based on physician/LIP order or complex needs related to functional status, cognitive ability or social support system  Outcome: Progressing

## 2023-07-23 NOTE — PLAN OF CARE
Patient delirious and confused, very pleasant in the morning and this afternoon became increasingly confused and agitated. Xanax and seroquel given. Patient not eating meals. Family updated and concerned over mental status. Psych notified and saw patient. Patient up in chair for most of day. Weaning oxygen as tolerated. Problem: Patient Centered Care  Goal: Patient preferences are identified and integrated in the patient's plan of care  Description: Interventions:  - What would you like us to know as we care for you?  From home with    - Provide timely, complete, and accurate information to patient/family  - Incorporate patient and family knowledge, values, beliefs, and cultural backgrounds into the planning and delivery of care  - Encourage patient/family to participate in care and decision-making at the level they choose  - Honor patient and family perspectives and choices  Outcome: Progressing     Problem: RESPIRATORY - ADULT  Goal: Achieves optimal ventilation and oxygenation  Description: INTERVENTIONS:  - Assess for changes in respiratory status  - Assess for changes in mentation and behavior  - Position to facilitate oxygenation and minimize respiratory effort  - Oxygen supplementation based on oxygen saturation or ABGs  - Provide Smoking Cessation handout, if applicable  - Encourage broncho-pulmonary hygiene including cough, deep breathe, Incentive Spirometry  - Assess the need for suctioning and perform as needed  - Assess and instruct to report SOB or any respiratory difficulty  - Respiratory Therapy support as indicated  - Manage/alleviate anxiety  - Monitor for signs/symptoms of CO2 retention  Outcome: Progressing     Problem: MUSCULOSKELETAL - ADULT  Goal: Return mobility to safest level of function  Description: INTERVENTIONS:  - Assess patient stability and activity tolerance for standing, transferring and ambulating w/ or w/o assistive devices  - Assist with transfers and ambulation using safe patient handling equipment as needed  - Ensure adequate protection for wounds/incisions during mobilization  - Obtain PT/OT consults as needed  - Advance activity as appropriate  - Communicate ordered activity level and limitations with patient/family  Outcome: Progressing     Problem: CARDIOVASCULAR - ADULT  Goal: Maintains optimal cardiac output and hemodynamic stability  Description: INTERVENTIONS:  - Monitor vital signs, rhythm, and trends  - Monitor for bleeding, hypotension and signs of decreased cardiac output  - Evaluate effectiveness of vasoactive medications to optimize hemodynamic stability  - Monitor arterial and/or venous puncture sites for bleeding and/or hematoma  - Assess quality of pulses, skin color and temperature  - Assess for signs of decreased coronary artery perfusion - ex.  Angina  - Evaluate fluid balance, assess for edema, trend weights  Outcome: Progressing  Goal: Absence of cardiac arrhythmias or at baseline  Description: INTERVENTIONS:  - Continuous cardiac monitoring, monitor vital signs, obtain 12 lead EKG if indicated  - Evaluate effectiveness of antiarrhythmic and heart rate control medications as ordered  - Initiate emergency measures for life threatening arrhythmias  - Monitor electrolytes and administer replacement therapy as ordered  Outcome: Progressing     Problem: METABOLIC/FLUID AND ELECTROLYTES - ADULT  Goal: Glucose maintained within prescribed range  Description: INTERVENTIONS:  - Monitor Blood Glucose as ordered  - Assess for signs and symptoms of hyperglycemia and hypoglycemia  - Administer ordered medications to maintain glucose within target range  - Assess barriers to adequate nutritional intake and initiate nutrition consult as needed  - Instruct patient on self management of diabetes  Outcome: Progressing  Goal: Electrolytes maintained within normal limits  Description: INTERVENTIONS:  - Monitor labs and rhythm and assess patient for signs and symptoms of electrolyte imbalances  - Administer electrolyte replacement as ordered  - Monitor response to electrolyte replacements, including rhythm and repeat lab results as appropriate  - Fluid restriction as ordered  - Instruct patient on fluid and nutrition restrictions as appropriate  Outcome: Progressing  Goal: Hemodynamic stability and optimal renal function maintained  Description: INTERVENTIONS:  - Monitor labs and assess for signs and symptoms of volume excess or deficit  - Monitor intake, output and patient weight  - Monitor urine specific gravity, serum osmolarity and serum sodium as indicated or ordered  - Monitor response to interventions for patient's volume status, including labs, urine output, blood pressure (other measures as available)  - Encourage oral intake as appropriate  - Instruct patient on fluid and nutrition restrictions as appropriate  Outcome: Progressing     Problem: SAFETY ADULT - FALL  Goal: Free from fall injury  Description: INTERVENTIONS:  - Assess pt frequently for physical needs  - Identify cognitive and physical deficits and behaviors that affect risk of falls.   - [place] fall precautions as indicated by assessment.  - Educate pt/family on patient safety including physical limitations  - Instruct pt to call for assistance with activity based on assessment  - Modify environment to reduce risk of injury  - Provide assistive devices as appropriate  - Consider OT/PT consult to assist with strengthening/mobility  - Encourage toileting schedule  Outcome: Progressing     Problem: Patient/Family Goals  Goal: Patient/Family Long Term Goal  Description: Patient's Long Term Goal: To go home    Interventions:  - Take medications as prescribed by MD  - Scheduled tests as needed  - PT/OT  - See additional Care Plan goals for specific interventions  Outcome: Progressing  Goal: Patient/Family Short Term Goal  Description: Patient's Short Term Goal: To get back to room air (no oxygen)    Interventions: - Wean oxygen as tolerated  - Neb tx as necessary for shortness of breath  - Prescribed medications   - See additional Care Plan goals for specific interventions  Outcome: Progressing     Problem: PAIN - ADULT  Goal: Verbalizes/displays adequate comfort level or patient's stated pain goal  Description: INTERVENTIONS:  - Encourage pt to monitor pain and request assistance  - Assess pain using appropriate pain scale  - Administer analgesics based on type and severity of pain and evaluate response  - Implement non-pharmacological measures as appropriate and evaluate response  - Consider cultural and social influences on pain and pain management  - Manage/alleviate anxiety  - Utilize distraction and/or relaxation techniques  - Monitor for opioid side effects  - Notify MD/LIP if interventions unsuccessful or patient reports new pain  - Anticipate increased pain with activity and pre-medicate as appropriate  Outcome: Progressing     Problem: DISCHARGE PLANNING  Goal: Discharge to home or other facility with appropriate resources  Description: INTERVENTIONS:  - Identify barriers to discharge w/pt and caregiver  - Include patient/family/discharge partner in discharge planning  - Arrange for needed discharge resources and transportation as appropriate  - Identify discharge learning needs (meds, wound care, etc)  - Arrange for interpreters to assist at discharge as needed  - Consider post-discharge preferences of patient/family/discharge partner  - Complete POLST form as appropriate  - Assess patient's ability to be responsible for managing their own health  - Refer to Case Management Department for coordinating discharge planning if the patient needs post-hospital services based on physician/LIP order or complex needs related to functional status, cognitive ability or social support system  Outcome: Progressing

## 2023-07-24 ENCOUNTER — TELEPHONE (OUTPATIENT)
Dept: INTERNAL MEDICINE CLINIC | Facility: CLINIC | Age: 88
End: 2023-07-24

## 2023-07-24 LAB
ANION GAP SERPL CALC-SCNC: 6 MMOL/L (ref 0–18)
BASOPHILS # BLD AUTO: 0.05 X10(3) UL (ref 0–0.2)
BASOPHILS NFR BLD AUTO: 0.6 %
BUN BLD-MCNC: 17 MG/DL (ref 7–18)
BUN/CREAT SERPL: 22.7 (ref 10–20)
CALCIUM BLD-MCNC: 10 MG/DL (ref 8.5–10.1)
CHLORIDE SERPL-SCNC: 107 MMOL/L (ref 98–112)
CO2 SERPL-SCNC: 31 MMOL/L (ref 21–32)
CREAT BLD-MCNC: 0.75 MG/DL
DEPRECATED RDW RBC AUTO: 46.1 FL (ref 35.1–46.3)
EGFRCR SERPLBLD CKD-EPI 2021: 77 ML/MIN/1.73M2 (ref 60–?)
EOSINOPHIL # BLD AUTO: 0.16 X10(3) UL (ref 0–0.7)
EOSINOPHIL NFR BLD AUTO: 2.1 %
ERYTHROCYTE [DISTWIDTH] IN BLOOD BY AUTOMATED COUNT: 13.4 % (ref 11–15)
GLUCOSE BLD-MCNC: 95 MG/DL (ref 70–99)
HCT VFR BLD AUTO: 43.9 %
HGB BLD-MCNC: 13.4 G/DL
IMM GRANULOCYTES # BLD AUTO: 0.08 X10(3) UL (ref 0–1)
IMM GRANULOCYTES NFR BLD: 1 %
LYMPHOCYTES # BLD AUTO: 1.52 X10(3) UL (ref 1–4)
LYMPHOCYTES NFR BLD AUTO: 19.5 %
MCH RBC QN AUTO: 28.2 PG (ref 26–34)
MCHC RBC AUTO-ENTMCNC: 30.5 G/DL (ref 31–37)
MCV RBC AUTO: 92.2 FL
MONOCYTES # BLD AUTO: 0.44 X10(3) UL (ref 0.1–1)
MONOCYTES NFR BLD AUTO: 5.6 %
NEUTROPHILS # BLD AUTO: 5.54 X10 (3) UL (ref 1.5–7.7)
NEUTROPHILS # BLD AUTO: 5.54 X10(3) UL (ref 1.5–7.7)
NEUTROPHILS NFR BLD AUTO: 71.2 %
OSMOLALITY SERPL CALC.SUM OF ELEC: 299 MOSM/KG (ref 275–295)
PLATELET # BLD AUTO: 229 10(3)UL (ref 150–450)
POTASSIUM SERPL-SCNC: 3.9 MMOL/L (ref 3.5–5.1)
RBC # BLD AUTO: 4.76 X10(6)UL
SODIUM SERPL-SCNC: 144 MMOL/L (ref 136–145)
WBC # BLD AUTO: 7.8 X10(3) UL (ref 4–11)

## 2023-07-24 PROCEDURE — 99232 SBSQ HOSP IP/OBS MODERATE 35: CPT | Performed by: INTERNAL MEDICINE

## 2023-07-24 PROCEDURE — 99233 SBSQ HOSP IP/OBS HIGH 50: CPT | Performed by: OTHER

## 2023-07-24 RX ORDER — QUETIAPINE FUMARATE 25 MG/1
25 TABLET, FILM COATED ORAL NIGHTLY
Status: DISCONTINUED | OUTPATIENT
Start: 2023-07-24 | End: 2023-07-28

## 2023-07-24 RX ORDER — AMLODIPINE BESYLATE 2.5 MG/1
2.5 TABLET ORAL DAILY
Status: DISCONTINUED | OUTPATIENT
Start: 2023-07-24 | End: 2023-07-25

## 2023-07-24 NOTE — PLAN OF CARE
Problem: Patient Centered Care  Goal: Patient preferences are identified and integrated in the patient's plan of care  Description: Interventions:  - What would you like us to know as we care for you?  From home with    - Provide timely, complete, and accurate information to patient/family  - Incorporate patient and family knowledge, values, beliefs, and cultural backgrounds into the planning and delivery of care  - Encourage patient/family to participate in care and decision-making at the level they choose  - Honor patient and family perspectives and choices  Outcome: Progressing     Problem: RESPIRATORY - ADULT  Goal: Achieves optimal ventilation and oxygenation  Description: INTERVENTIONS:  - Assess for changes in respiratory status  - Assess for changes in mentation and behavior  - Position to facilitate oxygenation and minimize respiratory effort  - Oxygen supplementation based on oxygen saturation or ABGs  - Provide Smoking Cessation handout, if applicable  - Encourage broncho-pulmonary hygiene including cough, deep breathe, Incentive Spirometry  - Assess the need for suctioning and perform as needed  - Assess and instruct to report SOB or any respiratory difficulty  - Respiratory Therapy support as indicated  - Manage/alleviate anxiety  - Monitor for signs/symptoms of CO2 retention  Outcome: Progressing     Problem: MUSCULOSKELETAL - ADULT  Goal: Return mobility to safest level of function  Description: INTERVENTIONS:  - Assess patient stability and activity tolerance for standing, transferring and ambulating w/ or w/o assistive devices  - Assist with transfers and ambulation using safe patient handling equipment as needed  - Ensure adequate protection for wounds/incisions during mobilization  - Obtain PT/OT consults as needed  - Advance activity as appropriate  - Communicate ordered activity level and limitations with patient/family  Outcome: Progressing     Problem: CARDIOVASCULAR - ADULT  Goal: Maintains optimal cardiac output and hemodynamic stability  Description: INTERVENTIONS:  - Monitor vital signs, rhythm, and trends  - Monitor for bleeding, hypotension and signs of decreased cardiac output  - Evaluate effectiveness of vasoactive medications to optimize hemodynamic stability  - Monitor arterial and/or venous puncture sites for bleeding and/or hematoma  - Assess quality of pulses, skin color and temperature  - Assess for signs of decreased coronary artery perfusion - ex.  Angina  - Evaluate fluid balance, assess for edema, trend weights  Outcome: Progressing  Goal: Absence of cardiac arrhythmias or at baseline  Description: INTERVENTIONS:  - Continuous cardiac monitoring, monitor vital signs, obtain 12 lead EKG if indicated  - Evaluate effectiveness of antiarrhythmic and heart rate control medications as ordered  - Initiate emergency measures for life threatening arrhythmias  - Monitor electrolytes and administer replacement therapy as ordered  Outcome: Progressing     Problem: METABOLIC/FLUID AND ELECTROLYTES - ADULT  Goal: Glucose maintained within prescribed range  Description: INTERVENTIONS:  - Monitor Blood Glucose as ordered  - Assess for signs and symptoms of hyperglycemia and hypoglycemia  - Administer ordered medications to maintain glucose within target range  - Assess barriers to adequate nutritional intake and initiate nutrition consult as needed  - Instruct patient on self management of diabetes  Outcome: Progressing  Goal: Electrolytes maintained within normal limits  Description: INTERVENTIONS:  - Monitor labs and rhythm and assess patient for signs and symptoms of electrolyte imbalances  - Administer electrolyte replacement as ordered  - Monitor response to electrolyte replacements, including rhythm and repeat lab results as appropriate  - Fluid restriction as ordered  - Instruct patient on fluid and nutrition restrictions as appropriate  Outcome: Progressing  Goal: Hemodynamic stability and optimal renal function maintained  Description: INTERVENTIONS:  - Monitor labs and assess for signs and symptoms of volume excess or deficit  - Monitor intake, output and patient weight  - Monitor urine specific gravity, serum osmolarity and serum sodium as indicated or ordered  - Monitor response to interventions for patient's volume status, including labs, urine output, blood pressure (other measures as available)  - Encourage oral intake as appropriate  - Instruct patient on fluid and nutrition restrictions as appropriate  Outcome: Progressing     Problem: SAFETY ADULT - FALL  Goal: Free from fall injury  Description: INTERVENTIONS:  - Assess pt frequently for physical needs  - Identify cognitive and physical deficits and behaviors that affect risk of falls.   - [place] fall precautions as indicated by assessment.  - Educate pt/family on patient safety including physical limitations  - Instruct pt to call for assistance with activity based on assessment  - Modify environment to reduce risk of injury  - Provide assistive devices as appropriate  - Consider OT/PT consult to assist with strengthening/mobility  - Encourage toileting schedule  Outcome: Progressing     Problem: Patient/Family Goals  Goal: Patient/Family Long Term Goal  Description: Patient's Long Term Goal: To go home    Interventions:  - Take medications as prescribed by MD  - Scheduled tests as needed  - PT/OT  - See additional Care Plan goals for specific interventions  Outcome: Progressing  Goal: Patient/Family Short Term Goal  Description: Patient's Short Term Goal: To get back to room air (no oxygen)    Interventions:   - Wean oxygen as tolerated  - Neb tx as necessary for shortness of breath  - Prescribed medications   - See additional Care Plan goals for specific interventions  Outcome: Progressing     Problem: PAIN - ADULT  Goal: Verbalizes/displays adequate comfort level or patient's stated pain goal  Description: INTERVENTIONS:  - Encourage pt to monitor pain and request assistance  - Assess pain using appropriate pain scale  - Administer analgesics based on type and severity of pain and evaluate response  - Implement non-pharmacological measures as appropriate and evaluate response  - Consider cultural and social influences on pain and pain management  - Manage/alleviate anxiety  - Utilize distraction and/or relaxation techniques  - Monitor for opioid side effects  - Notify MD/LIP if interventions unsuccessful or patient reports new pain  - Anticipate increased pain with activity and pre-medicate as appropriate  Outcome: Progressing     Problem: DISCHARGE PLANNING  Goal: Discharge to home or other facility with appropriate resources  Description: INTERVENTIONS:  - Identify barriers to discharge w/pt and caregiver  - Include patient/family/discharge partner in discharge planning  - Arrange for needed discharge resources and transportation as appropriate  - Identify discharge learning needs (meds, wound care, etc)  - Arrange for interpreters to assist at discharge as needed  - Consider post-discharge preferences of patient/family/discharge partner  - Complete POLST form as appropriate  - Assess patient's ability to be responsible for managing their own health  - Refer to Case Management Department for coordinating discharge planning if the patient needs post-hospital services based on physician/LIP order or complex needs related to functional status, cognitive ability or social support system  Outcome: Progressing

## 2023-07-24 NOTE — TELEPHONE ENCOUNTER
Patient's daughter Alesha Forbes is calling to speak to Dr Saumya Connors would like to get a big picture update to share with the family    Alesha Forbes lives out of town.     Phone [phone]    She is aware Dr Saumya Connors is out okay to wait

## 2023-07-24 NOTE — PLAN OF CARE
Received pt in stable condition. VSS, on 4 L O2. Tolerated all scheduled medications, no adverse reactions noted. PRN Seroquel & Xanax given to help w/ anxiety and restlessness. Pt on IV Cefepime Q 12  hrs. Frequent rounding on pt. Pt is sleeping more better tonight than previous night. All fall & safety measures in place for pt. Call light placed within pt.'s reach. Will continue to monitor for any new acute changes. Problem: Patient Centered Care  Goal: Patient preferences are identified and integrated in the patient's plan of care  Description: Interventions:  - What would you like us to know as we care for you?  From home with    - Provide timely, complete, and accurate information to patient/family  - Incorporate patient and family knowledge, values, beliefs, and cultural backgrounds into the planning and delivery of care  - Encourage patient/family to participate in care and decision-making at the level they choose  - Honor patient and family perspectives and choices  Outcome: Progressing     Problem: RESPIRATORY - ADULT  Goal: Achieves optimal ventilation and oxygenation  Description: INTERVENTIONS:  - Assess for changes in respiratory status  - Assess for changes in mentation and behavior  - Position to facilitate oxygenation and minimize respiratory effort  - Oxygen supplementation based on oxygen saturation or ABGs  - Provide Smoking Cessation handout, if applicable  - Encourage broncho-pulmonary hygiene including cough, deep breathe, Incentive Spirometry  - Assess the need for suctioning and perform as needed  - Assess and instruct to report SOB or any respiratory difficulty  - Respiratory Therapy support as indicated  - Manage/alleviate anxiety  - Monitor for signs/symptoms of CO2 retention  Outcome: Progressing     Problem: MUSCULOSKELETAL - ADULT  Goal: Return mobility to safest level of function  Description: INTERVENTIONS:  - Assess patient stability and activity tolerance for standing, transferring and ambulating w/ or w/o assistive devices  - Assist with transfers and ambulation using safe patient handling equipment as needed  - Ensure adequate protection for wounds/incisions during mobilization  - Obtain PT/OT consults as needed  - Advance activity as appropriate  - Communicate ordered activity level and limitations with patient/family  Outcome: Progressing     Problem: CARDIOVASCULAR - ADULT  Goal: Maintains optimal cardiac output and hemodynamic stability  Description: INTERVENTIONS:  - Monitor vital signs, rhythm, and trends  - Monitor for bleeding, hypotension and signs of decreased cardiac output  - Evaluate effectiveness of vasoactive medications to optimize hemodynamic stability  - Monitor arterial and/or venous puncture sites for bleeding and/or hematoma  - Assess quality of pulses, skin color and temperature  - Assess for signs of decreased coronary artery perfusion - ex.  Angina  - Evaluate fluid balance, assess for edema, trend weights  Outcome: Progressing  Goal: Absence of cardiac arrhythmias or at baseline  Description: INTERVENTIONS:  - Continuous cardiac monitoring, monitor vital signs, obtain 12 lead EKG if indicated  - Evaluate effectiveness of antiarrhythmic and heart rate control medications as ordered  - Initiate emergency measures for life threatening arrhythmias  - Monitor electrolytes and administer replacement therapy as ordered  Outcome: Progressing     Problem: METABOLIC/FLUID AND ELECTROLYTES - ADULT  Goal: Electrolytes maintained within normal limits  Description: INTERVENTIONS:  - Monitor labs and rhythm and assess patient for signs and symptoms of electrolyte imbalances  - Administer electrolyte replacement as ordered  - Monitor response to electrolyte replacements, including rhythm and repeat lab results as appropriate  - Fluid restriction as ordered  - Instruct patient on fluid and nutrition restrictions as appropriate  Outcome: Progressing  Goal: Hemodynamic stability and optimal renal function maintained  Description: INTERVENTIONS:  - Monitor labs and assess for signs and symptoms of volume excess or deficit  - Monitor intake, output and patient weight  - Monitor urine specific gravity, serum osmolarity and serum sodium as indicated or ordered  - Monitor response to interventions for patient's volume status, including labs, urine output, blood pressure (other measures as available)  - Encourage oral intake as appropriate  - Instruct patient on fluid and nutrition restrictions as appropriate  Outcome: Progressing     Problem: SAFETY ADULT - FALL  Goal: Free from fall injury  Description: INTERVENTIONS:  - Assess pt frequently for physical needs  - Identify cognitive and physical deficits and behaviors that affect risk of falls.   - [place] fall precautions as indicated by assessment.  - Educate pt/family on patient safety including physical limitations  - Instruct pt to call for assistance with activity based on assessment  - Modify environment to reduce risk of injury  - Provide assistive devices as appropriate  - Consider OT/PT consult to assist with strengthening/mobility  - Encourage toileting schedule  Outcome: Progressing     Problem: Patient/Family Goals  Goal: Patient/Family Long Term Goal  Description: Patient's Long Term Goal: To go home    Interventions:  - Take medications as prescribed by MD  - Scheduled tests as needed  - PT/OT  - See additional Care Plan goals for specific interventions  Outcome: Progressing  Goal: Patient/Family Short Term Goal  Description: Patient's Short Term Goal: To get back to room air (no oxygen)    Interventions:   - Wean oxygen as tolerated  - Neb tx as necessary for shortness of breath  - Prescribed medications   - See additional Care Plan goals for specific interventions  Outcome: Not Progressing     Problem: PAIN - ADULT  Goal: Verbalizes/displays adequate comfort level or patient's stated pain goal  Description: INTERVENTIONS:  - Encourage pt to monitor pain and request assistance  - Assess pain using appropriate pain scale  - Administer analgesics based on type and severity of pain and evaluate response  - Implement non-pharmacological measures as appropriate and evaluate response  - Consider cultural and social influences on pain and pain management  - Manage/alleviate anxiety  - Utilize distraction and/or relaxation techniques  - Monitor for opioid side effects  - Notify MD/LIP if interventions unsuccessful or patient reports new pain  - Anticipate increased pain with activity and pre-medicate as appropriate  Outcome: Progressing     Problem: DISCHARGE PLANNING  Goal: Discharge to home or other facility with appropriate resources  Description: INTERVENTIONS:  - Identify barriers to discharge w/pt and caregiver  - Include patient/family/discharge partner in discharge planning  - Arrange for needed discharge resources and transportation as appropriate  - Identify discharge learning needs (meds, wound care, etc)  - Arrange for interpreters to assist at discharge as needed  - Consider post-discharge preferences of patient/family/discharge partner  - Complete POLST form as appropriate  - Assess patient's ability to be responsible for managing their own health  - Refer to Case Management Department for coordinating discharge planning if the patient needs post-hospital services based on physician/LIP order or complex needs related to functional status, cognitive ability or social support system  Outcome: Progressing

## 2023-07-25 PROCEDURE — 99232 SBSQ HOSP IP/OBS MODERATE 35: CPT | Performed by: INTERNAL MEDICINE

## 2023-07-25 PROCEDURE — 99233 SBSQ HOSP IP/OBS HIGH 50: CPT | Performed by: OTHER

## 2023-07-25 RX ORDER — AMLODIPINE BESYLATE 5 MG/1
5 TABLET ORAL DAILY
Status: DISCONTINUED | OUTPATIENT
Start: 2023-07-25 | End: 2023-07-28

## 2023-07-25 RX ORDER — ARIPIPRAZOLE 2 MG/1
1 TABLET ORAL DAILY
Status: DISCONTINUED | OUTPATIENT
Start: 2023-07-25 | End: 2023-07-28

## 2023-07-25 NOTE — PLAN OF CARE
Patient vital signs stable. Currently on 4L HFNC. Anxious this evening, relieved with scheduled medications. PRN Hydralazine given for elevated blood pressure. No c/o pain. Safety precautions in place, call light within reach, no acute changes. Problem: Patient Centered Care  Goal: Patient preferences are identified and integrated in the patient's plan of care  Description: Interventions:  - What would you like us to know as we care for you?  From home with    - Provide timely, complete, and accurate information to patient/family  - Incorporate patient and family knowledge, values, beliefs, and cultural backgrounds into the planning and delivery of care  - Encourage patient/family to participate in care and decision-making at the level they choose  - Honor patient and family perspectives and choices  Outcome: Progressing     Problem: RESPIRATORY - ADULT  Goal: Achieves optimal ventilation and oxygenation  Description: INTERVENTIONS:  - Assess for changes in respiratory status  - Assess for changes in mentation and behavior  - Position to facilitate oxygenation and minimize respiratory effort  - Oxygen supplementation based on oxygen saturation or ABGs  - Provide Smoking Cessation handout, if applicable  - Encourage broncho-pulmonary hygiene including cough, deep breathe, Incentive Spirometry  - Assess the need for suctioning and perform as needed  - Assess and instruct to report SOB or any respiratory difficulty  - Respiratory Therapy support as indicated  - Manage/alleviate anxiety  - Monitor for signs/symptoms of CO2 retention  Outcome: Progressing     Problem: MUSCULOSKELETAL - ADULT  Goal: Return mobility to safest level of function  Description: INTERVENTIONS:  - Assess patient stability and activity tolerance for standing, transferring and ambulating w/ or w/o assistive devices  - Assist with transfers and ambulation using safe patient handling equipment as needed  - Ensure adequate protection for wounds/incisions during mobilization  - Obtain PT/OT consults as needed  - Advance activity as appropriate  - Communicate ordered activity level and limitations with patient/family  Outcome: Progressing     Problem: SAFETY ADULT - FALL  Goal: Free from fall injury  Description: INTERVENTIONS:  - Assess pt frequently for physical needs  - Identify cognitive and physical deficits and behaviors that affect risk of falls.   - [place] fall precautions as indicated by assessment.  - Educate pt/family on patient safety including physical limitations  - Instruct pt to call for assistance with activity based on assessment  - Modify environment to reduce risk of injury  - Provide assistive devices as appropriate  - Consider OT/PT consult to assist with strengthening/mobility  - Encourage toileting schedule  Outcome: Progressing     Problem: Patient/Family Goals  Goal: Patient/Family Long Term Goal  Description: Patient's Long Term Goal: To go home    Interventions:  - Take medications as prescribed by MD  - Scheduled tests as needed  - PT/OT  - See additional Care Plan goals for specific interventions  Outcome: Progressing  Goal: Patient/Family Short Term Goal  Description: Patient's Short Term Goal: To get back to room air (no oxygen)    Interventions:   - Wean oxygen as tolerated  - Neb tx as necessary for shortness of breath  - Prescribed medications   - See additional Care Plan goals for specific interventions  Outcome: Progressing     Problem: PAIN - ADULT  Goal: Verbalizes/displays adequate comfort level or patient's stated pain goal  Description: INTERVENTIONS:  - Encourage pt to monitor pain and request assistance  - Assess pain using appropriate pain scale  - Administer analgesics based on type and severity of pain and evaluate response  - Implement non-pharmacological measures as appropriate and evaluate response  - Consider cultural and social influences on pain and pain management  - Manage/alleviate anxiety  - Utilize distraction and/or relaxation techniques  - Monitor for opioid side effects  - Notify MD/LIP if interventions unsuccessful or patient reports new pain  - Anticipate increased pain with activity and pre-medicate as appropriate  Outcome: Progressing     Problem: DISCHARGE PLANNING  Goal: Discharge to home or other facility with appropriate resources  Description: INTERVENTIONS:  - Identify barriers to discharge w/pt and caregiver  - Include patient/family/discharge partner in discharge planning  - Arrange for needed discharge resources and transportation as appropriate  - Identify discharge learning needs (meds, wound care, etc)  - Arrange for interpreters to assist at discharge as needed  - Consider post-discharge preferences of patient/family/discharge partner  - Complete POLST form as appropriate  - Assess patient's ability to be responsible for managing their own health  - Refer to Case Management Department for coordinating discharge planning if the patient needs post-hospital services based on physician/LIP order or complex needs related to functional status, cognitive ability or social support system  Outcome: Progressing     Problem: CARDIOVASCULAR - ADULT  Goal: Maintains optimal cardiac output and hemodynamic stability  Description: INTERVENTIONS:  - Monitor vital signs, rhythm, and trends  - Monitor for bleeding, hypotension and signs of decreased cardiac output  - Evaluate effectiveness of vasoactive medications to optimize hemodynamic stability  - Monitor arterial and/or venous puncture sites for bleeding and/or hematoma  - Assess quality of pulses, skin color and temperature  - Assess for signs of decreased coronary artery perfusion - ex.  Angina  - Evaluate fluid balance, assess for edema, trend weights  Outcome: Progressing  Goal: Absence of cardiac arrhythmias or at baseline  Description: INTERVENTIONS:  - Continuous cardiac monitoring, monitor vital signs, obtain 12 lead EKG if indicated  - Evaluate effectiveness of antiarrhythmic and heart rate control medications as ordered  - Initiate emergency measures for life threatening arrhythmias  - Monitor electrolytes and administer replacement therapy as ordered  Outcome: Progressing     Problem: METABOLIC/FLUID AND ELECTROLYTES - ADULT  Goal: Glucose maintained within prescribed range  Description: INTERVENTIONS:  - Monitor Blood Glucose as ordered  - Assess for signs and symptoms of hyperglycemia and hypoglycemia  - Administer ordered medications to maintain glucose within target range  - Assess barriers to adequate nutritional intake and initiate nutrition consult as needed  - Instruct patient on self management of diabetes  Outcome: Progressing  Goal: Electrolytes maintained within normal limits  Description: INTERVENTIONS:  - Monitor labs and rhythm and assess patient for signs and symptoms of electrolyte imbalances  - Administer electrolyte replacement as ordered  - Monitor response to electrolyte replacements, including rhythm and repeat lab results as appropriate  - Fluid restriction as ordered  - Instruct patient on fluid and nutrition restrictions as appropriate  Outcome: Progressing  Goal: Hemodynamic stability and optimal renal function maintained  Description: INTERVENTIONS:  - Monitor labs and assess for signs and symptoms of volume excess or deficit  - Monitor intake, output and patient weight  - Monitor urine specific gravity, serum osmolarity and serum sodium as indicated or ordered  - Monitor response to interventions for patient's volume status, including labs, urine output, blood pressure (other measures as available)  - Encourage oral intake as appropriate  - Instruct patient on fluid and nutrition restrictions as appropriate  Outcome: Progressing

## 2023-07-25 NOTE — CM/SW NOTE
RECEIVED notification by JAMES supervisor of PT/OT now recommending BARRY. CM left vm message to pt/spouse on recommendation. CM requested department  [facility]) to initiate AIDIN referral for BARRY. SW/TANI will continue to follow. Patient has [facility] Medicare, requested to [facility] [place] for prior insurance auth for BARRY. CM/JAMES to uploaded PASRR , currently queued for review. PLAN; BARRY, Pending barry list for pt/spouse choice, pending prior auth from insurance. And PASRR pending review      CM/SW to remain available for support and/or discharge planning.     Alban Ward RN, [facility]    Ext.  [phone]

## 2023-07-25 NOTE — PLAN OF CARE
Problem: Patient Centered Care  Goal: Patient preferences are identified and integrated in the patient's plan of care  Description: Interventions:  - What would you like us to know as we care for you?  From home with    - Provide timely, complete, and accurate information to patient/family  - Incorporate patient and family knowledge, values, beliefs, and cultural backgrounds into the planning and delivery of care  - Encourage patient/family to participate in care and decision-making at the level they choose  - Honor patient and family perspectives and choices  Outcome: Progressing     Problem: RESPIRATORY - ADULT  Goal: Achieves optimal ventilation and oxygenation  Description: INTERVENTIONS:  - Assess for changes in respiratory status  - Assess for changes in mentation and behavior  - Position to facilitate oxygenation and minimize respiratory effort  - Oxygen supplementation based on oxygen saturation or ABGs  - Provide Smoking Cessation handout, if applicable  - Encourage broncho-pulmonary hygiene including cough, deep breathe, Incentive Spirometry  - Assess the need for suctioning and perform as needed  - Assess and instruct to report SOB or any respiratory difficulty  - Respiratory Therapy support as indicated  - Manage/alleviate anxiety  - Monitor for signs/symptoms of CO2 retention  Outcome: Progressing     Problem: MUSCULOSKELETAL - ADULT  Goal: Return mobility to safest level of function  Description: INTERVENTIONS:  - Assess patient stability and activity tolerance for standing, transferring and ambulating w/ or w/o assistive devices  - Assist with transfers and ambulation using safe patient handling equipment as needed  - Ensure adequate protection for wounds/incisions during mobilization  - Obtain PT/OT consults as needed  - Advance activity as appropriate  - Communicate ordered activity level and limitations with patient/family  Outcome: Progressing     Problem: CARDIOVASCULAR - ADULT  Goal: Maintains optimal cardiac output and hemodynamic stability  Description: INTERVENTIONS:  - Monitor vital signs, rhythm, and trends  - Monitor for bleeding, hypotension and signs of decreased cardiac output  - Evaluate effectiveness of vasoactive medications to optimize hemodynamic stability  - Monitor arterial and/or venous puncture sites for bleeding and/or hematoma  - Assess quality of pulses, skin color and temperature  - Assess for signs of decreased coronary artery perfusion - ex.  Angina  - Evaluate fluid balance, assess for edema, trend weights  Outcome: Progressing  Goal: Absence of cardiac arrhythmias or at baseline  Description: INTERVENTIONS:  - Continuous cardiac monitoring, monitor vital signs, obtain 12 lead EKG if indicated  - Evaluate effectiveness of antiarrhythmic and heart rate control medications as ordered  - Initiate emergency measures for life threatening arrhythmias  - Monitor electrolytes and administer replacement therapy as ordered  Outcome: Progressing     Problem: METABOLIC/FLUID AND ELECTROLYTES - ADULT  Goal: Glucose maintained within prescribed range  Description: INTERVENTIONS:  - Monitor Blood Glucose as ordered  - Assess for signs and symptoms of hyperglycemia and hypoglycemia  - Administer ordered medications to maintain glucose within target range  - Assess barriers to adequate nutritional intake and initiate nutrition consult as needed  - Instruct patient on self management of diabetes  Outcome: Progressing  Goal: Electrolytes maintained within normal limits  Description: INTERVENTIONS:  - Monitor labs and rhythm and assess patient for signs and symptoms of electrolyte imbalances  - Administer electrolyte replacement as ordered  - Monitor response to electrolyte replacements, including rhythm and repeat lab results as appropriate  - Fluid restriction as ordered  - Instruct patient on fluid and nutrition restrictions as appropriate  Outcome: Progressing  Goal: Hemodynamic stability and optimal renal function maintained  Description: INTERVENTIONS:  - Monitor labs and assess for signs and symptoms of volume excess or deficit  - Monitor intake, output and patient weight  - Monitor urine specific gravity, serum osmolarity and serum sodium as indicated or ordered  - Monitor response to interventions for patient's volume status, including labs, urine output, blood pressure (other measures as available)  - Encourage oral intake as appropriate  - Instruct patient on fluid and nutrition restrictions as appropriate  Outcome: Progressing     Problem: SAFETY ADULT - FALL  Goal: Free from fall injury  Description: INTERVENTIONS:  - Assess pt frequently for physical needs  - Identify cognitive and physical deficits and behaviors that affect risk of falls.   - [place] fall precautions as indicated by assessment.  - Educate pt/family on patient safety including physical limitations  - Instruct pt to call for assistance with activity based on assessment  - Modify environment to reduce risk of injury  - Provide assistive devices as appropriate  - Consider OT/PT consult to assist with strengthening/mobility  - Encourage toileting schedule  Outcome: Progressing     Problem: Patient/Family Goals  Goal: Patient/Family Long Term Goal  Description: Patient's Long Term Goal: To go home    Interventions:  - Take medications as prescribed by MD  - Scheduled tests as needed  - PT/OT  - See additional Care Plan goals for specific interventions  Outcome: Progressing  Goal: Patient/Family Short Term Goal  Description: Patient's Short Term Goal: To get back to room air (no oxygen)    Interventions:   - Wean oxygen as tolerated  - Neb tx as necessary for shortness of breath  - Prescribed medications   - See additional Care Plan goals for specific interventions  Outcome: Progressing     Problem: PAIN - ADULT  Goal: Verbalizes/displays adequate comfort level or patient's stated pain goal  Description: INTERVENTIONS:  - Encourage pt to monitor pain and request assistance  - Assess pain using appropriate pain scale  - Administer analgesics based on type and severity of pain and evaluate response  - Implement non-pharmacological measures as appropriate and evaluate response  - Consider cultural and social influences on pain and pain management  - Manage/alleviate anxiety  - Utilize distraction and/or relaxation techniques  - Monitor for opioid side effects  - Notify MD/LIP if interventions unsuccessful or patient reports new pain  - Anticipate increased pain with activity and pre-medicate as appropriate  Outcome: Progressing     Problem: DISCHARGE PLANNING  Goal: Discharge to home or other facility with appropriate resources  Description: INTERVENTIONS:  - Identify barriers to discharge w/pt and caregiver  - Include patient/family/discharge partner in discharge planning  - Arrange for needed discharge resources and transportation as appropriate  - Identify discharge learning needs (meds, wound care, etc)  - Arrange for interpreters to assist at discharge as needed  - Consider post-discharge preferences of patient/family/discharge partner  - Complete POLST form as appropriate  - Assess patient's ability to be responsible for managing their own health  - Refer to Case Management Department for coordinating discharge planning if the patient needs post-hospital services based on physician/LIP order or complex needs related to functional status, cognitive ability or social support system  Outcome: Progressing

## 2023-07-25 NOTE — CM/SW NOTE
Submitted clinical via [facility] portal.  [employer] Case/Auth ID is [id number]. Final insurance authorization is pending at this time. SW/CM assigned to the case will continue to follow auth status.         Sekou Cordoba   July 25, 2023   16:07

## 2023-07-25 NOTE — PHYSICAL THERAPY NOTE
PHYSICAL THERAPY TREATMENT NOTE - INPATIENT     Room Number: 547B/547-B       Presenting Problem: Generalized weakness  Co-Morbidities : COPD, hiatel hernia    Problem List  Principal Problem:    Generalized weakness  Active Problems:    Hiatal hernia    Generalized anxiety disorder with panic attacks    Pneumonia of both lower lobes due to infectious organism    COPD exacerbation ([facility] [facility]    Acute hypoxemic respiratory failure ([facility] [address]    Delirium due to another medical condition    Severe episode of recurrent major depressive disorder, without psychotic features ([facility] [facility]      PHYSICAL THERAPY ASSESSMENT     Patient seen overlapping with OT to maximize function this date, pt perseverating on medication concerns this date, oriented x 2. Pt [place], loud speech required throughout session. Functional Mobility: Gait belt used throughout mobility. - bed mobility: supine to/from sit mod A   - min A sitting EOB    - transfers: sit to/from stand transfers with mod A from EOB to RW   - standing balance at RW min A    - gait: ambulation with min A 10' with WC follow and RW, max VC for mobiltiy      At end of session patient in chair with all needs in reach, RN aware. The patient's Approx Degree of Impairment: 72.57% has been calculated based on documentation in the [facility] '6 clicks' Inpatient Basic Mobility Short Form. Research supports that patients with this level of impairment may benefit from [facility]. DISCHARGE RECOMMENDATIONS  PT Discharge Recommendations: Sub-acute rehabilitation     PLAN  PT Treatment Plan: Bed mobility; Patient education; Family education;Gait training  Frequency (Obs): 3-5x/week    SUBJECTIVE  \"I gotta figure out the red pill. \"     OBJECTIVE  Precautions: Bed/chair alarm;Hard of hearing    WEIGHT BEARING RESTRICTION      No restrictions           PAIN ASSESSMENT   Ratin          BALANCE  Static Sitting: Fair +  Dynamic Sitting: Fair  Static Standing: Fair -  Dynamic Standing: Poor +    AM-PAC '6-Clicks' INPATIENT SHORT FORM - BASIC MOBILITY  How much difficulty does the patient currently have. .. Patient Difficulty: Turning over in bed (including adjusting bedclothes, sheets and blankets)?: A Lot   Patient Difficulty: Sitting down on and standing up from a chair with arms (e.g., wheelchair, bedside commode, etc.): A Lot   Patient Difficulty: Moving from lying on back to sitting on the side of the bed?: A Lot   How much help from another person does the patient currently need. .. Help from Another: Moving to and from a bed to a chair (including a wheelchair)?: A Lot   Help from Another: Need to walk in hospital room?: A Lot   Help from Another: Climbing 3-5 steps with a railing?: Total     AM-PAC Score:  Raw Score: 11   Approx Degree of Impairment: 72.57%   Standardized Score (AM-PAC Scale): 33.86   CMS Modifier (G-Code): CL    FUNCTIONAL ABILITY STATUS  Functional Mobility/Gait Assessment  Gait Assistance: Minimum assistance  Distance (ft): 12  Assistive Device: Rolling walker  Pattern: Shuffle      Patient End of Session: Up in chair;Needs met;Call light within reach;RN aware of session/findings; All patient questions and concerns addressed; Alarm set; With [facility] staff    CURRENT GOALS   Goals to be met by: 7/23/23  Patient Goal Patient's self-stated goal is: return home when safe   Goal #1 Patient is able to demonstrate supine - sit EOB @ level: independent      Goal #1   Current Status Not met   Goal #2 Patient is able to demonstrate transfers Sit to/from Stand at assistance level: modified independent with walker - rolling      Goal #2  Current Status Not met   Goal #3 Patient is able to ambulate 150 feet with assist device: walker - rolling at assistance level: supervision   Goal #3   Current Status Not met   Goal #4 Patient will negotiate 2 stairs/one curb w/ assistive device and CGA   Goal #4   Current Status     Goal #5 Patient to demonstrate independence with home activity/exercise instructions provided to patient in preparation for discharge.    Goal #5   Current Status     Goal #6     Goal #6  Current Status          Gait Training: 10 minutes  Therapeutic Activity: 14 minutes

## 2023-07-25 NOTE — CM/SW NOTE
Department  notified of request for [facility] [place] referrals started. Assigned CM/SW to follow up with pt/family on further discharge planning.

## 2023-07-26 PROCEDURE — 99233 SBSQ HOSP IP/OBS HIGH 50: CPT | Performed by: OTHER

## 2023-07-26 PROCEDURE — 99232 SBSQ HOSP IP/OBS MODERATE 35: CPT | Performed by: INTERNAL MEDICINE

## 2023-07-26 RX ORDER — DESVENLAFAXINE 25 MG/1
25 TABLET, EXTENDED RELEASE ORAL DAILY
Status: DISCONTINUED | OUTPATIENT
Start: 2023-07-26 | End: 2023-07-26

## 2023-07-26 RX ORDER — ESCITALOPRAM OXALATE 5 MG/1
5 TABLET ORAL NIGHTLY
Status: DISCONTINUED | OUTPATIENT
Start: 2023-07-26 | End: 2023-07-26

## 2023-07-26 NOTE — CM/SW NOTE
Per MD note pt mental status back to baseline today. CM met w/pt, provided SNF list with instruction of use. Pt not sure if she wants SNF. CM informed pt insurance has approved if her final decision is [facility]. Justin Padron caregiver at bedside, private duty services. CM asked pt if she wanted her dtr-Nelli to be called, pt stated \"not at this time to call her\". JANA Rocha at bedside while CM seeing pt. CM wrote contact information on SNF list.    PASRR level 1 screen completed and uploaded to aidin referral, no second review or onsite required. [facility] ID [id number] approved [facility] from 7/26 to 7/28. Plan; [facility] choice pending    CM/SW to remain available for support and/or discharge planning.     Hitesh Zavala RN, [facility]    Ext.  [phone]

## 2023-07-26 NOTE — PLAN OF CARE
Patient vital signs stable. On 4L oxygen overnight. Anxious. No c/o pain. Bladder scan in AM showed 498 mL; straight cath done, output 700 mL. Safety precautions in place, call light within reach. No acute changes. Problem: Patient Centered Care  Goal: Patient preferences are identified and integrated in the patient's plan of care  Description: Interventions:  - What would you like us to know as we care for you?  From home with    - Provide timely, complete, and accurate information to patient/family  - Incorporate patient and family knowledge, values, beliefs, and cultural backgrounds into the planning and delivery of care  - Encourage patient/family to participate in care and decision-making at the level they choose  - Honor patient and family perspectives and choices  Outcome: Progressing     Problem: RESPIRATORY - ADULT  Goal: Achieves optimal ventilation and oxygenation  Description: INTERVENTIONS:  - Assess for changes in respiratory status  - Assess for changes in mentation and behavior  - Position to facilitate oxygenation and minimize respiratory effort  - Oxygen supplementation based on oxygen saturation or ABGs  - Provide Smoking Cessation handout, if applicable  - Encourage broncho-pulmonary hygiene including cough, deep breathe, Incentive Spirometry  - Assess the need for suctioning and perform as needed  - Assess and instruct to report SOB or any respiratory difficulty  - Respiratory Therapy support as indicated  - Manage/alleviate anxiety  - Monitor for signs/symptoms of CO2 retention  Outcome: Progressing     Problem: MUSCULOSKELETAL - ADULT  Goal: Return mobility to safest level of function  Description: INTERVENTIONS:  - Assess patient stability and activity tolerance for standing, transferring and ambulating w/ or w/o assistive devices  - Assist with transfers and ambulation using safe patient handling equipment as needed  - Ensure adequate protection for wounds/incisions during mobilization  - Obtain PT/OT consults as needed  - Advance activity as appropriate  - Communicate ordered activity level and limitations with patient/family  Outcome: Progressing     Problem: SAFETY ADULT - FALL  Goal: Free from fall injury  Description: INTERVENTIONS:  - Assess pt frequently for physical needs  - Identify cognitive and physical deficits and behaviors that affect risk of falls.   - [place] fall precautions as indicated by assessment.  - Educate pt/family on patient safety including physical limitations  - Instruct pt to call for assistance with activity based on assessment  - Modify environment to reduce risk of injury  - Provide assistive devices as appropriate  - Consider OT/PT consult to assist with strengthening/mobility  - Encourage toileting schedule  Outcome: Progressing     Problem: Patient/Family Goals  Goal: Patient/Family Long Term Goal  Description: Patient's Long Term Goal: To go home    Interventions:  - Take medications as prescribed by MD  - Scheduled tests as needed  - PT/OT  - See additional Care Plan goals for specific interventions  Outcome: Progressing  Goal: Patient/Family Short Term Goal  Description: Patient's Short Term Goal: To get back to room air (no oxygen)    Interventions:   - Wean oxygen as tolerated  - Neb tx as necessary for shortness of breath  - Prescribed medications   - See additional Care Plan goals for specific interventions  Outcome: Progressing     Problem: PAIN - ADULT  Goal: Verbalizes/displays adequate comfort level or patient's stated pain goal  Description: INTERVENTIONS:  - Encourage pt to monitor pain and request assistance  - Assess pain using appropriate pain scale  - Administer analgesics based on type and severity of pain and evaluate response  - Implement non-pharmacological measures as appropriate and evaluate response  - Consider cultural and social influences on pain and pain management  - Manage/alleviate anxiety  - Utilize distraction and/or relaxation techniques  - Monitor for opioid side effects  - Notify MD/LIP if interventions unsuccessful or patient reports new pain  - Anticipate increased pain with activity and pre-medicate as appropriate  Outcome: Progressing     Problem: DISCHARGE PLANNING  Goal: Discharge to home or other facility with appropriate resources  Description: INTERVENTIONS:  - Identify barriers to discharge w/pt and caregiver  - Include patient/family/discharge partner in discharge planning  - Arrange for needed discharge resources and transportation as appropriate  - Identify discharge learning needs (meds, wound care, etc)  - Arrange for interpreters to assist at discharge as needed  - Consider post-discharge preferences of patient/family/discharge partner  - Complete POLST form as appropriate  - Assess patient's ability to be responsible for managing their own health  - Refer to Case Management Department for coordinating discharge planning if the patient needs post-hospital services based on physician/LIP order or complex needs related to functional status, cognitive ability or social support system  Outcome: Progressing     Problem: CARDIOVASCULAR - ADULT  Goal: Maintains optimal cardiac output and hemodynamic stability  Description: INTERVENTIONS:  - Monitor vital signs, rhythm, and trends  - Monitor for bleeding, hypotension and signs of decreased cardiac output  - Evaluate effectiveness of vasoactive medications to optimize hemodynamic stability  - Monitor arterial and/or venous puncture sites for bleeding and/or hematoma  - Assess quality of pulses, skin color and temperature  - Assess for signs of decreased coronary artery perfusion - ex.  Angina  - Evaluate fluid balance, assess for edema, trend weights  Outcome: Progressing  Goal: Absence of cardiac arrhythmias or at baseline  Description: INTERVENTIONS:  - Continuous cardiac monitoring, monitor vital signs, obtain 12 lead EKG if indicated  - Evaluate effectiveness of antiarrhythmic and heart rate control medications as ordered  - Initiate emergency measures for life threatening arrhythmias  - Monitor electrolytes and administer replacement therapy as ordered  Outcome: Progressing     Problem: METABOLIC/FLUID AND ELECTROLYTES - ADULT  Goal: Glucose maintained within prescribed range  Description: INTERVENTIONS:  - Monitor Blood Glucose as ordered  - Assess for signs and symptoms of hyperglycemia and hypoglycemia  - Administer ordered medications to maintain glucose within target range  - Assess barriers to adequate nutritional intake and initiate nutrition consult as needed  - Instruct patient on self management of diabetes  Outcome: Progressing  Goal: Electrolytes maintained within normal limits  Description: INTERVENTIONS:  - Monitor labs and rhythm and assess patient for signs and symptoms of electrolyte imbalances  - Administer electrolyte replacement as ordered  - Monitor response to electrolyte replacements, including rhythm and repeat lab results as appropriate  - Fluid restriction as ordered  - Instruct patient on fluid and nutrition restrictions as appropriate  Outcome: Progressing  Goal: Hemodynamic stability and optimal renal function maintained  Description: INTERVENTIONS:  - Monitor labs and assess for signs and symptoms of volume excess or deficit  - Monitor intake, output and patient weight  - Monitor urine specific gravity, serum osmolarity and serum sodium as indicated or ordered  - Monitor response to interventions for patient's volume status, including labs, urine output, blood pressure (other measures as available)  - Encourage oral intake as appropriate  - Instruct patient on fluid and nutrition restrictions as appropriate  Outcome: Progressing

## 2023-07-26 NOTE — TELEPHONE ENCOUNTER
Can't call her anytime soon. I think she needs rehab. She is really weak.     I have told the patient multiple times, almost every time I've seen her, that she is going to need oxygen at discharge

## 2023-07-26 NOTE — TELEPHONE ENCOUNTER
Spoke to alexandra PONCE per HIPAA, and relayed MD message and instructions. She verbalized understanding and agrees with plan. States she is in search for a 24 hr caregiver for once the pt returns home. Advised to speak with  at hospital for resources, contact insurance if she plans to use insurance and offered resource of \"findhelp. org\" to help narrow search.

## 2023-07-26 NOTE — PLAN OF CARE
Problem: RESPIRATORY - ADULT  Goal: Achieves optimal ventilation and oxygenation  Description: INTERVENTIONS:  - Assess for changes in respiratory status  - Assess for changes in mentation and behavior  - Position to facilitate oxygenation and minimize respiratory effort  - Oxygen supplementation based on oxygen saturation or ABGs  - Provide Smoking Cessation handout, if applicable  - Encourage broncho-pulmonary hygiene including cough, deep breathe, Incentive Spirometry  - Assess the need for suctioning and perform as needed  - Assess and instruct to report SOB or any respiratory difficulty  - Respiratory Therapy support as indicated  - Manage/alleviate anxiety  - Monitor for signs/symptoms of CO2 retention  Outcome: Progressing     Problem: MUSCULOSKELETAL - ADULT  Goal: Return mobility to safest level of function  Description: INTERVENTIONS:  - Assess patient stability and activity tolerance for standing, transferring and ambulating w/ or w/o assistive devices  - Assist with transfers and ambulation using safe patient handling equipment as needed  - Ensure adequate protection for wounds/incisions during mobilization  - Obtain PT/OT consults as needed  - Advance activity as appropriate  - Communicate ordered activity level and limitations with patient/family  Outcome: Progressing     Problem: SAFETY ADULT - FALL  Goal: Free from fall injury  Description: INTERVENTIONS:  - Assess pt frequently for physical needs  - Identify cognitive and physical deficits and behaviors that affect risk of falls.   - [place] fall precautions as indicated by assessment.  - Educate pt/family on patient safety including physical limitations  - Instruct pt to call for assistance with activity based on assessment  - Modify environment to reduce risk of injury  - Provide assistive devices as appropriate  - Consider OT/PT consult to assist with strengthening/mobility  - Encourage toileting schedule  Outcome: Progressing     Problem: PAIN - ADULT  Goal: Verbalizes/displays adequate comfort level or patient's stated pain goal  Description: INTERVENTIONS:  - Encourage pt to monitor pain and request assistance  - Assess pain using appropriate pain scale  - Administer analgesics based on type and severity of pain and evaluate response  - Implement non-pharmacological measures as appropriate and evaluate response  - Consider cultural and social influences on pain and pain management  - Manage/alleviate anxiety  - Utilize distraction and/or relaxation techniques  - Monitor for opioid side effects  - Notify MD/LIP if interventions unsuccessful or patient reports new pain  - Anticipate increased pain with activity and pre-medicate as appropriate  Outcome: Progressing     Problem: METABOLIC/FLUID AND ELECTROLYTES - ADULT  Goal: Glucose maintained within prescribed range  Description: INTERVENTIONS:  - Monitor Blood Glucose as ordered  - Assess for signs and symptoms of hyperglycemia and hypoglycemia  - Administer ordered medications to maintain glucose within target range  - Assess barriers to adequate nutritional intake and initiate nutrition consult as needed  - Instruct patient on self management of diabetes  Outcome: Progressing  Goal: Electrolytes maintained within normal limits  Description: INTERVENTIONS:  - Monitor labs and rhythm and assess patient for signs and symptoms of electrolyte imbalances  - Administer electrolyte replacement as ordered  - Monitor response to electrolyte replacements, including rhythm and repeat lab results as appropriate  - Fluid restriction as ordered  - Instruct patient on fluid and nutrition restrictions as appropriate  Outcome: Progressing  Goal: Hemodynamic stability and optimal renal function maintained  Description: INTERVENTIONS:  - Monitor labs and assess for signs and symptoms of volume excess or deficit  - Monitor intake, output and patient weight  - Monitor urine specific gravity, serum osmolarity and serum sodium as indicated or ordered  - Monitor response to interventions for patient's volume status, including labs, urine output, blood pressure (other measures as available)  - Encourage oral intake as appropriate  - Instruct patient on fluid and nutrition restrictions as appropriate  Outcome: Progressing     Vital signs stable on 4 L High flow nasal cannula, unable to wean oxygen at this time. Patient denies pain at this time. Up to chair for dinner. Patient verbalized agreement to  and this RN to go to [facility] for sub acute rehab. Safety precautions in place, frequent nursing rounding completed, call light within reach. All questions answered and needs met.

## 2023-07-26 NOTE — CM/SW NOTE
Department  notified CM (LT) of [facility] approval.    Assigned SW/CM to follow up with patient/family on discharge plan. [facility] ID [id number] approved from 7/26 to 7/28.       Sita Beaver   July 26, 2023   08:18

## 2023-07-26 NOTE — TELEPHONE ENCOUNTER
Spoke with Steffanie Aaron. Family is considering [facility] in [place] or [facility] in [place] for rehab.

## 2023-07-26 NOTE — TELEPHONE ENCOUNTER
Patients daughter Monica Castellon is calling and would like to speak to Dr Tonja Eddy directly. Monica Castellon would like to know the doctors thoughts about going to rehab verses home health care. Daughter is also asking who is going to tell the patient that she is going home on oxygen. They would like it explained to her that this is something that she has to use from now on. The family would like the doctor to be the one who tells the patient.     Please call and advise

## 2023-07-26 NOTE — CM/SW NOTE
Received transferred call from nurse, dtr-Nelli Escalante stating has been talking with CM -Jorje Gorman for [facility]. CM informed [facility] of had call back pending from spouse that was placed 7/25 to discuss new discharge recommendation of SNF. Pt was w/altered mental status yesterday. However, today pt is back to baseline. CM informed dtr of pt not wanting rehab. Dtr stated currently the pt and spouse want home w/HH. Pt does currently have a caregiver/Portia 4 times per week for 8 hours each day. I provided dtr w/my contact ph# as she will discuss with both pt and pt's spouse of final dc plan. They will also want to speak w/Dr Arturo Andino to assist with making their final choice  HH vs [facility]. CM/SW to remain available for support and/or discharge planning.     Maeve Chacko RN, [facility]    Ext.  [phone]

## 2023-07-26 NOTE — CM/SW NOTE
Department  notified CM (LT) of [facility] approval     Assigned SW/CM to follow up with patient/family on discharge plan. Notified TANI (LT)  [facility] ID [id number] approved from 7/26 to 7/28.      Hectorora Swoope   July 26, 2023   14:38

## 2023-07-26 NOTE — TELEPHONE ENCOUNTER
To Dr Sriram Babb,  Please Advise    Patients daughter Xu Griffin is calling and would like to speak to Dr Sriram Babb directly. Xu Griffin would like to know the doctors thoughts about going to rehab verses home health care. Daughter is also asking who is going to tell the patient that she is going home on oxygen. They would like it explained to her that this is something that she has to use from now on.   The family would like the doctor to be the one who tells the patie

## 2023-07-27 PROCEDURE — 99232 SBSQ HOSP IP/OBS MODERATE 35: CPT | Performed by: INTERNAL MEDICINE

## 2023-07-27 PROCEDURE — 99233 SBSQ HOSP IP/OBS HIGH 50: CPT | Performed by: OTHER

## 2023-07-27 RX ORDER — POLYETHYLENE GLYCOL 3350 17 G/17G
17 POWDER, FOR SOLUTION ORAL DAILY PRN
Qty: 1 EACH | Refills: 0 | Status: SHIPPED | COMMUNITY
Start: 2023-07-27

## 2023-07-27 RX ORDER — QUETIAPINE FUMARATE 25 MG/1
25 TABLET, FILM COATED ORAL NIGHTLY
Qty: 30 TABLET | Refills: 0 | Status: SHIPPED | OUTPATIENT
Start: 2023-07-27

## 2023-07-27 RX ORDER — PREGABALIN 50 MG/1
50 CAPSULE ORAL 2 TIMES DAILY
Qty: 60 CAPSULE | Refills: 0 | Status: SHIPPED | OUTPATIENT
Start: 2023-07-27

## 2023-07-27 RX ORDER — IPRATROPIUM BROMIDE AND ALBUTEROL SULFATE 2.5; .5 MG/3ML; MG/3ML
3 SOLUTION RESPIRATORY (INHALATION) EVERY 6 HOURS PRN
Qty: 1 EACH | Refills: 0 | Status: SHIPPED | COMMUNITY
Start: 2023-07-27

## 2023-07-27 RX ORDER — ESCITALOPRAM OXALATE 5 MG/1
15 TABLET ORAL EVERY MORNING
Qty: 30 TABLET | Refills: 0 | Status: SHIPPED | COMMUNITY
Start: 2023-07-27

## 2023-07-27 RX ORDER — PREGABALIN 50 MG/1
50 CAPSULE ORAL 2 TIMES DAILY
Qty: 60 CAPSULE | Refills: 0 | Status: SHIPPED | COMMUNITY
Start: 2023-07-27 | End: 2023-07-27

## 2023-07-27 RX ORDER — ALPRAZOLAM 0.25 MG/1
0.25 TABLET ORAL NIGHTLY PRN
Qty: 30 TABLET | Refills: 0 | Status: SHIPPED | OUTPATIENT
Start: 2023-07-27

## 2023-07-27 RX ORDER — HEPARIN SODIUM 5000 [USP'U]/ML
5000 INJECTION, SOLUTION INTRAVENOUS; SUBCUTANEOUS EVERY 12 HOURS SCHEDULED
Qty: 60 EACH | Refills: 0 | Status: SHIPPED | COMMUNITY
Start: 2023-07-27

## 2023-07-27 RX ORDER — AMLODIPINE BESYLATE 5 MG/1
5 TABLET ORAL DAILY
Qty: 30 TABLET | Refills: 0 | Status: SHIPPED | COMMUNITY
Start: 2023-07-27

## 2023-07-27 RX ORDER — TRIAMCINOLONE ACETONIDE 1 MG/G
CREAM TOPICAL 2 TIMES DAILY
Qty: 45 G | Refills: 0 | Status: SHIPPED | COMMUNITY
Start: 2023-07-27

## 2023-07-27 RX ORDER — ARIPIPRAZOLE 2 MG/1
1 TABLET ORAL DAILY
Qty: 15 TABLET | Refills: 0 | Status: SHIPPED | OUTPATIENT
Start: 2023-07-27

## 2023-07-27 RX ORDER — PSEUDOEPHEDRINE HCL 30 MG
100 TABLET ORAL 2 TIMES DAILY
Qty: 60 CAPSULE | Refills: 0 | Status: SHIPPED | COMMUNITY
Start: 2023-07-27

## 2023-07-27 NOTE — CM/SW NOTE
CM followed up with patient regarding [facility] choice. Patient sleepy and does not want to discuss at this time. Caregiver at bedside and said patient has been wanting to sleep today. CM spoke with patient's spouse, his preference is [facility] at discharge. [facility] does not have a bed available, spouse aware and will choose second option. Spouse requested [facility] list to be emailed to him at [email or web address]. Sent via [employer]. Paper list also left at bedside. Spouse aware insurance [employer] was received and will  tomorrow  so we need choice on [facility]. Spouse will review list and either call CM this evening or tomorrow AM with choice. CM spoke with Nelli/dtr as well ([phone]) and updated her on available list of [facility] facilities. She will call her father and discuss, and call CM back to provide choice.      Plan: [facility] pending choice    Caio Davila, RN, BSN

## 2023-07-27 NOTE — PLAN OF CARE
Received pt in stable condition. VSS, on 4 L O2. Tolerated all scheduled medications, no adverse reactions noted. PRN Xanax given to help w/ anxiety and restlessness. Continuous telemetry monitoring in place. Frequent rounding on pt. All fall & safety measures in place for pt. Call light placed within pt.'s reach. Will continue to monitor for any new acute changes. Problem: Patient Centered Care  Goal: Patient preferences are identified and integrated in the patient's plan of care  Description: Interventions:  - What would you like us to know as we care for you?  From home with    - Provide timely, complete, and accurate information to patient/family  - Incorporate patient and family knowledge, values, beliefs, and cultural backgrounds into the planning and delivery of care  - Encourage patient/family to participate in care and decision-making at the level they choose  - Honor patient and family perspectives and choices  Outcome: Progressing     Problem: RESPIRATORY - ADULT  Goal: Achieves optimal ventilation and oxygenation  Description: INTERVENTIONS:  - Assess for changes in respiratory status  - Assess for changes in mentation and behavior  - Position to facilitate oxygenation and minimize respiratory effort  - Oxygen supplementation based on oxygen saturation or ABGs  - Provide Smoking Cessation handout, if applicable  - Encourage broncho-pulmonary hygiene including cough, deep breathe, Incentive Spirometry  - Assess the need for suctioning and perform as needed  - Assess and instruct to report SOB or any respiratory difficulty  - Respiratory Therapy support as indicated  - Manage/alleviate anxiety  - Monitor for signs/symptoms of CO2 retention  Outcome: Progressing     Problem: MUSCULOSKELETAL - ADULT  Goal: Return mobility to safest level of function  Description: INTERVENTIONS:  - Assess patient stability and activity tolerance for standing, transferring and ambulating w/ or w/o assistive devices  - Assist with transfers and ambulation using safe patient handling equipment as needed  - Ensure adequate protection for wounds/incisions during mobilization  - Obtain PT/OT consults as needed  - Advance activity as appropriate  - Communicate ordered activity level and limitations with patient/family  Outcome: Progressing     Problem: CARDIOVASCULAR - ADULT  Goal: Maintains optimal cardiac output and hemodynamic stability  Description: INTERVENTIONS:  - Monitor vital signs, rhythm, and trends  - Monitor for bleeding, hypotension and signs of decreased cardiac output  - Evaluate effectiveness of vasoactive medications to optimize hemodynamic stability  - Monitor arterial and/or venous puncture sites for bleeding and/or hematoma  - Assess quality of pulses, skin color and temperature  - Assess for signs of decreased coronary artery perfusion - ex.  Angina  - Evaluate fluid balance, assess for edema, trend weights  Outcome: Progressing  Goal: Absence of cardiac arrhythmias or at baseline  Description: INTERVENTIONS:  - Continuous cardiac monitoring, monitor vital signs, obtain 12 lead EKG if indicated  - Evaluate effectiveness of antiarrhythmic and heart rate control medications as ordered  - Initiate emergency measures for life threatening arrhythmias  - Monitor electrolytes and administer replacement therapy as ordered  Outcome: Progressing     Problem: METABOLIC/FLUID AND ELECTROLYTES - ADULT  Goal: Electrolytes maintained within normal limits  Description: INTERVENTIONS:  - Monitor labs and rhythm and assess patient for signs and symptoms of electrolyte imbalances  - Administer electrolyte replacement as ordered  - Monitor response to electrolyte replacements, including rhythm and repeat lab results as appropriate  - Fluid restriction as ordered  - Instruct patient on fluid and nutrition restrictions as appropriate  Outcome: Progressing  Goal: Hemodynamic stability and optimal renal function maintained  Description: INTERVENTIONS:  - Monitor labs and assess for signs and symptoms of volume excess or deficit  - Monitor intake, output and patient weight  - Monitor urine specific gravity, serum osmolarity and serum sodium as indicated or ordered  - Monitor response to interventions for patient's volume status, including labs, urine output, blood pressure (other measures as available)  - Encourage oral intake as appropriate  - Instruct patient on fluid and nutrition restrictions as appropriate  Outcome: Progressing     Problem: SAFETY ADULT - FALL  Goal: Free from fall injury  Description: INTERVENTIONS:  - Assess pt frequently for physical needs  - Identify cognitive and physical deficits and behaviors that affect risk of falls.   - [place] fall precautions as indicated by assessment.  - Educate pt/family on patient safety including physical limitations  - Instruct pt to call for assistance with activity based on assessment  - Modify environment to reduce risk of injury  - Provide assistive devices as appropriate  - Consider OT/PT consult to assist with strengthening/mobility  - Encourage toileting schedule  Outcome: Progressing     Problem: Patient/Family Goals  Goal: Patient/Family Long Term Goal  Description: Patient's Long Term Goal: To go home    Interventions:  - Take medications as prescribed by MD  - Scheduled tests as needed  - PT/OT  - See additional Care Plan goals for specific interventions  Outcome: Progressing  Goal: Patient/Family Short Term Goal  Description: Patient's Short Term Goal: To get back to room air (no oxygen)    Interventions:   - Wean oxygen as tolerated  - Neb tx as necessary for shortness of breath  - Prescribed medications   - See additional Care Plan goals for specific interventions  Outcome: Not Progressing     Problem: PAIN - ADULT  Goal: Verbalizes/displays adequate comfort level or patient's stated pain goal  Description: INTERVENTIONS:  - Encourage pt to monitor pain and request assistance  - Assess pain using appropriate pain scale  - Administer analgesics based on type and severity of pain and evaluate response  - Implement non-pharmacological measures as appropriate and evaluate response  - Consider cultural and social influences on pain and pain management  - Manage/alleviate anxiety  - Utilize distraction and/or relaxation techniques  - Monitor for opioid side effects  - Notify MD/LIP if interventions unsuccessful or patient reports new pain  - Anticipate increased pain with activity and pre-medicate as appropriate  Outcome: Progressing     Problem: DISCHARGE PLANNING  Goal: Discharge to home or other facility with appropriate resources  Description: INTERVENTIONS:  - Identify barriers to discharge w/pt and caregiver  - Include patient/family/discharge partner in discharge planning  - Arrange for needed discharge resources and transportation as appropriate  - Identify discharge learning needs (meds, wound care, etc)  - Arrange for interpreters to assist at discharge as needed  - Consider post-discharge preferences of patient/family/discharge partner  - Complete POLST form as appropriate  - Assess patient's ability to be responsible for managing their own health  - Refer to Case Management Department for coordinating discharge planning if the patient needs post-hospital services based on physician/LIP order or complex needs related to functional status, cognitive ability or social support system  Outcome: Progressing

## 2023-07-28 VITALS
RESPIRATION RATE: 18 BRPM | HEIGHT: 63 IN | OXYGEN SATURATION: 94 % | HEART RATE: 97 BPM | WEIGHT: 129.44 LBS | TEMPERATURE: 98 F | SYSTOLIC BLOOD PRESSURE: 129 MMHG | BODY MASS INDEX: 22.93 KG/M2 | DIASTOLIC BLOOD PRESSURE: 61 MMHG

## 2023-07-28 PROCEDURE — 99233 SBSQ HOSP IP/OBS HIGH 50: CPT | Performed by: OTHER

## 2023-07-28 PROCEDURE — 99239 HOSP IP/OBS DSCHRG MGMT >30: CPT | Performed by: INTERNAL MEDICINE

## 2023-07-28 RX ORDER — SENNOSIDES 8.6 MG
8.6 TABLET ORAL 2 TIMES DAILY
Status: DISCONTINUED | OUTPATIENT
Start: 2023-07-28 | End: 2023-07-28

## 2023-07-28 RX ORDER — POLYETHYLENE GLYCOL 3350 17 G/17G
17 POWDER, FOR SOLUTION ORAL DAILY
Qty: 30 PACKET | Refills: 0 | Status: SHIPPED | OUTPATIENT
Start: 2023-07-29 | End: 2023-08-28

## 2023-07-28 RX ORDER — POLYETHYLENE GLYCOL 3350 17 G/17G
17 POWDER, FOR SOLUTION ORAL DAILY
Status: DISCONTINUED | OUTPATIENT
Start: 2023-07-28 | End: 2023-07-28

## 2023-07-28 NOTE — PLAN OF CARE
Patient appropriate for discharge per MD. No acute changes. Patient's daughter and spouse updated on plan for discharge. PIVs removed. Paperwork sent with patient. Report given to Radha Ledesma RN at [employer]. Problem: Patient Centered Care  Goal: Patient preferences are identified and integrated in the patient's plan of care  Description: Interventions:  - What would you like us to know as we care for you?  From home with    - Provide timely, complete, and accurate information to patient/family  - Incorporate patient and family knowledge, values, beliefs, and cultural backgrounds into the planning and delivery of care  - Encourage patient/family to participate in care and decision-making at the level they choose  - Honor patient and family perspectives and choices  Outcome: Adequate for Discharge     Problem: RESPIRATORY - ADULT  Goal: Achieves optimal ventilation and oxygenation  Description: INTERVENTIONS:  - Assess for changes in respiratory status  - Assess for changes in mentation and behavior  - Position to facilitate oxygenation and minimize respiratory effort  - Oxygen supplementation based on oxygen saturation or ABGs  - Provide Smoking Cessation handout, if applicable  - Encourage broncho-pulmonary hygiene including cough, deep breathe, Incentive Spirometry  - Assess the need for suctioning and perform as needed  - Assess and instruct to report SOB or any respiratory difficulty  - Respiratory Therapy support as indicated  - Manage/alleviate anxiety  - Monitor for signs/symptoms of CO2 retention  Outcome: Adequate for Discharge     Problem: MUSCULOSKELETAL - ADULT  Goal: Return mobility to safest level of function  Description: INTERVENTIONS:  - Assess patient stability and activity tolerance for standing, transferring and ambulating w/ or w/o assistive devices  - Assist with transfers and ambulation using safe patient handling equipment as needed  - Ensure adequate protection for wounds/incisions during mobilization  - Obtain PT/OT consults as needed  - Advance activity as appropriate  - Communicate ordered activity level and limitations with patient/family  Outcome: Adequate for Discharge     Problem: SAFETY ADULT - FALL  Goal: Free from fall injury  Description: INTERVENTIONS:  - Assess pt frequently for physical needs  - Identify cognitive and physical deficits and behaviors that affect risk of falls.   - [place] fall precautions as indicated by assessment.  - Educate pt/family on patient safety including physical limitations  - Instruct pt to call for assistance with activity based on assessment  - Modify environment to reduce risk of injury  - Provide assistive devices as appropriate  - Consider OT/PT consult to assist with strengthening/mobility  - Encourage toileting schedule  Outcome: Adequate for Discharge     Problem: Patient/Family Goals  Goal: Patient/Family Long Term Goal  Description: Patient's Long Term Goal: To go home    Interventions:  - Take medications as prescribed by MD  - Scheduled tests as needed  - PT/OT  - See additional Care Plan goals for specific interventions  Outcome: Adequate for Discharge  Goal: Patient/Family Short Term Goal  Description: Patient's Short Term Goal: To get back to room air (no oxygen)    Interventions:   - Wean oxygen as tolerated  - Neb tx as necessary for shortness of breath  - Prescribed medications   - See additional Care Plan goals for specific interventions  Outcome: Adequate for Discharge     Problem: PAIN - ADULT  Goal: Verbalizes/displays adequate comfort level or patient's stated pain goal  Description: INTERVENTIONS:  - Encourage pt to monitor pain and request assistance  - Assess pain using appropriate pain scale  - Administer analgesics based on type and severity of pain and evaluate response  - Implement non-pharmacological measures as appropriate and evaluate response  - Consider cultural and social influences on pain and pain management  - Manage/alleviate anxiety  - Utilize distraction and/or relaxation techniques  - Monitor for opioid side effects  - Notify MD/LIP if interventions unsuccessful or patient reports new pain  - Anticipate increased pain with activity and pre-medicate as appropriate  Outcome: Adequate for Discharge     Problem: DISCHARGE PLANNING  Goal: Discharge to home or other facility with appropriate resources  Description: INTERVENTIONS:  - Identify barriers to discharge w/pt and caregiver  - Include patient/family/discharge partner in discharge planning  - Arrange for needed discharge resources and transportation as appropriate  - Identify discharge learning needs (meds, wound care, etc)  - Arrange for interpreters to assist at discharge as needed  - Consider post-discharge preferences of patient/family/discharge partner  - Complete POLST form as appropriate  - Assess patient's ability to be responsible for managing their own health  - Refer to Case Management Department for coordinating discharge planning if the patient needs post-hospital services based on physician/LIP order or complex needs related to functional status, cognitive ability or social support system  Outcome: Adequate for Discharge     Problem: CARDIOVASCULAR - ADULT  Goal: Maintains optimal cardiac output and hemodynamic stability  Description: INTERVENTIONS:  - Monitor vital signs, rhythm, and trends  - Monitor for bleeding, hypotension and signs of decreased cardiac output  - Evaluate effectiveness of vasoactive medications to optimize hemodynamic stability  - Monitor arterial and/or venous puncture sites for bleeding and/or hematoma  - Assess quality of pulses, skin color and temperature  - Assess for signs of decreased coronary artery perfusion - ex.  Angina  - Evaluate fluid balance, assess for edema, trend weights  Outcome: Adequate for Discharge  Goal: Absence of cardiac arrhythmias or at baseline  Description: INTERVENTIONS:  - Continuous cardiac monitoring, monitor vital signs, obtain 12 lead EKG if indicated  - Evaluate effectiveness of antiarrhythmic and heart rate control medications as ordered  - Initiate emergency measures for life threatening arrhythmias  - Monitor electrolytes and administer replacement therapy as ordered  Outcome: Adequate for Discharge     Problem: METABOLIC/FLUID AND ELECTROLYTES - ADULT  Goal: Glucose maintained within prescribed range  Description: INTERVENTIONS:  - Monitor Blood Glucose as ordered  - Assess for signs and symptoms of hyperglycemia and hypoglycemia  - Administer ordered medications to maintain glucose within target range  - Assess barriers to adequate nutritional intake and initiate nutrition consult as needed  - Instruct patient on self management of diabetes  Outcome: Adequate for Discharge  Goal: Electrolytes maintained within normal limits  Description: INTERVENTIONS:  - Monitor labs and rhythm and assess patient for signs and symptoms of electrolyte imbalances  - Administer electrolyte replacement as ordered  - Monitor response to electrolyte replacements, including rhythm and repeat lab results as appropriate  - Fluid restriction as ordered  - Instruct patient on fluid and nutrition restrictions as appropriate  Outcome: Adequate for Discharge  Goal: Hemodynamic stability and optimal renal function maintained  Description: INTERVENTIONS:  - Monitor labs and assess for signs and symptoms of volume excess or deficit  - Monitor intake, output and patient weight  - Monitor urine specific gravity, serum osmolarity and serum sodium as indicated or ordered  - Monitor response to interventions for patient's volume status, including labs, urine output, blood pressure (other measures as available)  - Encourage oral intake as appropriate  - Instruct patient on fluid and nutrition restrictions as appropriate  Outcome: Adequate for Discharge

## 2023-07-28 NOTE — CM/SW NOTE
07/28/23 1500   Discharge disposition   Expected discharge disposition subacute   Post Acute Care Provider   ([facility])   Discharge transportation [address]     Per dandre liaison at [facility] has a bed open today. Room 319  RN report # [phone]    [facility] stated ETA 9-9:30pm, CM escalated to [facility] supervisor to about a sooner  time. CM notified via chat to [address] and Isiah Kaplan of above. CM contacted dtr to update and informed working on transportation time with my supervisor for ambulance . Received update from [address] of ambulance ETA little over an hour. RN and Ranjana Dixon RN notified via epic chat. CM sent aidin message to [facility] . PCS done    Per [facility] pt has insurance approval ; has until tomorrow midnight to admit. CM/SW to remain available for support and/or discharge planning.     Maile Sierra RN, [facility]    Ext.  [phone]

## 2023-07-28 NOTE — PLAN OF CARE
Problem: MUSCULOSKELETAL - ADULT  Goal: Return mobility to safest level of function  Description: INTERVENTIONS:  - Assess patient stability and activity tolerance for standing, transferring and ambulating w/ or w/o assistive devices  - Assist with transfers and ambulation using safe patient handling equipment as needed  - Ensure adequate protection for wounds/incisions during mobilization  - Obtain PT/OT consults as needed  - Advance activity as appropriate  - Communicate ordered activity level and limitations with patient/family  Outcome: Progressing     Problem: SAFETY ADULT - FALL  Goal: Free from fall injury  Description: INTERVENTIONS:  - Assess pt frequently for physical needs  - Identify cognitive and physical deficits and behaviors that affect risk of falls.   - [place] fall precautions as indicated by assessment.  - Educate pt/family on patient safety including physical limitations  - Instruct pt to call for assistance with activity based on assessment  - Modify environment to reduce risk of injury  - Provide assistive devices as appropriate  - Consider OT/PT consult to assist with strengthening/mobility  - Encourage toileting schedule  Outcome: Progressing     Problem: CARDIOVASCULAR - ADULT  Goal: Absence of cardiac arrhythmias or at baseline  Description: INTERVENTIONS:  - Continuous cardiac monitoring, monitor vital signs, obtain 12 lead EKG if indicated  - Evaluate effectiveness of antiarrhythmic and heart rate control medications as ordered  - Initiate emergency measures for life threatening arrhythmias  - Monitor electrolytes and administer replacement therapy as ordered  Outcome: Progressing     Problem: METABOLIC/FLUID AND ELECTROLYTES - ADULT  Goal: Glucose maintained within prescribed range  Description: INTERVENTIONS:  - Monitor Blood Glucose as ordered  - Assess for signs and symptoms of hyperglycemia and hypoglycemia  - Administer ordered medications to maintain glucose within target range  - Assess barriers to adequate nutritional intake and initiate nutrition consult as needed  - Instruct patient on self management of diabetes  Outcome: Progressing  Goal: Electrolytes maintained within normal limits  Description: INTERVENTIONS:  - Monitor labs and rhythm and assess patient for signs and symptoms of electrolyte imbalances  - Administer electrolyte replacement as ordered  - Monitor response to electrolyte replacements, including rhythm and repeat lab results as appropriate  - Fluid restriction as ordered  - Instruct patient on fluid and nutrition restrictions as appropriate  Outcome: Progressing  Goal: Hemodynamic stability and optimal renal function maintained  Description: INTERVENTIONS:  - Monitor labs and assess for signs and symptoms of volume excess or deficit  - Monitor intake, output and patient weight  - Monitor urine specific gravity, serum osmolarity and serum sodium as indicated or ordered  - Monitor response to interventions for patient's volume status, including labs, urine output, blood pressure (other measures as available)  - Encourage oral intake as appropriate  - Instruct patient on fluid and nutrition restrictions as appropriate  Outcome: Progressing     Problem: RESPIRATORY - ADULT  Goal: Achieves optimal ventilation and oxygenation  Description: INTERVENTIONS:  - Assess for changes in respiratory status  - Assess for changes in mentation and behavior  - Position to facilitate oxygenation and minimize respiratory effort  - Oxygen supplementation based on oxygen saturation or ABGs  - Provide Smoking Cessation handout, if applicable  - Encourage broncho-pulmonary hygiene including cough, deep breathe, Incentive Spirometry  - Assess the need for suctioning and perform as needed  - Assess and instruct to report SOB or any respiratory difficulty  - Respiratory Therapy support as indicated  - Manage/alleviate anxiety  - Monitor for signs/symptoms of CO2 retention  Outcome: Not Progressing     Vital signs stable on 4 L High flow nasal cannula. Unable to wean oxygen at this time. Patient denies pain at this time. Up to chair this afternoon. Safety precautions in place, frequent nursing rounding completed, call light within reach. All questions answered and needs met.

## 2023-07-28 NOTE — CM/SW NOTE
07/28/23 1500   Discharge disposition   Expected discharge disposition subacute   Post Acute Care Provider   ([facility])   Discharge transportation [facility]     Received call from [place] /

## 2023-07-28 NOTE — PLAN OF CARE
Received pt in stable condition. VSS, on 4 L O2. Tolerated all scheduled medications, no adverse reactions noted. No pain voiced from pt at this time. Continuous telemetry monitoring in place. Frequent rounding on pt. All fall & safety measures in place for pt. Call light placed within pt.'s reach. Will continue to monitor for any new acute changes. Problem: Patient Centered Care  Goal: Patient preferences are identified and integrated in the patient's plan of care  Description: Interventions:  - What would you like us to know as we care for you?  From home with    - Provide timely, complete, and accurate information to patient/family  - Incorporate patient and family knowledge, values, beliefs, and cultural backgrounds into the planning and delivery of care  - Encourage patient/family to participate in care and decision-making at the level they choose  - Honor patient and family perspectives and choices  Outcome: Progressing     Problem: RESPIRATORY - ADULT  Goal: Achieves optimal ventilation and oxygenation  Description: INTERVENTIONS:  - Assess for changes in respiratory status  - Assess for changes in mentation and behavior  - Position to facilitate oxygenation and minimize respiratory effort  - Oxygen supplementation based on oxygen saturation or ABGs  - Provide Smoking Cessation handout, if applicable  - Encourage broncho-pulmonary hygiene including cough, deep breathe, Incentive Spirometry  - Assess the need for suctioning and perform as needed  - Assess and instruct to report SOB or any respiratory difficulty  - Respiratory Therapy support as indicated  - Manage/alleviate anxiety  - Monitor for signs/symptoms of CO2 retention  Outcome: Progressing     Problem: MUSCULOSKELETAL - ADULT  Goal: Return mobility to safest level of function  Description: INTERVENTIONS:  - Assess patient stability and activity tolerance for standing, transferring and ambulating w/ or w/o assistive devices  - Assist with transfers and ambulation using safe patient handling equipment as needed  - Ensure adequate protection for wounds/incisions during mobilization  - Obtain PT/OT consults as needed  - Advance activity as appropriate  - Communicate ordered activity level and limitations with patient/family  Outcome: Progressing     Problem: CARDIOVASCULAR - ADULT  Goal: Maintains optimal cardiac output and hemodynamic stability  Description: INTERVENTIONS:  - Monitor vital signs, rhythm, and trends  - Monitor for bleeding, hypotension and signs of decreased cardiac output  - Evaluate effectiveness of vasoactive medications to optimize hemodynamic stability  - Monitor arterial and/or venous puncture sites for bleeding and/or hematoma  - Assess quality of pulses, skin color and temperature  - Assess for signs of decreased coronary artery perfusion - ex.  Angina  - Evaluate fluid balance, assess for edema, trend weights  Outcome: Progressing  Goal: Absence of cardiac arrhythmias or at baseline  Description: INTERVENTIONS:  - Continuous cardiac monitoring, monitor vital signs, obtain 12 lead EKG if indicated  - Evaluate effectiveness of antiarrhythmic and heart rate control medications as ordered  - Initiate emergency measures for life threatening arrhythmias  - Monitor electrolytes and administer replacement therapy as ordered  Outcome: Progressing     Problem: METABOLIC/FLUID AND ELECTROLYTES - ADULT  Goal: Electrolytes maintained within normal limits  Description: INTERVENTIONS:  - Monitor labs and rhythm and assess patient for signs and symptoms of electrolyte imbalances  - Administer electrolyte replacement as ordered  - Monitor response to electrolyte replacements, including rhythm and repeat lab results as appropriate  - Fluid restriction as ordered  - Instruct patient on fluid and nutrition restrictions as appropriate  Outcome: Progressing  Goal: Hemodynamic stability and optimal renal function maintained  Description: INTERVENTIONS:  - Monitor labs and assess for signs and symptoms of volume excess or deficit  - Monitor intake, output and patient weight  - Monitor urine specific gravity, serum osmolarity and serum sodium as indicated or ordered  - Monitor response to interventions for patient's volume status, including labs, urine output, blood pressure (other measures as available)  - Encourage oral intake as appropriate  - Instruct patient on fluid and nutrition restrictions as appropriate  Outcome: Progressing     Problem: SAFETY ADULT - FALL  Goal: Free from fall injury  Description: INTERVENTIONS:  - Assess pt frequently for physical needs  - Identify cognitive and physical deficits and behaviors that affect risk of falls.   - [place] fall precautions as indicated by assessment.  - Educate pt/family on patient safety including physical limitations  - Instruct pt to call for assistance with activity based on assessment  - Modify environment to reduce risk of injury  - Provide assistive devices as appropriate  - Consider OT/PT consult to assist with strengthening/mobility  - Encourage toileting schedule  Outcome: Progressing     Problem: Patient/Family Goals  Goal: Patient/Family Long Term Goal  Description: Patient's Long Term Goal: To go home    Interventions:  - Take medications as prescribed by MD  - Scheduled tests as needed  - PT/OT  - See additional Care Plan goals for specific interventions  Outcome: Progressing  Goal: Patient/Family Short Term Goal  Description: Patient's Short Term Goal: To get back to room air (no oxygen)    Interventions:   - Wean oxygen as tolerated  - Neb tx as necessary for shortness of breath  - Prescribed medications   - See additional Care Plan goals for specific interventions  Outcome: Not Progressing     Problem: PAIN - ADULT  Goal: Verbalizes/displays adequate comfort level or patient's stated pain goal  Description: INTERVENTIONS:  - Encourage pt to monitor pain and request assistance  - Assess pain using appropriate pain scale  - Administer analgesics based on type and severity of pain and evaluate response  - Implement non-pharmacological measures as appropriate and evaluate response  - Consider cultural and social influences on pain and pain management  - Manage/alleviate anxiety  - Utilize distraction and/or relaxation techniques  - Monitor for opioid side effects  - Notify MD/LIP if interventions unsuccessful or patient reports new pain  - Anticipate increased pain with activity and pre-medicate as appropriate  Outcome: Progressing     Problem: DISCHARGE PLANNING  Goal: Discharge to home or other facility with appropriate resources  Description: INTERVENTIONS:  - Identify barriers to discharge w/pt and caregiver  - Include patient/family/discharge partner in discharge planning  - Arrange for needed discharge resources and transportation as appropriate  - Identify discharge learning needs (meds, wound care, etc)  - Arrange for interpreters to assist at discharge as needed  - Consider post-discharge preferences of patient/family/discharge partner  - Complete POLST form as appropriate  - Assess patient's ability to be responsible for managing their own health  - Refer to Case Management Department for coordinating discharge planning if the patient needs post-hospital services based on physician/LIP order or complex needs related to functional status, cognitive ability or social support system  Outcome: Progressing

## 2023-07-28 NOTE — CM/SW NOTE
NRD: 7/26-7/28- Patient has until 7/29 midnight to admit.     [facility]

## 2023-08-16 ENCOUNTER — TELEPHONE (OUTPATIENT)
Dept: INTERNAL MEDICINE CLINIC | Facility: CLINIC | Age: 88
End: 2023-08-16

## 2023-08-16 NOTE — TELEPHONE ENCOUNTER
Justin Padron / Caregiver is calling on behalf of the patient she is with the patient    Patient is questioning why is she taking Escitlopram 10 mg    Please call [phone]

## 2023-08-17 NOTE — TELEPHONE ENCOUNTER
Called patient relaying Coreen Burns MD message below. Printed and sent Medication list to home address today.

## 2023-09-07 RX ORDER — AMLODIPINE BESYLATE 5 MG/1
TABLET ORAL
Qty: 90 TABLET | Refills: 3 | Status: SHIPPED | OUTPATIENT
Start: 2023-09-07

## 2023-09-07 RX ORDER — PREGABALIN 50 MG/1
50 CAPSULE ORAL 2 TIMES DAILY
Qty: 180 CAPSULE | Refills: 1 | Status: SHIPPED | OUTPATIENT
Start: 2023-09-07

## 2023-09-07 RX ORDER — TRIAMCINOLONE ACETONIDE 1 MG/G
CREAM TOPICAL
Qty: 45 G | Refills: 3 | Status: SHIPPED | OUTPATIENT
Start: 2023-09-07

## 2023-09-07 RX ORDER — ESCITALOPRAM OXALATE 5 MG/1
15 TABLET ORAL EVERY MORNING
Qty: 270 TABLET | Refills: 3 | Status: SHIPPED | OUTPATIENT
Start: 2023-09-07

## 2023-09-07 RX ORDER — QUETIAPINE FUMARATE 25 MG/1
TABLET, FILM COATED ORAL
Qty: 90 TABLET | Refills: 3 | Status: SHIPPED | OUTPATIENT
Start: 2023-09-07

## 2023-09-07 NOTE — TELEPHONE ENCOUNTER
To Dr. Isela Headley for original RXs, pt has appt coming up 11/9/23. RX printed for pregabalin 7/27/23 for qty 60 plus 0 but the rest of below meds as listed as historical, I don't see that we ever sent these for her. Per [place]  pregabalin dispensed 8/15/23 for qty 61 RX by Dr. Sophie Solis.     I called pt, spoke with her  Kely Galarza, HIPAA confirmed - he confirmed Dr. Isela Headley is the PCP and requests refills from [facility]

## 2023-09-07 NOTE — TELEPHONE ENCOUNTER
Rx'es sent -- except for the OTC meds -- docusate, vitamin D, vit B12, tylenol    Please tell  that cholecalciferol and vitamin D are the same medication. She has both vitamin D 2000 units and cholecalciferol 5000 units listed.   Just take the 5000 units/day

## 2023-09-08 RX ORDER — MELATONIN
1000 DAILY
Qty: 90 TABLET | Refills: 3 | OUTPATIENT
Start: 2023-09-08

## 2023-09-08 RX ORDER — ACETAMINOPHEN 160 MG
TABLET,DISINTEGRATING ORAL
Qty: 90 CAPSULE | Refills: 3 | OUTPATIENT
Start: 2023-09-08

## 2023-09-08 RX ORDER — PSEUDOEPHED/ACETAMINOPH/DIPHEN 30MG-500MG
TABLET ORAL
Qty: 60 TABLET | Refills: 11 | OUTPATIENT
Start: 2023-09-08

## 2023-09-08 RX ORDER — DOCUSATE SODIUM 100 MG/1
100 CAPSULE, LIQUID FILLED ORAL 2 TIMES DAILY PRN
Qty: 180 CAPSULE | Refills: 3 | OUTPATIENT
Start: 2023-09-08

## 2023-09-08 NOTE — TELEPHONE ENCOUNTER
Refill request has failed the Ambulatory Medication Refill Standing Order and is routed to the primary physician to review the following:    Requested Prescriptions     Signed Prescriptions Disp Refills    PREGABALIN 50 MG Oral Cap 180 capsule 1     Sig: TAKE 1 CAPSULE BY MOUTH TWICE DAILY     Authorizing Provider: Magdi Cedeño    QUETIAPINE 25 MG Oral Tab 90 tablet 3     Sig: TAKE 1 TABLET BY MOUTH EVERY NIGHT AT BEDTIME FOR HALLUCINATIONS     Authorizing Provider: Magdi Cedeño    AMLODIPINE 5 MG Oral Tab 90 tablet 3     Sig: TAKE 1 TABLET BY MOUTH EVERY DAY. HOLD FOR SYSTOLIC BLOOD PRESSURE< 463     Authorizing Provider: Magdi Cedeño    TRIAMCINOLONE 0.1 % External Cream 45 g 3     Sig: APPLY TOPICALLY TO THE AFFECTED AREA TWICE DAILY FOR RASH     Authorizing Provider: Magdi Cedeño    escitalopram 5 MG Oral Tab 270 tablet 3     Sig: Take 3 tablets (15 mg total) by mouth every morning. Authorizing Provider: Magdi Cedeño     Refused Prescriptions Disp Refills    DOCUSATE SODIUM 100 MG Oral Cap [Pharmacy Med Name: DOCUSATE SOD 100MG CAPSULES] 180 capsule 3     Sig: TAKE 1 CAPSULE BY MOUTH TWICE DAILY AS NEEDED FOR CONSTIPATION     Refused By: Carole Grace     Reason for Refusal: Refill not appropriate    ACETAMINOPHEN EXTRA STRENGTH 500 MG Oral Tab [Pharmacy Med Name: ACETAMINOPHEN 500MG E/S CAPLETS] 60 tablet 11     Sig: TAKE 2 TABLETS BY MOUTH EVERY 8 HOURS AS NEEDED FOR PAIN.  NOT TO EXCEED 3 GRAMS A DAY OF ACETAMINOPHEN FROM ALL SOURCES     Refused By: Carole Grace     Reason for Refusal: Refill not appropriate    VITAMIN D3 125 MCG (5000 UT) Oral Cap [Pharmacy Med Name: VITAMIN D3 5000 UNIT CAPSULES] 90 capsule 3     Sig: TAKE 1 TABLET BY MOUTH EVERY DAY     Refused By: Carole Grace     Reason for Refusal: Refill not appropriate    CHOLECALCIFEROL 50 MCG (2000 UT) Oral Cap [Pharmacy Med Name: VITAMIN D3 2,000UNIT CAPSULES] 90 capsule 3     Sig: TAKE 1 CAPSULE BY MOUTH EVERY DAY FOR SUPPLEMENT ALONG WITH 5000 USE CAPSULE FOR TOTAL OF 7000 IU A DAY     Refused By: Ambar Gabriel     Reason for Refusal: Refill not appropriate    CYANOCOBALAMIN 1000 MCG Oral Tab [Pharmacy Med Name: VITAMIN B-12 1000MCG TABLETS] 90 tablet 3     Sig: TAKE 1 TABLET BY MOUTH EVERY DAY     Refused By: Ambar Gabriel     Reason for Refusal: Refill not appropriate       S/w pt's  and relayed below msg in detail with verbal understanding

## 2023-11-09 ENCOUNTER — LAB ENCOUNTER (OUTPATIENT)
Dept: LAB | Age: 88
End: 2023-11-09
Attending: INTERNAL MEDICINE
Payer: MEDICARE

## 2023-11-09 ENCOUNTER — OFFICE VISIT (OUTPATIENT)
Dept: INTERNAL MEDICINE CLINIC | Facility: CLINIC | Age: 88
End: 2023-11-09

## 2023-11-09 VITALS
DIASTOLIC BLOOD PRESSURE: 70 MMHG | TEMPERATURE: 96 F | HEART RATE: 89 BPM | WEIGHT: 125.38 LBS | SYSTOLIC BLOOD PRESSURE: 122 MMHG | BODY MASS INDEX: 22.21 KG/M2 | HEIGHT: 63 IN | OXYGEN SATURATION: 95 %

## 2023-11-09 DIAGNOSIS — D50.0 IRON DEFICIENCY ANEMIA DUE TO CHRONIC BLOOD LOSS: ICD-10-CM

## 2023-11-09 DIAGNOSIS — E53.8 VITAMIN B12 DEFICIENCY: Primary | ICD-10-CM

## 2023-11-09 DIAGNOSIS — E55.9 VITAMIN D DEFICIENCY: ICD-10-CM

## 2023-11-09 DIAGNOSIS — J44.9 CHRONIC OBSTRUCTIVE PULMONARY DISEASE, UNSPECIFIED COPD TYPE (HCC): ICD-10-CM

## 2023-11-09 DIAGNOSIS — M81.0 AGE-RELATED OSTEOPOROSIS WITHOUT CURRENT PATHOLOGICAL FRACTURE: ICD-10-CM

## 2023-11-09 DIAGNOSIS — F32.A ANXIETY AND DEPRESSION: ICD-10-CM

## 2023-11-09 DIAGNOSIS — Z00.00 ROUTINE HEALTH MAINTENANCE: ICD-10-CM

## 2023-11-09 DIAGNOSIS — F41.9 ANXIETY AND DEPRESSION: ICD-10-CM

## 2023-11-09 DIAGNOSIS — E53.8 VITAMIN B12 DEFICIENCY: ICD-10-CM

## 2023-11-09 PROBLEM — J96.01 ACUTE HYPOXEMIC RESPIRATORY FAILURE (HCC): Status: RESOLVED | Noted: 2023-07-16 | Resolved: 2023-11-09

## 2023-11-09 PROBLEM — J18.9 PNEUMONIA OF BOTH LOWER LOBES DUE TO INFECTIOUS ORGANISM: Status: RESOLVED | Noted: 2023-07-16 | Resolved: 2023-11-09

## 2023-11-09 PROBLEM — F05 DELIRIUM DUE TO ANOTHER MEDICAL CONDITION: Status: RESOLVED | Noted: 2023-07-22 | Resolved: 2023-11-09

## 2023-11-09 PROBLEM — R53.1 GENERALIZED WEAKNESS: Status: RESOLVED | Noted: 2023-07-14 | Resolved: 2023-11-09

## 2023-11-09 LAB
ALBUMIN SERPL-MCNC: 4.4 G/DL (ref 3.2–4.8)
ALBUMIN/GLOB SERPL: 1.4 {RATIO} (ref 1–2)
ALP LIVER SERPL-CCNC: 78 U/L
ALT SERPL-CCNC: 10 U/L
ANION GAP SERPL CALC-SCNC: 6 MMOL/L (ref 0–18)
AST SERPL-CCNC: 16 U/L (ref ?–34)
BASOPHILS # BLD AUTO: 0.05 X10(3) UL (ref 0–0.2)
BASOPHILS NFR BLD AUTO: 0.5 %
BILIRUB SERPL-MCNC: 0.3 MG/DL (ref 0.2–1.1)
BUN BLD-MCNC: 14 MG/DL (ref 9–23)
BUN/CREAT SERPL: 15.7 (ref 10–20)
CALCIUM BLD-MCNC: 10.2 MG/DL (ref 8.7–10.4)
CHLORIDE SERPL-SCNC: 103 MMOL/L (ref 98–112)
CO2 SERPL-SCNC: 31 MMOL/L (ref 21–32)
CREAT BLD-MCNC: 0.89 MG/DL
DEPRECATED HBV CORE AB SER IA-ACNC: 50 NG/ML
DEPRECATED RDW RBC AUTO: 44.7 FL (ref 35.1–46.3)
EGFRCR SERPLBLD CKD-EPI 2021: 62 ML/MIN/1.73M2 (ref 60–?)
EOSINOPHIL # BLD AUTO: 0.2 X10(3) UL (ref 0–0.7)
EOSINOPHIL NFR BLD AUTO: 2.2 %
ERYTHROCYTE [DISTWIDTH] IN BLOOD BY AUTOMATED COUNT: 13.1 % (ref 11–15)
FASTING STATUS PATIENT QL REPORTED: NO
GLOBULIN PLAS-MCNC: 3.2 G/DL (ref 2.8–4.4)
GLUCOSE BLD-MCNC: 112 MG/DL (ref 70–99)
HCT VFR BLD AUTO: 46.8 %
HGB BLD-MCNC: 14.6 G/DL
IMM GRANULOCYTES # BLD AUTO: 0.02 X10(3) UL (ref 0–1)
IMM GRANULOCYTES NFR BLD: 0.2 %
IRON SATN MFR SERPL: 15 %
IRON SERPL-MCNC: 45 UG/DL
LYMPHOCYTES # BLD AUTO: 1.61 X10(3) UL (ref 1–4)
LYMPHOCYTES NFR BLD AUTO: 17.6 %
MCH RBC QN AUTO: 28.9 PG (ref 26–34)
MCHC RBC AUTO-ENTMCNC: 31.2 G/DL (ref 31–37)
MCV RBC AUTO: 92.5 FL
MONOCYTES # BLD AUTO: 0.42 X10(3) UL (ref 0.1–1)
MONOCYTES NFR BLD AUTO: 4.6 %
NEUTROPHILS # BLD AUTO: 6.85 X10 (3) UL (ref 1.5–7.7)
NEUTROPHILS # BLD AUTO: 6.85 X10(3) UL (ref 1.5–7.7)
NEUTROPHILS NFR BLD AUTO: 74.9 %
OSMOLALITY SERPL CALC.SUM OF ELEC: 291 MOSM/KG (ref 275–295)
PLATELET # BLD AUTO: 245 10(3)UL (ref 150–450)
POTASSIUM SERPL-SCNC: 4.1 MMOL/L (ref 3.5–5.1)
PROT SERPL-MCNC: 7.6 G/DL (ref 5.7–8.2)
RBC # BLD AUTO: 5.06 X10(6)UL
SODIUM SERPL-SCNC: 140 MMOL/L (ref 136–145)
TIBC SERPL-MCNC: 307 UG/DL (ref 250–425)
TRANSFERRIN SERPL-MCNC: 206 MG/DL (ref 250–380)
VIT B12 SERPL-MCNC: 1241 PG/ML (ref 211–911)
WBC # BLD AUTO: 9.2 X10(3) UL (ref 4–11)

## 2023-11-09 PROCEDURE — 83540 ASSAY OF IRON: CPT

## 2023-11-09 PROCEDURE — 3074F SYST BP LT 130 MM HG: CPT | Performed by: INTERNAL MEDICINE

## 2023-11-09 PROCEDURE — G0008 ADMIN INFLUENZA VIRUS VAC: HCPCS | Performed by: INTERNAL MEDICINE

## 2023-11-09 PROCEDURE — 99214 OFFICE O/P EST MOD 30 MIN: CPT | Performed by: INTERNAL MEDICINE

## 2023-11-09 PROCEDURE — 1125F AMNT PAIN NOTED PAIN PRSNT: CPT | Performed by: INTERNAL MEDICINE

## 2023-11-09 PROCEDURE — 3078F DIAST BP <80 MM HG: CPT | Performed by: INTERNAL MEDICINE

## 2023-11-09 PROCEDURE — 90662 IIV NO PRSV INCREASED AG IM: CPT | Performed by: INTERNAL MEDICINE

## 2023-11-09 PROCEDURE — 36415 COLL VENOUS BLD VENIPUNCTURE: CPT

## 2023-11-09 PROCEDURE — 85025 COMPLETE CBC W/AUTO DIFF WBC: CPT

## 2023-11-09 PROCEDURE — 82607 VITAMIN B-12: CPT

## 2023-11-09 PROCEDURE — 80053 COMPREHEN METABOLIC PANEL: CPT

## 2023-11-09 PROCEDURE — 1159F MED LIST DOCD IN RCRD: CPT | Performed by: INTERNAL MEDICINE

## 2023-11-09 PROCEDURE — 82728 ASSAY OF FERRITIN: CPT

## 2023-11-09 PROCEDURE — 84466 ASSAY OF TRANSFERRIN: CPT

## 2023-11-09 PROCEDURE — 3008F BODY MASS INDEX DOCD: CPT | Performed by: INTERNAL MEDICINE

## 2023-11-14 ENCOUNTER — TELEPHONE (OUTPATIENT)
Dept: INTERNAL MEDICINE CLINIC | Facility: CLINIC | Age: 88
End: 2023-11-14

## 2023-11-28 ENCOUNTER — TELEPHONE (OUTPATIENT)
Dept: INTERNAL MEDICINE CLINIC | Facility: CLINIC | Age: 88
End: 2023-11-28

## 2023-12-14 ENCOUNTER — TELEPHONE (OUTPATIENT)
Dept: INTERNAL MEDICINE CLINIC | Facility: CLINIC | Age: 88
End: 2023-12-14

## 2023-12-14 NOTE — TELEPHONE ENCOUNTER
Marek/Melissa called.   Patient is requesting that oxygen be picked up  [address] requesting order to remove oxygen and overnight test   FAX#:  [phone]  Didi Alan can be reached at [phone], option 1  Tasked to nursing

## 2023-12-18 NOTE — TELEPHONE ENCOUNTER
Order faxed today 12/18 to [phone]. Confirmation received. [facility].

## 2024-01-15 ENCOUNTER — APPOINTMENT (OUTPATIENT)
Dept: CV DIAGNOSTICS | Facility: HOSPITAL | Age: 89
End: 2024-01-15
Attending: INTERNAL MEDICINE
Payer: MEDICARE

## 2024-01-15 ENCOUNTER — APPOINTMENT (OUTPATIENT)
Dept: GENERAL RADIOLOGY | Facility: HOSPITAL | Age: 89
End: 2024-01-15
Payer: MEDICARE

## 2024-01-15 ENCOUNTER — HOSPITAL ENCOUNTER (INPATIENT)
Facility: HOSPITAL | Age: 89
LOS: 6 days | Discharge: NURSING, REHAB OR LONG-TERM CARE | End: 2024-01-21
Attending: INTERNAL MEDICINE | Admitting: INTERNAL MEDICINE
Payer: MEDICARE

## 2024-01-15 DIAGNOSIS — J96.01 ACUTE RESPIRATORY FAILURE WITH HYPOXIA (HCC): Primary | ICD-10-CM

## 2024-01-15 DIAGNOSIS — R50.9 FEBRILE ILLNESS: ICD-10-CM

## 2024-01-15 LAB
ADENOVIRUS PCR:: NOT DETECTED
ALBUMIN SERPL-MCNC: 4.2 G/DL (ref 3.2–4.8)
ALBUMIN/GLOB SERPL: 1.4 {RATIO} (ref 1–2)
ALP LIVER SERPL-CCNC: 90 U/L
ALT SERPL-CCNC: 34 U/L
ANION GAP SERPL CALC-SCNC: 10 MMOL/L (ref 0–18)
ANION GAP SERPL CALC-SCNC: 6 MMOL/L (ref 0–18)
APTT PPP: 26.4 SECONDS (ref 23.3–35.6)
APTT PPP: 92.3 SECONDS (ref 23.3–35.6)
AST SERPL-CCNC: 41 U/L (ref ?–34)
ATRIAL RATE: 105 BPM
ATRIAL RATE: 160 BPM
B PARAPERT DNA SPEC QL NAA+PROBE: NOT DETECTED
B PERT DNA SPEC QL NAA+PROBE: NOT DETECTED
BASOPHILS # BLD AUTO: 0.03 X10(3) UL (ref 0–0.2)
BASOPHILS NFR BLD AUTO: 0.3 %
BILIRUB SERPL-MCNC: 1.3 MG/DL (ref 0.2–1.1)
BILIRUB UR QL: NEGATIVE
BNP SERPL-MCNC: 28 PG/ML
BUN BLD-MCNC: 19 MG/DL (ref 9–23)
BUN BLD-MCNC: 20 MG/DL (ref 9–23)
BUN/CREAT SERPL: 18.3 (ref 10–20)
BUN/CREAT SERPL: 19.2 (ref 10–20)
C PNEUM DNA SPEC QL NAA+PROBE: NOT DETECTED
CALCIUM BLD-MCNC: 8.6 MG/DL (ref 8.7–10.4)
CALCIUM BLD-MCNC: 9.9 MG/DL (ref 8.7–10.4)
CHLORIDE SERPL-SCNC: 105 MMOL/L (ref 98–112)
CHLORIDE SERPL-SCNC: 108 MMOL/L (ref 98–112)
CHOLEST SERPL-MCNC: 225 MG/DL (ref ?–200)
CO2 SERPL-SCNC: 24 MMOL/L (ref 21–32)
CO2 SERPL-SCNC: 26 MMOL/L (ref 21–32)
COLOR UR: YELLOW
CORONAVIRUS 229E PCR:: NOT DETECTED
CORONAVIRUS HKU1 PCR:: NOT DETECTED
CORONAVIRUS NL63 PCR:: NOT DETECTED
CORONAVIRUS OC43 PCR:: NOT DETECTED
CREAT BLD-MCNC: 1.04 MG/DL
CREAT BLD-MCNC: 1.04 MG/DL
DEPRECATED RDW RBC AUTO: 43.6 FL (ref 35.1–46.3)
DEPRECATED RDW RBC AUTO: 45 FL (ref 35.1–46.3)
EGFRCR SERPLBLD CKD-EPI 2021: 52 ML/MIN/1.73M2 (ref 60–?)
EGFRCR SERPLBLD CKD-EPI 2021: 52 ML/MIN/1.73M2 (ref 60–?)
EOSINOPHIL # BLD AUTO: 0.01 X10(3) UL (ref 0–0.7)
EOSINOPHIL NFR BLD AUTO: 0.1 %
ERYTHROCYTE [DISTWIDTH] IN BLOOD BY AUTOMATED COUNT: 13.3 % (ref 11–15)
ERYTHROCYTE [DISTWIDTH] IN BLOOD BY AUTOMATED COUNT: 13.7 % (ref 11–15)
FLUAV + FLUBV RNA SPEC NAA+PROBE: NEGATIVE
FLUAV + FLUBV RNA SPEC NAA+PROBE: NEGATIVE
FLUAV RNA SPEC QL NAA+PROBE: NOT DETECTED
FLUBV RNA SPEC QL NAA+PROBE: NOT DETECTED
GLOBULIN PLAS-MCNC: 2.9 G/DL (ref 2.8–4.4)
GLUCOSE BLD-MCNC: 158 MG/DL (ref 70–99)
GLUCOSE BLD-MCNC: 173 MG/DL (ref 70–99)
GLUCOSE BLDC GLUCOMTR-MCNC: 154 MG/DL (ref 70–99)
GLUCOSE UR-MCNC: NORMAL MG/DL
HCT VFR BLD AUTO: 36.3 %
HCT VFR BLD AUTO: 45.2 %
HDLC SERPL-MCNC: 69 MG/DL (ref 40–59)
HGB BLD-MCNC: 11.9 G/DL
HGB BLD-MCNC: 14.5 G/DL
IMM GRANULOCYTES # BLD AUTO: 0.04 X10(3) UL (ref 0–1)
IMM GRANULOCYTES NFR BLD: 0.4 %
KETONES UR-MCNC: NEGATIVE MG/DL
LACTATE SERPL-SCNC: 1.8 MMOL/L (ref 0.5–2)
LACTATE SERPL-SCNC: 2.2 MMOL/L (ref 0.5–2)
LACTATE SERPL-SCNC: 4.6 MMOL/L (ref 0.5–2)
LDLC SERPL CALC-MCNC: 140 MG/DL (ref ?–100)
LEUKOCYTE ESTERASE UR QL STRIP.AUTO: 500
LYMPHOCYTES # BLD AUTO: 0.71 X10(3) UL (ref 1–4)
LYMPHOCYTES NFR BLD AUTO: 6.7 %
MAGNESIUM SERPL-MCNC: 1.6 MG/DL (ref 1.6–2.6)
MCH RBC QN AUTO: 28.7 PG (ref 26–34)
MCH RBC QN AUTO: 29.5 PG (ref 26–34)
MCHC RBC AUTO-ENTMCNC: 32.1 G/DL (ref 31–37)
MCHC RBC AUTO-ENTMCNC: 32.8 G/DL (ref 31–37)
MCV RBC AUTO: 89.3 FL
MCV RBC AUTO: 90.1 FL
METAPNEUMOVIRUS PCR:: NOT DETECTED
MONOCYTES # BLD AUTO: 0.02 X10(3) UL (ref 0.1–1)
MONOCYTES NFR BLD AUTO: 0.2 %
MYCOPLASMA PNEUMONIA PCR:: NOT DETECTED
NEUTROPHILS # BLD AUTO: 9.73 X10 (3) UL (ref 1.5–7.7)
NEUTROPHILS # BLD AUTO: 9.73 X10(3) UL (ref 1.5–7.7)
NEUTROPHILS NFR BLD AUTO: 92.3 %
NONHDLC SERPL-MCNC: 156 MG/DL (ref ?–130)
OSMOLALITY SERPL CALC.SUM OF ELEC: 294 MOSM/KG (ref 275–295)
OSMOLALITY SERPL CALC.SUM OF ELEC: 297 MOSM/KG (ref 275–295)
P AXIS: 49 DEGREES
P AXIS: 66 DEGREES
P-R INTERVAL: 130 MS
P-R INTERVAL: 140 MS
PARAINFLUENZA 1 PCR:: NOT DETECTED
PARAINFLUENZA 2 PCR:: NOT DETECTED
PARAINFLUENZA 3 PCR:: NOT DETECTED
PARAINFLUENZA 4 PCR:: NOT DETECTED
PH UR: 5.5 [PH] (ref 5–8)
PLATELET # BLD AUTO: 110 10(3)UL (ref 150–450)
PLATELET # BLD AUTO: 79 10(3)UL (ref 150–450)
POTASSIUM SERPL-SCNC: 3.4 MMOL/L (ref 3.5–5.1)
POTASSIUM SERPL-SCNC: 3.9 MMOL/L (ref 3.5–5.1)
PROCALCITONIN SERPL-MCNC: 0.45 NG/ML (ref ?–0.05)
PROT SERPL-MCNC: 7.1 G/DL (ref 5.7–8.2)
PROT UR-MCNC: 50 MG/DL
Q-T INTERVAL: 258 MS
Q-T INTERVAL: 346 MS
QRS DURATION: 72 MS
QRS DURATION: 80 MS
QTC CALCULATION (BEZET): 420 MS
QTC CALCULATION (BEZET): 457 MS
R AXIS: 102 DEGREES
R AXIS: 147 DEGREES
RBC # BLD AUTO: 4.03 X10(6)UL
RBC # BLD AUTO: 5.06 X10(6)UL
RBC #/AREA URNS AUTO: >10 /HPF
RHINOVIRUS/ENTERO PCR:: NOT DETECTED
RSV RNA SPEC NAA+PROBE: NEGATIVE
RSV RNA SPEC QL NAA+PROBE: NOT DETECTED
SARS-COV-2 RNA NPH QL NAA+NON-PROBE: NOT DETECTED
SARS-COV-2 RNA RESP QL NAA+PROBE: NOT DETECTED
SODIUM SERPL-SCNC: 139 MMOL/L (ref 136–145)
SODIUM SERPL-SCNC: 140 MMOL/L (ref 136–145)
SP GR UR STRIP: 1.02 (ref 1–1.03)
T AXIS: 49 DEGREES
T AXIS: 71 DEGREES
TRIGL SERPL-MCNC: 94 MG/DL (ref 30–149)
TROPONIN I SERPL HS-MCNC: 199 NG/L
TROPONIN I SERPL HS-MCNC: 2249 NG/L
TROPONIN I SERPL HS-MCNC: 3228 NG/L
UROBILINOGEN UR STRIP-ACNC: NORMAL
VENTRICULAR RATE: 105 BPM
VENTRICULAR RATE: 160 BPM
VLDLC SERPL CALC-MCNC: 17 MG/DL (ref 0–30)
WBC # BLD AUTO: 10.5 X10(3) UL (ref 4–11)
WBC # BLD AUTO: 33.3 X10(3) UL (ref 4–11)
WBC #/AREA URNS AUTO: >50 /HPF

## 2024-01-15 PROCEDURE — 93306 TTE W/DOPPLER COMPLETE: CPT | Performed by: INTERNAL MEDICINE

## 2024-01-15 PROCEDURE — 99291 CRITICAL CARE FIRST HOUR: CPT | Performed by: INTERNAL MEDICINE

## 2024-01-15 PROCEDURE — 71045 X-RAY EXAM CHEST 1 VIEW: CPT

## 2024-01-15 PROCEDURE — 99223 1ST HOSP IP/OBS HIGH 75: CPT | Performed by: INTERNAL MEDICINE

## 2024-01-15 RX ORDER — HEPARIN SODIUM 1000 [USP'U]/ML
60 INJECTION, SOLUTION INTRAVENOUS; SUBCUTANEOUS ONCE
Status: COMPLETED | OUTPATIENT
Start: 2024-01-15 | End: 2024-01-15

## 2024-01-15 RX ORDER — METHYLPREDNISOLONE SODIUM SUCCINATE 125 MG/2ML
60 INJECTION, POWDER, LYOPHILIZED, FOR SOLUTION INTRAMUSCULAR; INTRAVENOUS ONCE
Status: COMPLETED | OUTPATIENT
Start: 2024-01-15 | End: 2024-01-15

## 2024-01-15 RX ORDER — HEPARIN SODIUM AND DEXTROSE 10000; 5 [USP'U]/100ML; G/100ML
INJECTION INTRAVENOUS CONTINUOUS
Status: DISCONTINUED | OUTPATIENT
Start: 2024-01-15 | End: 2024-01-15

## 2024-01-15 RX ORDER — HEPARIN SODIUM 5000 [USP'U]/ML
5000 INJECTION, SOLUTION INTRAVENOUS; SUBCUTANEOUS EVERY 12 HOURS SCHEDULED
Status: DISCONTINUED | OUTPATIENT
Start: 2024-01-15 | End: 2024-01-15

## 2024-01-15 RX ORDER — HEPARIN SODIUM AND DEXTROSE 10000; 5 [USP'U]/100ML; G/100ML
INJECTION INTRAVENOUS CONTINUOUS
Status: DISCONTINUED | OUTPATIENT
Start: 2024-01-15 | End: 2024-01-17

## 2024-01-15 RX ORDER — SODIUM CHLORIDE 9 MG/ML
INJECTION, SOLUTION INTRAVENOUS CONTINUOUS
Status: DISCONTINUED | OUTPATIENT
Start: 2024-01-15 | End: 2024-01-20

## 2024-01-15 RX ORDER — IPRATROPIUM BROMIDE AND ALBUTEROL SULFATE 2.5; .5 MG/3ML; MG/3ML
3 SOLUTION RESPIRATORY (INHALATION) ONCE
Status: COMPLETED | OUTPATIENT
Start: 2024-01-15 | End: 2024-01-15

## 2024-01-15 RX ORDER — MAGNESIUM OXIDE 400 MG/1
400 TABLET ORAL ONCE
Status: COMPLETED | OUTPATIENT
Start: 2024-01-15 | End: 2024-01-15

## 2024-01-15 RX ORDER — HEPARIN SODIUM AND DEXTROSE 10000; 5 [USP'U]/100ML; G/100ML
12 INJECTION INTRAVENOUS ONCE
Status: COMPLETED | OUTPATIENT
Start: 2024-01-15 | End: 2024-01-15

## 2024-01-15 RX ORDER — ACETAMINOPHEN 650 MG/1
650 SUPPOSITORY RECTAL ONCE
Status: COMPLETED | OUTPATIENT
Start: 2024-01-15 | End: 2024-01-15

## 2024-01-15 RX ORDER — ALPRAZOLAM 0.25 MG/1
0.25 TABLET ORAL NIGHTLY PRN
Status: DISCONTINUED | OUTPATIENT
Start: 2024-01-15 | End: 2024-01-21

## 2024-01-15 RX ORDER — POLYETHYLENE GLYCOL 3350 17 G/17G
17 POWDER, FOR SOLUTION ORAL DAILY PRN
Status: DISCONTINUED | OUTPATIENT
Start: 2024-01-15 | End: 2024-01-21

## 2024-01-15 RX ORDER — POTASSIUM CHLORIDE 1.5 G/1.58G
40 POWDER, FOR SOLUTION ORAL ONCE
Status: COMPLETED | OUTPATIENT
Start: 2024-01-15 | End: 2024-01-15

## 2024-01-15 RX ORDER — MELATONIN
325
Status: DISCONTINUED | OUTPATIENT
Start: 2024-01-16 | End: 2024-01-21

## 2024-01-15 RX ORDER — PREGABALIN 25 MG/1
50 CAPSULE ORAL 2 TIMES DAILY
Status: DISCONTINUED | OUTPATIENT
Start: 2024-01-15 | End: 2024-01-21

## 2024-01-15 RX ORDER — MORPHINE SULFATE 4 MG/ML
6 INJECTION, SOLUTION INTRAMUSCULAR; INTRAVENOUS ONCE
Status: DISCONTINUED | OUTPATIENT
Start: 2024-01-15 | End: 2024-01-15

## 2024-01-15 RX ORDER — HEPARIN SODIUM 1000 [USP'U]/ML
60 INJECTION, SOLUTION INTRAVENOUS; SUBCUTANEOUS ONCE
Qty: 10 ML | Refills: 0 | Status: DISCONTINUED | OUTPATIENT
Start: 2024-01-15 | End: 2024-01-15

## 2024-01-15 RX ORDER — HEPARIN SODIUM AND DEXTROSE 10000; 5 [USP'U]/100ML; G/100ML
12 INJECTION INTRAVENOUS ONCE
Qty: 250 ML | Refills: 0 | Status: DISCONTINUED | OUTPATIENT
Start: 2024-01-15 | End: 2024-01-15

## 2024-01-15 RX ORDER — QUETIAPINE FUMARATE 25 MG/1
25 TABLET, FILM COATED ORAL NIGHTLY
Status: DISCONTINUED | OUTPATIENT
Start: 2024-01-15 | End: 2024-01-21

## 2024-01-15 RX ORDER — ARIPIPRAZOLE 2 MG/1
0.5 TABLET ORAL DAILY
Status: DISCONTINUED | OUTPATIENT
Start: 2024-01-15 | End: 2024-01-21

## 2024-01-15 NOTE — PLAN OF CARE
Received patient AO x4 on 5L HFNC from ED. Troponin & Platelets increased from prior, OK to start Heparin gtt per Cardiolgoy Dr. Felix. Next PTT at 2130. IVFs continued. UA with Culture collected. Blood cultures resulted positive for E coli. Extended length PIV placed by ICU APN. Purewick in place. Admission navigator completed. Family updated on plan of care. Safety measures maintained, no signs of injury.    1815- patient with more frequent forgetfulness towards evening. RN explained purpose of medications several times as patient would repeatedly ask for the purpose of them. Patient AO x4 at this time but forgetful.    Problem: CARDIOVASCULAR - ADULT  Goal: Maintains optimal cardiac output and hemodynamic stability  Description: INTERVENTIONS:  - Monitor vital signs, rhythm, and trends  - Monitor for bleeding, hypotension and signs of decreased cardiac output  - Evaluate effectiveness of vasoactive medications to optimize hemodynamic stability  - Monitor arterial and/or venous puncture sites for bleeding and/or hematoma  - Assess quality of pulses, skin color and temperature  - Assess for signs of decreased coronary artery perfusion - ex. Angina  - Evaluate fluid balance, assess for edema, trend weights  Outcome: Progressing  Goal: Absence of cardiac arrhythmias or at baseline  Description: INTERVENTIONS:  - Continuous cardiac monitoring, monitor vital signs, obtain 12 lead EKG if indicated  - Evaluate effectiveness of antiarrhythmic and heart rate control medications as ordered  - Initiate emergency measures for life threatening arrhythmias  - Monitor electrolytes and administer replacement therapy as ordered  Outcome: Progressing     Problem: RESPIRATORY - ADULT  Goal: Achieves optimal ventilation and oxygenation  Description: INTERVENTIONS:  - Assess for changes in respiratory status  - Assess for changes in mentation and behavior  - Position to facilitate oxygenation and minimize respiratory effort  - Oxygen  supplementation based on oxygen saturation or ABGs  - Provide Smoking Cessation handout, if applicable  - Encourage broncho-pulmonary hygiene including cough, deep breathe, Incentive Spirometry  - Assess the need for suctioning and perform as needed  - Assess and instruct to report SOB or any respiratory difficulty  - Respiratory Therapy support as indicated  - Manage/alleviate anxiety  - Monitor for signs/symptoms of CO2 retention  Outcome: Progressing     Problem: GENITOURINARY - ADULT  Goal: Absence of urinary retention  Description: INTERVENTIONS:  - Assess patient’s ability to void and empty bladder  - Monitor intake/output and perform bladder scan as needed  - Follow urinary retention protocol/standard of care  - Consider collaborating with pharmacy to review patient's medication profile  - Implement strategies to promote bladder emptying  Outcome: Progressing     Problem: METABOLIC/FLUID AND ELECTROLYTES - ADULT  Goal: Electrolytes maintained within normal limits  Description: INTERVENTIONS:  - Monitor labs and rhythm and assess patient for signs and symptoms of electrolyte imbalances  - Administer electrolyte replacement as ordered  - Monitor response to electrolyte replacements, including rhythm and repeat lab results as appropriate  - Fluid restriction as ordered  - Instruct patient on fluid and nutrition restrictions as appropriate  Outcome: Progressing  Goal: Hemodynamic stability and optimal renal function maintained  Description: INTERVENTIONS:  - Monitor labs and assess for signs and symptoms of volume excess or deficit  - Monitor intake, output and patient weight  - Monitor urine specific gravity, serum osmolarity and serum sodium as indicated or ordered  - Monitor response to interventions for patient's volume status, including labs, urine output, blood pressure (other measures as available)  - Encourage oral intake as appropriate  - Instruct patient on fluid and nutrition restrictions as  appropriate  Outcome: Progressing     Problem: HEMATOLOGIC - ADULT  Goal: Maintains hematologic stability  Description: INTERVENTIONS  - Assess for signs and symptoms of bleeding or hemorrhage  - Monitor labs and vital signs for trends  - Administer supportive blood products/factors, fluids and medications as ordered and appropriate  - Administer supportive blood products/factors as ordered and appropriate  Outcome: Progressing     Problem: NEUROLOGICAL - ADULT  Goal: Achieves stable or improved neurological status  Description: INTERVENTIONS  - Assess for and report changes in neurological status  - Initiate measures to prevent increased intracranial pressure  - Maintain blood pressure and fluid volume within ordered parameters to optimize cerebral perfusion and minimize risk of hemorrhage  - Monitor temperature, glucose, and sodium. Initiate appropriate interventions as ordered  Outcome: Progressing

## 2024-01-15 NOTE — ED QUICK NOTES
RT at bedside to discontinue BIPAP at this time. Pt placed on 5L high flow NC and maintaining an spo2 of 94%.

## 2024-01-15 NOTE — ED INITIAL ASSESSMENT (HPI)
Patient presents to the ED via EMS from home c/o weakness and shortness of breath. Per medics, patient was hypoxic on room air, oxygen saturation between 83-84%. Received 1 neb treatment in route. Pt presents in respiratory distress; respirations noted as labored, mouth breathing, expiratory wheezing. ED MD at bedside, ED respiratory called.   Per EMS, history of COPD and pneumonia.  Dexi 155 in route.

## 2024-01-15 NOTE — PLAN OF CARE
Dr. Felix notified of plts = 79. Per alexandra BLEVINS to hold heparin gtt at this time.     JUAN M Friedman  1/15/2024  10:32 AM  Ph [phone] ([place])  Ph [phone] ([place])

## 2024-01-15 NOTE — ED PROVIDER NOTES
Patient Seen in: [facility] Emergency Department      History     Chief Complaint   Patient presents with    Difficulty Breathing    Weakness     Stated Complaint: weakness. shortness of breath    Subjective:   HPI    88-year-old female with COPD not on home O2 not feeling well yesterday per  brought into the ER tonight after  called 911 as he heard her moaning around 330 or 4 and went in and she was nearly unresponsive in respiratory distress.  No known fever.  He states she was cool to the touch at the time.  She had pneumonia in July.  No established lung doctor.  No significant cardiac history.  No reported recent antibiotic therapy.    Objective:   Past Medical History:   Diagnosis Date    Anemia     Basal cell carcinoma     Mouth    Calculus, kidney     COPD (chronic obstructive pulmonary disease) (HCC)     Esophageal reflux     Hematuria     History of recurrent UTIs 02/26/2015    Hypercholesterolemia     Kidney stone     Osteopenia     Other and unspecified hyperlipidemia     Recurrent UTI     Scoliosis     Spinal stenosis     Squamous cell cancer of lip     Thyroid nodule     Urinary frequency     Vitamin D deficiency               Past Surgical History:   Procedure Laterality Date    CATARACT Right 5/2016    CYSTO/URETERO W/LITHOTRIPSY Right 10/29/2015    Procedure: LITHOTRIPSY HOLMIUM LASER WITH CYSTOSCOPY;  Surgeon: Tommie Majano MD;  Location: [facility]    CYSTOSCOPY,INSERT URETERAL STENT Right 9/24/2015    Procedure: LITHOTRIPSY WITH CYSTOSCOPY, STENT PLACEMENT;  Surgeon: Tommie Majano MD;  Location: [facility]    FRAGMENTING OF KIDNEY STONE Right 9/24/2015    Procedure: LITHOTRIPSY WITH CYSTOSCOPY, STENT PLACEMENT;  Surgeon: Tommie Majano MD;  Location: [facility]    OTHER SURGICAL HISTORY  1960    Surgery of Blockage in Urethral Tube    OTHER SURGICAL HISTORY  1985 and on 08/24/00    ESWL - left    OTHER SURGICAL HISTORY   00    Cystourethroscopy with Urethral Calibration and Dilation with Bilateral Retrograde Pyelogram and Left Ureteral Stone Manipulation with insertion of Double-J Stent    OTHER SURGICAL HISTORY  2015    R eye surgery for bleed and then development of blood clot--resolved    OTHER SURGICAL HISTORY  16    Cystoscpy stent removal    OTHER SURGICAL HISTORY      OTHER SURGICAL HISTORY  2017    Mohs surgery    PATIENT DOCUMENTED NOT TO HAVE EXPERIENCED ANY OF THE FOLLOWING EVENTS Right 2015    Procedure: LITHOTRIPSY WITH CYSTOSCOPY, STENT PLACEMENT;  Surgeon: Tommie Majano MD;  Location: [facility]    PATIENT DOCUMENTED NOT TO HAVE EXPERIENCED ANY OF THE FOLLOWING EVENTS Right 10/29/2015    Procedure: CYSTOSCOPY/URETEROSCOPY/STONE EXTRACTION;  Surgeon: Tommie Majano MD;  Location: [facility]    PATIENT WITH PREOPERATIVE ORDER FOR IV ANTIBIOTIC SURGICAL SITE INFECT Right 2015    Procedure: LITHOTRIPSY WITH CYSTOSCOPY, STENT PLACEMENT;  Surgeon: Tommie Majano MD;  Location: [facility]    PATIENT WITH PREOPERATIVE ORDER FOR IV ANTIBIOTIC SURGICAL SITE INFECT Right 10/29/2015    Procedure: CYSTOSCOPY/URETEROSCOPY/STONE EXTRACTION;  Surgeon: Tommie Majano MD;  Location: [facility]                Social History     Socioeconomic History    Marital status:    Tobacco Use    Smoking status: Former     Packs/day: 0.25     Years: 10.00     Additional pack years: 0.00     Total pack years: 2.50     Types: Cigarettes     Quit date: 2016     Years since quittin.4     Passive exposure: Past    Smokeless tobacco: Never   Vaping Use    Vaping Use: Never used   Substance and Sexual Activity    Alcohol use: No     Alcohol/week: 0.0 standard drinks of alcohol    Drug use: No   Other Topics Concern    Caffeine Concern No     Social Determinants of Health     Food Insecurity: No Food Insecurity (1/15/2024)    Food Insecurity     Food  Insecurity: Never true   Transportation Needs: No Transportation Needs (1/15/2024)    Transportation Needs     Lack of Transportation: No   Housing Stability: Low Risk  (1/15/2024)    Housing Stability     Housing Instability: No              Review of Systems    Positive for stated complaint: weakness. shortness of breath  Other systems are as noted in HPI.  Constitutional and vital signs reviewed.      All other systems reviewed and negative except as noted above.    Physical Exam     ED Triage Vitals [01/15/24 0522]   BP (!) 132/110   Pulse (!) 156   Resp (!) 35   Temp (!) 102.2 °F (39 °C)   Temp src Axillary   SpO2 97 %   O2 Device Non-rebreather mask       Current:/59 (BP Location: Right arm)   Pulse 87   Temp 98 °F (36.7 °C) (Axillary)   Resp 16   Wt 56.8 kg   SpO2 95%   BMI 22.18 kg/m²         Physical Exam    Constitutional: No JVD mildly somnolent, does respond to questions but in respiratory distress  Head: Normocephalic and atraumatic.   Eyes: Conjunctivae are normal. Pupils are equal, round, and reactive to light.   Neck: Normal range of motion. Neck supple.   Cardiovascular: Tachy, regular rhythm and intact and equal distal pulses.    Pulmonary/Chest: Respiratory distress, significant tachypnea, diminished lung sounds with wheeze bilaterally  Abdominal: Soft. There is no tenderness. There is no guarding.   Musculoskeletal: Normal range of motion. No edema or tenderness.   Neurological: Alert and oriented to person, place, and time.  No gross focal deficit  Skin: Skin is warm and dry.   Psychiatric: Normal mood and affect.  Behavior is normal.   Nursing note and vitals reviewed.    Differential diagnosis includes acute COPD exacerbation, viral syndrome, pneumonia, sepsis and bacteremia.      ED Course     Labs Reviewed   COMP METABOLIC PANEL (14) - Abnormal; Notable for the following components:       Result Value    Glucose 158 (*)     Creatinine 1.04 (*)     eGFR-Cr 52 (*)     AST 41 (*)      Bilirubin, Total 1.3 (*)     All other components within normal limits   TROPONIN I HIGH SENSITIVITY - Abnormal; Notable for the following components:    Troponin I (High Sensitivity) 199 (*)     All other components within normal limits   LACTIC ACID, PLASMA - Abnormal; Notable for the following components:    Lactic Acid 4.6 (*)     All other components within normal limits   PROCALCITONIN - Abnormal; Notable for the following components:    Procalcitonin 0.45 (*)     All other components within normal limits   LIPID PANEL - Abnormal; Notable for the following components:    Cholesterol, Total 225 (*)     HDL Cholesterol 69 (*)     LDL Cholesterol 140 (*)     Non HDL Chol 156 (*)     All other components within normal limits   LACTIC ACID REFLEX POST POSTIVE - Abnormal; Notable for the following components:    Lactic Acid 2.2 (*)     All other components within normal limits   TROPONIN I HIGH SENSITIVITY - Abnormal; Notable for the following components:    Troponin I (High Sensitivity) 2,249 (*)     All other components within normal limits   BASIC METABOLIC PANEL (8) - Abnormal; Notable for the following components:    Glucose 173 (*)     Potassium 3.4 (*)     Creatinine 1.04 (*)     Calcium, Total 8.6 (*)     Calculated Osmolality 297 (*)     eGFR-Cr 52 (*)     All other components within normal limits   CBC, PLATELET; NO DIFFERENTIAL - Abnormal; Notable for the following components:    WBC 33.3 (*)     HGB 11.9 (*)     .0 (*)     All other components within normal limits   TROPONIN I HIGH SENSITIVITY - Abnormal; Notable for the following components:    Troponin I (High Sensitivity) 3,228 (*)     All other components within normal limits   PTT, ACTIVATED - Abnormal; Notable for the following components:    PTT 92.3 (*)     All other components within normal limits   URINALYSIS WITH CULTURE REFLEX - Abnormal; Notable for the following components:    Clarity Urine Turbid (*)     Blood Urine 3+ (*)      Protein Urine 50 (*)     Nitrite Urine 2+ (*)     Leukocyte Esterase Urine 500 (*)     WBC Urine >50 (*)     RBC Urine >10 (*)     Bacteria Urine 1+ (*)     Squamous Epi. Cells Few (*)     All other components within normal limits   COMP METABOLIC PANEL (14) - Abnormal; Notable for the following components:    Glucose 139 (*)     Chloride 114 (*)     Calculated Osmolality 297 (*)     AST 49 (*)     Alkaline Phosphatase 47 (*)     Globulin  2.6 (*)     All other components within normal limits   PTT, ACTIVATED - Abnormal; Notable for the following components:    PTT 70.5 (*)     All other components within normal limits   PTT, ACTIVATED - Abnormal; Notable for the following components:    PTT 54.0 (*)     All other components within normal limits   TROPONIN I HIGH SENSITIVITY - Abnormal; Notable for the following components:    Troponin I (High Sensitivity) 2,841 (*)     All other components within normal limits   PLATELET COUNT - Abnormal; Notable for the following components:    PLT 72.0 (*)     All other components within normal limits   PTT, ACTIVATED - Abnormal; Notable for the following components:    PTT 41.1 (*)     All other components within normal limits   BASIC METABOLIC PANEL (8) - Abnormal; Notable for the following components:    Glucose 105 (*)     Chloride 113 (*)     Calculated Osmolality 296 (*)     All other components within normal limits   BASIC METABOLIC PANEL (8) - Abnormal; Notable for the following components:    CO2 33.0 (*)     Calculated Osmolality 297 (*)     All other components within normal limits   BASIC METABOLIC PANEL (8) - Abnormal; Notable for the following components:    Glucose 102 (*)     CO2 33.0 (*)     BUN 5 (*)     BUN/CREA Ratio 7.9 (*)     All other components within normal limits   HEPATIC FUNCTION PANEL (7) - Abnormal; Notable for the following components:    ALT <7 (*)     All other components within normal limits   POCT GLUCOSE - Abnormal; Notable for the following  components:    POC Glucose  154 (*)     All other components within normal limits   BLOOD CULTURE - Abnormal; Notable for the following components:    Blood Culture Result Escherichia coli (*)     Blood Smear Gram Negative Rods (*)     All other components within normal limits    Narrative:      Anaerobic Bottle Volume - 4 mL    Anaerobic Bottle Time to Detection - 8 hr  24 min   Aerobic Bottle Volume - 9 mL    Aerobic Bottle Time to Detection - 9 hr  54 min    BLOOD CULTURE - Abnormal; Notable for the following components:    Blood Culture Result Escherichia coli (*)     Blood Smear Gram Negative Rods (*)     All other components within normal limits    Narrative:      Anaerobic Bottle Volume - 11 mL    Anaerobic Bottle Time to Detection - 9 hr  24 min   Aerobic Bottle Volume - 12 mL    Aerobic Bottle Time to Detection - 11 hr  30 min    BLD CULT ID BY PCR - Abnormal; Notable for the following components:    Escherichia coli by PCR Detected (*)     All other components within normal limits   URINE CULTURE, ROUTINE - Abnormal; Notable for the following components:    Urine Culture 50,000-99,000 CFU/ML Escherichia coli (*)     All other components within normal limits   CBC W/ DIFFERENTIAL - Abnormal; Notable for the following components:    PLT 79.0 (*)     Neutrophil Absolute Prelim 9.73 (*)     Neutrophil Absolute 9.73 (*)     Lymphocyte Absolute 0.71 (*)     Monocyte Absolute 0.02 (*)     All other components within normal limits   CBC W/ DIFFERENTIAL - Abnormal; Notable for the following components:    WBC 28.3 (*)     HGB 11.3 (*)     MCHC 30.8 (*)     RDW-SD 47.6 (*)     PLT 95.0 (*)     Neutrophil Absolute Prelim 26.34 (*)     Neutrophil Absolute 26.34 (*)     Lymphocyte Absolute 0.79 (*)     All other components within normal limits   CBC W/ DIFFERENTIAL - Abnormal; Notable for the following components:    WBC 13.0 (*)     HGB 11.2 (*)     MCHC 30.1 (*)     RDW-SD 49.1 (*)     PLT 72.0 (*)     Neutrophil  Absolute Prelim 11.50 (*)     Neutrophil Absolute 11.50 (*)     Lymphocyte Absolute 0.92 (*)     All other components within normal limits   CBC W/ DIFFERENTIAL - Abnormal; Notable for the following components:    HGB 10.7 (*)     MCHC 30.6 (*)     RDW-SD 46.4 (*)     PLT 56.0 (*)     Lymphocyte Absolute 0.69 (*)     All other components within normal limits   CBC W/ DIFFERENTIAL - Abnormal; Notable for the following components:    HGB 11.3 (*)     HCT 34.1 (*)     PLT 76.0 (*)     All other components within normal limits   PRO BETA NATRIURETIC PEPTIDE - Normal   PTT, ACTIVATED - Normal   LACTIC ACID REFLEX POST POSITIVE [id number] - Normal   MAGNESIUM - Normal   MAGNESIUM - Normal   POTASSIUM - Normal   PTT, ACTIVATED - Normal   MAGNESIUM - Normal   MAGNESIUM - Normal   SARS-COV-2/FLU A AND B/RSV BY PCR (GENEXPERT) - Normal    Narrative:     This test is intended for the qualitative detection and differentiation of SARS-CoV-2, influenza A, influenza B, and respiratory syncytial virus (RSV) viral RNA in nasopharyngeal or nares swabs from individuals suspected of respiratory viral infection consistent with COVID-19 by their healthcare provider. Signs and symptoms of respiratory viral infection due to SARS-CoV-2, influenza, and RSV can be similar.    Test performed using the Xpert Xpress SARS-CoV-2/FLU/RSV (real time RT-PCR)  assay on the [employer] instrument, [place], [place] [address].   This test is being used under the Food and Drug Administration's Emergency Use Authorization.    The authorized Fact Sheet for Healthcare Providers for this assay is available upon request from the laboratory.   RESPIRATORY FLU EXPAND PANEL + COVID-19 - Normal    Narrative:     This test is intended for the simultaneous qualitative detection and differentiation of nucleic acids from multiple viral and bacterial respiratory organisms, including nucleic acid from Severe Acute Respiratory Syndrome Coronavirus 2 (SARS-CoV-2) in  nasopharyngeal swab from individuals suspected of respiratory viral infection consistent with COVID-19 by their healthcare provider.    Test performed using the [employer] Respiratory Panel 2.1 (RP2.1) assay on the [employer] System, "[employer] [place], [place] [address].    This test is being used under the Food and Drug Administration's Emergency Use Authorization.    The authorized Fact Sheet for Healthcare Providers for this assay is available upon request from the laboratory.    SARS and MERS coronaviruses are not tested on this assay.   CBC WITH DIFFERENTIAL WITH PLATELET    Narrative:     The following orders were created for panel order CBC With Differential With Platelet.  Procedure                               Abnormality         Status                     ---------                               -----------         ------                     CBC W/ DIFFERENTIAL[[id number]]          Abnormal            Final result                 Please view results for these tests on the individual orders.   SCAN SLIDE   SCAN SLIDE   CBC WITH DIFFERENTIAL WITH PLATELET    Narrative:     The following orders were created for panel order CBC With Differential With Platelet.  Procedure                               Abnormality         Status                     ---------                               -----------         ------                     CBC W/ DIFFERENTIAL[[id number]]          Abnormal            Final result                 Please view results for these tests on the individual orders.   SCAN SLIDE   CBC WITH DIFFERENTIAL WITH PLATELET    Narrative:     The following orders were created for panel order CBC With Differential With Platelet.  Procedure                               Abnormality         Status                     ---------                               -----------         ------                     CBC W/ DIFFERENTIAL[[id number]]          Abnormal            Final result                  Please view results for these tests on the individual orders.   SCAN SLIDE   HEPARIN INDUCED PLATELET   CBC WITH DIFFERENTIAL WITH PLATELET    Narrative:     The following orders were created for panel order CBC With Differential With Platelet.  Procedure                               Abnormality         Status                     ---------                               -----------         ------                     CBC W/ DIFFERENTIAL[[id number]]          Abnormal            Final result                 Please view results for these tests on the individual orders.   SCAN SLIDE   CBC WITH DIFFERENTIAL WITH PLATELET    Narrative:     The following orders were created for panel order CBC With Differential With Platelet.  Procedure                               Abnormality         Status                     ---------                               -----------         ------                     CBC W/ DIFFERENTIAL[[id number]]          Abnormal            Final result                 Please view results for these tests on the individual orders.   RAINBOW DRAW LAVENDER   RAINBOW DRAW LIGHT GREEN   RAINBOW DRAW BLUE   RAINBOW DRAW GOLD   RAINBOW DRAW GOLD   RAINBOW DRAW LAVENDER   BLOOD CULTURE   BLOOD CULTURE     EKG    Rate, intervals and axes as noted on EKG Report.  Rate: 160  Rhythm: Sinus Rhythm  Reading: Artifact, rate related changes, notable sinus tachycardia                     XR CHEST AP PORTABLE  (CPT=71045)    Result Date: 1/15/2024  PROCEDURE: XR CHEST AP PORTABLE  (CPT=71045) TIME: 6:38.   COMPARISON: [facility], CT CHEST PE AORTA (IV ONLY) (CPT=71260), 7/14/2023, 4:33 PM.  [facility], XR CHEST AP PORTABLE (CPT=71045), 7/21/2023, 7:53 AM.  [facility], XR CHEST AP PORTABLE (CPT=71045), 7/19/2023, 6:24 AM.  [facility], XR CHEST AP PORTABLE (CPT=71045), 7/18/2023, 6:31 AM.  INDICATIONS: Weakness and shortness of breath.  TECHNIQUE:   Single view.    FINDINGS:  CARDIAC/VASC: The cardiomediastinal silhouette is unchanged in size.  There is atherosclerotic calcification of the tortuous thoracic aorta. MEDIAST/ASA:   There is a large hiatal hernia.  The mediastinum and hilum are otherwise unchanged. LUNGS/PLEURA: There is elevation of the left hemidiaphragm with associated left basilar airspace opacity.  There is a small right basilar airspace opacity.  No significant pleural effusion.  No pneumothorax.  No evidence of pulmonary edema. BONES: Severe levocurvature of the thoracic spine with multilevel degenerative changes of the thoracic spine.  Bilateral shoulder degenerative change.  There is chronic irregularity involving a few ribs. OTHER: Negative.          CONCLUSION:   No significant change in the elevation of the left hemidiaphragm with left greater than right basilar airspace opacities, which are favored to reflect atelectasis/scarring.  Large hiatal hernia.  Lesser incidental findings described above.    Dictated by (CST): Yasir Thomas MD on 1/15/2024 at 6:46 AM     Finalized by (CST): Yasir Thomas MD on 1/15/2024 at 6:50 AM                  [facility]        Admission disposition: 1/15/2024  7:33 AM                                     [facility]      Sepsis Reassessment Note    /75   Pulse (!) 147   Temp (!) 104.2 °F (40.1 °C) (Rectal)   Resp (!) 50   SpO2 95%      I completed the sepsis reassessment at 0623    Cardiac:  Regularity: Regular  Rate: Tachycardic  Heart Sounds: No adventitious heart sounds    Lungs:   Right: Diminished, wheezing  Left: Diminished, wheezing    Peripheral Pulses:  Radial: Right 1+ or Left 1+      Capillary Refill:  <3 Secs    Skin:  Temp/Moisture: Warm and Dry  Color: Normal      Mello Covington MD  1/15/2024  6:21 AM      Medical Decision Making  Patient brought to the ER and moderate to severe respiratory distress.  She was placed on BiPAP.  She has been hemodynamically stable but notably  tachycardic.  The  is here with her providing some of the history as noted above.  She is at very high risk for decompensation.  She will be admitted to the PCU.  I did initiate broad-spectrum antibiotic coverage.    Problems Addressed:  Acute respiratory failure with hypoxia (HCC): acute illness or injury with systemic symptoms that poses a threat to life or bodily functions  Febrile illness: acute illness or injury with systemic symptoms    Amount and/or Complexity of Data Reviewed  Independent Historian: spouse     Details: Spouse gives hx of timeline from yesterday to last night to this morning as noted above in th HPI when he heard his wife moaning and then found her struggling to breathe  External Data Reviewed: notes.     Details: 4/20/19 Echo:  Study Conclusions   1. Left ventricle: The cavity size was normal. Wall thickness was      increased in a pattern of severe LVH. Systolic function was      normal. The estimated ejection fraction was 55-60%. Wall motion      was normal; there were no regional wall motion abnormalities.      Doppler parameters are consistent with abnormal left ventricular      relaxation (grade 1 diastolic dysfunction).   2. Aortic valve: Mild regurgitation.   3. Mitral valve: Mildly calcified annulus. Mildly thickened      leaflets.   No previous study was available for comparison.     Labs: ordered. Decision-making details documented in ED Course.  Radiology: ordered and independent interpretation performed. Decision-making details documented in ED Course.     Details: By my review there is no obvious evidence of pulmonary edema, pleural effusion, pneumothorax or focal infiltrate on x-ray imaging.  ECG/medicine tests: ordered and independent interpretation performed. Decision-making details documented in ED Course.    Risk  Drug therapy requiring intensive monitoring for toxicity.  Decision regarding hospitalization.    Critical Care  Total time providing critical care:  minutes (I spent a total of 35 minutes of critical care time in obtaining history, performing a physical exam, bedside monitoring of interventions, collecting and interpreting tests and discussion with consultants but not including time spent performing procedures.)        Disposition and Plan     Clinical Impression:  1. Acute respiratory failure with hypoxia (HCC)    2. Febrile illness         Disposition:  Admit  1/15/2024  7:33 am    Follow-up:  Kate Calloway NP  [address] [id number]  [facility] [address]  [phone]    Follow up in 2 week(s)      We recommend that you schedule follow up care with a primary care provider within the next three months to obtain basic health screening including reassessment of your blood pressure.      Medications Prescribed:  Current Discharge Medication List                            Hospital Problems       Present on Admission  Date Reviewed: 11/9/2023            ICD-10-CM Noted POA    * (Principal) Acute respiratory failure with hypoxia (HCC) J96.01 1/15/2024 Unknown    Febrile illness R50.9 1/15/2024 Unknown

## 2024-01-15 NOTE — CM/SW NOTE
Mireya admitted today from the ER this afternoon.  Lives with spouse.  O2 currently at HF NC.  Started on heparin gtt.    / to remain available for support and/or discharge planning.      Katlyn WALTERA BSN RN CRRN   RN Case Manager  [phone]

## 2024-01-15 NOTE — CONSULTS
[facility]    Consult Note    Date:  1/15/2024  Date of Admission:  1/15/2024    Chief Complaint:   Mireya Wolfe is a(n) 88 year old female with fever and tachypnea.    HPI:   The patient has a history of COPD having smoked up to a pack per day up to 10 years ago.  She quit smoking primarily because she developed skin cancer around her mouth.  She has not had recurrent UTIs in the past.  She uses Breo at home.  She has a home nebulizer which she does not use.  She has severe scoliosis as well as a large left hiatal hernia with her stomach almost entirely in the chest.  She was in her usual state of health all day yesterday.  At around 3:00 in the morning today, the patient's  heard moaning coming from her bedroom.  He found her slightly less responsive.  He called 911 and she was brought to the emergency room and was found to have a temperature of 104.  She denies new shortness of breath, cough, phlegm, chest pain, dysuria.  There is been no diarrhea.  She is vaccinated for COVID but not with the new vaccine.  She did have a flu vaccine.  She tested negative for RSV, COVID and flu in the emergency room.    History     Past Medical History:   Diagnosis Date    Anemia     Basal cell carcinoma     Mouth    Calculus, kidney     COPD (chronic obstructive pulmonary disease) (HCC)     Esophageal reflux     Hematuria     History of recurrent UTIs 02/26/2015    Hypercholesterolemia     Kidney stone     Osteopenia     Other and unspecified hyperlipidemia     Recurrent UTI     Scoliosis     Spinal stenosis     Squamous cell cancer of lip     Thyroid nodule     Urinary frequency     Vitamin D deficiency      Past Surgical History:   Procedure Laterality Date    CATARACT Right 5/2016    CYSTO/URETERO W/LITHOTRIPSY Right 10/29/2015    Procedure: LITHOTRIPSY HOLMIUM LASER WITH CYSTOSCOPY;  Surgeon: Tommie Majano MD;  [place] [facility] [place] [facility]    CYSTOSCOPY,INSERT URETERAL STENT Right 9/24/2015     Procedure: LITHOTRIPSY WITH CYSTOSCOPY, STENT PLACEMENT;  Surgeon: Tommie Majano MD;  Location: [facility]    FRAGMENTING OF KIDNEY STONE Right 9/24/2015    Procedure: LITHOTRIPSY WITH CYSTOSCOPY, STENT PLACEMENT;  Surgeon: Tommie Majano MD;  Location: [facility]    OTHER SURGICAL HISTORY  1960    Surgery of Blockage in Urethral Tube    OTHER SURGICAL HISTORY  1985 and on 08/24/00    ESWL - left    OTHER SURGICAL HISTORY  07/19/00    Cystourethroscopy with Urethral Calibration and Dilation with Bilateral Retrograde Pyelogram and Left Ureteral Stone Manipulation with insertion of Double-J Stent    OTHER SURGICAL HISTORY  June 2015    R eye surgery for bleed and then development of blood clot--resolved    OTHER SURGICAL HISTORY  2/8/16    Cystoscpy stent removal    OTHER SURGICAL HISTORY      OTHER SURGICAL HISTORY  03/2017    Mohs surgery    PATIENT DOCUMENTED NOT TO HAVE EXPERIENCED ANY OF THE FOLLOWING EVENTS Right 9/24/2015    Procedure: LITHOTRIPSY WITH CYSTOSCOPY, STENT PLACEMENT;  Surgeon: Tommie Majano MD;  Location: [facility]    PATIENT DOCUMENTED NOT TO HAVE EXPERIENCED ANY OF THE FOLLOWING EVENTS Right 10/29/2015    Procedure: CYSTOSCOPY/URETEROSCOPY/STONE EXTRACTION;  Surgeon: Tommie Majano MD;  Location: [facility]    PATIENT WITH PREOPERATIVE ORDER FOR IV ANTIBIOTIC SURGICAL SITE INFECT Right 9/24/2015    Procedure: LITHOTRIPSY WITH CYSTOSCOPY, STENT PLACEMENT;  Surgeon: Tommie Majano MD;  Location: [facility]    PATIENT WITH PREOPERATIVE ORDER FOR IV ANTIBIOTIC SURGICAL SITE INFECT Right 10/29/2015    Procedure: CYSTOSCOPY/URETEROSCOPY/STONE EXTRACTION;  Surgeon: Tommie Majano MD;  Location: [facility]     Family History   Problem Relation Age of Onset    Breast Cancer Mother 67    Cancer Brother      Social History: , 3 children, quit tobacco 10 years ago after 1 pack/day, no alcohol,  homemaker  Social History     Socioeconomic History    Marital status:    Tobacco Use    Smoking status: Former     Packs/day: 0.25     Years: 10.00     Additional pack years: 0.00     Total pack years: 2.50     Types: Cigarettes     Quit date: 2016     Years since quittin.4     Passive exposure: Past    Smokeless tobacco: Never   Vaping Use    Vaping Use: Never used   Substance and Sexual Activity    Alcohol use: No     Alcohol/week: 0.0 standard drinks of alcohol    Drug use: No   Other Topics Concern    Caffeine Concern No     Allergies/Medications:   Allergies:   Allergies   Allergen Reactions    Shellfish-Derived Products NAUSEA AND VOMITING    Erythromycin UNKNOWN    Iodine (Topical) OTHER (SEE COMMENTS)    Ioversol UNKNOWN    Other OTHER (SEE COMMENTS)    Radiology Contrast Iodinated Dyes DIZZINESS     (Not in a hospital admission)      Review of Systems:   Review of Systems:  Vision normal. Ear nose and throat normal. Bowel normal. Bladder function normal. Depression. No thyroid disease. No lymphatic system concerns.  No rash. Muscles and joints notable for severe knee arthritis and she uses a walker. No weight loss no weight gain.    Physical Exam:   Vital Signs:  Blood pressure 103/66, pulse 110, temperature (!) 104.2 °F (40.1 °C), temperature source Rectal, resp. rate 25, SpO2 97%.     Alert white female on BiPAP  HEENT examination is unremarkable with pupils equal round and reactive to light and accommodation.   Neck without adenopathy, thyromegaly, JVD nor bruit.   Lungs markedly diminished breath sounds to auscultation and percussion.  Severe scoliosis.  Cardiac regular rate and rhythm no murmur.   Abdomen nontender, without hepatosplenomegaly and no mass appreciable.   Extremities without clubbing cyanosis nor edema.   Neurologic grossly intact with symmetric tone and strength and reflex.  Skin without gross abnormality    Results:     Lab Results   Component Value Date    WBC 10.5  01/15/2024    HGB 14.5 01/15/2024    HCT 45.2 01/15/2024    PLT 79.0 01/15/2024    CREATSERUM 1.04 01/15/2024    BUN 19 01/15/2024     01/15/2024    K 3.9 01/15/2024     01/15/2024    CO2 24.0 01/15/2024     01/15/2024    CA 9.9 01/15/2024    ALB 4.2 01/15/2024    ALKPHO 90 01/15/2024    BILT 1.3 01/15/2024    TP 7.1 01/15/2024    AST 41 01/15/2024    ALT 34 01/15/2024     Chest x-ray-severe scoliosis and large hiatal hernia, little change from prior.    Assessment/Plan:   1.  Acute on chronic respiratory failure-the patient has increased physiologic stress with a temperature of 104.  She clearly has an acute infection although the source is uncertain.  The RSV, influenza and COVID swab is negative.  She certainly may have a virus other than those 3 tested.  She has a history of urinary tract infections and the UA is pending.  Her chest x-ray is always very abnormal but she did not seem to have any new respiratory symptomatology such as cough or phlegm and even though she is on BiPAP now, she had denied new shortness of breath.  She has severe scoliosis, large hiatal hernia and underlying COPD contributing to the chronic respiratory failure.  She uses Breo at home and has at home nebulizer but does not use.    Recommendations:  1.  Expanded viral respiratory panel  2.  Await UA  3.  Sepsis protocol  4.  30 mL/kg bolus in patient with lactic acid of 4.6  5.  Empiric antibiotic  6.  Blood cultures  7.  BiPAP as needed  8.  Nebulizer  9.  Breo  10.  We will follow clinically.    2.  DVT prophylaxis-subcutaneous heparin    3.  CODE STATUS-DNAR     I am delighted to assist with Mireya's care    Greater than 35 minutes critical care time    Tao Magana MD  Medical Director, Critical Care, [facility]  Medical Director, [facility]  Pager: [phone]

## 2024-01-15 NOTE — ED QUICK NOTES
Report received from JANA Ruff. Pt resting comfortably at this time on BIPAP. Pt awaiting for PCU bed.

## 2024-01-15 NOTE — ED QUICK NOTES
Orders for admission, patient is aware of plan and ready to go upstairs. Any questions, please call ED RN Elzbieta RN at extension [phone].     Patient Covid vaccination status: Fully vaccinated     COVID Test Ordered in ED: SARS-CoV-2/Flu A and B/RSV by PCR (GeneXpert)    COVID Suspicion at Admission: N/A    Running Infusions:      Mental Status/LOC at time of transport: AAOx2-3    Other pertinent information: On BIPAP FIO2 60%.  CIWA score: N/A   NIH score:  N/A         53 yo male h/o sickle cell disease on home dilaudsusannah guillaumety admitted to [facility] for worsening pain presenting with persistent pain. pt just d/c from outside hospital. today worsening pain to knees and joints similar to prior sickle cell flares. also reports pain to right knee and swelling. was reportedly more swollen and has improved but still hurts. did fall at outside hospital but no recent falls. no fevers today. no sob or difficulty breathing. no back pain. no chest pain. no cough. no sick contacts  s/p arthrocentesis in ER   states to still have knee and leg pain   requesting around the clock dilaudid

## 2024-01-15 NOTE — ED QUICK NOTES
ED respiratory at bedside. Patient placed on BIPAP, FIO2 60%.  Patient  at bedside stating he heard patient \"making noises around 0400\" and when he checked on her, patient was not responding appropriately. Per , history of pneumonia back in July of 2023.

## 2024-01-15 NOTE — H&P
[facility]    History and Physical    Mireya Wolfe Patient Status:  Inpatient    1935 MRN [id number]   Location [facility] 2W/SW Attending Aide Vu, DO   Hosp Day # 0 PCP Kathy Rao MD     Date:  1/15/2024  Date of Admission:  1/15/2024    History provided by:spouse  HPI:     Chief Complaint   Patient presents with    Difficulty Breathing    Weakness     Mrs. Wolfe is an 88 year old female with past medical history of COPD, dyslipidemia, recurrent UTIs presenting slightly unresponsive from home. Her  is bedside providing history as pt is on bipap. Reports that the day prior to admission, pt was having slightly more mid to lower back pain and had a lower activity day and went to sleep. Her  heard some moaning and came to check on her and found her less responsive. He called EMS and pt was brought to the ER. She was noted to be hypoxic 83-84% on arrival. Placed on bipap and now is more comfortable. Also noted to have fever of 104F.     Additional workup demonstrates lactic acidosis, elevated troponins, negative viral panel (including flu/covid/rsv), normal bnp, procalcitonin 0.45.      She denies recent cough, sick contacts, diarrhea, dysuria.       Of note, she was admitted in 2023 for weakness and pneumonia. Spouse states she has not been feeling well ever since.   Also reports that the past few weeks, she has not been consistently taking her medications.     History     Past Medical History:   Diagnosis Date    Anemia     Basal cell carcinoma     Mouth    Calculus, kidney     COPD (chronic obstructive pulmonary disease) (HCC)     Esophageal reflux     Hematuria     History of recurrent UTIs 2015    Hypercholesterolemia     Kidney stone     Osteopenia     Other and unspecified hyperlipidemia     Recurrent UTI     Scoliosis     Spinal stenosis     Squamous cell cancer of lip     Thyroid nodule     Urinary frequency     Vitamin D deficiency      Past  Surgical History:   Procedure Laterality Date    CATARACT Right 5/2016    CYSTO/URETERO W/LITHOTRIPSY Right 10/29/2015    Procedure: LITHOTRIPSY HOLMIUM LASER WITH CYSTOSCOPY;  Surgeon: Tommie Majano MD;  Location: [facility]    CYSTOSCOPY,INSERT URETERAL STENT Right 9/24/2015    Procedure: LITHOTRIPSY WITH CYSTOSCOPY, STENT PLACEMENT;  Surgeon: Tommie Majano MD;  Location: [facility]    FRAGMENTING OF KIDNEY STONE Right 9/24/2015    Procedure: LITHOTRIPSY WITH CYSTOSCOPY, STENT PLACEMENT;  Surgeon: Tommie Majano MD;  Location: [facility]    OTHER SURGICAL HISTORY  1960    Surgery of Blockage in Urethral Tube    OTHER SURGICAL HISTORY  1985 and on 08/24/00    ESWL - left    OTHER SURGICAL HISTORY  07/19/00    Cystourethroscopy with Urethral Calibration and Dilation with Bilateral Retrograde Pyelogram and Left Ureteral Stone Manipulation with insertion of Double-J Stent    OTHER SURGICAL HISTORY  June 2015    R eye surgery for bleed and then development of blood clot--resolved    OTHER SURGICAL HISTORY  2/8/16    Cystoscpy stent removal    OTHER SURGICAL HISTORY      OTHER SURGICAL HISTORY  03/2017    Mohs surgery    PATIENT DOCUMENTED NOT TO HAVE EXPERIENCED ANY OF THE FOLLOWING EVENTS Right 9/24/2015    Procedure: LITHOTRIPSY WITH CYSTOSCOPY, STENT PLACEMENT;  Surgeon: Tommie Majano MD;  Location: [facility]    PATIENT DOCUMENTED NOT TO HAVE EXPERIENCED ANY OF THE FOLLOWING EVENTS Right 10/29/2015    Procedure: CYSTOSCOPY/URETEROSCOPY/STONE EXTRACTION;  Surgeon: Tommie Majano MD;  Location: [facility]    PATIENT WITH PREOPERATIVE ORDER FOR IV ANTIBIOTIC SURGICAL SITE INFECT Right 9/24/2015    Procedure: LITHOTRIPSY WITH CYSTOSCOPY, STENT PLACEMENT;  Surgeon: Tommie Majano MD;  Location: [facility]    PATIENT WITH PREOPERATIVE ORDER FOR IV ANTIBIOTIC SURGICAL SITE INFECT Right 10/29/2015    Procedure:  CYSTOSCOPY/URETEROSCOPY/STONE EXTRACTION;  Surgeon: Tommie Majano MD;  [place] [facility]     Family History   Problem Relation Age of Onset    Breast Cancer Mother 67    Cancer Brother      Social History:  Social History     Socioeconomic History    Marital status:    Tobacco Use    Smoking status: Former     Packs/day: 0.25     Years: 10.00     Additional pack years: 0.00     Total pack years: 2.50     Types: Cigarettes     Quit date: 2016     Years since quittin.4     Passive exposure: Past    Smokeless tobacco: Never   Vaping Use    Vaping Use: Never used   Substance and Sexual Activity    Alcohol use: No     Alcohol/week: 0.0 standard drinks of alcohol    Drug use: No   Other Topics Concern    Caffeine Concern No     Social Determinants of Health     Food Insecurity: No Food Insecurity (1/15/2024)    Food Insecurity     Food Insecurity: Never true   Transportation Needs: No Transportation Needs (1/15/2024)    Transportation Needs     Lack of Transportation: No   Housing Stability: Low Risk  (1/15/2024)    Housing Stability     Housing Instability: No     Allergies/Medications:   Allergies:   Allergies   Allergen Reactions    Shellfish-Derived Products NAUSEA AND VOMITING    Erythromycin UNKNOWN    Iodine (Topical) OTHER (SEE COMMENTS)    Ioversol UNKNOWN    Other OTHER (SEE COMMENTS)    Radiology Contrast Iodinated Dyes DIZZINESS     Medications Prior to Admission   Medication Sig    PREGABALIN 50 MG Oral Cap TAKE 1 CAPSULE BY MOUTH TWICE DAILY    AMLODIPINE 5 MG Oral Tab TAKE 1 TABLET BY MOUTH EVERY DAY. HOLD FOR SYSTOLIC BLOOD PRESSURE< 110    escitalopram 5 MG Oral Tab Take 3 tablets (15 mg total) by mouth every morning.    ALPRAZolam 0.25 MG Oral Tab Take 1 tablet (0.25 mg total) by mouth nightly as needed for Anxiety.    FLUTICASONE FUROATE-VILANTEROL 100-25 MCG/ACT Inhalation Aerosol Powder, Breath Activated INHALE 1 PUFF INTO THE LUNGS DAILY    Cholecalciferol (VITAMIN  D) 125 MCG (5000 UT) Oral Cap Take 1 capsule (5,000 Units total) by mouth daily.    ferrous sulfate 325 (65 FE) MG Oral Tab EC Take 1 tablet (325 mg total) by mouth daily with breakfast.    Vitamin B-12 1000 MCG Oral Tab Take 1 tablet (1,000 mcg total) by mouth daily.    QUETIAPINE 25 MG Oral Tab TAKE 1 TABLET BY MOUTH EVERY NIGHT AT BEDTIME FOR HALLUCINATIONS    TRIAMCINOLONE 0.1 % External Cream APPLY TOPICALLY TO THE AFFECTED AREA TWICE DAILY FOR RASH    ARIPiprazole 2 MG Oral Tab Take 0.5 tablets (1 mg total) by mouth daily.    polyethylene glycol, PEG 3350, 17 g Oral Powd Pack Take 17 g by mouth daily as needed.       Review of Systems:     Constitutional:  Positive for chills and fever.   Respiratory:  Positive for chest tightness and shortness of breath.    Cardiovascular:  Negative for chest pain, palpitations and leg swelling.   Gastrointestinal:  Negative for abdominal pain.   Genitourinary:  Negative for dysuria.   Musculoskeletal:  Positive for back pain.       Physical Exam:   Vital Signs:  Blood pressure 90/47, pulse 100, temperature 99.8 °F (37.7 °C), temperature source Temporal, resp. rate (!) 27, weight 125 lb 3.5 oz (56.8 kg), SpO2 94%.  Physical Exam  HENT:      Mouth/Throat:      Mouth: Mucous membranes are dry.   Eyes:      Extraocular Movements: Extraocular movements intact.      Conjunctiva/sclera: Conjunctivae normal.   Neck:      Vascular: No carotid bruit.   Cardiovascular:      Rate and Rhythm: Tachycardia present.      Heart sounds: No murmur heard.  Pulmonary:      Breath sounds: Normal breath sounds. No wheezing or rhonchi.   Abdominal:      General: Bowel sounds are normal. There is no distension.      Palpations: Abdomen is soft.      Tenderness: There is no abdominal tenderness. There is no guarding or rebound.   Musculoskeletal:         General: No swelling.   Lymphadenopathy:      Cervical: No cervical adenopathy.   Skin:     General: Skin is dry.      Capillary Refill: Capillary  refill takes less than 2 seconds.   Neurological:      General: No focal deficit present.      Mental Status: She is alert.           Results:     Lab Results   Component Value Date    WBC 10.5 01/15/2024    HGB 14.5 01/15/2024    HCT 45.2 01/15/2024    PLT 79.0 (L) 01/15/2024    CREATSERUM 1.04 (H) 01/15/2024    BUN 20 01/15/2024     01/15/2024    K 3.4 (L) 01/15/2024     01/15/2024    CO2 26.0 01/15/2024     (H) 01/15/2024    CA 8.6 (L) 01/15/2024    ALB 4.2 01/15/2024    ALKPHO 90 01/15/2024    BILT 1.3 (H) 01/15/2024    TP 7.1 01/15/2024    AST 41 (H) 01/15/2024    ALT 34 01/15/2024    PTT 26.4 01/15/2024    T4F 1.1 07/15/2023    TSH 0.349 (L) 07/15/2023    DDIMER 1.63 (H) 07/14/2023    MG 2.4 07/19/2023    PHOS 2.8 07/19/2023    TROPHS 2,249 (HH) 01/15/2024    B12 1,241 (H) 11/09/2023     XR CHEST AP PORTABLE  (CPT=71045)    Result Date: 1/15/2024  CONCLUSION:   No significant change in the elevation of the left hemidiaphragm with left greater than right basilar airspace opacities, which are favored to reflect atelectasis/scarring.  Large hiatal hernia.  Lesser incidental findings described above.    Dictated by (CST): Yasir Thomas MD on 1/15/2024 at 6:46 AM     Finalized by (CST): Yasir Thomas MD on 1/15/2024 at 6:50 AM         EKG 12 Lead    Result Date: 1/15/2024  Sinus tachycardia Rightward axis Low voltage QRS, consider pulmonary disease, pericardial effusion, or normal variant Borderline ECG When compared with ECG of 15-ESCOBAR-2024 05:22, Vent. rate has decreased BY  55 BPM Confirmed by GREER TAI JORDAN ([id number]) on 1/15/2024 12:32:25 PM    EKG    Result Date: 1/15/2024  Sinus tachycardia Right axis deviation Abnormal ECG No previous ECGs found in [facility] Confirmed by GREER TAI JORDAN ([id number]) on 1/15/2024 9:58:15 AM     Assessment/Plan:     Acute respiratory failure with hypoxia (HCC)  Pt hospitalized 7/2023--weakness, bibasilar pneumonia, COPD exacerbation. Treated with zosyn  then cefepime. Initially discharged home with oxygen, but was weaned off oxygen.   Now on bipap--appreciate pulmonology consultation  CXR reviewed--not significantly changed from prior  Extensive viral panel, RSV, covid, Flu neg  Lactic acidosis 4.6>2.2  Procal 0.45  Bcx pending  UA-not done yet  Weaned to 5L NC now  Given solumedrol, started on zosyn IV      Febrile illness  Unclear source--await bcx, ucx  Empiric zosyn  Ivf    Elevated Troponins  EKG-sinus tachycardia  Appreciate cardiology consultation  Heparin drip--held d/t thrombocytopenia  Echo pending       Thrombocytopenia  Platelet 79; monitor closely    Depression and anxiety  -pt with long hx of depression (and anxiety), exacerbated by marital strife/stress  -previously saw psychiatrist Dr. Friend who retired  -Lexapro increased to 15mg/day  -on prn xanax     Hypertension  Amlodipine 5mg daily     UTI   On methenamine; sees urology Dr. 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  Pt was briefly on fosamax in 2014. Declines restarting fosamax or other meds so would not check repeat DEXA.  Vitamin B12 deficiency    Level normal (1307) in 8/2022; cont B12 1000 mcg/day  Hx of iron deficiency anemia  Had EGD/C-scope in 2010 (Dr. Ferrari) -- negative. 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 Continue FeSO4 325mg/day.    Lumbar spinal stenosis, chronic back pain    Has seen Dr. Salgado (ortho spine at [facility]).  Referred to pain specialist, Dr. Deirdre Elizabeth at [facility]. Had facet injections in 8/2018 which helped tremendously.  Saw Dr. Elizabeth again 9/25/18; did PT.  MRI in 4/2019 did not appear to show anything acute.  No longer taking Norco.  Bilateral knee OA  Sees  ortho Dr. Booth -- has had b/l cortisone injections with transient improvement.  Has been advised for TKR's but trying to avoid. Ambulates with a walker.    COPD  Essentially quit smoking in 8/2016.  Has been using the Breo.  Basal and squamous cell cancer, face  S/p Mohs surgeries in 4 areas of her face in 2018 (Dr. Jonas).      Post-herpetic neuralgia (dx'ed with shingles left upper back 6/8/22)  -still with pain but significantly improved  -on Lyrica 50mg BID      Cognitive decline, likely age-related, poss exacerbated by depression  -pt saw neurologist Dr. Lokesh Mcmullen in 9/2022. Was thought to have late onset Alzheimer's dementia (started on aricept), though pt had not shown obvious sx of dementia prior to this admission  Pt was referred for neuropsych testing and MRI brain in 2022 by Dr. Mcmullen though did not schedule.    -had delirium 7/2023 while hospitalized--zyprexa caused sedation; seroquel 25mg at bedtime, and abilify 0.5mg daily                          Advance directives  -Discussed with her ; pt has POLST in chart, confirms DNI/DNR.         **Certification      PHYSICIAN Certification of Need for Inpatient Hospitalization - Initial Certification    Patient will require inpatient services that will reasonably be expected to span two midnight's based on the clinical documentation in H+P.   Based on patients current state of illness, I anticipate that, after discharge, patient will require TBD.        Aide Vu,   1/15/2024

## 2024-01-15 NOTE — CONSULTS
Cardiology (consult dictated)    Assessment:  1.  Presentation with respiratory insufficiency with hypoxia.    2.  High fever and elevated lactic acid.  Pneumonia versus UTI.  Borderline blood pressure.    3.  Significant elevation of troponins.  And severe sinus tachycardia on admission but no chest pain or ischemic changes.  Suspect type II MI.      Plan:  1.  Continue to trend troponins.    2.  Given some uncertainty about underlying cardiac status, will start heparin drip.    3.  Echocardiogram.      Thank you.

## 2024-01-15 NOTE — ED QUICK NOTES
Dr. Gooden aware that platelet count is low. Per Dr. Gooden hold heparin in ER. Spoke with Starke APRN and advised that heparin will be held in ED at this time due to platelets being 79.

## 2024-01-15 NOTE — PLAN OF CARE
Repeat high sensitivity troponin 3228. Plts improved to 110. Start heparin gtt per Dr. Felix.     JUAN M Friedman  1/15/2024  3:08 PM  Ph [phone] ([place])  Ph [phone] ([place])

## 2024-01-15 NOTE — CONSULTS
[facility]    PATIENT'S NAME: RADHA DALLAS   ATTENDING PHYSICIAN: Georges Rand MD   CONSULTING PHYSICIAN: Tommie Felix MD   PATIENT ACCOUNT#:   [id number]    LOCATION:  [address]  MEDICAL RECORD #:   [id number]       YOB: 1935  ADMISSION DATE:       01/15/2024      CONSULT DATE:  01/15/2024    REPORT OF CONSULTATION      HISTORY OF PRESENT ILLNESS:  The patient is an 88-year-old female brought to the hospital today from home after her  noted her to be poorly responsive and in respiratory distress.  Paramedics found her to be hypoxic and with a temperature of 104.  The patient's initial troponin was elevated, and we were called to consult.  The patient denies having any chest pain, dizziness, or palpitations and no prior cardiac history.    PAST MEDICAL HISTORY:  Positive for COPD, large hiatal hernia, gastroesophageal reflux, scoliosis, a thyroid nodule, and anxiety and depression.  The patient was admitted to the hospital in July and according to the patient's , there was a cardiac issue and she was prescribed a \"heart pill.\"  They cannot recall the name or the condition for which it is intended.    MEDICATIONS:  Home medications:  Lyrica, quetiapine, amlodipine, escitalopram, alprazolam, aripoprazole, vitamin D, iron, and vitamin B12.      SOCIAL HISTORY:  Former smoker.  Does not ingest alcohol.  The patient is , and her  is with her here today.    REVIEW OF SYSTEMS:  Otherwise negative.      PHYSICAL EXAMINATION:    GENERAL:  Well-developed, well-nourished female.  No acute distress.  Awake and alert, answers questions appropriately.  Oriented x3.    VITAL SIGNS:  Maximum temperature 104.2, current 101.1; pulse initially 160, now 110; respirations 22, initially 35; saturation currently 99% on BiPAP.  HEENT:  Unremarkable.    NECK:  Difficult to assess neck veins but appear normal.  Carotids normal without bruits.  No thyromegaly.    LUNGS:   Congested.  HEART:  S1, S2 normal.  No pathologic murmur.  No S3.  ABDOMEN:  Soft and nontender.  EXTREMITIES:  Cool.  No edema.  Pulses diminished but present.    LABORATORY DATA:  Troponin initially 199; then on the second 2-hour sample 2249.  Procalcitonin 0.45.  ProBNP 520.  WBC 10.5, hemoglobin 14.5, platelets 79,000.  Lactic acid 4.6.  Sodium 139, potassium 3.9, bicarb 24, BUN 19, creatinine 1.04, GFR 52.      Chest x-ray shows elevation of the left hemidiaphragm, large hiatal hernia, scattered opacities.    ASSESSMENT:    1.   Respiratory insufficiency, hypoxia, and altered mental status, likely due to infectious process, lungs and/or urinary tract infection which she has had in the past.  Currently improved mental status and respiratory status.  2.   Significant elevation of troponin.  Repeat EKG shows sinus tachycardia with a rate of 105 versus previous of 165.  There are no acute ischemic changes.  No chest pain.  Suspect that this is a type 2 myocardial infarction secondary to increased O2 demands from tachycardia, hypoxia, etc.    PLAN:    1.   Continue to trend troponins.  2.   Given some uncertainty about underlying cardiac status, we will start heparin drip.  3.   Echocardiogram.    Thank you for this consultation.    Dictated By Tommie Felix MD  d: 01/15/2024 09:31:32  t: 01/15/2024 10:19:29  Job [id number]  [facility]/

## 2024-01-16 ENCOUNTER — APPOINTMENT (OUTPATIENT)
Dept: GENERAL RADIOLOGY | Facility: HOSPITAL | Age: 89
End: 2024-01-16
Attending: INTERNAL MEDICINE
Payer: MEDICARE

## 2024-01-16 LAB
ALBUMIN SERPL-MCNC: 3.4 G/DL (ref 3.2–4.8)
ALBUMIN/GLOB SERPL: 1.3 {RATIO} (ref 1–2)
ALP LIVER SERPL-CCNC: 47 U/L
ALT SERPL-CCNC: 42 U/L
ANION GAP SERPL CALC-SCNC: 3 MMOL/L (ref 0–18)
APTT PPP: 54 SECONDS (ref 23.3–35.6)
APTT PPP: 70.5 SECONDS (ref 23.3–35.6)
AST SERPL-CCNC: 49 U/L (ref ?–34)
BASOPHILS # BLD AUTO: 0.06 X10(3) UL (ref 0–0.2)
BASOPHILS NFR BLD AUTO: 0.2 %
BILIRUB SERPL-MCNC: 0.6 MG/DL (ref 0.2–1.1)
BUN BLD-MCNC: 15 MG/DL (ref 9–23)
BUN/CREAT SERPL: 16.9 (ref 10–20)
CALCIUM BLD-MCNC: 8.8 MG/DL (ref 8.7–10.4)
CHLORIDE SERPL-SCNC: 114 MMOL/L (ref 98–112)
CO2 SERPL-SCNC: 25 MMOL/L (ref 21–32)
CREAT BLD-MCNC: 0.89 MG/DL
DEPRECATED RDW RBC AUTO: 47.6 FL (ref 35.1–46.3)
EGFRCR SERPLBLD CKD-EPI 2021: 62 ML/MIN/1.73M2 (ref 60–?)
EOSINOPHIL # BLD AUTO: 0 X10(3) UL (ref 0–0.7)
EOSINOPHIL NFR BLD AUTO: 0 %
ERYTHROCYTE [DISTWIDTH] IN BLOOD BY AUTOMATED COUNT: 14 % (ref 11–15)
GLOBULIN PLAS-MCNC: 2.6 G/DL (ref 2.8–4.4)
GLUCOSE BLD-MCNC: 139 MG/DL (ref 70–99)
HCT VFR BLD AUTO: 36.7 %
HGB BLD-MCNC: 11.3 G/DL
IMM GRANULOCYTES # BLD AUTO: 0.27 X10(3) UL (ref 0–1)
IMM GRANULOCYTES NFR BLD: 1 %
LYMPHOCYTES # BLD AUTO: 0.79 X10(3) UL (ref 1–4)
LYMPHOCYTES NFR BLD AUTO: 2.8 %
MAGNESIUM SERPL-MCNC: 1.9 MG/DL (ref 1.6–2.6)
MCH RBC QN AUTO: 28.3 PG (ref 26–34)
MCHC RBC AUTO-ENTMCNC: 30.8 G/DL (ref 31–37)
MCV RBC AUTO: 92 FL
MONOCYTES # BLD AUTO: 0.81 X10(3) UL (ref 0.1–1)
MONOCYTES NFR BLD AUTO: 2.9 %
NEUTROPHILS # BLD AUTO: 26.34 X10 (3) UL (ref 1.5–7.7)
NEUTROPHILS # BLD AUTO: 26.34 X10(3) UL (ref 1.5–7.7)
NEUTROPHILS NFR BLD AUTO: 93.1 %
OSMOLALITY SERPL CALC.SUM OF ELEC: 297 MOSM/KG (ref 275–295)
PLATELET # BLD AUTO: 95 10(3)UL (ref 150–450)
POTASSIUM SERPL-SCNC: 4.7 MMOL/L (ref 3.5–5.1)
POTASSIUM SERPL-SCNC: 4.7 MMOL/L (ref 3.5–5.1)
PROT SERPL-MCNC: 6 G/DL (ref 5.7–8.2)
RBC # BLD AUTO: 3.99 X10(6)UL
SODIUM SERPL-SCNC: 142 MMOL/L (ref 136–145)
TROPONIN I SERPL HS-MCNC: 2841 NG/L
WBC # BLD AUTO: 28.3 X10(3) UL (ref 4–11)

## 2024-01-16 PROCEDURE — 99232 SBSQ HOSP IP/OBS MODERATE 35: CPT | Performed by: INTERNAL MEDICINE

## 2024-01-16 PROCEDURE — 71045 X-RAY EXAM CHEST 1 VIEW: CPT | Performed by: INTERNAL MEDICINE

## 2024-01-16 PROCEDURE — 99233 SBSQ HOSP IP/OBS HIGH 50: CPT | Performed by: INTERNAL MEDICINE

## 2024-01-16 RX ORDER — ATORVASTATIN CALCIUM 40 MG/1
40 TABLET, FILM COATED ORAL NIGHTLY
Status: DISCONTINUED | OUTPATIENT
Start: 2024-01-16 | End: 2024-01-21

## 2024-01-16 NOTE — PROGRESS NOTES
[facility]    Progress Note    Mireya Wolfe Patient Status:  Inpatient    1935 MRN [id number]   Location [facility] 2W/SW Attending Aide Vu,    Hosp Day # 1 PCP Kathy Rao MD       SUBJECTIVE:  Pt awake, alert; recognizes me.  Denies abd pain or cough.  Difficulty recalling recent events    OBJECTIVE:  Vital signs in last 24 hours:  /47 (BP Location: Right arm)   Pulse 89   Temp 97.9 °F (36.6 °C) (Temporal)   Resp 24   Wt 125 lb 3.5 oz (56.8 kg)   SpO2 97%   BMI 22.18 kg/m²     Intake/Output:    Intake/Output Summary (Last 24 hours) at 2024 0835  Last data filed at 2024 0500  Gross per 24 hour   Intake 2252 ml   Output 1255 ml   Net 997 ml       Wt Readings from Last 3 Encounters:   01/15/24 125 lb 3.5 oz (56.8 kg)   23 125 lb 6.4 oz (56.9 kg)   23 129 lb 6.6 oz (58.7 kg)       EXAM:  GENERAL: frail appearing; slightly tachypneic  LUNGS: clear to auscultation, +fine exp wheezing  CARDIO: RRR, normal S1S2, without murmur or gallop  GI: soft, NT, ND, NABS  EXTREMITIES: no cyanosis, clubbing or edema; feet warm; +2 DP pulses b/l    Data Review:     Labs:   Lab Results   Component Value Date    WBC 28.3 2024    HGB 11.3 2024    HCT 36.7 2024    PLT 95.0 2024    CREATSERUM 0.89 2024    BUN 15 2024     2024    K 4.7 2024    K 4.7 2024     2024    CO2 25.0 2024     2024    CA 8.8 2024    ALB 3.4 2024    ALKPHO 47 2024    BILT 0.6 2024    TP 6.0 2024    AST 49 2024    ALT 42 2024    PTT 54.0 2024    MG 1.9 2024         Imaging:  XR CHEST AP PORTABLE  (CPT=71045)    Result Date: 1/15/2024  CONCLUSION:   No significant change in the elevation of the left hemidiaphragm with left greater than right basilar airspace opacities, which are favored to reflect atelectasis/scarring.  Large hiatal hernia.  Lesser  incidental findings described above.    Dictated by (CST): Yasir Thomas MD on 1/15/2024 at 6:46 AM     Finalized by (CST): Yasir Thomas MD on 1/15/2024 at 6:50 AM          CARD ECHO 2D DOPPLER CONTRAST (CPT=93306)    Result Date: 1/15/2024  Transthoracic Echocardiogram Name:Mireya Wolfe Date: 01/15/2024 :  1935 Ht:  (63in)  BP: 112 / 69 MRN:  [id number]     Age:  88years    Wt:  (125lb) HR: 103bpm Loc:  [facility]       Gndr: F          BSA: 1.58m^2 Sonographer: Sarah JC Ordering:    Tommie Felix Consulting:  Tommie Felix ---------------------------------------------------------------------------- History/Indications:   Weakness, SOB, Elevated Troponins. ---------------------------------------------------------------------------- Procedure information:  A transthoracic complete 2D study was performed. Additional evaluation included M-mode, complete spectral Doppler, and color Doppler.  Patient status:  Inpatient.  Location:  Bedside.    Comparison was made to the study of 2019.    This was a routine study. Transthoracic echocardiography for diagnosis. Image quality was adequate. The study was technically limited due to poor acoustic window availability and body habitus. Intravenous contrast (Definity) was administered to evaluate left ventricular function. ---------------------------------------------------------------------------- Conclusions: 1. Left ventricle: The cavity size was normal. Wall thickness was normal.    Systolic function was mildly reduced. The estimated ejection fraction was    45-50%, by biplane method of disks. Doppler parameters are consistent    with abnormal left ventricular relaxation - grade 1 diastolic    dysfunction. 2. Left ventricle: There is mild hypokinesis of the apex. 3. Tricuspid valve: There was moderate regurgitation. Impressions:  No previous study from [facility] was available for comparison. *  ---------------------------------------------------------------------------- * Findings: Left ventricle:  The cavity size was normal. Wall thickness was normal. Systolic function was mildly reduced. The estimated ejection fraction was 45-50%, by biplane method of disks. Regional wall motion:   There is mild hypokinesis of the apex.   Doppler parameters are consistent with abnormal left ventricular relaxation - grade 1 diastolic dysfunction. Left atrium:  Well visualized. The atrium was normal in size. Right ventricle:  The cavity size was normal. Systolic function was normal. Right atrium:  Well visualized. The atrium was normal in size. Mitral valve:  Well visualized. The valve was structurally normal. The leaflets were normal thickness. Leaflet separation was normal. No evidence for prolapse.  Doppler:  Transvalvular velocity was within the normal range. There was no evidence for stenosis. There was trivial regurgitation. Aortic valve:  Well visualized.  The valve was trileaflet. The leaflets were normal thickness and mildly calcified. Cusp separation was normal.  Doppler:  Transvalvular velocity was within the normal range. There was no evidence for stenosis. There was trivial regurgitation. Tricuspid valve:  Well visualized. The annulus is normal-sized. The valve is structurally normal. The leaflets are normal thickness. Leaflet separation was normal. No echocardiographic evidence for tricuspid prolapse.  Doppler: Transvalvular velocity was within the normal range. There was no evidence for stenosis. There was moderate regurgitation. Pulmonic valve:   Well visualized. The annulus is normal-sized. The valve is structurally normal. The leaflets are normal thickness. Cusp separation was normal. No evidence for prolapse.  Doppler:  Transvalvular velocity was within the normal range. There was no evidence for stenosis. There was no significant regurgitation. Pericardium:   There was no pericardial effusion. Pleura:   No evidence of pleural fluid accumulation. Aorta: Aortic root: The aortic root was normal-sized. The aortic root was normal. Ascending aorta: The ascending aorta was normal. The ascending aorta was normal. Pulmonary arteries: The main pulmonary artery was normal-sized. There was no evidence of pulmonary hypertension. Systemic veins:  Central venous respirophasic diameter changes are in the normal range (>50%). Inferior vena cava: The IVC was normally collapsible and normal-sized. The IVC was normal-sized. ---------------------------------------------------------------------------- Measurements  Left ventricle                    Value        Ref  IVS thickness, ED, PLAX       (H) 1.1   cm     0.6 - 0.9  LV ID, ED, PLAX               (L) 2.4   cm     3.8 - 5.2  LV ID, ES, PLAX               (L) 1.8   cm     2.2 - 3.5  LV PW thickness, ED, PLAX     (H) 1.0   cm     0.6 - 0.9  IVS/LV PW ratio, ED, PLAX         1.10         ---------  LV PW/LV ID ratio, ED, PLAX       0.42         ---------  LV ejection fraction          (L) 52    %      54 - 74  Stroke volume/bsa, 2D             25    ml/m^2 ---------  LV end-diastolic volume, 1-p      48    ml     48 - 140  A4C  LV ejection fraction, 1-p A4C     52    %      46 - 78  Stroke volume, 1-p A4C            25    ml     ---------  LV end-diastolic volume/bsa,      30    ml/m^2 30 - 82  1-p A4C  Stroke volume/bsa, 1-p A4C        15    ml/m^2 ---------  LV end-diastolic volume, 2-p  (L) 39    ml     46 - 106  LV end-systolic volume, 2-p       19    ml     14 - 42  LV ejection fraction, 2-p     (L) 52    %      54 - 74  Stroke volume, 2-p                20    ml     ---------  LV end-diastolic volume/bsa,  (L) 25    ml/m^2 29 - 61  2-p  LV end-systolic volume/bsa,       12    ml/m^2 8 - 24  2-p  Stroke volume/bsa, 2-p            12.8  ml/m^2 ---------  LV e', lateral                (L) 7.0   cm/sec >=10.0  LV E/e', lateral                  11           <=13  LV e', medial                      8.7   cm/sec >=7.0  LV E/e', medial                   9            ---------  LV e', average                    7.8   cm/sec ---------  LV E/e', average                  10           <=14  LVOT                              Value        Ref  LVOT ID                           1.6   cm     ---------  LVOT peak velocity, S             1.02  m/sec  ---------  LVOT VTI, S                       19.4  cm     ---------  LVOT peak gradient, S             4     mm Hg  ---------  LVOT mean gradient, S             3     mm Hg  ---------  Stroke volume (SV), LVOT DP       39    ml     ---------  Stroke index (SV/bsa), LVOT       25    ml/m^2 ---------  DP  Aortic valve                      Value        Ref  Aortic leaflet separation, MM     1.4   cm     ---------  Aortic root                       Value        Ref  Aortic root ID, STJ, ED           2.8   cm     2.0 - 3.2  Left atrium                       Value        Ref  LA volume, S                      35    ml     22 - 52  LA volume/bsa, S                  22    ml/m^2 16 - 34  LA volume, ES, 1-p A4C            34    ml     22 - 52  LA volume, ES, 1-p A2C            36    ml     22 - 52  LA volume, ES, A/L                37    ml     ---------  LA volume/bsa, ES, A/L            23    ml/m^2 16 - 34  Mitral valve                      Value        Ref  Mitral E-wave peak velocity       0.79  m/sec  ---------  Mitral A-wave peak velocity       1.72  m/sec  ---------  Mitral deceleration time          104   ms     ---------  Mitral peak gradient, D           3     mm Hg  ---------  Mitral E/A ratio, peak            0.5          ---------  Pulmonary artery                  Value        Ref  PA pressure, S, DP                33    mm Hg  ---------  Tricuspid valve                   Value        Ref  Tricuspid regurg peak             2.67  m/sec  <=2.8  velocity  Tricuspid peak RV-RA gradient     30    mm Hg  ---------  Systemic veins                    Value         Ref  Estimated CVP                     3     mm Hg  ---------  Inferior vena cava                Value        Ref  ID                                1.2   cm     <=2.1  Right ventricle                   Value        Ref  TAPSE, 2D                         2.08  cm     >=1.70  TAPSE, MM                         2.08  cm     >=1.70  RV pressure, S, DP                33    mm Hg  ---------  RV s', lateral                    15.6  cm/sec >=9.5 Legend: (L)  and  (H)  dior values outside specified reference range. ---------------------------------------------------------------------------- Prepared and electronically signed by Kelton Mckeon MD 01/15/2024 15:22          Meds:   Current Facility-Administered Medications   Medication Dose Route Frequency    sodium chloride 0.9% infusion   Intravenous Continuous    norepinephrine (Levophed) 4 mg/250mL infusion premix  0.5-30 mcg/min Intravenous Continuous PRN    vasopressin (Vasostrict) 20 Units in sodium chloride 0.9% 100 mL infusion for septic shock  0.01-0.03 Units/min Intravenous Continuous PRN    piperacillin-tazobactam (Zosyn) 3.375 g in dextrose 5% 100 mL IVPB-ADDV  3.375 g Intravenous Q8H    ALPRAZolam (Xanax) tab 0.25 mg  0.25 mg Oral Nightly PRN    ARIPiprazole (Abilify) tab 0.5 mg  0.5 mg Oral Daily    escitalopram (Lexapro) tab 15 mg  15 mg Oral QAM    ferrous sulfate DR tab 325 mg  325 mg Oral Daily with breakfast    pregabalin (Lyrica) cap 50 mg  50 mg Oral BID    QUEtiapine (SEROquel) tab 25 mg  25 mg Oral Nightly    polyethylene glycol (PEG 3350) (Miralax) 17 g oral packet 17 g  17 g Oral Daily PRN    heparin (Porcine) 30558 units/250mL infusion CONTINUOUS  200-3,000 Units/hr Intravenous Continuous       Assessment & Plan    Acute on chronic hypoxic respiratory failure (HCC)  -Pt hospitalized 7/2023--weakness, bibasilar pneumonia, COPD exacerbation. Treated with zosyn then cefepime. Initially home on O2 but weaned off.   -now admitted with hypoxia (sats 83% RA),  altered mental status, fever to 104.2  CXR reviewed--not significantly changed from prior  Extended resp panel, RSV, covid, Flu neg  Lactic acidosis 4.6>2.2>1.8 (normal)  Procal slightly high 0.45  Initially placed on BiPAP, now on 6L O2 NC  Dr. Magana's pulm consult appreciated     Sepsis with E.coli bacteremia  UTI  No significant hypotension  Fever to 104.2 on admission  Lactic acidosis 4.6, now normalized   WBC 10.5>33.3>28.3 -- cont to trend  pCXR unremarkable; repeat ordered for this am  UA with 2+ nitrite, >50 WBC -- likely source of infection  Blood cx x 2 growing E.coli -- final sens pending  Continue IV zosyn (day 2)    Elevated Troponins  Trop 199>2249>3228  EKG-sinus tachycardia  Appreciate cardiology consultation  Heparin drip restarted  Echo with new depressed LV syst function (EF 45-50%) -- prev echo in 2019 (EF 55-60%)  Likely demand ischemia related to hypoxia/tachycardia/sepsis  Could consider ischemic w/u at later date, though pt was asymptomatic  ; start lipitor 40mg qhs        Thrombocytopenia  Platelet 79>110>95  Likely 2/2 sepsis  Cont to monitor     Depression and anxiety  -pt with long hx of depression (and anxiety), exacerbated by marital strife/stress  -previously saw psychiatrist Dr. Friend who retired  -on Lexapro 15mg/day  -on prn xanax     Hypertension  Amlodipine 5mg daily  On hold this admission 2/2 borderline BP    Hx of iron deficiency anemia  Had EGD/C-scope in 2010 (Dr. Ferrari) -- negative. 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 Continue FeSO4 325mg/day.    Hgb down 14.5 > 11.3 --- suspect 2/2 sepsis  Cont to trend     Hx of frequent UTI   On methenamine as outpt; sees urology Dr. Munir Cullen     Eventration of left hemidiaphragm  Previously thought to be due to large HH  CT chest clarified 7/2023 - small HH but large eventration of left hemidiaphragm     Osteoporosis  DEXA in 7/2017- osteoporosis of left femoral neck (T  -2.9).  Pt was briefly on fosamax in 2014. Declines restarting fosamax or other meds so would not check repeat DEXA.    Vitamin B12 deficiency    Level normal (1241) in 11/2023; cont B12 1000 mcg/day    Lumbar spinal stenosis, chronic back pain    Has seen Dr. Salgado (ortho spine at [facility]).  Referred to pain specialist, Dr. Deirdre Elizabeth at [facility]. Had facet injections in 8/2018 which helped tremendously.  Saw Dr. Elizabeth again 9/25/18; did PT.  MRI in 4/2019 did not appear to show anything acute.  No longer taking Norco.  Bilateral knee OA  Sees ortho Dr. Booth -- has had b/l cortisone injections with transient improvement.  Has been advised for TKR's but trying to avoid. Ambulates with a walker.      COPD  Essentially quit smoking in 8/2016.  Has been using the Breo.    Basal and squamous cell cancer, face  S/p Mohs surgeries in 4 areas of her face in 2018 (Dr. Jonas).      Post-herpetic neuralgia (dx'ed with shingles left upper back 6/8/22)  -still with pain but significantly improved  -on Lyrica 50mg BID      Cognitive decline, likely age-related, poss exacerbated by depression  -pt saw neurologist Dr. Lokesh Mcmullen in 9/2022. Was thought to have late onset Alzheimer's dementia (started on aricept), though pt had not shown obvious sx of dementia prior to this admission  Pt was referred for neuropsych testing and MRI brain in 2022 by Dr. Mcmullen though did not schedule.    -had delirium 7/2023 while hospitalized--zyprexa caused sedation; seroquel 25mg at bedtime, and abilify 0.5mg daily     Advance directives  -Discussed with her ; pt has POLST in chart, confirms DNR/select        D/w cardiology Dr. Melvin and pulm Dr. Magana  Detailed message left on pt's 's VM        Kathy Rao MD  1/16/2024  8:35 AM

## 2024-01-16 NOTE — PLAN OF CARE
Problem: Patient Centered Care  Goal: Patient preferences are identified and integrated in the patient's plan of care  Description: Interventions:  - What would you like us to know as we care for you? Im from home with my   - Provide timely, complete, and accurate information to patient/family  - Incorporate patient and family knowledge, values, beliefs, and cultural backgrounds into the planning and delivery of care  - Encourage patient/family to participate in care and decision-making at the level they choose  - Honor patient and family perspectives and choices  Outcome: Progressing     Problem: Patient/Family Goals  Goal: Patient/Family Long Term Goal  Description: Patient's Long Term Goal: Get stronger    Interventions:  - PT/OT  - See additional Care Plan goals for specific interventions  Outcome: Progressing  Goal: Patient/Family Short Term Goal  Description: Patient's Short Term Goal: Go home    Interventions:   - Take all my medications   - See additional Care Plan goals for specific interventions  Outcome: Progressing     Problem: CARDIOVASCULAR - ADULT  Goal: Maintains optimal cardiac output and hemodynamic stability  Description: INTERVENTIONS:  - Monitor vital signs, rhythm, and trends  - Monitor for bleeding, hypotension and signs of decreased cardiac output  - Evaluate effectiveness of vasoactive medications to optimize hemodynamic stability  - Monitor arterial and/or venous puncture sites for bleeding and/or hematoma  - Assess quality of pulses, skin color and temperature  - Assess for signs of decreased coronary artery perfusion - ex. Angina  - Evaluate fluid balance, assess for edema, trend weights  Outcome: Progressing  Goal: Absence of cardiac arrhythmias or at baseline  Description: INTERVENTIONS:  - Continuous cardiac monitoring, monitor vital signs, obtain 12 lead EKG if indicated  - Evaluate effectiveness of antiarrhythmic and heart rate control medications as ordered  - Initiate  emergency measures for life threatening arrhythmias  - Monitor electrolytes and administer replacement therapy as ordered  Outcome: Progressing     Problem: RESPIRATORY - ADULT  Goal: Achieves optimal ventilation and oxygenation  Description: INTERVENTIONS:  - Assess for changes in respiratory status  - Assess for changes in mentation and behavior  - Position to facilitate oxygenation and minimize respiratory effort  - Oxygen supplementation based on oxygen saturation or ABGs  - Provide Smoking Cessation handout, if applicable  - Encourage broncho-pulmonary hygiene including cough, deep breathe, Incentive Spirometry  - Assess the need for suctioning and perform as needed  - Assess and instruct to report SOB or any respiratory difficulty  - Respiratory Therapy support as indicated  - Manage/alleviate anxiety  - Monitor for signs/symptoms of CO2 retention  Outcome: Progressing     Problem: METABOLIC/FLUID AND ELECTROLYTES - ADULT  Goal: Electrolytes maintained within normal limits  Description: INTERVENTIONS:  - Monitor labs and rhythm and assess patient for signs and symptoms of electrolyte imbalances  - Administer electrolyte replacement as ordered  - Monitor response to electrolyte replacements, including rhythm and repeat lab results as appropriate  - Fluid restriction as ordered  - Instruct patient on fluid and nutrition restrictions as appropriate  Outcome: Progressing  Goal: Hemodynamic stability and optimal renal function maintained  Description: INTERVENTIONS:  - Monitor labs and assess for signs and symptoms of volume excess or deficit  - Monitor intake, output and patient weight  - Monitor urine specific gravity, serum osmolarity and serum sodium as indicated or ordered  - Monitor response to interventions for patient's volume status, including labs, urine output, blood pressure (other measures as available)  - Encourage oral intake as appropriate  - Instruct patient on fluid and nutrition restrictions as  appropriate  Outcome: Progressing     Problem: NEUROLOGICAL - ADULT  Goal: Achieves stable or improved neurological status  Description: INTERVENTIONS  - Assess for and report changes in neurological status  - Initiate measures to prevent increased intracranial pressure  - Maintain blood pressure and fluid volume within ordered parameters to optimize cerebral perfusion and minimize risk of hemorrhage  - Monitor temperature, glucose, and sodium. Initiate appropriate interventions as ordered  Outcome: Progressing     Problem: HEMATOLOGIC - ADULT  Goal: Maintains hematologic stability  Description: INTERVENTIONS  - Assess for signs and symptoms of bleeding or hemorrhage  - Monitor labs and vital signs for trends  - Administer supportive blood products/factors, fluids and medications as ordered and appropriate  - Administer supportive blood products/factors as ordered and appropriate  Outcome: Progressing

## 2024-01-16 NOTE — PROGRESS NOTES
[facility]    Progress Note      Assessment and Plan:   1.  E. coli bacteremia-urine cultures negative so far.  Could have been a chest source.  Fever is improved and white blood cell count is trending in the right direction albeit quite high yet.     Recommendations:  1.  Ongoing empiric antibiotic.  2.  Await sensitivities     2.  DVT prophylaxis-subcutaneous heparin     3.  CODE STATUS-DNAR select    4.  Stress ischemia-non-ST elevation myocardial infarction.  LV function on echo is 45 to 50%.  Troponin markedly elevated.    Recommendations: As per cardiology.    5.  Acute on chronic respiratory failure-patient has underlying COPD with chronic elevation of the left hemidiaphragm and basilar atelectasis as well as scoliosis.  May have mild CHF.  The chest x-ray is still pending this morning.    Recommendations:  1.  Await this morning's x-ray  2.  Oxygen taper  3.  Nebulizer therapy  4.  PAP therapy as needed    Subjective:   Mireya Wolfe is a(n) 88 year old female who is breathing more comfortably and tolerating being off the PAP therapy.    Objective:   Blood pressure 110/47, pulse 89, temperature 97.9 °F (36.6 °C), temperature source Temporal, resp. rate 24, weight 125 lb 3.5 oz (56.8 kg), SpO2 97%.    Physical Exam  Alert slightly confused female  HEENT with pupils equal round and reactive to light and accommodation.   Neck without adenopathy, thyromegaly, JVD nor bruit.   Lungs diminished breath sounds on the left to auscultation and percussion.  Cardiac regular rate and rhythm no murmur.   Abdomen nontender, without hepatosplenomegaly and no mass appreciable.   Extremities without clubbing cyanosis nor edema.   Neurologic grossly intact with symmetric tone and strength and reflex.  Skin without gross abnormality     Results:     Lab Results   Component Value Date    WBC 28.3 01/16/2024    HGB 11.3 01/16/2024    HCT 36.7 01/16/2024    PLT 95.0 01/16/2024    CREATSERUM 0.89 01/16/2024    BUN  15 01/16/2024     01/16/2024    K 4.7 01/16/2024    K 4.7 01/16/2024     01/16/2024    CO2 25.0 01/16/2024     01/16/2024    CA 8.8 01/16/2024    ALB 3.4 01/16/2024    ALKPHO 47 01/16/2024    BILT 0.6 01/16/2024    TP 6.0 01/16/2024    AST 49 01/16/2024    ALT 42 01/16/2024    PTT 54.0 01/16/2024    MG 1.9 01/16/2024     Chest x-ray pending    Tao Magana MD  Medical Director, Critical Care, [facility]  Medical Director, [facility]  Pager: [phone]

## 2024-01-16 NOTE — PROGRESS NOTES
[facility]  Cardiology Progress Note    Mireya Wolfe Patient Status:  Inpatient    1935 MRN [id number]   Location [facility] 2W/SW Attending Aide Vu,    Hosp Day # 1 PCP Kathy Rao MD     Subjective     Denies any cardiac complaints.  On levo 2.    Objective:   Patient Vitals for the past 24 hrs:   BP Temp Temp src Pulse Resp SpO2 Weight   24 0800 110/47 97.9 °F (36.6 °C) Temporal 89 24 97 % --   24 0700 122/64 -- -- 90 24 93 % --   24 0600 108/50 -- -- 79 22 95 % --   24 0500 105/50 -- -- 86 25 96 % --   24 0400 102/35 97.2 °F (36.2 °C) Temporal 80 22 96 % --   24 0300 112/45 -- -- 88 23 96 % --   24 0200 95/42 -- -- 86 25 95 % --   24 0100 113/52 -- -- 88 24 91 % --   24 0000 (!) 123/106 -- -- 99 19 95 % --   01/15/24 2300 100/41 -- -- 94 (!) 30 92 % --   01/15/24 2200 99/48 -- -- 87 22 94 % --   01/15/24 2100 112/54 -- -- 97 22 91 % --   01/15/24 2000 112/59 97.6 °F (36.4 °C) Temporal 96 20 91 % --   01/15/24 1800 99/48 -- -- 93 25 92 % --   01/15/24 1600 100/55 98 °F (36.7 °C) Temporal 95 (!) 34 94 % --   01/15/24 1400 102/48 -- -- 92 25 95 % --   01/15/24 1235 90/47 -- -- 100 (!) 27 94 % 125 lb 3.5 oz (56.8 kg)   01/15/24 1215 -- 99.8 °F (37.7 °C) Temporal 93 15 94 % --   01/15/24 1148 -- 98.5 °F (36.9 °C) Oral -- -- -- --   01/15/24 1145 99/53 -- -- 102 21 96 % --   01/15/24 1130 (!) 114/92 -- -- 107 25 93 % --   01/15/24 1115 96/52 -- -- 102 (!) 27 95 % --   01/15/24 1100 109/70 -- -- 107 25 94 % --   01/15/24 1045 103/52 -- -- 101 25 95 % --   01/15/24 1030 101/59 -- -- 107 (!) 27 95 % --   01/15/24 1015 97/53 -- -- 103 24 96 % --   01/15/24 1006 -- -- -- -- -- -- 130 lb 1.1 oz (59 kg)   01/15/24 1000 99/51 -- -- 103 (!) 28 95 % --   01/15/24 0953 -- -- -- -- -- 95 % --   01/15/24 0945 96/53 -- -- 106 22 98 % --     Intake/Output:   Last 3 shifts:   Intake/Output                   24 0700 - 01/15/24  0659 (Not Admitted) 01/15/24 0700 - 01/16/24 0659 01/16/24 0700 - 01/17/24 0659       Intake    P.O.  --  240  --    P.O. -- 240 --    I.V.  500  1812  --    Volume (mL) (sodium chloride 0.9 % IV bolus 1,707 mL) -- 1707 --    Volume (mL) (sodium chloride 0.9% infusion) -- 105 --    Volume (mL) (sodium chloride 0.9 % IV bolus 500 mL) 500 -- --    IV PIGGYBACK  --  200  --    Volume (mL) (piperacillin-tazobactam (Zosyn) 3.375 g in dextrose 5% 100 mL IVPB-ADDV) -- 100 --    Volume (mL) (piperacillin-tazobactam (Zosyn) 3.375 g in dextrose 5% 100 mL IVPB-ADDV) -- 100 --    Total Intake 500 2252 --       Output    Urine  --  1255  --    Incontinent Urine Occurrence -- 2 x --    Output (mL) (External Urinary Catheter) -- 1255 --    Total Output -- 1255 --       Net I/O     500 997 --             Scheduled Meds:    atorvastatin  40 mg Oral Nightly    piperacillin-tazobactam  3.375 g Intravenous Q8H    ARIPiprazole  0.5 mg Oral Daily    escitalopram  15 mg Oral QAM    ferrous sulfate  325 mg Oral Daily with breakfast    pregabalin  50 mg Oral BID    QUEtiapine  25 mg Oral Nightly       Continuous Infusions:    sodium chloride 75 mL/hr at 01/16/24 0029    norepinephrine      vasopressin (Vasostrict) 20 Units in sodium chloride 0.9% 100 mL infusion for septic shock      continuous dose heparin 600 Units/hr (01/15/24 0761)       Results:     Lab Results   Component Value Date    WBC 28.3 (H) 01/16/2024    HGB 11.3 (L) 01/16/2024    HCT 36.7 01/16/2024    PLT 95.0 (L) 01/16/2024    CREATSERUM 0.89 01/16/2024    BUN 15 01/16/2024     01/16/2024    K 4.7 01/16/2024    K 4.7 01/16/2024     (H) 01/16/2024    CO2 25.0 01/16/2024     (H) 01/16/2024    CA 8.8 01/16/2024    ALB 3.4 01/16/2024    ALKPHO 47 (L) 01/16/2024    BILT 0.6 01/16/2024    TP 6.0 01/16/2024    AST 49 (H) 01/16/2024    ALT 42 01/16/2024    PTT 54.0 (H) 01/16/2024    T4F 1.1 07/15/2023    TSH 0.349 (L) 07/15/2023    DDIMER 1.63 (H) 07/14/2023     MG 1.9 2024    PHOS 2.8 2023    B12 1,241 (H) 2023       Recent Labs   Lab 01/15/24  0530 01/15/24  1236 24  0419   * 173* 139*   BUN 19 20 15   CREATSERUM 1.04* 1.04* 0.89   CA 9.9 8.6* 8.8    140 142   K 3.9 3.4* 4.7  4.7    108 114*   CO2 24.0 26.0 25.0     Recent Labs   Lab 01/15/24  0530 01/15/24  1236 24  0419   RBC 5.06 4.03 3.99   HGB 14.5 11.9* 11.3*   HCT 45.2 36.3 36.7   MCV 89.3 90.1 92.0   MCH 28.7 29.5 28.3   MCHC 32.1 32.8 30.8*   RDW 13.3 13.7 14.0   NEPRELIM 9.73*  --  26.34*   WBC 10.5 33.3* 28.3*   PLT 79.0* 110.0* 95.0*       Recent Labs   Lab 01/15/24  0530   BNPML 28       No results for input(s): \"TROP\", \"CK\" in the last 168 hours.    XR CHEST AP PORTABLE  (CPT=71045)    Result Date: 1/15/2024  CONCLUSION:   No significant change in the elevation of the left hemidiaphragm with left greater than right basilar airspace opacities, which are favored to reflect atelectasis/scarring.  Large hiatal hernia.  Lesser incidental findings described above.    Dictated by (CST): Yasir Thomas MD on 1/15/2024 at 6:46 AM     Finalized by (CST): Yasir Thomas MD on 1/15/2024 at 6:50 AM         EKG 12 Lead    Result Date: 1/15/2024  Sinus tachycardia Rightward axis Low voltage QRS, consider pulmonary disease, pericardial effusion, or normal variant Borderline ECG When compared with ECG of 15-ESCOBAR-2024 05:22, Vent. rate has decreased BY  55 BPM Confirmed by GREER TAI, AYE ([id number]) on 1/15/2024 12:32:25 PM    EKG    Result Date: 1/15/2024  Sinus tachycardia Right axis deviation Abnormal ECG No previous ECGs found in [facility] Confirmed by GREER TAI JORDAN ([id number]) on 1/15/2024 9:58:15 AM   CARD ECHO 2D DOPPLER CONTRAST (CPT=93306)    Result Date: 1/15/2024  Transthoracic Echocardiogram Name:Mireya Wolfe Date: 01/15/2024 :  1935 Ht:  (63in)  BP: 112 / 69 MRN:  [id number]     Age:  88years    Wt:  (125lb) HR: 103bpm Loc:  [facility]       Gndr: F           BSA: 1.58m^2 Sonographer: Sarah JC Ordering:    Tommie Felix Consulting:  Tommie Felix ---------------------------------------------------------------------------- History/Indications:   Weakness, SOB, Elevated Troponins. ---------------------------------------------------------------------------- Procedure information:  A transthoracic complete 2D study was performed. Additional evaluation included M-mode, complete spectral Doppler, and color Doppler.  Patient status:  Inpatient.  Location:  Bedside.    Comparison was made to the study of 04/20/2019.    This was a routine study. Transthoracic echocardiography for diagnosis. Image quality was adequate. The study was technically limited due to poor acoustic window availability and body habitus. Intravenous contrast (Definity) was administered to evaluate left ventricular function. ---------------------------------------------------------------------------- Conclusions: 1. Left ventricle: The cavity size was normal. Wall thickness was normal.    Systolic function was mildly reduced. The estimated ejection fraction was    45-50%, by biplane method of disks. Doppler parameters are consistent    with abnormal left ventricular relaxation - grade 1 diastolic    dysfunction. 2. Left ventricle: There is mild hypokinesis of the apex. 3. Tricuspid valve: There was moderate regurgitation. Impressions:  No previous study from [facility] was available for comparison. * ---------------------------------------------------------------------------- * Findings: Left ventricle:  The cavity size was normal. Wall thickness was normal. Systolic function was mildly reduced. The estimated ejection fraction was 45-50%, by biplane method of disks. Regional wall motion:   There is mild hypokinesis of the apex.   Doppler parameters are consistent with abnormal left ventricular relaxation - grade 1 diastolic dysfunction. Left atrium:  Well visualized. The atrium was normal in  size. Right ventricle:  The cavity size was normal. Systolic function was normal. Right atrium:  Well visualized. The atrium was normal in size. Mitral valve:  Well visualized. The valve was structurally normal. The leaflets were normal thickness. Leaflet separation was normal. No evidence for prolapse.  Doppler:  Transvalvular velocity was within the normal range. There was no evidence for stenosis. There was trivial regurgitation. Aortic valve:  Well visualized.  The valve was trileaflet. The leaflets were normal thickness and mildly calcified. Cusp separation was normal.  Doppler:  Transvalvular velocity was within the normal range. There was no evidence for stenosis. There was trivial regurgitation. Tricuspid valve:  Well visualized. The annulus is normal-sized. The valve is structurally normal. The leaflets are normal thickness. Leaflet separation was normal. No echocardiographic evidence for tricuspid prolapse.  Doppler: Transvalvular velocity was within the normal range. There was no evidence for stenosis. There was moderate regurgitation. Pulmonic valve:   Well visualized. The annulus is normal-sized. The valve is structurally normal. The leaflets are normal thickness. Cusp separation was normal. No evidence for prolapse.  Doppler:  Transvalvular velocity was within the normal range. There was no evidence for stenosis. There was no significant regurgitation. Pericardium:   There was no pericardial effusion. Pleura:  No evidence of pleural fluid accumulation. Aorta: Aortic root: The aortic root was normal-sized. The aortic root was normal. Ascending aorta: The ascending aorta was normal. The ascending aorta was normal. Pulmonary arteries: The main pulmonary artery was normal-sized. There was no evidence of pulmonary hypertension. Systemic veins:  Central venous respirophasic diameter changes are in the normal range (>50%). Inferior vena cava: The IVC was normally collapsible and normal-sized. The IVC was  normal-sized. ---------------------------------------------------------------------------- Measurements  Left ventricle                    Value        Ref  IVS thickness, ED, PLAX       (H) 1.1   cm     0.6 - 0.9  LV ID, ED, PLAX               (L) 2.4   cm     3.8 - 5.2  LV ID, ES, PLAX               (L) 1.8   cm     2.2 - 3.5  LV PW thickness, ED, PLAX     (H) 1.0   cm     0.6 - 0.9  IVS/LV PW ratio, ED, PLAX         1.10         ---------  LV PW/LV ID ratio, ED, PLAX       0.42         ---------  LV ejection fraction          (L) 52    %      54 - 74  Stroke volume/bsa, 2D             25    ml/m^2 ---------  LV end-diastolic volume, 1-p      48    ml     48 - 140  A4C  LV ejection fraction, 1-p A4C     52    %      46 - 78  Stroke volume, 1-p A4C            25    ml     ---------  LV end-diastolic volume/bsa,      30    ml/m^2 30 - 82  1-p A4C  Stroke volume/bsa, 1-p A4C        15    ml/m^2 ---------  LV end-diastolic volume, 2-p  (L) 39    ml     46 - 106  LV end-systolic volume, 2-p       19    ml     14 - 42  LV ejection fraction, 2-p     (L) 52    %      54 - 74  Stroke volume, 2-p                20    ml     ---------  LV end-diastolic volume/bsa,  (L) 25    ml/m^2 29 - 61  2-p  LV end-systolic volume/bsa,       12    ml/m^2 8 - 24  2-p  Stroke volume/bsa, 2-p            12.8  ml/m^2 ---------  LV e', lateral                (L) 7.0   cm/sec >=10.0  LV E/e', lateral                  11           <=13  LV e', medial                     8.7   cm/sec >=7.0  LV E/e', medial                   9            ---------  LV e', average                    7.8   cm/sec ---------  LV E/e', average                  10           <=14  LVOT                              Value        Ref  LVOT ID                           1.6   cm     ---------  LVOT peak velocity, S             1.02  m/sec  ---------  LVOT VTI, S                       19.4  cm     ---------  LVOT peak gradient, S             4     mm Hg  ---------  LVOT mean  gradient, S             3     mm Hg  ---------  Stroke volume (SV), LVOT DP       39    ml     ---------  Stroke index (SV/bsa), LVOT       25    ml/m^2 ---------  DP  Aortic valve                      Value        Ref  Aortic leaflet separation, MM     1.4   cm     ---------  Aortic root                       Value        Ref  Aortic root ID, STJ, ED           2.8   cm     2.0 - 3.2  Left atrium                       Value        Ref  LA volume, S                      35    ml     22 - 52  LA volume/bsa, S                  22    ml/m^2 16 - 34  LA volume, ES, 1-p A4C            34    ml     22 - 52  LA volume, ES, 1-p A2C            36    ml     22 - 52  LA volume, ES, A/L                37    ml     ---------  LA volume/bsa, ES, A/L            23    ml/m^2 16 - 34  Mitral valve                      Value        Ref  Mitral E-wave peak velocity       0.79  m/sec  ---------  Mitral A-wave peak velocity       1.72  m/sec  ---------  Mitral deceleration time          104   ms     ---------  Mitral peak gradient, D           3     mm Hg  ---------  Mitral E/A ratio, peak            0.5          ---------  Pulmonary artery                  Value        Ref  PA pressure, S, DP                33    mm Hg  ---------  Tricuspid valve                   Value        Ref  Tricuspid regurg peak             2.67  m/sec  <=2.8  velocity  Tricuspid peak RV-RA gradient     30    mm Hg  ---------  Systemic veins                    Value        Ref  Estimated CVP                     3     mm Hg  ---------  Inferior vena cava                Value        Ref  ID                                1.2   cm     <=2.1  Right ventricle                   Value        Ref  TAPSE, 2D                         2.08  cm     >=1.70  TAPSE, MM                         2.08  cm     >=1.70  RV pressure, S, DP                33    mm Hg  ---------  RV s', lateral                    15.6  cm/sec >=9.5 Legend: (L)  and  (H)  dior values outside specified  reference range. ---------------------------------------------------------------------------- Prepared and electronically signed by Kelton Mckeon MD 01/15/2024 15:22       Exam:     General: Alert. No apparent distress.   HEENT: Normocephalic, anicteric sclera, neck supple, no thyromegaly or adenopathy.  Neck: No JVD, carotids 2+, no bruits.  Cardiac: Regular rate and rhythm. S1, S2 normal. No murmur, pericardial rub, S3, or extra cardiac sounds.  Lungs: Bilateral wheezing.  Normal excursions and effort.  Abdomen: Soft, non-tender. No organosplenomegally, mass or rebound, BS-present.  Extremities: Without clubbing or cyanosis.  Trace lower extremity edema.    Neurologic: Alert, normal affect. No focal defects  Skin: Warm and dry.     Assessment and Plan:   Assessment:  1.  Presentation with respiratory insufficiency with hypoxia secondary to COPD, prior treatment for pneumonia.     2.  Sepsis.  E. coli bacteremia with possible urinary source.  On broad-spectrum antibiotics     3.  Significant elevation of troponins.  Hypoxia, hypotension, severe sinus tachycardia on admission but no chest pain or ischemic changes.  Suspect type II MI.  Now on IV heparin.    4.  Borderline decreased ejection fraction.  EF 45 to 50% on TTE with moderate tricuspid regurgitation.  Likely secondary to above.        Plan:  1.  Continue to check troponins until downtrending.    2.  Continue IV heparin for now.  Patient has underlying dementia, will need to discuss options for ischemic evaluation with patient's  given her age/comorbidities, however no urgency at this time given stability minimal symptoms.     3.  Further management of sepsis per primary service.    Luca Melvin MD  [facility]  1/16/2024

## 2024-01-16 NOTE — PAYOR COMM NOTE
--------------  ADMISSION REVIEW     Payor: [employer]  Subscriber #:  [id number]  Authorization Number: [id number]    Admit date: 1/15/24  Admit time: 12:14 PM         History and Physical    HPI:   Mrs. Wolfe is an 88 year old female with past medical history of COPD, dyslipidemia, recurrent UTIs presenting slightly unresponsive from home. Her  is bedside providing history as pt is on bipap. Reports that the day prior to admission, pt was having slightly more mid to lower back pain and had a lower activity day and went to sleep. Her  heard some moaning and came to check on her and found her less responsive. He called EMS and pt was brought to the ER. She was noted to be hypoxic 83-84% on arrival. Placed on bipap and now is more comfortable. Also noted to have fever of 104F.     Additional workup demonstrates lactic acidosis, elevated troponins, negative viral panel (including flu/covid/rsv), normal bnp, procalcitonin 0.45.      She denies recent cough, sick contacts, diarrhea, dysuria.     Of note, she was admitted in 7/2023 for weakness and pneumonia. Spouse states she has not been feeling well ever since.   Also reports that the past few weeks, she has not been consistently taking her medications.     Constitutional:  Positive for chills and fever.   Respiratory:  Positive for chest tightness and shortness of breath.    Cardiovascular:  Negative for chest pain, palpitations and leg swelling.   Gastrointestinal:  Negative for abdominal pain.   Genitourinary:  Negative for dysuria.   Musculoskeletal:  Positive for back pain.     Blood pressure 90/47, pulse 100, temperature 99.8 °F (37.7 °C), temperature source Temporal, resp. rate (!) 27, weight 125 lb 3.5 oz (56.8 kg), SpO2 94%.  Physical Exam  HENT:      Mouth/Throat:      Mouth: Mucous membranes are dry.   Eyes:      Extraocular Movements: Extraocular movements intact.      Conjunctiva/sclera: Conjunctivae normal.   Neck:       Vascular: No carotid bruit.   Cardiovascular:      Rate and Rhythm: Tachycardia present.      Heart sounds: No murmur heard.  Pulmonary:      Breath sounds: Normal breath sounds. No wheezing or rhonchi.   Abdominal:      General: Bowel sounds are normal. There is no distension.      Palpations: Abdomen is soft.      Tenderness: There is no abdominal tenderness. There is no guarding or rebound.   Musculoskeletal:         General: No swelling.   Lymphadenopathy:      Cervical: No cervical adenopathy.   Skin:     General: Skin is dry.      Capillary Refill: Capillary refill takes less than 2 seconds.   Neurological:      General: No focal deficit present.      Mental Status: She is alert.   Lab Results   Component Value Date    WBC 10.5 01/15/2024    HGB 14.5 01/15/2024    HCT 45.2 01/15/2024    PLT 79.0 (L) 01/15/2024    CREATSERUM 1.04 (H) 01/15/2024    BUN 20 01/15/2024     01/15/2024    K 3.4 (L) 01/15/2024     01/15/2024    CO2 26.0 01/15/2024     (H) 01/15/2024    CA 8.6 (L) 01/15/2024    ALB 4.2 01/15/2024    ALKPHO 90 01/15/2024    BILT 1.3 (H) 01/15/2024    TP 7.1 01/15/2024    AST 41 (H) 01/15/2024    ALT 34 01/15/2024    PTT 26.4 01/15/2024    TROPHS 2,249 (HH) 01/15/2024   XR CHEST AP PORTABLE  (CPT=71045)  Result Date: 1/15/2024  CONCLUSION:   No significant change in the elevation of the left hemidiaphragm with left greater than right basilar airspace opacities, which are favored to reflect atelectasis/scarring.  Large hiatal hernia.  Lesser incidental findings described above.    Dictated by (CST): Yasir Thomas MD on 1/15/2024 at 6:46 AM     Finalized by (CST): Yasir Thomas MD on 1/15/2024 at 6:50 AM           EKG 12 Lead  Result Date: 1/15/2024  Sinus tachycardia Rightward axis Low voltage QRS, consider pulmonary disease, pericardial effusion, or normal variant Borderline ECG When compared with ECG of 15-ESCOBAR-2024 05:22, Vent. rate has decreased BY  55 BPM Confirmed by ABIDA,  AYE BUTTERFIELD ([id number]) on 1/15/2024 12:32:25 PM    EKG  Result Date: 1/15/2024  Sinus tachycardia Right axis deviation Abnormal ECG No previous ECGs found in [facility] Confirmed by GREER TAI JORDAN ([id number]) on 1/15/2024 9:58:15 AM     Assessment/Plan:     Acute respiratory failure with hypoxia (HCC)  Pt hospitalized 7/2023--weakness, bibasilar pneumonia, COPD exacerbation. Treated with zosyn then cefepime. Initially discharged home with oxygen, but was weaned off oxygen.   Now on bipap--appreciate pulmonology consultation  CXR reviewed--not significantly changed from prior  Extensive viral panel, RSV, covid, Flu neg  Lactic acidosis 4.6>2.2  Procal 0.45  Bcx pending  UA-not done yet  Weaned to 5L NC now  Given solumedrol, started on zosyn IV      Febrile illness  Unclear source--await bcx, ucx  Empiric zosyn  Ivf    Elevated Troponins  EKG-sinus tachycardia  Appreciate cardiology consultation  Heparin drip--held d/t thrombocytopenia  Echo pending     Thrombocytopenia  Platelet 79; monitor closely     Hypertension  Amlodipine 5mg daily           1/16/24  Pt awake, alert; recognizes MD,  Difficulty recalling recent events     /47 (BP Location: Right arm)   Pulse 89   Temp 97.9 °F (36.6 °C) (Temporal)   Resp 24   Wt 125 lb 3.5 oz (56.8 kg)   SpO2 97%   BMI 22.18 kg/m²    GENERAL: frail appearing; slightly tachypneic  LUNGS: clear to auscultation, +fine exp wheezing  CARDIO: RRR, normal S1S2, without murmur or gallop  GI: soft, NT, ND, NABS  EXTREMITIES: no cyanosis, clubbing or edema; feet warm; +2 DP pulses b/l     Lab Results   Component Value Date     WBC 28.3 01/16/2024     HGB 11.3 01/16/2024     HCT 36.7 01/16/2024     PLT 95.0 01/16/2024     CREATSERUM 0.89 01/16/2024     BUN 15 01/16/2024      01/16/2024     K 4.7 01/16/2024     K 4.7 01/16/2024      01/16/2024     CO2 25.0 01/16/2024      01/16/2024     CA 8.8 01/16/2024     ALB 3.4 01/16/2024     ALKPHO 47 01/16/2024     BILT 0.6  01/16/2024     TP 6.0 01/16/2024     AST 49 01/16/2024     ALT 42 01/16/2024     PTT 54.0 01/16/2024     MG 1.9 01/16/2024       Assessment & Plan     Acute on chronic hypoxic respiratory failure (HCC)  -Pt hospitalized 7/2023--weakness, bibasilar pneumonia, COPD exacerbation. Treated with zosyn then cefepime. Initially home on O2 but weaned off.   -now admitted with hypoxia (sats 83% RA), altered mental status, fever to 104.2  CXR reviewed--not significantly changed from prior  Extended resp panel, RSV, covid, Flu neg  Lactic acidosis 4.6>2.2>1.8 (normal)  Procal slightly high 0.45  Initially placed on BiPAP, now on 6L O2 NC  Dr. Magana's pulm consult appreciated     Sepsis with E.coli bacteremia  UTI  No significant hypotension  Fever to 104.2 on admission  Lactic acidosis 4.6, now normalized   WBC 10.5>33.3>28.3 -- cont to trend  pCXR unremarkable; repeat ordered for this am  UA with 2+ nitrite, >50 WBC -- likely source of infection  Blood cx x 2 growing E.coli -- final sens pending  Continue IV zosyn (day 2)     Elevated Troponins  Trop 199>2249>3228  EKG-sinus tachycardia  Appreciate cardiology consultation  Heparin drip restarted  Echo with new depressed LV syst function (EF 45-50%) -- prev echo in 2019 (EF 55-60%)  Likely demand ischemia related to hypoxia/tachycardia/sepsis  Could consider ischemic w/u at later date, though pt was asymptomatic  ; start lipitor 40mg qhs        Thrombocytopenia  Platelet 79>110>95  Likely 2/2 sepsis  Cont to monitor     Hypertension  Amlodipine 5mg daily  On hold this admission 2/2 borderline BP        PULMONOLOGY  Assessment and Plan:   1.  E. coli bacteremia-urine cultures negative so far.  Could have been a chest source.  Fever is improved and white blood cell count is trending in the right direction albeit quite high yet.     Recommendations:  1.  Ongoing empiric antibiotic.  2.  Await sensitivities     2.  DVT prophylaxis-subcutaneous heparin     3.  CODE  STATUS-DNAR select     4.  Stress ischemia-non-ST elevation myocardial infarction.  LV function on echo is 45 to 50%.  Troponin markedly elevated.     Recommendations: As per cardiology.     5.  Acute on chronic respiratory failure-patient has underlying COPD with chronic elevation of the left hemidiaphragm and basilar atelectasis as well as scoliosis.  May have mild CHF.  The chest x-ray is still pending this morning.     Recommendations:  1.  Await this morning's x-ray  2.  Oxygen taper  3.  Nebulizer therapy  4.  PAP therapy as needed       CARDIOLOGY  Assessment:  1.  Presentation with respiratory insufficiency with hypoxia secondary to COPD, prior treatment for pneumonia.     2.  Sepsis.  E. coli bacteremia with possible urinary source.  On broad-spectrum antibiotics     3.  Significant elevation of troponins.  Hypoxia, hypotension, severe sinus tachycardia on admission but no chest pain or ischemic changes.  Suspect type II MI.  Now on IV heparin.     4.  Borderline decreased ejection fraction.  EF 45 to 50% on TTE with moderate tricuspid regurgitation.  Likely secondary to above.        Plan:  1.  Continue to check troponins until downtrending.     2.  Continue IV heparin for now.  Patient has underlying dementia, will need to discuss options for ischemic evaluation with patient's  given her age/comorbidities, however no urgency at this time given stability minimal symptoms.     3.  Further management of sepsis per primary service.    MEDICATIONS ADMINISTERED IN LAST 1 DAY:  ARIPiprazole (Abilify) tab 0.5 mg       Date Action Dose Route User    1/16/2024 0905 Given 0.5 mg Oral Stefany Somers RN    1/15/2024 1803 Given 0.5 mg Oral Damaris Stewart RN          heparin (Porcine) 1000 UNIT/ML injection - BOLUS IV 3,400 Units       Date Action Dose Route User    1/15/2024 1529 Given 3,400 Units Intravenous Damaris Stewart RN          heparin (Porcine) 86412 units/250mL infusion CONTINUOUS       Date Action  Dose Route User    1/15/2024 2351 Hi-Risk Rate/Dose Change 600 Units/hr Intravenous Ama Nolasco RN          ferrous sulfate DR tab 325 mg       Date Action Dose Route User    1/16/2024 0845 Given 325 mg Oral Stefany Somers RN          heparin (Porcine) 28005 units/250mL infusion INITIAL DOSE       Date Action Dose Route User    1/15/2024 1530 New Bag 12 Units/kg/hr × 56.8 kg Intravenous Damaris Stewart RN          magnesium oxide (Mag-Ox) tab 400 mg       Date Action Dose Route User    1/15/2024 1805 Given 400 mg Oral Damaris Stewart RN          piperacillin-tazobactam (Zosyn) 3.375 g in dextrose 5% 100 mL IVPB-ADDV       Date Action Dose Route User    1/15/2024 1314 New Bag 3.375 g Intravenous Damaris Stewart RN          piperacillin-tazobactam (Zosyn) 3.375 g in dextrose 5% 100 mL IVPB-ADDV       Date Action Dose Route User    1/16/2024 0906 New Bag 3.375 g Intravenous Stefany Somers RN    1/16/2024 0029 New Bag 3.375 g Intravenous Ama Nolasco RN    1/15/2024 1817 New Bag 3.375 g Intravenous Damaris Stewart RN          potassium chloride (Klor-Con) 20 MEQ oral powder 40 mEq       Date Action Dose Route User    1/15/2024 1807 Given 40 mEq Oral Damaris Stewart RN          pregabalin (Lyrica) cap 50 mg       Date Action Dose Route User    1/16/2024 0845 Given 50 mg Oral Stefany Somers RN    1/15/2024 2025 Given 50 mg Oral Ama Nolasco RN          QUEtiapine (SEROquel) tab 25 mg       Date Action Dose Route User    1/15/2024 2025 Given 25 mg Oral Ama Nolasco RN          sodium chloride 0.9% infusion       Date Action Dose Route User    1/16/2024 0029 New Bag (none) Intravenous Ama Nolasco RN    1/15/2024 1400 Rate/Dose Verify (none) Intravenous Damaris Stewart RN    1/15/2024 1217 New Bag (none) Intravenous Damaris Stewart, JANA          escitalopram (Lexapro) tab 15 mg       Date Action Dose Route User    1/16/2024 0846 Given 15 mg Oral Stefany Somers RN    1/15/2024 1526 Given 15 mg Oral  Damaris Stewart RN            Vitals (last day)       Date/Time Temp Pulse Resp BP SpO2 Weight O2 Device O2 Flow Rate (L/min) [facility]    01/16/24 0800 97.9 °F (36.6 °C) 89 24 110/47 97 % -- High flow nasal cannula 5 L/min MT    01/16/24 0400 97.2 °F (36.2 °C) 80 22 102/35 96 % -- High flow nasal cannula 5 L/min     01/16/24 0100 -- 88 24 113/52 91 % -- -- 5 L/min     01/16/24 0000 -- 99 19 123/106 95 % -- High flow nasal cannula 5 L/min     01/15/24 2300 -- 94 30 100/41 92 % -- High flow nasal cannula 5 L/min     01/15/24 2000 97.6 °F (36.4 °C) 96 20 112/59 91 % -- High flow nasal cannula 5 L/min     01/15/24 1600 98 °F (36.7 °C) 95 34 100/55 94 % -- High flow nasal cannula 5 L/min     01/15/24 1215 99.8 °F (37.7 °C) 93 15 -- 94 % -- High flow nasal cannula 5 L/min     01/15/24 1148 98.5 °F (36.9 °C) -- -- -- -- -- -- --     01/15/24 1000 -- 103 28 99/51 95 % -- High flow nasal cannula 5 L/min     01/15/24 0744 101.1 °F (38.4 °C) -- -- -- -- -- -- -- LP    01/15/24 0544 104.2 °F (40.1 °C) -- -- -- -- -- -- -- KR    01/15/24 0535 -- 147 -- -- 95 % -- -- -- KM    01/15/24 0531 -- -- -- -- -- -- Bi-PAP -- KR    01/15/24 0530 -- 147 50 135/75 95 % -- -- -- KR    01/15/24 0522 102.2 °F (39 °C) 156 35 132/110 97 % -- Non-rebreather mask 10 L/min KR

## 2024-01-17 ENCOUNTER — APPOINTMENT (OUTPATIENT)
Dept: GENERAL RADIOLOGY | Facility: HOSPITAL | Age: 89
End: 2024-01-17
Attending: INTERNAL MEDICINE
Payer: MEDICARE

## 2024-01-17 LAB
ANION GAP SERPL CALC-SCNC: 2 MMOL/L (ref 0–18)
APTT PPP: 31.1 SECONDS (ref 23.3–35.6)
APTT PPP: 41.1 SECONDS (ref 23.3–35.6)
BASOPHILS # BLD AUTO: 0.06 X10(3) UL (ref 0–0.2)
BASOPHILS NFR BLD AUTO: 0.5 %
BLACTX-M ISLT/SPM QL: NOT DETECTED
BUN BLD-MCNC: 12 MG/DL (ref 9–23)
BUN/CREAT SERPL: 15.8 (ref 10–20)
CALCIUM BLD-MCNC: 9.1 MG/DL (ref 8.7–10.4)
CHLORIDE SERPL-SCNC: 113 MMOL/L (ref 98–112)
CO2 SERPL-SCNC: 28 MMOL/L (ref 21–32)
CREAT BLD-MCNC: 0.76 MG/DL
DEPRECATED RDW RBC AUTO: 49.1 FL (ref 35.1–46.3)
E COLI DNA BLD POS QL NAA+NON-PROBE: DETECTED
EGFRCR SERPLBLD CKD-EPI 2021: 75 ML/MIN/1.73M2 (ref 60–?)
EOSINOPHIL # BLD AUTO: 0.07 X10(3) UL (ref 0–0.7)
EOSINOPHIL NFR BLD AUTO: 0.5 %
ERYTHROCYTE [DISTWIDTH] IN BLOOD BY AUTOMATED COUNT: 14 % (ref 11–15)
GLUCOSE BLD-MCNC: 105 MG/DL (ref 70–99)
HCT VFR BLD AUTO: 37.2 %
HGB BLD-MCNC: 11.2 G/DL
IMM GRANULOCYTES # BLD AUTO: 0.1 X10(3) UL (ref 0–1)
IMM GRANULOCYTES NFR BLD: 0.8 %
LYMPHOCYTES # BLD AUTO: 0.92 X10(3) UL (ref 1–4)
LYMPHOCYTES NFR BLD AUTO: 7.1 %
MCH RBC QN AUTO: 28.3 PG (ref 26–34)
MCHC RBC AUTO-ENTMCNC: 30.1 G/DL (ref 31–37)
MCV RBC AUTO: 93.9 FL
MONOCYTES # BLD AUTO: 0.32 X10(3) UL (ref 0.1–1)
MONOCYTES NFR BLD AUTO: 2.5 %
NEUTROPHILS # BLD AUTO: 11.5 X10 (3) UL (ref 1.5–7.7)
NEUTROPHILS # BLD AUTO: 11.5 X10(3) UL (ref 1.5–7.7)
NEUTROPHILS NFR BLD AUTO: 88.6 %
OSMOLALITY SERPL CALC.SUM OF ELEC: 296 MOSM/KG (ref 275–295)
PLATELET # BLD AUTO: 72 10(3)UL (ref 150–450)
PLATELET # BLD AUTO: 72 10(3)UL (ref 150–450)
POTASSIUM SERPL-SCNC: 4.3 MMOL/L (ref 3.5–5.1)
RBC # BLD AUTO: 3.96 X10(6)UL
SODIUM SERPL-SCNC: 143 MMOL/L (ref 136–145)
WBC # BLD AUTO: 13 X10(3) UL (ref 4–11)

## 2024-01-17 PROCEDURE — 71045 X-RAY EXAM CHEST 1 VIEW: CPT | Performed by: INTERNAL MEDICINE

## 2024-01-17 PROCEDURE — 99232 SBSQ HOSP IP/OBS MODERATE 35: CPT | Performed by: INTERNAL MEDICINE

## 2024-01-17 RX ORDER — IPRATROPIUM BROMIDE AND ALBUTEROL SULFATE 2.5; .5 MG/3ML; MG/3ML
3 SOLUTION RESPIRATORY (INHALATION)
Status: DISCONTINUED | OUTPATIENT
Start: 2024-01-17 | End: 2024-01-20

## 2024-01-17 RX ORDER — ARIPIPRAZOLE 2 MG/1
0.5 TABLET ORAL DAILY
Status: DISCONTINUED | OUTPATIENT
Start: 2024-01-17 | End: 2024-01-17

## 2024-01-17 RX ORDER — CEFAZOLIN SODIUM/WATER 2 G/20 ML
2 SYRINGE (ML) INTRAVENOUS EVERY 8 HOURS
Status: DISCONTINUED | OUTPATIENT
Start: 2024-01-17 | End: 2024-01-21

## 2024-01-17 NOTE — CM/SW NOTE
01/17/24 1000   CM/SW Referral Data   Referral Source Social Work (self-referral)   Reason for Referral Discharge planning   Informant Patient;Spouse/Significant Other   Medical Hx   Does patient have an established PCP? Yes  (Kathy Lorenaantonio)   Patient Info   Patient's Current Mental Status at Time of Assessment Alert;Oriented;Memory Impairments  (slight confusion/memory)   Patient's Home Environment House   Number of Levels in Home 2   Patient lives with Spouse/Significant other   Patient Status Prior to Admission   Independent with ADLs and Mobility No   Pt. requires assistance with Ambulating;Driving;Meals;Housework   Services in place prior to admission DME/Supplies at home   Type of DME/Supplies Standard Walker;Stair Lift   Discharge Needs   Anticipated D/C needs Home health care;Medical equipment;Subacute rehab;To be determined  (monitor O2 needs)   Choice of Post-Acute Provider   Informed patient of right to choose their preferred provider Yes   List of appropriate post-acute services provided to patient/family with quality data   (HH ref in [facility] - needs f/up)       SW self referred pt for DC Planning. Met w/ pt at bedside. Pt was able to verify her home address and confirmed living w/ her  Alphonse.    Pt was able to tell SW she lives in a home w/ 2 levels. Pt stated she has \"a ladder attached to a pole\" to get upstairs.    Pt informed JAMES she has 2 sons: Alphonse and Joaquim. They live nearby as well.    SW offered to call pt's spouse Alphonse. Pt stated he was working at his [employer] company currently.    SW contacted pt's spouse and verified all above info. Per pt's  Alphonse, pt has a walker on each floor and a stair lift up stairs. Pt enters the home through the garage and has 2 steps to enter w/ hand rails.    Pt has hx w/ ROSEMARIE at [facility] in Summer 2023. Pt also has hx w/ Home O2 but has not needed it and was returned approx 2 weeks ago.    Pt is currently on 5L o2 - no home  oxygen. SW to monitor for needs closer to DC.    SW discussed HH for pt. Pt's spouse believes it would be beneficial but unsure if pt will be agreeable.     Tentative HH referrals sent in [employer]. F2F entered/sent.    Message sent to Dr. Rao and requested PT/OT consults be added if MD felt appropriate.    Need for DC:  PT/OT evals  HH list/choice  Confirm O2 needs (sats/verb/MDO entered & cosigned)    PLAN: Home w/ poss HH - pending above & med clear      SW/CM to remain available for support and/or discharge planning.         Nahomi Hart, MSW, LSW [id number]

## 2024-01-17 NOTE — PROGRESS NOTES
[facility]    Progress Note    Mireya Wolfe Patient Status:  Inpatient    1935 MRN [id number]   Location [facility] 3W/SW Attending Aide Vu,    Hosp Day # 2 PCP Kathy Rao MD       SUBJECTIVE:  Feels okay except tired    OBJECTIVE:  Vital signs in last 24 hours:  /71 (BP Location: Right arm)   Pulse 93   Temp 98.4 °F (36.9 °C) (Oral)   Resp 24   Wt 125 lb (56.7 kg)   SpO2 95%   BMI 22.14 kg/m²     Intake/Output:    Intake/Output Summary (Last 24 hours) at 2024 0917  Last data filed at 2024 0657  Gross per 24 hour   Intake 5400.3 ml   Output 1300 ml   Net 4100.3 ml       Wt Readings from Last 3 Encounters:   24 125 lb (56.7 kg)   23 125 lb 6.4 oz (56.9 kg)   23 129 lb 6.6 oz (58.7 kg)       EXAM:  GENERAL: frail, tachypneic, alert, oriented but forgetful  LUNGS: b/l exp wheezing; bibasilar crackle; decrease BS at left base  CARDIO: RRR, normal S1S2, without murmur or gallop  GI: soft, NT, ND, NABS, no HSM  EXTREMITIES: no cyanosis, clubbing or edema    Data Review:     Labs:   Lab Results   Component Value Date    WBC 13.0 2024    HGB 11.2 2024    HCT 37.2 2024    PLT 72.0 2024    PLT 72.0 2024    CREATSERUM 0.76 2024    BUN 12 2024     2024    K 4.3 2024     2024    CO2 28.0 2024     2024    CA 9.1 2024    PTT 41.1 2024         Imaging:  XR CHEST AP PORTABLE  (CPT=71045)    Result Date: 2024  CONCLUSION:  1. Cardiomegaly.  Tortuous atherosclerotic aorta. 2. Left perihilar and bibasilar parenchymal abnormality and/or atelectatic changes.  Slight progression. 3. Large diaphragmatic hernia containing a large portion of the stomach which has become fluid-filled. 4. Left-sided subpulmonic effusion suspected..     Dictated by (CST): Naveen Martínez MD on 2024 at 8:34 AM     Finalized by (CST): Naveen Martínez MD on  2024 at 8:37 AM          XR CHEST AP PORTABLE  (CPT=71045)    Result Date: 2024  CONCLUSION: Increasing bibasilar atelectasis or consolidation    Dictated by (CST): Quincy Arteaga MD on 2024 at 9:37 AM     Finalized by (CST): Quincy Arteaga MD on 2024 at 9:38 AM          CARD ECHO 2D DOPPLER CONTRAST (CPT=93306)    Result Date: 1/15/2024  Transthoracic Echocardiogram Name:Mireya Wolfe Date: 01/15/2024 :  1935 Ht:  (63in)  BP: 112 / 69 MRN:  [id number]     Age:  88years    Wt:  (125lb) HR: 103bpm Loc:  [facility]       Gndr: F          BSA: 1.58m^2 Sonographer: Sarah JC Ordering:    Tommie Felix Consulting:  Tommie Felix ---------------------------------------------------------------------------- History/Indications:   Weakness, SOB, Elevated Troponins. ---------------------------------------------------------------------------- Procedure information:  A transthoracic complete 2D study was performed. Additional evaluation included M-mode, complete spectral Doppler, and color Doppler.  Patient status:  Inpatient.  Location:  Bedside.    Comparison was made to the study of 2019.    This was a routine study. Transthoracic echocardiography for diagnosis. Image quality was adequate. The study was technically limited due to poor acoustic window availability and body habitus. Intravenous contrast (Definity) was administered to evaluate left ventricular function. ---------------------------------------------------------------------------- Conclusions: 1. Left ventricle: The cavity size was normal. Wall thickness was normal.    Systolic function was mildly reduced. The estimated ejection fraction was    45-50%, by biplane method of disks. Doppler parameters are consistent    with abnormal left ventricular relaxation - grade 1 diastolic    dysfunction. 2. Left ventricle: There is mild hypokinesis of the apex. 3. Tricuspid valve: There was moderate regurgitation. Impressions:  No previous study from  [facility] was available for comparison. * ---------------------------------------------------------------------------- * Findings: Left ventricle:  The cavity size was normal. Wall thickness was normal. Systolic function was mildly reduced. The estimated ejection fraction was 45-50%, by biplane method of disks. Regional wall motion:   There is mild hypokinesis of the apex.   Doppler parameters are consistent with abnormal left ventricular relaxation - grade 1 diastolic dysfunction. Left atrium:  Well visualized. The atrium was normal in size. Right ventricle:  The cavity size was normal. Systolic function was normal. Right atrium:  Well visualized. The atrium was normal in size. Mitral valve:  Well visualized. The valve was structurally normal. The leaflets were normal thickness. Leaflet separation was normal. No evidence for prolapse.  Doppler:  Transvalvular velocity was within the normal range. There was no evidence for stenosis. There was trivial regurgitation. Aortic valve:  Well visualized.  The valve was trileaflet. The leaflets were normal thickness and mildly calcified. Cusp separation was normal.  Doppler:  Transvalvular velocity was within the normal range. There was no evidence for stenosis. There was trivial regurgitation. Tricuspid valve:  Well visualized. The annulus is normal-sized. The valve is structurally normal. The leaflets are normal thickness. Leaflet separation was normal. No echocardiographic evidence for tricuspid prolapse.  Doppler: Transvalvular velocity was within the normal range. There was no evidence for stenosis. There was moderate regurgitation. Pulmonic valve:   Well visualized. The annulus is normal-sized. The valve is structurally normal. The leaflets are normal thickness. Cusp separation was normal. No evidence for prolapse.  Doppler:  Transvalvular velocity was within the normal range. There was no evidence for stenosis. There was no significant regurgitation.  Pericardium:   There was no pericardial effusion. Pleura:  No evidence of pleural fluid accumulation. Aorta: Aortic root: The aortic root was normal-sized. The aortic root was normal. Ascending aorta: The ascending aorta was normal. The ascending aorta was normal. Pulmonary arteries: The main pulmonary artery was normal-sized. There was no evidence of pulmonary hypertension. Systemic veins:  Central venous respirophasic diameter changes are in the normal range (>50%). Inferior vena cava: The IVC was normally collapsible and normal-sized. The IVC was normal-sized. ---------------------------------------------------------------------------- Measurements  Left ventricle                    Value        Ref  IVS thickness, ED, PLAX       (H) 1.1   cm     0.6 - 0.9  LV ID, ED, PLAX               (L) 2.4   cm     3.8 - 5.2  LV ID, ES, PLAX               (L) 1.8   cm     2.2 - 3.5  LV PW thickness, ED, PLAX     (H) 1.0   cm     0.6 - 0.9  IVS/LV PW ratio, ED, PLAX         1.10         ---------  LV PW/LV ID ratio, ED, PLAX       0.42         ---------  LV ejection fraction          (L) 52    %      54 - 74  Stroke volume/bsa, 2D             25    ml/m^2 ---------  LV end-diastolic volume, 1-p      48    ml     48 - 140  A4C  LV ejection fraction, 1-p A4C     52    %      46 - 78  Stroke volume, 1-p A4C            25    ml     ---------  LV end-diastolic volume/bsa,      30    ml/m^2 30 - 82  1-p A4C  Stroke volume/bsa, 1-p A4C        15    ml/m^2 ---------  LV end-diastolic volume, 2-p  (L) 39    ml     46 - 106  LV end-systolic volume, 2-p       19    ml     14 - 42  LV ejection fraction, 2-p     (L) 52    %      54 - 74  Stroke volume, 2-p                20    ml     ---------  LV end-diastolic volume/bsa,  (L) 25    ml/m^2 29 - 61  2-p  LV end-systolic volume/bsa,       12    ml/m^2 8 - 24  2-p  Stroke volume/bsa, 2-p            12.8  ml/m^2 ---------  LV e', lateral                (L) 7.0   cm/sec >=10.0  LV E/e',  lateral                  11           <=13  LV e', medial                     8.7   cm/sec >=7.0  LV E/e', medial                   9            ---------  LV e', average                    7.8   cm/sec ---------  LV E/e', average                  10           <=14  LVOT                              Value        Ref  LVOT ID                           1.6   cm     ---------  LVOT peak velocity, S             1.02  m/sec  ---------  LVOT VTI, S                       19.4  cm     ---------  LVOT peak gradient, S             4     mm Hg  ---------  LVOT mean gradient, S             3     mm Hg  ---------  Stroke volume (SV), LVOT DP       39    ml     ---------  Stroke index (SV/bsa), LVOT       25    ml/m^2 ---------  DP  Aortic valve                      Value        Ref  Aortic leaflet separation, MM     1.4   cm     ---------  Aortic root                       Value        Ref  Aortic root ID, STJ, ED           2.8   cm     2.0 - 3.2  Left atrium                       Value        Ref  LA volume, S                      35    ml     22 - 52  LA volume/bsa, S                  22    ml/m^2 16 - 34  LA volume, ES, 1-p A4C            34    ml     22 - 52  LA volume, ES, 1-p A2C            36    ml     22 - 52  LA volume, ES, A/L                37    ml     ---------  LA volume/bsa, ES, A/L            23    ml/m^2 16 - 34  Mitral valve                      Value        Ref  Mitral E-wave peak velocity       0.79  m/sec  ---------  Mitral A-wave peak velocity       1.72  m/sec  ---------  Mitral deceleration time          104   ms     ---------  Mitral peak gradient, D           3     mm Hg  ---------  Mitral E/A ratio, peak            0.5          ---------  Pulmonary artery                  Value        Ref  PA pressure, S, DP                33    mm Hg  ---------  Tricuspid valve                   Value        Ref  Tricuspid regurg peak             2.67  m/sec  <=2.8  velocity  Tricuspid peak RV-RA gradient     30    mm  Hg  ---------  Systemic veins                    Value        Ref  Estimated CVP                     3     mm Hg  ---------  Inferior vena cava                Value        Ref  ID                                1.2   cm     <=2.1  Right ventricle                   Value        Ref  TAPSE, 2D                         2.08  cm     >=1.70  TAPSE, MM                         2.08  cm     >=1.70  RV pressure, S, DP                33    mm Hg  ---------  RV s', lateral                    15.6  cm/sec >=9.5 Legend: (L)  and  (H)  dior values outside specified reference range. ---------------------------------------------------------------------------- Prepared and electronically signed by Kelton Mckeon MD 01/15/2024 15:22          Meds:   Current Facility-Administered Medications   Medication Dose Route Frequency    atorvastatin (Lipitor) tab 40 mg  40 mg Oral Nightly    sodium chloride 0.9% infusion   Intravenous Continuous    norepinephrine (Levophed) 4 mg/250mL infusion premix  0.5-30 mcg/min Intravenous Continuous PRN    vasopressin (Vasostrict) 20 Units in sodium chloride 0.9% 100 mL infusion for septic shock  0.01-0.03 Units/min Intravenous Continuous PRN    piperacillin-tazobactam (Zosyn) 3.375 g in dextrose 5% 100 mL IVPB-ADDV  3.375 g Intravenous Q8H    ALPRAZolam (Xanax) tab 0.25 mg  0.25 mg Oral Nightly PRN    ARIPiprazole (Abilify) tab 0.5 mg  0.5 mg Oral Daily    escitalopram (Lexapro) tab 15 mg  15 mg Oral QAM    ferrous sulfate DR tab 325 mg  325 mg Oral Daily with breakfast    pregabalin (Lyrica) cap 50 mg  50 mg Oral BID    QUEtiapine (SEROquel) tab 25 mg  25 mg Oral Nightly    polyethylene glycol (PEG 3350) (Miralax) 17 g oral packet 17 g  17 g Oral Daily PRN    heparin (Porcine) 41047 units/250mL infusion CONTINUOUS  200-3,000 Units/hr Intravenous Continuous       Assessment & Plan    Acute on chronic hypoxic respiratory failure (HCC)  -Pt hospitalized 7/2023--weakness, bibasilar pneumonia, COPD  exacerbation. Treated with zosyn then cefepime. Initially home on O2 but weaned off.   -now admitted with hypoxia (sats 83% RA), altered mental status, fever to 104.2  CXR 1/16/24 -- L perihilar and bibasilar parenchymal abnormality and/or atelec changes, sl progression  Extended resp panel, RSV, covid, Flu neg  Lactic acidosis 4.6>2.2>1.8 (normal)  Procal slightly high 0.45  Initially placed on BiPAP, now down to 5L NC -- cont to wean as jagjit Magana's pulm consult appreciated     Sepsis with E.coli bacteremia  UTI  No significant hypotension  Fever to 104.2 on admission  Lactic acidosis 4.6, now normalized   WBC 10.5>33.3>28.3>13.0 -- cont to trend  UA with 2+ nitrite, >50 WBC -- likely source of infection  Blood cx x 2 growing E.coli -- R to amp/levaquin/cipro, S to zosyn and cefazolin  On IV zosyn (day 3) -- deescalate abx to cefazolin     Elevated Troponins  Trop 199>2249>3228  EKG-sinus tachycardia  Appreciate cardiology consultation  Heparin drip restarted -- will discuss holding given decrease in platelets again  Echo with new depressed LV syst function (EF 45-50%) -- prev echo in 2019 (EF 55-60%)  Likely demand ischemia related to hypoxia/tachycardia/sepsis  Could consider ischemic w/u at later date, though pt was asymptomatic  ; started lipitor 40mg qhs        Thrombocytopenia  Platelet 79>110>95>72  Likely 2/2 sepsis  Will discuss poss stopping heparin with cardiology     Depression and anxiety  -pt with long hx of depression (and anxiety), exacerbated by marital strife/stress  -previously saw psychiatrist Dr. Friend who retired  -on Lexapro 15mg/day  -on prn xanax     Hypertension  Amlodipine 5mg daily  On hold this admission 2/2 borderline BP     Hx of iron deficiency anemia  Had EGD/C-scope in 2010 (Dr. Ferrari) -- negative. 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 Continue FeSO4 325mg/day.    Hgb down 14.5 > 11.3 --- suspect 2/2  sepsis  Cont to trend     Hx of frequent UTI   On methenamine as outpt; sees urology Dr. Munir Cullen     Eventration of left hemidiaphragm  Previously thought to be due to large HH  CT chest clarified 7/2023 - small HH but large eventration of left hemidiaphragm     Osteoporosis  DEXA in 7/2017- osteoporosis of left femoral neck (T -2.9).  Pt was briefly on fosamax in 2014. Declines restarting fosamax or other meds so would not check repeat DEXA.     Vitamin B12 deficiency    Level normal (1241) in 11/2023; cont B12 1000 mcg/day     Lumbar spinal stenosis, chronic back pain    Has seen Dr. Salgado (ortho spine at [facility]).  Referred to pain specialist, Dr. Deirdre Elizabeth at [facility]. Had facet injections in 8/2018 which helped tremendously.  Saw Dr. Elizabeth again 9/25/18; did PT.  MRI in 4/2019 did not appear to show anything acute.  No longer taking Norco.  Bilateral knee OA  Sees ortho Dr. Booth -- has had b/l cortisone injections with transient improvement.  Has been advised for TKR's but trying to avoid. Ambulates with a walker.       COPD  Essentially quit smoking in 8/2016.  Has been using the Breo.     Basal and squamous cell cancer, face  S/p Mohs surgeries in 4 areas of her face in 2018 (Dr. Jonas).      Post-herpetic neuralgia (dx'ed with shingles left upper back 6/8/22)  -still with pain but significantly improved  -on Lyrica 50mg BID      Cognitive decline, likely age-related, poss exacerbated by depression  -pt saw neurologist Dr. Lokesh Mcmullen in 9/2022. Was thought to have late onset Alzheimer's dementia (started on aricept), though pt had not shown obvious sx of dementia prior to this admission  Pt was referred for neuropsych testing and MRI brain in 2022 by Dr. Mcmullen though did not schedule.    -had delirium 7/2023 while hospitalized--zyprexa caused sedation; seroquel 25mg at bedtime, and abilify 0.5mg daily     Advance directives  -Discussed with her  ; pt has POLST in chart, confirms DNR/select                  Kathy Rao MD  1/17/2024  9:17 AM

## 2024-01-17 NOTE — PROGRESS NOTES
Double RN skin check done prior to transfer off Unit. Skin check performed by this RN and Matias SMALL RN.      Wounds are as follows: generalized bruising BUE various stages of healing. All other areas intact.     Will remain available for any further questions or concerns.

## 2024-01-17 NOTE — PLAN OF CARE
Pt A/Ox3 overnight. One assist with walker. IV fluids, Zoysn and Heparin continued. Chest Xray ordered.     Problem: Patient Centered Care  Goal: Patient preferences are identified and integrated in the patient's plan of care  Description: Interventions:  - What would you like us to know as we care for you? Im from home with my   - Provide timely, complete, and accurate information to patient/family  - Incorporate patient and family knowledge, values, beliefs, and cultural backgrounds into the planning and delivery of care  - Encourage patient/family to participate in care and decision-making at the level they choose  - Honor patient and family perspectives and choices  Outcome: Progressing     Problem: Patient/Family Goals  Goal: Patient/Family Long Term Goal  Description: Patient's Long Term Goal: Get stronger    Interventions:  - PT/OT  - See additional Care Plan goals for specific interventions  Outcome: Progressing  Goal: Patient/Family Short Term Goal  Description: Patient's Short Term Goal: Go home    Interventions:   - Take all my medications   - See additional Care Plan goals for specific interventions  Outcome: Progressing     Problem: CARDIOVASCULAR - ADULT  Goal: Maintains optimal cardiac output and hemodynamic stability  Description: INTERVENTIONS:  - Monitor vital signs, rhythm, and trends  - Monitor for bleeding, hypotension and signs of decreased cardiac output  - Evaluate effectiveness of vasoactive medications to optimize hemodynamic stability  - Monitor arterial and/or venous puncture sites for bleeding and/or hematoma  - Assess quality of pulses, skin color and temperature  - Assess for signs of decreased coronary artery perfusion - ex. Angina  - Evaluate fluid balance, assess for edema, trend weights  Outcome: Progressing  Goal: Absence of cardiac arrhythmias or at baseline  Description: INTERVENTIONS:  - Continuous cardiac monitoring, monitor vital signs, obtain 12 lead EKG if  indicated  - Evaluate effectiveness of antiarrhythmic and heart rate control medications as ordered  - Initiate emergency measures for life threatening arrhythmias  - Monitor electrolytes and administer replacement therapy as ordered  Outcome: Progressing     Problem: RESPIRATORY - ADULT  Goal: Achieves optimal ventilation and oxygenation  Description: INTERVENTIONS:  - Assess for changes in respiratory status  - Assess for changes in mentation and behavior  - Position to facilitate oxygenation and minimize respiratory effort  - Oxygen supplementation based on oxygen saturation or ABGs  - Provide Smoking Cessation handout, if applicable  - Encourage broncho-pulmonary hygiene including cough, deep breathe, Incentive Spirometry  - Assess the need for suctioning and perform as needed  - Assess and instruct to report SOB or any respiratory difficulty  - Respiratory Therapy support as indicated  - Manage/alleviate anxiety  - Monitor for signs/symptoms of CO2 retention  Outcome: Progressing     Problem: GENITOURINARY - ADULT  Goal: Absence of urinary retention  Description: INTERVENTIONS:  - Assess patient’s ability to void and empty bladder  - Monitor intake/output and perform bladder scan as needed  - Follow urinary retention protocol/standard of care  - Consider collaborating with pharmacy to review patient's medication profile  - Implement strategies to promote bladder emptying  Outcome: Progressing     Problem: METABOLIC/FLUID AND ELECTROLYTES - ADULT  Goal: Electrolytes maintained within normal limits  Description: INTERVENTIONS:  - Monitor labs and rhythm and assess patient for signs and symptoms of electrolyte imbalances  - Administer electrolyte replacement as ordered  - Monitor response to electrolyte replacements, including rhythm and repeat lab results as appropriate  - Fluid restriction as ordered  - Instruct patient on fluid and nutrition restrictions as appropriate  Outcome: Progressing  Goal: Hemodynamic  stability and optimal renal function maintained  Description: INTERVENTIONS:  - Monitor labs and assess for signs and symptoms of volume excess or deficit  - Monitor intake, output and patient weight  - Monitor urine specific gravity, serum osmolarity and serum sodium as indicated or ordered  - Monitor response to interventions for patient's volume status, including labs, urine output, blood pressure (other measures as available)  - Encourage oral intake as appropriate  - Instruct patient on fluid and nutrition restrictions as appropriate  Outcome: Progressing     Problem: HEMATOLOGIC - ADULT  Goal: Maintains hematologic stability  Description: INTERVENTIONS  - Assess for signs and symptoms of bleeding or hemorrhage  - Monitor labs and vital signs for trends  - Administer supportive blood products/factors, fluids and medications as ordered and appropriate  - Administer supportive blood products/factors as ordered and appropriate  Outcome: Progressing     Problem: NEUROLOGICAL - ADULT  Goal: Achieves stable or improved neurological status  Description: INTERVENTIONS  - Assess for and report changes in neurological status  - Initiate measures to prevent increased intracranial pressure  - Maintain blood pressure and fluid volume within ordered parameters to optimize cerebral perfusion and minimize risk of hemorrhage  - Monitor temperature, glucose, and sodium. Initiate appropriate interventions as ordered  Outcome: Progressing

## 2024-01-17 NOTE — PLAN OF CARE
Patient up in bed with call light and personal items within reach.   Problem: Patient Centered Care  Goal: Patient preferences are identified and integrated in the patient's plan of care  Description: Interventions:  - What would you like us to know as we care for you? Im from home with my   - Provide timely, complete, and accurate information to patient/family  - Incorporate patient and family knowledge, values, beliefs, and cultural backgrounds into the planning and delivery of care  - Encourage patient/family to participate in care and decision-making at the level they choose  - Honor patient and family perspectives and choices  Outcome: Progressing     Problem: Patient/Family Goals  Goal: Patient/Family Long Term Goal  Description: Patient's Long Term Goal: Get stronger    Interventions:  - PT/OT  - See additional Care Plan goals for specific interventions  Outcome: Progressing  Goal: Patient/Family Short Term Goal  Description: Patient's Short Term Goal: Go home    Interventions:   - Take all my medications   - See additional Care Plan goals for specific interventions  Outcome: Progressing     Problem: CARDIOVASCULAR - ADULT  Goal: Maintains optimal cardiac output and hemodynamic stability  Description: INTERVENTIONS:  - Monitor vital signs, rhythm, and trends  - Monitor for bleeding, hypotension and signs of decreased cardiac output  - Evaluate effectiveness of vasoactive medications to optimize hemodynamic stability  - Monitor arterial and/or venous puncture sites for bleeding and/or hematoma  - Assess quality of pulses, skin color and temperature  - Assess for signs of decreased coronary artery perfusion - ex. Angina  - Evaluate fluid balance, assess for edema, trend weights  Outcome: Progressing  Goal: Absence of cardiac arrhythmias or at baseline  Description: INTERVENTIONS:  - Continuous cardiac monitoring, monitor vital signs, obtain 12 lead EKG if indicated  - Evaluate effectiveness of  antiarrhythmic and heart rate control medications as ordered  - Initiate emergency measures for life threatening arrhythmias  - Monitor electrolytes and administer replacement therapy as ordered  Outcome: Progressing     Problem: RESPIRATORY - ADULT  Goal: Achieves optimal ventilation and oxygenation  Description: INTERVENTIONS:  - Assess for changes in respiratory status  - Assess for changes in mentation and behavior  - Position to facilitate oxygenation and minimize respiratory effort  - Oxygen supplementation based on oxygen saturation or ABGs  - Provide Smoking Cessation handout, if applicable  - Encourage broncho-pulmonary hygiene including cough, deep breathe, Incentive Spirometry  - Assess the need for suctioning and perform as needed  - Assess and instruct to report SOB or any respiratory difficulty  - Respiratory Therapy support as indicated  - Manage/alleviate anxiety  - Monitor for signs/symptoms of CO2 retention  Outcome: Progressing     Problem: GENITOURINARY - ADULT  Goal: Absence of urinary retention  Description: INTERVENTIONS:  - Assess patient’s ability to void and empty bladder  - Monitor intake/output and perform bladder scan as needed  - Follow urinary retention protocol/standard of care  - Consider collaborating with pharmacy to review patient's medication profile  - Implement strategies to promote bladder emptying  Outcome: Progressing     Problem: METABOLIC/FLUID AND ELECTROLYTES - ADULT  Goal: Electrolytes maintained within normal limits  Description: INTERVENTIONS:  - Monitor labs and rhythm and assess patient for signs and symptoms of electrolyte imbalances  - Administer electrolyte replacement as ordered  - Monitor response to electrolyte replacements, including rhythm and repeat lab results as appropriate  - Fluid restriction as ordered  - Instruct patient on fluid and nutrition restrictions as appropriate  Outcome: Progressing  Goal: Hemodynamic stability and optimal renal function  maintained  Description: INTERVENTIONS:  - Monitor labs and assess for signs and symptoms of volume excess or deficit  - Monitor intake, output and patient weight  - Monitor urine specific gravity, serum osmolarity and serum sodium as indicated or ordered  - Monitor response to interventions for patient's volume status, including labs, urine output, blood pressure (other measures as available)  - Encourage oral intake as appropriate  - Instruct patient on fluid and nutrition restrictions as appropriate  Outcome: Progressing     Problem: HEMATOLOGIC - ADULT  Goal: Maintains hematologic stability  Description: INTERVENTIONS  - Assess for signs and symptoms of bleeding or hemorrhage  - Monitor labs and vital signs for trends  - Administer supportive blood products/factors, fluids and medications as ordered and appropriate  - Administer supportive blood products/factors as ordered and appropriate  Outcome: Progressing     Problem: NEUROLOGICAL - ADULT  Goal: Achieves stable or improved neurological status  Description: INTERVENTIONS  - Assess for and report changes in neurological status  - Initiate measures to prevent increased intracranial pressure  - Maintain blood pressure and fluid volume within ordered parameters to optimize cerebral perfusion and minimize risk of hemorrhage  - Monitor temperature, glucose, and sodium. Initiate appropriate interventions as ordered  Outcome: Progressing

## 2024-01-17 NOTE — PLAN OF CARE
Received patient A&Ox4, vital signs stable, on 5L high flow nasal cannula. Patient denies any pain. Patient states that she is \"ready\" for the next step in her care.  is at the bedside, updated on the plan of care, and all questions answered to the best of my ability. Patient transferred to room 309, report given to receiving RN Alen.     Problem: Patient Centered Care  Goal: Patient preferences are identified and integrated in the patient's plan of care  Description: Interventions:  - What would you like us to know as we care for you? Im from home with my   - Provide timely, complete, and accurate information to patient/family  - Incorporate patient and family knowledge, values, beliefs, and cultural backgrounds into the planning and delivery of care  - Encourage patient/family to participate in care and decision-making at the level they choose  - Honor patient and family perspectives and choices  Outcome: Progressing     Problem: Patient/Family Goals  Goal: Patient/Family Long Term Goal  Description: Patient's Long Term Goal: Get stronger    Interventions:  - PT/OT  - See additional Care Plan goals for specific interventions  Outcome: Progressing  Goal: Patient/Family Short Term Goal  Description: Patient's Short Term Goal: Go home    Interventions:   - Take all my medications   - See additional Care Plan goals for specific interventions  Outcome: Progressing     Problem: CARDIOVASCULAR - ADULT  Goal: Maintains optimal cardiac output and hemodynamic stability  Description: INTERVENTIONS:  - Monitor vital signs, rhythm, and trends  - Monitor for bleeding, hypotension and signs of decreased cardiac output  - Evaluate effectiveness of vasoactive medications to optimize hemodynamic stability  - Monitor arterial and/or venous puncture sites for bleeding and/or hematoma  - Assess quality of pulses, skin color and temperature  - Assess for signs of decreased coronary artery perfusion - ex. Angina  -  Evaluate fluid balance, assess for edema, trend weights  Outcome: Progressing  Goal: Absence of cardiac arrhythmias or at baseline  Description: INTERVENTIONS:  - Continuous cardiac monitoring, monitor vital signs, obtain 12 lead EKG if indicated  - Evaluate effectiveness of antiarrhythmic and heart rate control medications as ordered  - Initiate emergency measures for life threatening arrhythmias  - Monitor electrolytes and administer replacement therapy as ordered  Outcome: Progressing     Problem: RESPIRATORY - ADULT  Goal: Achieves optimal ventilation and oxygenation  Description: INTERVENTIONS:  - Assess for changes in respiratory status  - Assess for changes in mentation and behavior  - Position to facilitate oxygenation and minimize respiratory effort  - Oxygen supplementation based on oxygen saturation or ABGs  - Provide Smoking Cessation handout, if applicable  - Encourage broncho-pulmonary hygiene including cough, deep breathe, Incentive Spirometry  - Assess the need for suctioning and perform as needed  - Assess and instruct to report SOB or any respiratory difficulty  - Respiratory Therapy support as indicated  - Manage/alleviate anxiety  - Monitor for signs/symptoms of CO2 retention  Outcome: Progressing     Problem: GENITOURINARY - ADULT  Goal: Absence of urinary retention  Description: INTERVENTIONS:  - Assess patient’s ability to void and empty bladder  - Monitor intake/output and perform bladder scan as needed  - Follow urinary retention protocol/standard of care  - Consider collaborating with pharmacy to review patient's medication profile  - Implement strategies to promote bladder emptying  Outcome: Progressing     Problem: METABOLIC/FLUID AND ELECTROLYTES - ADULT  Goal: Electrolytes maintained within normal limits  Description: INTERVENTIONS:  - Monitor labs and rhythm and assess patient for signs and symptoms of electrolyte imbalances  - Administer electrolyte replacement as ordered  - Monitor  response to electrolyte replacements, including rhythm and repeat lab results as appropriate  - Fluid restriction as ordered  - Instruct patient on fluid and nutrition restrictions as appropriate  Outcome: Progressing  Goal: Hemodynamic stability and optimal renal function maintained  Description: INTERVENTIONS:  - Monitor labs and assess for signs and symptoms of volume excess or deficit  - Monitor intake, output and patient weight  - Monitor urine specific gravity, serum osmolarity and serum sodium as indicated or ordered  - Monitor response to interventions for patient's volume status, including labs, urine output, blood pressure (other measures as available)  - Encourage oral intake as appropriate  - Instruct patient on fluid and nutrition restrictions as appropriate  Outcome: Progressing     Problem: HEMATOLOGIC - ADULT  Goal: Maintains hematologic stability  Description: INTERVENTIONS  - Assess for signs and symptoms of bleeding or hemorrhage  - Monitor labs and vital signs for trends  - Administer supportive blood products/factors, fluids and medications as ordered and appropriate  - Administer supportive blood products/factors as ordered and appropriate  Outcome: Progressing     Problem: NEUROLOGICAL - ADULT  Goal: Achieves stable or improved neurological status  Description: INTERVENTIONS  - Assess for and report changes in neurological status  - Initiate measures to prevent increased intracranial pressure  - Maintain blood pressure and fluid volume within ordered parameters to optimize cerebral perfusion and minimize risk of hemorrhage  - Monitor temperature, glucose, and sodium. Initiate appropriate interventions as ordered  Outcome: Progressing

## 2024-01-17 NOTE — PROGRESS NOTES
[facility]    Progress Note      Assessment and Plan:   1.  E. coli bacteremia-urine cultures negative so far.  The patient clearly had a urinary source.    Recommendations:  1.  Agree Ancef    2.  DVT prophylaxis-subcutaneous heparin     3.  CODE STATUS-DNAR select    4.  Stress ischemia-non-ST elevation myocardial infarction.  LV function on echo is 45 to 50%.  Troponin markedly elevated.    Recommendations: As per cardiology, likely nuclear stress test that empiric interval.    5.  Acute on chronic respiratory failure-patient has underlying COPD with chronic elevation of the left hemidiaphragm and basilar atelectasis as well as scoliosis.  May have mild CHF.  The chest x-ray is slightly worse on the left.    Recommendations:  1.  Incentive spirometry, out of bed, Acapella flutter valve  2.  Oxygen taper  3.  Nebulizer therapy      Subjective:   Mireya Wolfe is a(n) 88 year old female who is breathing more comfortably and tolerating being off the PAP therapy.    Objective:   Blood pressure 129/55, pulse 88, temperature 97.9 °F (36.6 °C), temperature source Oral, resp. rate 21, weight 125 lb (56.7 kg), SpO2 100%.    Physical Exam  Alert slightly confused female  HEENT with pupils equal round and reactive to light and accommodation.   Neck without adenopathy, thyromegaly, JVD nor bruit.   Lungs diminished breath sounds on the left to auscultation and percussion.  Cardiac regular rate and rhythm no murmur.   Abdomen nontender, without hepatosplenomegaly and no mass appreciable.   Extremities without clubbing cyanosis nor edema.   Neurologic grossly intact with symmetric tone and strength and reflex.  Skin without gross abnormality     Results:     Lab Results   Component Value Date    WBC 13.0 01/17/2024    HGB 11.2 01/17/2024    HCT 37.2 01/17/2024    PLT 72.0 01/17/2024    PLT 72.0 01/17/2024    CREATSERUM 0.76 01/17/2024    BUN 12 01/17/2024     01/17/2024    K 4.3 01/17/2024      01/17/2024    CO2 28.0 01/17/2024     01/17/2024    CA 9.1 01/17/2024    PTT 31.1 01/17/2024     Chest x-ray with slight worsening of left perihilar haziness.  Large left hiatal hernia with diaphragmatic eventration.    Tao Magana MD  Medical Director, Critical Care, [facility]  Medical Director, [facility]  Pager: [phone]

## 2024-01-17 NOTE — PROGRESS NOTES
[facility] - CARDIOLOGY PROGRESS NOTE  Mireya Wolfe Patient Status:  Inpatient    1935 MRN [id number]   Location [facility] 3W/SW Attending Aide Vu,    Hosp Day # 2 PCP Kathy Rao MD     Assessment:    1.  E. coli bacteremia/sepsis.  Possible pneumonia; on antibiotics.  Hemodynamically stable.  Normal renal function, good urine output.  Positive fluid balance.  Watch for mobilization of fluid and emergence of CHF.    2.  Elevated troponins.  Likely type II MI.  LV global LV systolic dysfunction on echo.    3.  COPD.    4.  Current thrombocytopenia.      Plan:    Continue heparin drip.    Check HIPA titers.    Will have discussion with patient and family regarding nuclear stress test at some point.  No urgency at this time.      Subjective:  No chest pain or shortness of breath.    Objective:  /71 (BP Location: Right arm)   Pulse 93   Temp 98.4 °F (36.9 °C) (Oral)   Resp 24   Wt 125 lb (56.7 kg)   SpO2 95%   BMI 22.14 kg/m²     Temp (24hrs), Av.2 °F (36.8 °C), Min:97.3 °F (36.3 °C), Max:99.1 °F (37.3 °C)      Intake/Output:    Intake/Output Summary (Last 24 hours) at 2024 1121  Last data filed at 2024 0657  Gross per 24 hour   Intake 5400.3 ml   Output 900 ml   Net 4500.3 ml       Wt Readings from Last 2 Encounters:   24 125 lb (56.7 kg)   23 125 lb 6.4 oz (56.9 kg)       Physical Exam:    General: Alert and oriented x 3,  No apparent distress.  HEENT: No focal deficits.  Neck: No JVD, carotids 2+ no bruits.  Cardiac: Regular rate and rhythm, S1, S2 normal, no murmur  Lungs: Markedly diminished breath sounds.  Minimal wheezing.  No rales or rhonchi.  Normal excursions and effort.  Abdomen: Soft, non-tender.   Extremities: Without clubbing, cyanosis or edema.  Peripheral pulses are 2+.  Skin: Warm and dry.     Labs:  Lab Results   Component Value Date     WBC 13.0 01/17/2024    HGB 11.2 01/17/2024    HCT 37.2 01/17/2024    PLT 72.0 01/17/2024    PLT 72.0 01/17/2024     No results found for: \"PT\", \"INR\"  Lab Results   Component Value Date     01/17/2024    K 4.3 01/17/2024     01/17/2024    CO2 28.0 01/17/2024    BUN 12 01/17/2024    CREATSERUM 0.76 01/17/2024     01/17/2024    CA 9.1 01/17/2024        No results found for: \"TROP\", \"CKMB\"     Medications:     ceFAZolin  2 g Intravenous Q8H    ipratropium-albuterol  3 mL Nebulization 4 times per day    atorvastatin  40 mg Oral Nightly    ARIPiprazole  0.5 mg Oral Daily    escitalopram  15 mg Oral QAM    ferrous sulfate  325 mg Oral Daily with breakfast    pregabalin  50 mg Oral BID    QUEtiapine  25 mg Oral Nightly      sodium chloride 75 mL/hr at 01/17/24 0013    norepinephrine      vasopressin (Vasostrict) 20 Units in sodium chloride 0.9% 100 mL infusion for septic shock      continuous dose heparin 700 Units/hr (01/17/24 0643)       Tommie Felix MD  1/17/2024  11:21 AM

## 2024-01-18 LAB
ANION GAP SERPL CALC-SCNC: 1 MMOL/L (ref 0–18)
BASOPHILS # BLD AUTO: 0.04 X10(3) UL (ref 0–0.2)
BASOPHILS NFR BLD AUTO: 0.6 %
BUN BLD-MCNC: 9 MG/DL (ref 9–23)
BUN/CREAT SERPL: 10.7 (ref 10–20)
CALCIUM BLD-MCNC: 9.2 MG/DL (ref 8.7–10.4)
CHLORIDE SERPL-SCNC: 110 MMOL/L (ref 98–112)
CO2 SERPL-SCNC: 33 MMOL/L (ref 21–32)
CREAT BLD-MCNC: 0.84 MG/DL
DEPRECATED RDW RBC AUTO: 46.4 FL (ref 35.1–46.3)
EGFRCR SERPLBLD CKD-EPI 2021: 67 ML/MIN/1.73M2 (ref 60–?)
EOSINOPHIL # BLD AUTO: 0.09 X10(3) UL (ref 0–0.7)
EOSINOPHIL NFR BLD AUTO: 1.4 %
ERYTHROCYTE [DISTWIDTH] IN BLOOD BY AUTOMATED COUNT: 13.7 % (ref 11–15)
GLUCOSE BLD-MCNC: 99 MG/DL (ref 70–99)
HCT VFR BLD AUTO: 35 %
HEPARIN AB PPP QL PL AGG: NEGATIVE
HGB BLD-MCNC: 10.7 G/DL
IMM GRANULOCYTES # BLD AUTO: 0.05 X10(3) UL (ref 0–1)
IMM GRANULOCYTES NFR BLD: 0.8 %
LYMPHOCYTES # BLD AUTO: 0.69 X10(3) UL (ref 1–4)
LYMPHOCYTES NFR BLD AUTO: 10.6 %
MAGNESIUM SERPL-MCNC: 1.8 MG/DL (ref 1.6–2.6)
MCH RBC QN AUTO: 28 PG (ref 26–34)
MCHC RBC AUTO-ENTMCNC: 30.6 G/DL (ref 31–37)
MCV RBC AUTO: 91.6 FL
MONOCYTES # BLD AUTO: 0.21 X10(3) UL (ref 0.1–1)
MONOCYTES NFR BLD AUTO: 3.2 %
NEUTROPHILS # BLD AUTO: 5.4 X10 (3) UL (ref 1.5–7.7)
NEUTROPHILS # BLD AUTO: 5.4 X10(3) UL (ref 1.5–7.7)
NEUTROPHILS NFR BLD AUTO: 83.4 %
OSMOLALITY SERPL CALC.SUM OF ELEC: 297 MOSM/KG (ref 275–295)
PLATELET # BLD AUTO: 56 10(3)UL (ref 150–450)
POTASSIUM SERPL-SCNC: 3.7 MMOL/L (ref 3.5–5.1)
RBC # BLD AUTO: 3.82 X10(6)UL
SODIUM SERPL-SCNC: 144 MMOL/L (ref 136–145)
WBC # BLD AUTO: 6.5 X10(3) UL (ref 4–11)

## 2024-01-18 PROCEDURE — 99232 SBSQ HOSP IP/OBS MODERATE 35: CPT | Performed by: INTERNAL MEDICINE

## 2024-01-18 RX ORDER — MAGNESIUM SULFATE HEPTAHYDRATE 40 MG/ML
2 INJECTION, SOLUTION INTRAVENOUS ONCE
Status: COMPLETED | OUTPATIENT
Start: 2024-01-18 | End: 2024-01-18

## 2024-01-18 NOTE — PLAN OF CARE
Patient worked with PT/OT today, they are recommending rehab due to weakness. Daughter updated on plan of care on the phone, hoping for [facility] at discharge and then home health after.       Problem: Patient Centered Care  Goal: Patient preferences are identified and integrated in the patient's plan of care  Description: Interventions:  - What would you like us to know as we care for you? Im from home with my   - Provide timely, complete, and accurate information to patient/family  - Incorporate patient and family knowledge, values, beliefs, and cultural backgrounds into the planning and delivery of care  - Encourage patient/family to participate in care and decision-making at the level they choose  - Honor patient and family perspectives and choices  Outcome: Progressing     Problem: Patient/Family Goals  Goal: Patient/Family Long Term Goal  Description: Patient's Long Term Goal: Get stronger    Interventions:  - PT/OT  - See additional Care Plan goals for specific interventions  Outcome: Progressing  Goal: Patient/Family Short Term Goal  Description: Patient's Short Term Goal: Go home    Interventions:   - Take all my medications   - See additional Care Plan goals for specific interventions  Outcome: Progressing     Problem: CARDIOVASCULAR - ADULT  Goal: Maintains optimal cardiac output and hemodynamic stability  Description: INTERVENTIONS:  - Monitor vital signs, rhythm, and trends  - Monitor for bleeding, hypotension and signs of decreased cardiac output  - Evaluate effectiveness of vasoactive medications to optimize hemodynamic stability  - Monitor arterial and/or venous puncture sites for bleeding and/or hematoma  - Assess quality of pulses, skin color and temperature  - Assess for signs of decreased coronary artery perfusion - ex. Angina  - Evaluate fluid balance, assess for edema, trend weights  Outcome: Progressing  Goal: Absence of cardiac arrhythmias or at baseline  Description:  INTERVENTIONS:  - Continuous cardiac monitoring, monitor vital signs, obtain 12 lead EKG if indicated  - Evaluate effectiveness of antiarrhythmic and heart rate control medications as ordered  - Initiate emergency measures for life threatening arrhythmias  - Monitor electrolytes and administer replacement therapy as ordered  Outcome: Progressing     Problem: RESPIRATORY - ADULT  Goal: Achieves optimal ventilation and oxygenation  Description: INTERVENTIONS:  - Assess for changes in respiratory status  - Assess for changes in mentation and behavior  - Position to facilitate oxygenation and minimize respiratory effort  - Oxygen supplementation based on oxygen saturation or ABGs  - Provide Smoking Cessation handout, if applicable  - Encourage broncho-pulmonary hygiene including cough, deep breathe, Incentive Spirometry  - Assess the need for suctioning and perform as needed  - Assess and instruct to report SOB or any respiratory difficulty  - Respiratory Therapy support as indicated  - Manage/alleviate anxiety  - Monitor for signs/symptoms of CO2 retention  Outcome: Progressing     Problem: GENITOURINARY - ADULT  Goal: Absence of urinary retention  Description: INTERVENTIONS:  - Assess patient’s ability to void and empty bladder  - Monitor intake/output and perform bladder scan as needed  - Follow urinary retention protocol/standard of care  - Consider collaborating with pharmacy to review patient's medication profile  - Implement strategies to promote bladder emptying  Outcome: Progressing     Problem: METABOLIC/FLUID AND ELECTROLYTES - ADULT  Goal: Electrolytes maintained within normal limits  Description: INTERVENTIONS:  - Monitor labs and rhythm and assess patient for signs and symptoms of electrolyte imbalances  - Administer electrolyte replacement as ordered  - Monitor response to electrolyte replacements, including rhythm and repeat lab results as appropriate  - Fluid restriction as ordered  - Instruct patient  on fluid and nutrition restrictions as appropriate  Outcome: Progressing  Goal: Hemodynamic stability and optimal renal function maintained  Description: INTERVENTIONS:  - Monitor labs and assess for signs and symptoms of volume excess or deficit  - Monitor intake, output and patient weight  - Monitor urine specific gravity, serum osmolarity and serum sodium as indicated or ordered  - Monitor response to interventions for patient's volume status, including labs, urine output, blood pressure (other measures as available)  - Encourage oral intake as appropriate  - Instruct patient on fluid and nutrition restrictions as appropriate  Outcome: Progressing     Problem: HEMATOLOGIC - ADULT  Goal: Maintains hematologic stability  Description: INTERVENTIONS  - Assess for signs and symptoms of bleeding or hemorrhage  - Monitor labs and vital signs for trends  - Administer supportive blood products/factors, fluids and medications as ordered and appropriate  - Administer supportive blood products/factors as ordered and appropriate  Outcome: Progressing     Problem: NEUROLOGICAL - ADULT  Goal: Achieves stable or improved neurological status  Description: INTERVENTIONS  - Assess for and report changes in neurological status  - Initiate measures to prevent increased intracranial pressure  - Maintain blood pressure and fluid volume within ordered parameters to optimize cerebral perfusion and minimize risk of hemorrhage  - Monitor temperature, glucose, and sodium. Initiate appropriate interventions as ordered  Outcome: Progressing     Problem: SAFETY ADULT - FALL  Goal: Free from fall injury  Description: INTERVENTIONS:  - Assess pt frequently for physical needs  - Identify cognitive and physical deficits and behaviors that affect risk of falls.  - [place] fall precautions as indicated by assessment.  - Educate pt/family on patient safety including physical limitations  - Instruct pt to call for assistance with activity based on  assessment  - Modify environment to reduce risk of injury  - Provide assistive devices as appropriate  - Consider OT/PT consult to assist with strengthening/mobility  - Encourage toileting schedule  Outcome: Progressing     Problem: DISCHARGE PLANNING  Goal: Discharge to home or other facility with appropriate resources  Description: INTERVENTIONS:  - Identify barriers to discharge w/pt and caregiver  - Include patient/family/discharge partner in discharge planning  - Arrange for needed discharge resources and transportation as appropriate  - Identify discharge learning needs (meds, wound care, etc)  - Arrange for interpreters to assist at discharge as needed  - Consider post-discharge preferences of patient/family/discharge partner  - Complete POLST form as appropriate  - Assess patient's ability to be responsible for managing their own health  - Refer to Case Management Department for coordinating discharge planning if the patient needs post-hospital services based on physician/LIP order or complex needs related to functional status, cognitive ability or social support system  Outcome: Progressing

## 2024-01-18 NOTE — PROGRESS NOTES
[facility]    Progress Note    Mireya Wolfe Patient Status:  Inpatient    1935 MRN [id number]   Location [facility] 3W/SW Attending Aide Vu,    Hosp Day # 3 PCP Kathy Rao MD       SUBJECTIVE:  Feeling okay.  Some cough/phlegm    OBJECTIVE:  Vital signs in last 24 hours:  /88 (BP Location: Right arm)   Pulse 92   Temp 98 °F (36.7 °C) (Oral)   Resp 14   Wt 134 lb 4.8 oz (60.9 kg)   SpO2 93%   BMI 23.79 kg/m²     Intake/Output:    Intake/Output Summary (Last 24 hours) at 2024 0935  Last data filed at 2024 0637  Gross per 24 hour   Intake 580 ml   Output 1700 ml   Net -1120 ml       Wt Readings from Last 3 Encounters:   24 134 lb 4.8 oz (60.9 kg)   23 125 lb 6.4 oz (56.9 kg)   23 129 lb 6.6 oz (58.7 kg)       EXAM:  GENERAL: well developed, well nourished, in no apparent distress  LUNGS: decreased BS on R 1/2 up; bronchial BS on L 1/2 up  CARDIO: RRR, normal S1S2, without murmur or gallop  GI: soft, NT, ND, NABS, no HSM  EXTREMITIES: no cyanosis, clubbing or edema    Data Review:     Labs:   Lab Results   Component Value Date    CREATSERUM 0.84 2024    BUN 9 2024     2024    K 3.7 2024     2024    CO2 33.0 2024    GLU 99 2024    CA 9.2 2024    PTT 31.1 2024    MG 1.8 2024         Imaging:  XR CHEST AP PORTABLE  (CPT=71045)    Result Date: 2024  CONCLUSION:  1. Cardiomegaly.  Tortuous atherosclerotic aorta. 2. Left perihilar and bibasilar parenchymal abnormality and/or atelectatic changes.  Slight progression. 3. Large diaphragmatic hernia containing a large portion of the stomach which has become fluid-filled. 4. Left-sided subpulmonic effusion suspected..     Dictated by (CST): Naveen Martínez MD on 2024 at 8:34 AM     Finalized by (CST): Naveen Martínez MD on 2024 at 8:37 AM          CARD ECHO 2D DOPPLER CONTRAST (CPT=93306)    Result  Date: 1/15/2024  Transthoracic Echocardiogram Name:Mireya Wolfe Date: 01/15/2024 :  1935 Ht:  (63in)  BP: 112 / 69 MRN:  [id number]     Age:  88years    Wt:  (125lb) HR: 103bpm Loc:  [facility]       Gndr: F          BSA: 1.58m^2 Sonographer: Sarah JC Ordering:    Tommie Felix Consulting:  Tommie Felix ---------------------------------------------------------------------------- History/Indications:   Weakness, SOB, Elevated Troponins. ---------------------------------------------------------------------------- Procedure information:  A transthoracic complete 2D study was performed. Additional evaluation included M-mode, complete spectral Doppler, and color Doppler.  Patient status:  Inpatient.  Location:  Bedside.    Comparison was made to the study of 2019.    This was a routine study. Transthoracic echocardiography for diagnosis. Image quality was adequate. The study was technically limited due to poor acoustic window availability and body habitus. Intravenous contrast (Definity) was administered to evaluate left ventricular function. ---------------------------------------------------------------------------- Conclusions: 1. Left ventricle: The cavity size was normal. Wall thickness was normal.    Systolic function was mildly reduced. The estimated ejection fraction was    45-50%, by biplane method of disks. Doppler parameters are consistent    with abnormal left ventricular relaxation - grade 1 diastolic    dysfunction. 2. Left ventricle: There is mild hypokinesis of the apex. 3. Tricuspid valve: There was moderate regurgitation. Impressions:  No previous study from [facility] was available for comparison. * ---------------------------------------------------------------------------- * Findings: Left ventricle:  The cavity size was normal. Wall thickness was normal. Systolic function was mildly reduced. The estimated ejection fraction was 45-50%, by biplane method of disks. Regional wall  motion:   There is mild hypokinesis of the apex.   Doppler parameters are consistent with abnormal left ventricular relaxation - grade 1 diastolic dysfunction. Left atrium:  Well visualized. The atrium was normal in size. Right ventricle:  The cavity size was normal. Systolic function was normal. Right atrium:  Well visualized. The atrium was normal in size. Mitral valve:  Well visualized. The valve was structurally normal. The leaflets were normal thickness. Leaflet separation was normal. No evidence for prolapse.  Doppler:  Transvalvular velocity was within the normal range. There was no evidence for stenosis. There was trivial regurgitation. Aortic valve:  Well visualized.  The valve was trileaflet. The leaflets were normal thickness and mildly calcified. Cusp separation was normal.  Doppler:  Transvalvular velocity was within the normal range. There was no evidence for stenosis. There was trivial regurgitation. Tricuspid valve:  Well visualized. The annulus is normal-sized. The valve is structurally normal. The leaflets are normal thickness. Leaflet separation was normal. No echocardiographic evidence for tricuspid prolapse.  Doppler: Transvalvular velocity was within the normal range. There was no evidence for stenosis. There was moderate regurgitation. Pulmonic valve:   Well visualized. The annulus is normal-sized. The valve is structurally normal. The leaflets are normal thickness. Cusp separation was normal. No evidence for prolapse.  Doppler:  Transvalvular velocity was within the normal range. There was no evidence for stenosis. There was no significant regurgitation. Pericardium:   There was no pericardial effusion. Pleura:  No evidence of pleural fluid accumulation. Aorta: Aortic root: The aortic root was normal-sized. The aortic root was normal. Ascending aorta: The ascending aorta was normal. The ascending aorta was normal. Pulmonary arteries: The main pulmonary artery was normal-sized. There was no  evidence of pulmonary hypertension. Systemic veins:  Central venous respirophasic diameter changes are in the normal range (>50%). Inferior vena cava: The IVC was normally collapsible and normal-sized. The IVC was normal-sized. ---------------------------------------------------------------------------- Measurements  Left ventricle                    Value        Ref  IVS thickness, ED, PLAX       (H) 1.1   cm     0.6 - 0.9  LV ID, ED, PLAX               (L) 2.4   cm     3.8 - 5.2  LV ID, ES, PLAX               (L) 1.8   cm     2.2 - 3.5  LV PW thickness, ED, PLAX     (H) 1.0   cm     0.6 - 0.9  IVS/LV PW ratio, ED, PLAX         1.10         ---------  LV PW/LV ID ratio, ED, PLAX       0.42         ---------  LV ejection fraction          (L) 52    %      54 - 74  Stroke volume/bsa, 2D             25    ml/m^2 ---------  LV end-diastolic volume, 1-p      48    ml     48 - 140  A4C  LV ejection fraction, 1-p A4C     52    %      46 - 78  Stroke volume, 1-p A4C            25    ml     ---------  LV end-diastolic volume/bsa,      30    ml/m^2 30 - 82  1-p A4C  Stroke volume/bsa, 1-p A4C        15    ml/m^2 ---------  LV end-diastolic volume, 2-p  (L) 39    ml     46 - 106  LV end-systolic volume, 2-p       19    ml     14 - 42  LV ejection fraction, 2-p     (L) 52    %      54 - 74  Stroke volume, 2-p                20    ml     ---------  LV end-diastolic volume/bsa,  (L) 25    ml/m^2 29 - 61  2-p  LV end-systolic volume/bsa,       12    ml/m^2 8 - 24  2-p  Stroke volume/bsa, 2-p            12.8  ml/m^2 ---------  LV e', lateral                (L) 7.0   cm/sec >=10.0  LV E/e', lateral                  11           <=13  LV e', medial                     8.7   cm/sec >=7.0  LV E/e', medial                   9            ---------  LV e', average                    7.8   cm/sec ---------  LV E/e', average                  10           <=14  LVOT                              Value        Ref  LVOT ID                            1.6   cm     ---------  LVOT peak velocity, S             1.02  m/sec  ---------  LVOT VTI, S                       19.4  cm     ---------  LVOT peak gradient, S             4     mm Hg  ---------  LVOT mean gradient, S             3     mm Hg  ---------  Stroke volume (SV), LVOT DP       39    ml     ---------  Stroke index (SV/bsa), LVOT       25    ml/m^2 ---------  DP  Aortic valve                      Value        Ref  Aortic leaflet separation, MM     1.4   cm     ---------  Aortic root                       Value        Ref  Aortic root ID, STJ, ED           2.8   cm     2.0 - 3.2  Left atrium                       Value        Ref  LA volume, S                      35    ml     22 - 52  LA volume/bsa, S                  22    ml/m^2 16 - 34  LA volume, ES, 1-p A4C            34    ml     22 - 52  LA volume, ES, 1-p A2C            36    ml     22 - 52  LA volume, ES, A/L                37    ml     ---------  LA volume/bsa, ES, A/L            23    ml/m^2 16 - 34  Mitral valve                      Value        Ref  Mitral E-wave peak velocity       0.79  m/sec  ---------  Mitral A-wave peak velocity       1.72  m/sec  ---------  Mitral deceleration time          104   ms     ---------  Mitral peak gradient, D           3     mm Hg  ---------  Mitral E/A ratio, peak            0.5          ---------  Pulmonary artery                  Value        Ref  PA pressure, S, DP                33    mm Hg  ---------  Tricuspid valve                   Value        Ref  Tricuspid regurg peak             2.67  m/sec  <=2.8  velocity  Tricuspid peak RV-RA gradient     30    mm Hg  ---------  Systemic veins                    Value        Ref  Estimated CVP                     3     mm Hg  ---------  Inferior vena cava                Value        Ref  ID                                1.2   cm     <=2.1  Right ventricle                   Value        Ref  TAPSE, 2D                         2.08  cm     >=1.70  TAPSE, MM                          2.08  cm     >=1.70  RV pressure, S, DP                33    mm Hg  ---------  RV s', lateral                    15.6  cm/sec >=9.5 Legend: (L)  and  (H)  dior values outside specified reference range. ---------------------------------------------------------------------------- Prepared and electronically signed by Kelton Mckeon MD 01/15/2024 15:22          Meds:   Current Facility-Administered Medications   Medication Dose Route Frequency    magnesium sulfate in sterile water for injection 2 g/50mL IVPB premix 2 g  2 g Intravenous Once    ceFAZolin (Ancef) 2 g in 20mL IV syringe premix  2 g Intravenous Q8H    ipratropium-albuterol (Duoneb) 0.5-2.5 (3) MG/3ML inhalation solution 3 mL  3 mL Nebulization 4 times per day    atorvastatin (Lipitor) tab 40 mg  40 mg Oral Nightly    sodium chloride 0.9% infusion   Intravenous Continuous    norepinephrine (Levophed) 4 mg/250mL infusion premix  0.5-30 mcg/min Intravenous Continuous PRN    vasopressin (Vasostrict) 20 Units in sodium chloride 0.9% 100 mL infusion for septic shock  0.01-0.03 Units/min Intravenous Continuous PRN    ALPRAZolam (Xanax) tab 0.25 mg  0.25 mg Oral Nightly PRN    ARIPiprazole (Abilify) tab 0.5 mg  0.5 mg Oral Daily    escitalopram (Lexapro) tab 15 mg  15 mg Oral QAM    ferrous sulfate DR tab 325 mg  325 mg Oral Daily with breakfast    pregabalin (Lyrica) cap 50 mg  50 mg Oral BID    QUEtiapine (SEROquel) tab 25 mg  25 mg Oral Nightly    polyethylene glycol (PEG 3350) (Miralax) 17 g oral packet 17 g  17 g Oral Daily PRN       Assessment & Plan    Acute on chronic hypoxic respiratory failure (HCC)  -Pt hospitalized 7/2023--weakness, bibasilar pneumonia, COPD exacerbation. Treated with zosyn then cefepime. Initially home on O2 but weaned off.   -now admitted with hypoxia (sats 83% RA), altered mental status, fever to 104.2  CXR 1/16/24 -- L perihilar and bibasilar parenchymal abnormality and/or atelec changes, sl  progression  Extended resp panel, RSV, covid, Flu neg  Lactic acidosis 4.6>2.2>1.8 (normal)  Procal slightly high 0.45  Initially placed on BiPAP; has been on 5L NC for the past few days; O2 sats 92-94%; wean as jagjit; may need O2 at discharge  Hayde Magana's pulm consult appreciated     Sepsis with E.coli bacteremia  UTI  No significant hypotension  Fever to 104.2 on admission  Lactic acidosis 4.6, now normalized   UA with 2+ nitrite, >50 WBC -- likely source of infection  Blood cx x 2 growing E.coli -- R to amp/levaquin/cipro, S to zosyn and cefazolin  S/p Zosyn x 3 days, now on IV cefazolin (day 4 total abx)  WBC 10.5>33.3>28.3>13.0 --repeat CBC pending today  Afebrile x 3 days  Check repeat blood cx      Elevated Troponins  Trop 199>2249>3228  EKG-sinus tachycardia  On heparin gtt -- checking HIPA Ab's given decreased platelets -- still pending  Echo with new depressed LV syst function (EF 45-50%) -- prev echo in 2019 (EF 55-60%)  Likely demand ischemia related to hypoxia/tachycardia/sepsis  Could consider ischemic w/u at later date, though pt was asymptomatic  ; started lipitor 40mg qhs  Further recs per cardiology     Thrombocytopenia  Platelet 79>110>95>72 -- repeat CBC pending today  Likely 2/2 sepsis  HIPA Ab's pending     Depression and anxiety  -pt with long hx of depression (and anxiety), exacerbated by marital strife/stress  -previously saw psychiatrist Dr. Friend who retired  -on Lexapro 15mg/day  -on prn xanax     Hypertension  Amlodipine 5mg daily  On hold this admission 2/2 borderline BP     Hx of iron deficiency anemia  Had EGD/C-scope in 2010 (Dr. Ferrari) -- negative. 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 Continue FeSO4 325mg/day.    Hgb down 14.5 > 11.3 --- suspect 2/2 sepsis  Cont to trend     Hx of frequent UTI   On methenamine as outpt; sees urology Dr. Munir Cullen     Eventration of left hemidiaphragm  Previously thought to be due  to large HH  CT chest clarified 7/2023 - small HH but large eventration of left hemidiaphragm     Osteoporosis  DEXA in 7/2017- osteoporosis of left femoral neck (T -2.9).  Pt was briefly on fosamax in 2014. Declines restarting fosamax or other meds so would not check repeat DEXA.     Vitamin B12 deficiency    Level normal (1241) in 11/2023; cont B12 1000 mcg/day     Lumbar spinal stenosis, chronic back pain    Has seen Dr. Salgado (ortho spine at [facility]).  Referred to pain specialist, Dr. Deirdre Elizabeth at [facility]. Had facet injections in 8/2018 which helped tremendously.  Saw Dr. Elizabeth again 9/25/18; did PT.  MRI in 4/2019 did not appear to show anything acute.  No longer taking Norco.  Bilateral knee OA  Sees ortho Dr. Booth -- has had b/l cortisone injections with transient improvement.  Has been advised for TKR's but trying to avoid. Ambulates with a walker.       COPD  Essentially quit smoking in 8/2016.  Has been using the Breo.     Basal and squamous cell cancer, face  S/p Mohs surgeries in 4 areas of her face in 2018 (Dr. Jonas).      Post-herpetic neuralgia (dx'ed with shingles left upper back 6/8/22)  -still with pain but significantly improved  -on Lyrica 50mg BID      Cognitive decline, likely age-related, poss exacerbated by depression  -pt saw neurologist Dr. Lokesh Mcmullen in 9/2022. Was thought to have late onset Alzheimer's dementia (started on aricept), though pt had not shown obvious sx of dementia prior to this admission  Pt was referred for neuropsych testing and MRI brain in 2022 by Dr. Mcmullen though did not schedule.    -had delirium 7/2023 while hospitalized--zyprexa caused sedation; seroquel 25mg at bedtime, and abilify 0.5mg daily     Advance directives  -Discussed with her ; pt has POLST in chart, confirms DNR/select        VTE proph  -heparin gtt as above    Dispo  -PT/OT eval ordered  -pt may need ROSEMARIE      Detailed message left on  's VM          Kathy Rao MD  1/18/2024  9:35 AM

## 2024-01-18 NOTE — PHYSICAL THERAPY NOTE
PHYSICAL THERAPY EVALUATION - INPATIENT     Room Number: 309/309-A  Evaluation Date: 1/18/2024  Type of Evaluation: Initial   Physician Order: PT Eval and Treat    Presenting Problem: acute respiratory failure  Reason for Therapy: Mobility Dysfunction and Discharge Planning    PHYSICAL THERAPY ASSESSMENT   Orders received and chart reviewed. JANA Chavez approved participation in physical therapy. Session coordinated with OT, gait belt donned. PPE worn by therapist: mask and gloves. Patient was not wearing a mask during session. Patient is a 88 year old female admitted 1/15/2024 for Presenting Problem: acute respiratory failure. Patient presented in bed with 0/10 pain. Education provided on Physical therapy plan of care and physiological benefits of out of bed mobility. Patient with good carryover. Patient on 5 liters of oxygen during the session. Patient oxygen sat 96% and heart rate 91, blood pressure 154/69. Patient currently with mod A x 1 to SBA for mobility during the session. Patient with two losses of balance while ambulating 2ft to bedside chair with rolling walker. Patient with history of left shoulder pain. Patient with posterior lean when sitting edge of bed, able to lean more anteriorly with cues. Prior to admission, patient reports being independent in ADL's and ambulation with rollator. Patient is currently functioning below baseline with bed mobility, transfers, and gait; requiring SBA to mod A x 1 as a result of the following impairments: decreased functional strength, decreased endurance/aerobic capacity, pain, and medical status. Patient would benefit from continued skilled therapy services to promote return to PLOF while maximizing function and safety with continuous assistance and gradual rehabilitative therapy. Final discharge placement decision is directed by the inter-disciplinary team.    Patient history and/or personal factors that may impact the plan of care include home accessibility  concerns. Based on the physical therapy examination of the noted systems and functional activity/participation limitations, the patient presentation is evolving given the patient demonstrates worsening of previously stable condition and demonstrates worsening of co-morbidities.      Bed mobility: Supervision increased time   Transfers: Min assist  Gait Assistance: Moderate assistance  Distance (ft): 2ft  Assistive Device: Rolling walker             Patient was left in bedside chair and alarm activated at end of session with all needs in reach. Patient's current functional deficits include bed mobility, transfers, and gait. Patient does not have the physical skills to return to prior living environment upon D/C from the hospital. Anticipate patient will benefit from continued skilled physical therapy while in the hospital and upon D/C from the hospital at a rehab facility.  The patient's Approx Degree of Impairment: 61.29% has been calculated based on documentation in the [facility] '6 clicks' Inpatient Basic Mobility Short Form.  Research supports that patients with this level of impairment may benefit from Subacute Rehab. RN aware of patient status post session.    Patient will benefit from continued IP PT services to address these deficits in preparation for discharge.    DISCHARGE RECOMMENDATIONS  PT Discharge Recommendations: Sub-acute rehabilitation    PLAN  PT Treatment Plan: Bed mobility;Body mechanics;Patient education;Transfer training;Balance training;Gait training  Rehab Potential : Good  Frequency (Obs): 3-5x/week       PHYSICAL THERAPY MEDICAL/SOCIAL HISTORY   Problem List  Principal Problem:    Acute respiratory failure with hypoxia (HCC)  Active Problems:    Febrile illness    Past Medical History  Past Medical History:   Diagnosis Date    Anemia     Basal cell carcinoma     Mouth    Calculus, kidney     COPD (chronic obstructive pulmonary disease) (HCC)     Esophageal reflux     Hematuria     History of  recurrent UTIs 02/26/2015    Hypercholesterolemia     Kidney stone     Osteopenia     Other and unspecified hyperlipidemia     Recurrent UTI     Scoliosis     Spinal stenosis     Squamous cell cancer of lip     Thyroid nodule     Urinary frequency     Vitamin D deficiency      Past Surgical History  Past Surgical History:   Procedure Laterality Date    CATARACT Right 5/2016    CYSTO/URETERO W/LITHOTRIPSY Right 10/29/2015    Procedure: LITHOTRIPSY HOLMIUM LASER WITH CYSTOSCOPY;  Surgeon: Tommie Majano MD;  Location: [facility]    CYSTOSCOPY,INSERT URETERAL STENT Right 9/24/2015    Procedure: LITHOTRIPSY WITH CYSTOSCOPY, STENT PLACEMENT;  Surgeon: Tommie Majano MD;  Location: [facility]    FRAGMENTING OF KIDNEY STONE Right 9/24/2015    Procedure: LITHOTRIPSY WITH CYSTOSCOPY, STENT PLACEMENT;  Surgeon: Tommie Majano MD;  Location: [facility]    OTHER SURGICAL HISTORY  1960    Surgery of Blockage in Urethral Tube    OTHER SURGICAL HISTORY  1985 and on 08/24/00    ESWL - left    OTHER SURGICAL HISTORY  07/19/00    Cystourethroscopy with Urethral Calibration and Dilation with Bilateral Retrograde Pyelogram and Left Ureteral Stone Manipulation with insertion of Double-J Stent    OTHER SURGICAL HISTORY  June 2015    R eye surgery for bleed and then development of blood clot--resolved    OTHER SURGICAL HISTORY  2/8/16    Cystoscpy stent removal    OTHER SURGICAL HISTORY      OTHER SURGICAL HISTORY  03/2017    Mohs surgery    PATIENT DOCUMENTED NOT TO HAVE EXPERIENCED ANY OF THE FOLLOWING EVENTS Right 9/24/2015    Procedure: LITHOTRIPSY WITH CYSTOSCOPY, STENT PLACEMENT;  Surgeon: Tommie Majano MD;  Location: [facility]    PATIENT DOCUMENTED NOT TO HAVE EXPERIENCED ANY OF THE FOLLOWING EVENTS Right 10/29/2015    Procedure: CYSTOSCOPY/URETEROSCOPY/STONE EXTRACTION;  Surgeon: Tommie Majano MD;  Location: [facility]    PATIENT WITH PREOPERATIVE ORDER  FOR IV ANTIBIOTIC SURGICAL SITE INFECT Right 2015    Procedure: LITHOTRIPSY WITH CYSTOSCOPY, STENT PLACEMENT;  Surgeon: Tommie Majano MD;  Location: [facility]    PATIENT WITH PREOPERATIVE ORDER FOR IV ANTIBIOTIC SURGICAL SITE INFECT Right 10/29/2015    Procedure: CYSTOSCOPY/URETEROSCOPY/STONE EXTRACTION;  Surgeon: Tommie Majano MD;  Location: [facility]     HOME SITUATION  Type of Home: House   Home Layout: Two level;Stairlift  Stairs to Enter : 2  Railing: Yes  Stairs to Bedroom: 12  Railing: Yes  Lives With: Spouse  Drives: No  Patient Owned Equipment: Rollator  Patient Regularly Uses: None    Prior Level of [place]: Patient reports being independent in ADL's and ambulation with rollator prior to admission to the hospital.     SUBJECTIVE  \"I am old\"    PHYSICAL THERAPY EXAMINATION     OBJECTIVE  Precautions: None  Fall Risk: High fall risk    WEIGHT BEARING RESTRICTION  Weight Bearing Restriction: None                PAIN ASSESSMENT  [place]          COGNITION  Overall Cognitive Status:  Impaired    RANGE OF MOTION AND STRENGTH ASSESSMENT    Lower extremity ROM is within functional limits  BLE WNL    Lower extremity strength is within functional limits  BLE WNL    BALANCE  Static Sitting: Fair +  Dynamic Sitting: Fair  Static Standing: Poor +  Dynamic Standing: Poor    AM-PAC '6-Clicks' INPATIENT SHORT FORM - BASIC MOBILITY  How much difficulty does the patient currently have...  Patient Difficulty: Turning over in bed (including adjusting bedclothes, sheets and blankets)?: A Little   Patient Difficulty: Sitting down on and standing up from a chair with arms (e.g., wheelchair, bedside commode, etc.): A Little   Patient Difficulty: Moving from lying on back to sitting on the side of the bed?: A Little   How much help from another person does the patient currently need...   Help from Another: Moving to and from a bed to a chair (including a wheelchair)?: A Lot   Help from  Another: Need to walk in hospital room?: A Lot   Help from Another: Climbing 3-5 steps with a railing?: Total     AM-PAC Score:  Raw Score: 14   Approx Degree of Impairment: 61.29%   Standardized Score (AM-PAC Scale): 38.1   CMS Modifier (G-Code): CL    Exercise/Education Provided:  Bed mobility  Body mechanics  Gait training  Transfer training    Patient End of Session: Up in chair;Needs met;Call light within reach;RN aware of session/findings;All patient questions and concerns addressed;Alarm set    CURRENT GOALS  Goals to be met by: 1/31/24  Patient Goal Patient's self-stated goal is: to go home   Goal #1 Patient is able to demonstrate supine - sit EOB @ level: supervision     Goal #1   Current Status    Goal #2 Patient is able to demonstrate transfers Sit to/from Stand at assistance level: supervision with walker - rolling     Goal #2  Current Status    Goal #3 Patient is able to ambulate 100 feet with assist device: walker - rolling at assistance level: supervision   Goal #3   Current Status    Goal #4 Patient will negotiate 2 stairs/one curb w/ assistive device and supervision   Goal #4   Current Status    Goal #5 Patient to demonstrate independence with home activity/exercise instructions provided to patient in preparation for discharge.   Goal #5   Current Status    Goal #6    Goal #6  Current Status     Patient Evaluation Complexity Level:  History Low - no personal factors and/or co-morbidities   Examination of body systems Low - addressing 1-2 elements   Clinical Presentation Moderate - Evolving   Clinical Decision Making Low Complexity     Therapeutic Activity: 13 minutes  Therapeutic Exercise: 10 minutes

## 2024-01-18 NOTE — PLAN OF CARE
O2 5 l n/c . Refused C pap at HS. IV abx continues. IVF infusing well. PT/OT to see. Plan: O2 needs, home with possible HHC.    Problem: Patient Centered Care  Goal: Patient preferences are identified and integrated in the patient's plan of care  Description: Interventions:  - What would you like us to know as we care for you? Im from home with my   - Provide timely, complete, and accurate information to patient/family  - Incorporate patient and family knowledge, values, beliefs, and cultural backgrounds into the planning and delivery of care  - Encourage patient/family to participate in care and decision-making at the level they choose  - Honor patient and family perspectives and choices  Outcome: Progressing     Problem: CARDIOVASCULAR - ADULT  Goal: Maintains optimal cardiac output and hemodynamic stability  Description: INTERVENTIONS:  - Monitor vital signs, rhythm, and trends  - Monitor for bleeding, hypotension and signs of decreased cardiac output  - Evaluate effectiveness of vasoactive medications to optimize hemodynamic stability  - Monitor arterial and/or venous puncture sites for bleeding and/or hematoma  - Assess quality of pulses, skin color and temperature  - Assess for signs of decreased coronary artery perfusion - ex. Angina  - Evaluate fluid balance, assess for edema, trend weights  Outcome: Progressing  Goal: Absence of cardiac arrhythmias or at baseline  Description: INTERVENTIONS:  - Continuous cardiac monitoring, monitor vital signs, obtain 12 lead EKG if indicated  - Evaluate effectiveness of antiarrhythmic and heart rate control medications as ordered  - Initiate emergency measures for life threatening arrhythmias  - Monitor electrolytes and administer replacement therapy as ordered  Outcome: Progressing     Problem: RESPIRATORY - ADULT  Goal: Achieves optimal ventilation and oxygenation  Description: INTERVENTIONS:  - Assess for changes in respiratory status  - Assess for changes in  mentation and behavior  - Position to facilitate oxygenation and minimize respiratory effort  - Oxygen supplementation based on oxygen saturation or ABGs  - Provide Smoking Cessation handout, if applicable  - Encourage broncho-pulmonary hygiene including cough, deep breathe, Incentive Spirometry  - Assess the need for suctioning and perform as needed  - Assess and instruct to report SOB or any respiratory difficulty  - Respiratory Therapy support as indicated  - Manage/alleviate anxiety  - Monitor for signs/symptoms of CO2 retention  Outcome: Progressing     Problem: GENITOURINARY - ADULT  Goal: Absence of urinary retention  Description: INTERVENTIONS:  - Assess patient’s ability to void and empty bladder  - Monitor intake/output and perform bladder scan as needed  - Follow urinary retention protocol/standard of care  - Consider collaborating with pharmacy to review patient's medication profile  - Implement strategies to promote bladder emptying  Outcome: Progressing     Problem: METABOLIC/FLUID AND ELECTROLYTES - ADULT  Goal: Electrolytes maintained within normal limits  Description: INTERVENTIONS:  - Monitor labs and rhythm and assess patient for signs and symptoms of electrolyte imbalances  - Administer electrolyte replacement as ordered  - Monitor response to electrolyte replacements, including rhythm and repeat lab results as appropriate  - Fluid restriction as ordered  - Instruct patient on fluid and nutrition restrictions as appropriate  Outcome: Progressing  Goal: Hemodynamic stability and optimal renal function maintained  Description: INTERVENTIONS:  - Monitor labs and assess for signs and symptoms of volume excess or deficit  - Monitor intake, output and patient weight  - Monitor urine specific gravity, serum osmolarity and serum sodium as indicated or ordered  - Monitor response to interventions for patient's volume status, including labs, urine output, blood pressure (other measures as available)  -  Encourage oral intake as appropriate  - Instruct patient on fluid and nutrition restrictions as appropriate  Outcome: Progressing     Problem: HEMATOLOGIC - ADULT  Goal: Maintains hematologic stability  Description: INTERVENTIONS  - Assess for signs and symptoms of bleeding or hemorrhage  - Monitor labs and vital signs for trends  - Administer supportive blood products/factors, fluids and medications as ordered and appropriate  - Administer supportive blood products/factors as ordered and appropriate  Outcome: Progressing     Problem: NEUROLOGICAL - ADULT  Goal: Achieves stable or improved neurological status  Description: INTERVENTIONS  - Assess for and report changes in neurological status  - Initiate measures to prevent increased intracranial pressure  - Maintain blood pressure and fluid volume within ordered parameters to optimize cerebral perfusion and minimize risk of hemorrhage  - Monitor temperature, glucose, and sodium. Initiate appropriate interventions as ordered  Outcome: Progressing     Problem: SAFETY ADULT - FALL  Goal: Free from fall injury  Description: INTERVENTIONS:  - Assess pt frequently for physical needs  - Identify cognitive and physical deficits and behaviors that affect risk of falls.  - [place] fall precautions as indicated by assessment.  - Educate pt/family on patient safety including physical limitations  - Instruct pt to call for assistance with activity based on assessment  - Modify environment to reduce risk of injury  - Provide assistive devices as appropriate  - Consider OT/PT consult to assist with strengthening/mobility  - Encourage toileting schedule  Outcome: Progressing     Problem: DISCHARGE PLANNING  Goal: Discharge to home or other facility with appropriate resources  Description: INTERVENTIONS:  - Identify barriers to discharge w/pt and caregiver  - Include patient/family/discharge partner in discharge planning  - Arrange for needed discharge resources and transportation  as appropriate  - Identify discharge learning needs (meds, wound care, etc)  - Arrange for interpreters to assist at discharge as needed  - Consider post-discharge preferences of patient/family/discharge partner  - Complete POLST form as appropriate  - Assess patient's ability to be responsible for managing their own health  - Refer to Case Management Department for coordinating discharge planning if the patient needs post-hospital services based on physician/LIP order or complex needs related to functional status, cognitive ability or social support system  Outcome: Progressing

## 2024-01-18 NOTE — OCCUPATIONAL THERAPY NOTE
OCCUPATIONAL THERAPY EVALUATION - INPATIENT     Room Number: 309/309-A  Evaluation Date: 1/18/2024  Type of Evaluation: Initial  Presenting Problem: acute respiratory failure with hypoxia, UTI  Hx COPD, PNA, scoliosis and large L hiatal hernia, CC     Physician Order: IP Consult to Occupational Therapy  Reason for Therapy: ADL/IADL Dysfunction and Discharge Planning    OCCUPATIONAL THERAPY ASSESSMENT   Patient is a 88 year old female admitted 1/15/2024 for acute respiratory failure with hypoxia, UTI. Orders received, chart reviewed. RN approved patient participation.  In this OT evaluation patient presents with the following impairments: strength, balance, activity tolerance, endurance, functional reach, pain, and new supplemental O2 needs .  These deficits manifest functionally while performing ADLs, functional mobility, and functional transfers.The patient is below baseline and would benefit from skilled inpatient OT to address the above deficits, maximizing patient's ability to return to prior level of function.    Patient reported she is independent with ADLs at baseline; MI for fx mobility using rollator. Currently requiring up to max/total assist for ADLs and mod assist for ambulating a few steps using RW. Patient is not safe to return home at this time. The patient's Approx Degree of Impairment: 56.46% has been calculated based on documentation in the [facility] '6 clicks' Inpatient Daily Activity Short Form.  Research supports that patients with this level of impairment may benefit from ROSEMARIE. Recommend ROSEMARIE upon discharge to facilitate return to baseline level of ADL participation/performance.     DISCHARGE RECOMMENDATIONS  OT Discharge Recommendations: Sub-acute rehabilitation  OT Device Recommendations: TBD    PLAN  OT Treatment Plan: Balance activities;Energy conservation/work simplification techniques;ADL training;Functional transfer training;Endurance training;Patient/Family education;Patient/Family  training;Equipment eval/education;Compensatory technique education       OCCUPATIONAL THERAPY MEDICAL/SOCIAL HISTORY   Problem List  Principal Problem:    Acute respiratory failure with hypoxia (HCC)  Active Problems:    Febrile illness    HOME SITUATION  Type of Home: House  Home Layout: Two level; Stairlift  Lives With: Spouse  Shower/Tub and Equipment: Walk-in shower (spongebathes)  Drives: No  Patient Regularly Uses: Glasses  Use of Assistive Device(s): Rollator; RW    Prior Level of [place]: Indep with ADLs. Spouse assists with IADLs. MI for fx mobility using rollator. Does not use supp O2 at baseline.    SUBJECTIVE  \"I haven't fallen because I haven't gotten up much at home.\"    OCCUPATIONAL THERAPY EXAMINATION    OBJECTIVE  Precautions: Bed/chair alarm; Hard of hearing  Fall Risk: High fall risk    PAIN ASSESSMENT  Rating: -- (not rated)  Location: chronic back and L shoulder pain  Management Techniques: Body mechanics; Activity promotion; Repositioning; Nurse notified    ACTIVITY TOLERANCE  Pulse: 90  Heart Rate Source: Monitor     BP: 154/69  BP Location: Right arm  BP Method: Automatic  Patient Position: Semi-[place]    O2 SATURATIONS  Oxygen Therapy  SPO2% on Oxygen at Rest: 97  At rest oxygen flow (liters per minute): 5  SPO2% Ambulation on Oxygen: 96  Ambulation oxygen flow (liters per minute): 5    COGNITION  Overall Cognitive Status:  WFL - within functional limits    SENSATION  Light touch:  intact    RANGE OF MOTION   Upper extremity ROM is within functional limits     STRENGTH ASSESSMENT  Upper extremity strength is within functional limits     ACTIVITIES OF DAILY LIVING ASSESSMENT  AM-PAC ‘6-Clicks’ Inpatient Daily Activity Short Form  How much help from another person does the patient currently need…  -   Putting on and taking off regular lower body clothing?: A Lot  -   Bathing (including washing, rinsing, drying)?: A Lot  -   Toileting, which includes using toilet, bedpan or urinal? :  Total  -   Putting on and taking off regular upper body clothing?: A Little  -   Taking care of personal grooming such as brushing teeth?: A Little  -   Eating meals?: None    AM-PAC Score:  Score: 15  Approx Degree of Impairment: 56.46%  Standardized Score (AM-PAC Scale): 34.69  CMS Modifier (G-Code): CK    BED MOBILITY  Supine to Sit: CGA     FUNCTIONAL TRANSFER ASSESSMENT  Sit to Stand from EOB: Min assist    FUNCTIONAL MOBILITY  Steps from bed to chair using RW: Mod assist    FUNCTIONAL ADL ASSESSMENT  Eating: setup assist seated (per obs)   Grooming: setup assist seated (per obs)   UB Dressing: min assist seated (per obs)   LB Dressing: max assist  Toileting: total assist     EDUCATION PROVIDED  Patient : Role of Occupational Therapy; Plan of Care; Discharge Recommendations; Fall Prevention; Functional Transfer Techniques; Posture/Positioning; Proper Body Mechanics; Energy Conservation  Patient's Response to Education: Verbalized Understanding; Requires Further Education    Patient End of Session: Up in chair;Needs met;Call light within reach;RN aware of session/findings;All patient questions and concerns addressed;Alarm set    OT Goals  Patients self stated goal is: none stated     Patient will complete functional transfer with SUP  Comment:     Patient will complete toileting with SUP  Comment:     Patient will complete grooming in standing at sink with SUP  Comment:    Patient will complete item retrieval with SUP  Comment:          Goals  on: 24  Frequency: 3-5x/week    Patient Evaluation Complexity Level:   Occupational Profile/Medical History MODERATE - Expanded review of history including review of medical or therapy record   Specific performance deficits impacting engagement in ADL/IADL MODERATE  3 - 5 performance deficits   Client Assessment/Performance Deficits MODERATE - Comorbidities and min to mod modifications of tasks    Clinical Decision Making MODERATE - Analysis of occupational  profile, detailed assessments, several treatment options    Overall Complexity MODERATE     OT Session Time  Therapeutic Activity: 23 minutes    Lanny Pressley OTR/L  [facility]  #[id number]

## 2024-01-18 NOTE — PROGRESS NOTES
[facility]  Cardiology Progress Note    Mireya Wolfe Patient Status:  Inpatient    1935 MRN [id number]   Location [facility] 3W/SW Attending Aide Vu,    Hosp Day # 3 PCP Kathy Rao MD     Subjective     Remains confused this morning.  Denies any cardiac complaints.    Objective:   Patient Vitals for the past 24 hrs:   BP Temp Temp src Pulse Resp SpO2 Weight   24 0935 154/69 98.4 °F (36.9 °C) Oral 96 14 92 % --   24 0700 -- -- -- -- -- 93 % --   24 0500 -- -- -- -- -- -- 134 lb 4.8 oz (60.9 kg)   24 0445 122/88 98 °F (36.7 °C) Oral 92 14 94 % --   24 2103 129/58 98.7 °F (37.1 °C) Oral 97 18 94 % --   24 1900 -- -- -- 98 -- -- --   24 1423 129/55 97.9 °F (36.6 °C) Oral 88 21 100 % --     Intake/Output:   Last 3 shifts:   Intake/Output                   24 07 - 24 0659 24 0700 - 24 0659 24 0700 - 24 0659       Intake    P.O.  800  660  --    P.O. 800 660 --    I.V.  4300.3  --  --    Volume (mL) Heparin 180.3 -- --    Volume (mL) (sodium chloride 0.9% infusion) 4120 -- --    IV PIGGYBACK  300  40  --    Volume (mL) (piperacillin-tazobactam (Zosyn) 3.375 g in dextrose 5% 100 mL IVPB-ADDV) 300 -- --    Volume (mL) (ceFAZolin (Ancef) 2 g in 20mL IV syringe premix) -- 40 --    Total Intake 5400.3 700 --       Output    Urine  1300  1700  --    Diaper Wt with Urine (g) - Peds Only 0 -- --    Output (mL) (External Urinary Catheter) 1300 1700 --    Total Output 1300 1700 --       Net I/O     4100.3 -1000 --             Scheduled Meds:    magnesium sulfate  2 g Intravenous Once    ceFAZolin  2 g Intravenous Q8H    ipratropium-albuterol  3 mL Nebulization 4 times per day    atorvastatin  40 mg Oral Nightly    ARIPiprazole  0.5 mg Oral Daily    escitalopram  15 mg Oral QAM    ferrous sulfate  325 mg Oral Daily with breakfast    pregabalin  50 mg Oral BID    QUEtiapine  25 mg Oral Nightly        Continuous Infusions:    sodium chloride 75 mL/hr at 01/17/24 2059    norepinephrine      vasopressin (Vasostrict) 20 Units in sodium chloride 0.9% 100 mL infusion for septic shock         Results:     Lab Results   Component Value Date    WBC 6.5 01/18/2024    HGB 10.7 (L) 01/18/2024    HCT 35.0 01/18/2024    PLT 56.0 (L) 01/18/2024    CREATSERUM 0.84 01/18/2024    BUN 9 01/18/2024     01/18/2024    K 3.7 01/18/2024     01/18/2024    CO2 33.0 (H) 01/18/2024    GLU 99 01/18/2024    CA 9.2 01/18/2024    ALB 3.4 01/16/2024    ALKPHO 47 (L) 01/16/2024    BILT 0.6 01/16/2024    TP 6.0 01/16/2024    AST 49 (H) 01/16/2024    ALT 42 01/16/2024    PTT 31.1 01/17/2024    T4F 1.1 07/15/2023    TSH 0.349 (L) 07/15/2023    DDIMER 1.63 (H) 07/14/2023    MG 1.8 01/18/2024    PHOS 2.8 07/19/2023    B12 1,241 (H) 11/09/2023       Recent Labs   Lab 01/16/24  0419 01/17/24  0545 01/18/24  0646   * 105* 99   BUN 15 12 9   CREATSERUM 0.89 0.76 0.84   CA 8.8 9.1 9.2    143 144   K 4.7  4.7 4.3 3.7   * 113* 110   CO2 25.0 28.0 33.0*     Recent Labs   Lab 01/16/24  0419 01/17/24  0545 01/18/24  0646   RBC 3.99 3.96 3.82   HGB 11.3* 11.2* 10.7*   HCT 36.7 37.2 35.0   MCV 92.0 93.9 91.6   MCH 28.3 28.3 28.0   MCHC 30.8* 30.1* 30.6*   RDW 14.0 14.0 13.7   NEPRELIM 26.34* 11.50* 5.40   WBC 28.3* 13.0* 6.5   PLT 95.0* 72.0*  72.0* 56.0*       Recent Labs   Lab 01/15/24  0530   BNPML 28       No results for input(s): \"TROP\", \"CK\" in the last 168 hours.    XR CHEST AP PORTABLE  (CPT=71045)    Result Date: 1/17/2024  CONCLUSION:  1. Cardiomegaly.  Tortuous atherosclerotic aorta. 2. Left perihilar and bibasilar parenchymal abnormality and/or atelectatic changes.  Slight progression. 3. Large diaphragmatic hernia containing a large portion of the stomach which has become fluid-filled. 4. Left-sided subpulmonic effusion suspected..     Dictated by (CST): Naveen Martínez MD on 1/17/2024 at 8:34 AM      Finalized by (CST): Naveen Martínez MD on 2024 at 8:37 AM          XR CHEST AP PORTABLE  (CPT=71045)    Result Date: 2024  CONCLUSION: Increasing bibasilar atelectasis or consolidation    Dictated by (CST): Quincy Arteaga MD on 2024 at 9:37 AM     Finalized by (CST): Quincy Arteaga MD on 2024 at 9:38 AM             CARD ECHO 2D DOPPLER CONTRAST (CPT=93306)    Result Date: 1/15/2024  Transthoracic Echocardiogram Name:Mireya Wolfe Date: 01/15/2024 :  1935 Ht:  (63in)  BP: 112 / 69 MRN:  [id number]     Age:  88years    Wt:  (125lb) HR: 103bpm Loc:  [facility]       Gndr: F          BSA: 1.58m^2 Sonographer: Sarah JC Ordering:    Tommie Felix Consulting:  Tommie Felix ---------------------------------------------------------------------------- History/Indications:   Weakness, SOB, Elevated Troponins. ---------------------------------------------------------------------------- Procedure information:  A transthoracic complete 2D study was performed. Additional evaluation included M-mode, complete spectral Doppler, and color Doppler.  Patient status:  Inpatient.  Location:  Bedside.    Comparison was made to the study of 2019.    This was a routine study. Transthoracic echocardiography for diagnosis. Image quality was adequate. The study was technically limited due to poor acoustic window availability and body habitus. Intravenous contrast (Definity) was administered to evaluate left ventricular function. ---------------------------------------------------------------------------- Conclusions: 1. Left ventricle: The cavity size was normal. Wall thickness was normal.    Systolic function was mildly reduced. The estimated ejection fraction was    45-50%, by biplane method of disks. Doppler parameters are consistent    with abnormal left ventricular relaxation - grade 1 diastolic    dysfunction. 2. Left ventricle: There is mild hypokinesis of the apex. 3. Tricuspid valve: There was moderate  regurgitation. Impressions:  No previous study from [facility] was available for comparison. * ---------------------------------------------------------------------------- * Findings: Left ventricle:  The cavity size was normal. Wall thickness was normal. Systolic function was mildly reduced. The estimated ejection fraction was 45-50%, by biplane method of disks. Regional wall motion:   There is mild hypokinesis of the apex.   Doppler parameters are consistent with abnormal left ventricular relaxation - grade 1 diastolic dysfunction. Left atrium:  Well visualized. The atrium was normal in size. Right ventricle:  The cavity size was normal. Systolic function was normal. Right atrium:  Well visualized. The atrium was normal in size. Mitral valve:  Well visualized. The valve was structurally normal. The leaflets were normal thickness. Leaflet separation was normal. No evidence for prolapse.  Doppler:  Transvalvular velocity was within the normal range. There was no evidence for stenosis. There was trivial regurgitation. Aortic valve:  Well visualized.  The valve was trileaflet. The leaflets were normal thickness and mildly calcified. Cusp separation was normal.  Doppler:  Transvalvular velocity was within the normal range. There was no evidence for stenosis. There was trivial regurgitation. Tricuspid valve:  Well visualized. The annulus is normal-sized. The valve is structurally normal. The leaflets are normal thickness. Leaflet separation was normal. No echocardiographic evidence for tricuspid prolapse.  Doppler: Transvalvular velocity was within the normal range. There was no evidence for stenosis. There was moderate regurgitation. Pulmonic valve:   Well visualized. The annulus is normal-sized. The valve is structurally normal. The leaflets are normal thickness. Cusp separation was normal. No evidence for prolapse.  Doppler:  Transvalvular velocity was within the normal range. There was no evidence  for stenosis. There was no significant regurgitation. Pericardium:   There was no pericardial effusion. Pleura:  No evidence of pleural fluid accumulation. Aorta: Aortic root: The aortic root was normal-sized. The aortic root was normal. Ascending aorta: The ascending aorta was normal. The ascending aorta was normal. Pulmonary arteries: The main pulmonary artery was normal-sized. There was no evidence of pulmonary hypertension. Systemic veins:  Central venous respirophasic diameter changes are in the normal range (>50%). Inferior vena cava: The IVC was normally collapsible and normal-sized. The IVC was normal-sized. ---------------------------------------------------------------------------- Measurements  Left ventricle                    Value        Ref  IVS thickness, ED, PLAX       (H) 1.1   cm     0.6 - 0.9  LV ID, ED, PLAX               (L) 2.4   cm     3.8 - 5.2  LV ID, ES, PLAX               (L) 1.8   cm     2.2 - 3.5  LV PW thickness, ED, PLAX     (H) 1.0   cm     0.6 - 0.9  IVS/LV PW ratio, ED, PLAX         1.10         ---------  LV PW/LV ID ratio, ED, PLAX       0.42         ---------  LV ejection fraction          (L) 52    %      54 - 74  Stroke volume/bsa, 2D             25    ml/m^2 ---------  LV end-diastolic volume, 1-p      48    ml     48 - 140  A4C  LV ejection fraction, 1-p A4C     52    %      46 - 78  Stroke volume, 1-p A4C            25    ml     ---------  LV end-diastolic volume/bsa,      30    ml/m^2 30 - 82  1-p A4C  Stroke volume/bsa, 1-p A4C        15    ml/m^2 ---------  LV end-diastolic volume, 2-p  (L) 39    ml     46 - 106  LV end-systolic volume, 2-p       19    ml     14 - 42  LV ejection fraction, 2-p     (L) 52    %      54 - 74  Stroke volume, 2-p                20    ml     ---------  LV end-diastolic volume/bsa,  (L) 25    ml/m^2 29 - 61  2-p  LV end-systolic volume/bsa,       12    ml/m^2 8 - 24  2-p  Stroke volume/bsa, 2-p            12.8  ml/m^2 ---------  LV e', lateral                 (L) 7.0   cm/sec >=10.0  LV E/e', lateral                  11           <=13  LV e', medial                     8.7   cm/sec >=7.0  LV E/e', medial                   9            ---------  LV e', average                    7.8   cm/sec ---------  LV E/e', average                  10           <=14  LVOT                              Value        Ref  LVOT ID                           1.6   cm     ---------  LVOT peak velocity, S             1.02  m/sec  ---------  LVOT VTI, S                       19.4  cm     ---------  LVOT peak gradient, S             4     mm Hg  ---------  LVOT mean gradient, S             3     mm Hg  ---------  Stroke volume (SV), LVOT DP       39    ml     ---------  Stroke index (SV/bsa), LVOT       25    ml/m^2 ---------  DP  Aortic valve                      Value        Ref  Aortic leaflet separation, MM     1.4   cm     ---------  Aortic root                       Value        Ref  Aortic root ID, STJ, ED           2.8   cm     2.0 - 3.2  Left atrium                       Value        Ref  LA volume, S                      35    ml     22 - 52  LA volume/bsa, S                  22    ml/m^2 16 - 34  LA volume, ES, 1-p A4C            34    ml     22 - 52  LA volume, ES, 1-p A2C            36    ml     22 - 52  LA volume, ES, A/L                37    ml     ---------  LA volume/bsa, ES, A/L            23    ml/m^2 16 - 34  Mitral valve                      Value        Ref  Mitral E-wave peak velocity       0.79  m/sec  ---------  Mitral A-wave peak velocity       1.72  m/sec  ---------  Mitral deceleration time          104   ms     ---------  Mitral peak gradient, D           3     mm Hg  ---------  Mitral E/A ratio, peak            0.5          ---------  Pulmonary artery                  Value        Ref  PA pressure, S, DP                33    mm Hg  ---------  Tricuspid valve                   Value        Ref  Tricuspid regurg peak             2.67  m/sec  <=2.8   velocity  Tricuspid peak RV-RA gradient     30    mm Hg  ---------  Systemic veins                    Value        Ref  Estimated CVP                     3     mm Hg  ---------  Inferior vena cava                Value        Ref  ID                                1.2   cm     <=2.1  Right ventricle                   Value        Ref  TAPSE, 2D                         2.08  cm     >=1.70  TAPSE, MM                         2.08  cm     >=1.70  RV pressure, S, DP                33    mm Hg  ---------  RV s', lateral                    15.6  cm/sec >=9.5 Legend: (L)  and  (H)  dior values outside specified reference range. ---------------------------------------------------------------------------- Prepared and electronically signed by Kelton Mckeon MD 01/15/2024 15:22        Exam:     General: Alert and oriented x 3,  No apparent distress.  HEENT: No focal deficits.  Neck: No JVD, carotids 2+ no bruits.  Cardiac: Regular rate and rhythm, S1, S2 normal, no murmur  Lungs: Markedly diminished breath sounds.  Minimal wheezing.  No rales or rhonchi.  Normal excursions and effort.  Abdomen: Soft, non-tender.   Extremities: Without clubbing, cyanosis or edema.  Peripheral pulses are 2+.  Skin: Warm and dry.     Assessment and Plan:   Assessment:     1.  E. coli bacteremia/sepsis.  Possible pneumonia; on antibiotics.  Hemodynamically stable.  Normal renal function, good urine output.     2.  Elevated troponins.  Likely type II MI.  LV global LV systolic dysfunction on echo.     3.  COPD.     4.  Current thrombocytopenia.        Plan:     IV heparin has been discontinued.  Completed 48 hours for likely type II NSTEMI on admission.     HIPA titers are pending.     Will have discussion with patient and family regarding nuclear stress test at some point.  No urgency at this time.    Luca Melvin MD  [facility]  1/18/2024

## 2024-01-18 NOTE — PROGRESS NOTES
[facility]    Progress Note      Assessment and Plan:   1.  E. coli bacteremia-urine cultures negative so far.  The patient clearly had a urinary source.    Recommendations:  1.  Agree Ancef    2.  DVT prophylaxis-subcutaneous heparin     3.  CODE STATUS-DNAR select    4.  Stress ischemia-non-ST elevation myocardial infarction.  LV function on echo is 45 to 50%.  Troponin markedly elevated.    Recommendations: As per cardiology, likely nuclear stress test that empiric interval.    5.  Acute on chronic respiratory failure-patient has underlying COPD with chronic elevation of the left hemidiaphragm and basilar atelectasis as well as scoliosis.  May have mild CHF.  The chest x-ray is slightly worse on the left.  The patient is breathing comfortably and requiring less oxygen but still on several liters.  Will repeat the x-ray in the morning.    Recommendations:  1.  Incentive spirometry, out of bed, Acapella flutter valve  2.  Oxygen taper  3.  Nebulizer therapy  5.  Morning x-ray      Subjective:   Mireya Wolfe is a(n) 88 year old female who is breathing more comfortably and out of bed.    Objective:   Blood pressure 154/69, pulse 90, temperature 98.4 °F (36.9 °C), temperature source Oral, resp. rate 14, weight 134 lb 4.8 oz (60.9 kg), SpO2 92%.    Physical Exam  Alert slightly confused female  HEENT with pupils equal round and reactive to light and accommodation.   Neck without adenopathy, thyromegaly, JVD nor bruit.   Lungs diminished breath sounds on the left to auscultation and percussion.  Cardiac regular rate and rhythm no murmur.   Abdomen nontender, without hepatosplenomegaly and no mass appreciable.   Extremities without clubbing cyanosis nor edema.   Neurologic grossly intact with symmetric tone and strength and reflex.  Skin without gross abnormality     Results:     Lab Results   Component Value Date    WBC 6.5 01/18/2024    HGB 10.7 01/18/2024    HCT 35.0 01/18/2024    PLT 56.0 01/18/2024     CREATSERUM 0.84 01/18/2024    BUN 9 01/18/2024     01/18/2024    K 3.7 01/18/2024     01/18/2024    CO2 33.0 01/18/2024    GLU 99 01/18/2024    CA 9.2 01/18/2024    MG 1.8 01/18/2024     Chest x-ray with slight worsening of left perihilar haziness.  Large left hiatal hernia with diaphragmatic eventration.    Tao Magana MD  Medical Director, Critical Care, [facility]  Medical Director, [facility]  Pager: [phone]

## 2024-01-18 NOTE — CM/SW NOTE
10:40AM  Notified by PT/OT - recommend [facility] at [facility] for pt. SW pending notes from PT/OT to confirm appropriate level of care w/ pt's insurance.    [facility] referral sent in Aidin for review. PASRR completed and queued for review.     Received notice from JANA Chavez - pt's dtr stated their top choice for facility will be [facility] [place]. SW pending acceptance from the facility at this time.    01:45PM  SW spoke to Kristal w/ Admissions at [facility] [place] - she will review packet and update SW on if they can accept or not.    03:20PM  [facility] still reviewing in Aidin but stated \"pt looks ok\" in terms of clinical needs.    SW pending official acceptance in order to reserve and submit insurance approval request.    JAMES spoke to pt's dtr Nelli via phone. Confirmed first choice is [facility] [place] if accepted. SW confirmed if they do not accept for one reason or another, SW to f/up w/ pt's dtr for other choices.    PASRR resulted - no level 2 required. Uploaded to Aidin.    Need for DC:  Confirm [facility] [place] accepted  Ins Auth      PLAN: Likely [facility] [place] [facility] - pending above & med clear      SW/CM to remain available for support and/or discharge planning.         Nahomi Hart, MSW, LSW [phone]

## 2024-01-19 ENCOUNTER — APPOINTMENT (OUTPATIENT)
Dept: GENERAL RADIOLOGY | Facility: HOSPITAL | Age: 89
End: 2024-01-19
Attending: INTERNAL MEDICINE
Payer: MEDICARE

## 2024-01-19 LAB
ALBUMIN SERPL-MCNC: 3.5 G/DL (ref 3.2–4.8)
ALP LIVER SERPL-CCNC: 66 U/L
ALT SERPL-CCNC: <7 U/L
ANION GAP SERPL CALC-SCNC: 1 MMOL/L (ref 0–18)
AST SERPL-CCNC: 15 U/L (ref ?–34)
BASOPHILS # BLD AUTO: 0.04 X10(3) UL (ref 0–0.2)
BASOPHILS NFR BLD AUTO: 0.5 %
BILIRUB DIRECT SERPL-MCNC: 0.1 MG/DL (ref ?–0.3)
BILIRUB SERPL-MCNC: 0.5 MG/DL (ref 0.2–1.1)
BUN BLD-MCNC: 5 MG/DL (ref 9–23)
BUN/CREAT SERPL: 7.9 (ref 10–20)
CALCIUM BLD-MCNC: 8.7 MG/DL (ref 8.7–10.4)
CHLORIDE SERPL-SCNC: 108 MMOL/L (ref 98–112)
CO2 SERPL-SCNC: 33 MMOL/L (ref 21–32)
CREAT BLD-MCNC: 0.63 MG/DL
DEPRECATED RDW RBC AUTO: 43.8 FL (ref 35.1–46.3)
EGFRCR SERPLBLD CKD-EPI 2021: 85 ML/MIN/1.73M2 (ref 60–?)
EOSINOPHIL # BLD AUTO: 0.2 X10(3) UL (ref 0–0.7)
EOSINOPHIL NFR BLD AUTO: 2.7 %
ERYTHROCYTE [DISTWIDTH] IN BLOOD BY AUTOMATED COUNT: 13.2 % (ref 11–15)
GLUCOSE BLD-MCNC: 102 MG/DL (ref 70–99)
HCT VFR BLD AUTO: 34.1 %
HGB BLD-MCNC: 11.3 G/DL
IMM GRANULOCYTES # BLD AUTO: 0.08 X10(3) UL (ref 0–1)
IMM GRANULOCYTES NFR BLD: 1.1 %
LYMPHOCYTES # BLD AUTO: 1.04 X10(3) UL (ref 1–4)
LYMPHOCYTES NFR BLD AUTO: 14.3 %
MAGNESIUM SERPL-MCNC: 2 MG/DL (ref 1.6–2.6)
MCH RBC QN AUTO: 29.7 PG (ref 26–34)
MCHC RBC AUTO-ENTMCNC: 33.1 G/DL (ref 31–37)
MCV RBC AUTO: 89.7 FL
MONOCYTES # BLD AUTO: 0.41 X10(3) UL (ref 0.1–1)
MONOCYTES NFR BLD AUTO: 5.6 %
NEUTROPHILS # BLD AUTO: 5.51 X10 (3) UL (ref 1.5–7.7)
NEUTROPHILS # BLD AUTO: 5.51 X10(3) UL (ref 1.5–7.7)
NEUTROPHILS NFR BLD AUTO: 75.8 %
OSMOLALITY SERPL CALC.SUM OF ELEC: 291 MOSM/KG (ref 275–295)
PLATELET # BLD AUTO: 76 10(3)UL (ref 150–450)
POTASSIUM SERPL-SCNC: 3.5 MMOL/L (ref 3.5–5.1)
PROT SERPL-MCNC: 5.9 G/DL (ref 5.7–8.2)
RBC # BLD AUTO: 3.8 X10(6)UL
SODIUM SERPL-SCNC: 142 MMOL/L (ref 136–145)
WBC # BLD AUTO: 7.3 X10(3) UL (ref 4–11)

## 2024-01-19 PROCEDURE — 71045 X-RAY EXAM CHEST 1 VIEW: CPT | Performed by: INTERNAL MEDICINE

## 2024-01-19 PROCEDURE — 99232 SBSQ HOSP IP/OBS MODERATE 35: CPT | Performed by: INTERNAL MEDICINE

## 2024-01-19 PROCEDURE — 99232 SBSQ HOSP IP/OBS MODERATE 35: CPT | Performed by: PHYSICIAN ASSISTANT

## 2024-01-19 NOTE — PROGRESS NOTES
[facility]  Cardiology Progress Note    Mireya Wolfe Patient Status:  Inpatient    1935 MRN [id number]   Location [facility] 3W/SW Attending Aide Vu,    Hosp Day # 4 PCP Kathy Rao MD     Subjective     No cardiac complaints.    Objective:   Patient Vitals for the past 24 hrs:   BP Temp Temp src Pulse Resp SpO2 Weight   24 0916 147/59 98 °F (36.7 °C) Axillary 87 16 95 % --   24 0607 (!) 161/72 98.4 °F (36.9 °C) Axillary 94 15 97 % --   24 0523 -- -- -- -- -- -- 125 lb 3.2 oz (56.8 kg)   24 2106 154/74 98.7 °F (37.1 °C) Oral 98 14 96 % --   24 1614 142/90 98.7 °F (37.1 °C) Oral 94 16 91 % --     Intake/Output:   Last 3 shifts:   Intake/Output                   24 0700 - 24 0659 24 0700 - 24 0659 24 0700 - 24 0659       Intake    P.O.  660  280  --    P.O. 660 280 --    I.V.  --  2483.8  --    Volume (mL) (sodium chloride 0.9% infusion) -- 2483.8 --    IV PIGGYBACK  40  20  --    Volume (mL) (ceFAZolin (Ancef) 2 g in 20mL IV syringe premix) 40 20 --    Total Intake 700 2783.8 --       Output    Urine  1700  2100  --    Output (mL) (External Urinary Catheter) 1700 2100 --    Stool  --  --  --    Stool Count Calculated for I/O -- 1 x --    Total Output  2100 --       Net I/O     -1000 683.8 --           Scheduled Meds:    ceFAZolin  2 g Intravenous Q8H    ipratropium-albuterol  3 mL Nebulization 4 times per day    atorvastatin  40 mg Oral Nightly    ARIPiprazole  0.5 mg Oral Daily    escitalopram  15 mg Oral QAM    ferrous sulfate  325 mg Oral Daily with breakfast    pregabalin  50 mg Oral BID    QUEtiapine  25 mg Oral Nightly       Continuous Infusions:    sodium chloride 75 mL/hr at 24 0606       Results:     Lab Results   Component Value Date    WBC 7.3 2024    HGB 11.3 (L) 2024    HCT 34.1 (L) 2024    PLT 76.0 (L) 2024    CREATSERUM 0.63 2024    BUN 5 (L)  2024     2024    K 3.5 2024     2024    CO2 33.0 (H) 2024     (H) 2024    CA 8.7 2024    ALB 3.5 2024    ALKPHO 66 2024    BILT 0.5 2024    TP 5.9 2024    AST 15 2024    ALT <7 (L) 2024    PTT 31.1 2024    T4F 1.1 07/15/2023    TSH 0.349 (L) 07/15/2023    DDIMER 1.63 (H) 2023    MG 2.0 2024    PHOS 2.8 2023    B12 1,241 (H) 2023       Recent Labs   Lab 24  0545 24  0646 24  0458   * 99 102*   BUN 12 9 5*   CREATSERUM 0.76 0.84 0.63   CA 9.1 9.2 8.7    144 142   K 4.3 3.7 3.5   * 110 108   CO2 28.0 33.0* 33.0*     Recent Labs   Lab 24  0545 24  0646 24  0458   RBC 3.96 3.82 3.80   HGB 11.2* 10.7* 11.3*   HCT 37.2 35.0 34.1*   MCV 93.9 91.6 89.7   MCH 28.3 28.0 29.7   MCHC 30.1* 30.6* 33.1   RDW 14.0 13.7 13.2   NEPRELIM 11.50* 5.40 5.51   WBC 13.0* 6.5 7.3   PLT 72.0*  72.0* 56.0* 76.0*       Recent Labs   Lab 01/15/24  0530   BNPML 28       No results for input(s): \"TROP\", \"CK\" in the last 168 hours.    XR CHEST AP PORTABLE  (CPT=71045)    Result Date: 2024  CONCLUSION:  1. Cardiomegaly.  Tortuous atherosclerotic aorta. 2. Left perihilar and bibasilar parenchymal abnormality and/or atelectatic changes.  Slight progression. 3. Large diaphragmatic hernia containing a large portion of the stomach which has become fluid-filled. 4. Left-sided subpulmonic effusion suspected..     Dictated by (CST): Naveen Martínez MD on 2024 at 8:34 AM     Finalized by (CST): Naveen Martínez MD on 2024 at 8:37 AM             CARD ECHO 2D DOPPLER CONTRAST (CPT=93306)    Result Date: 1/15/2024  Transthoracic Echocardiogram Name:Mireya Wolfe Date: 01/15/2024 :  1935 Ht:  (63in)  BP: 112 / 69 MRN:  [id number]     Age:  88years    Wt:  (125lb) HR: 103bpm Loc:  [facility]       Gndr: F          BSA: 1.58m^2 Sonographer: Sarah JC Ordering:     Tommie Felix Consulting:  Tommie Felix ---------------------------------------------------------------------------- History/Indications:   Weakness, SOB, Elevated Troponins. ---------------------------------------------------------------------------- Procedure information:  A transthoracic complete 2D study was performed. Additional evaluation included M-mode, complete spectral Doppler, and color Doppler.  Patient status:  Inpatient.  Location:  Bedside.    Comparison was made to the study of 04/20/2019.    This was a routine study. Transthoracic echocardiography for diagnosis. Image quality was adequate. The study was technically limited due to poor acoustic window availability and body habitus. Intravenous contrast (Definity) was administered to evaluate left ventricular function. ---------------------------------------------------------------------------- Conclusions: 1. Left ventricle: The cavity size was normal. Wall thickness was normal.    Systolic function was mildly reduced. The estimated ejection fraction was    45-50%, by biplane method of disks. Doppler parameters are consistent    with abnormal left ventricular relaxation - grade 1 diastolic    dysfunction. 2. Left ventricle: There is mild hypokinesis of the apex. 3. Tricuspid valve: There was moderate regurgitation. Impressions:  No previous study from [facility] was available for comparison. * ---------------------------------------------------------------------------- * Findings: Left ventricle:  The cavity size was normal. Wall thickness was normal. Systolic function was mildly reduced. The estimated ejection fraction was 45-50%, by biplane method of disks. Regional wall motion:   There is mild hypokinesis of the apex.   Doppler parameters are consistent with abnormal left ventricular relaxation - grade 1 diastolic dysfunction. Left atrium:  Well visualized. The atrium was normal in size. Right ventricle:  The cavity size was  normal. Systolic function was normal. Right atrium:  Well visualized. The atrium was normal in size. Mitral valve:  Well visualized. The valve was structurally normal. The leaflets were normal thickness. Leaflet separation was normal. No evidence for prolapse.  Doppler:  Transvalvular velocity was within the normal range. There was no evidence for stenosis. There was trivial regurgitation. Aortic valve:  Well visualized.  The valve was trileaflet. The leaflets were normal thickness and mildly calcified. Cusp separation was normal.  Doppler:  Transvalvular velocity was within the normal range. There was no evidence for stenosis. There was trivial regurgitation. Tricuspid valve:  Well visualized. The annulus is normal-sized. The valve is structurally normal. The leaflets are normal thickness. Leaflet separation was normal. No echocardiographic evidence for tricuspid prolapse.  Doppler: Transvalvular velocity was within the normal range. There was no evidence for stenosis. There was moderate regurgitation. Pulmonic valve:   Well visualized. The annulus is normal-sized. The valve is structurally normal. The leaflets are normal thickness. Cusp separation was normal. No evidence for prolapse.  Doppler:  Transvalvular velocity was within the normal range. There was no evidence for stenosis. There was no significant regurgitation. Pericardium:   There was no pericardial effusion. Pleura:  No evidence of pleural fluid accumulation. Aorta: Aortic root: The aortic root was normal-sized. The aortic root was normal. Ascending aorta: The ascending aorta was normal. The ascending aorta was normal. Pulmonary arteries: The main pulmonary artery was normal-sized. There was no evidence of pulmonary hypertension. Systemic veins:  Central venous respirophasic diameter changes are in the normal range (>50%). Inferior vena cava: The IVC was normally collapsible and normal-sized. The IVC was normal-sized.  ---------------------------------------------------------------------------- Measurements  Left ventricle                    Value        Ref  IVS thickness, ED, PLAX       (H) 1.1   cm     0.6 - 0.9  LV ID, ED, PLAX               (L) 2.4   cm     3.8 - 5.2  LV ID, ES, PLAX               (L) 1.8   cm     2.2 - 3.5  LV PW thickness, ED, PLAX     (H) 1.0   cm     0.6 - 0.9  IVS/LV PW ratio, ED, PLAX         1.10         ---------  LV PW/LV ID ratio, ED, PLAX       0.42         ---------  LV ejection fraction          (L) 52    %      54 - 74  Stroke volume/bsa, 2D             25    ml/m^2 ---------  LV end-diastolic volume, 1-p      48    ml     48 - 140  A4C  LV ejection fraction, 1-p A4C     52    %      46 - 78  Stroke volume, 1-p A4C            25    ml     ---------  LV end-diastolic volume/bsa,      30    ml/m^2 30 - 82  1-p A4C  Stroke volume/bsa, 1-p A4C        15    ml/m^2 ---------  LV end-diastolic volume, 2-p  (L) 39    ml     46 - 106  LV end-systolic volume, 2-p       19    ml     14 - 42  LV ejection fraction, 2-p     (L) 52    %      54 - 74  Stroke volume, 2-p                20    ml     ---------  LV end-diastolic volume/bsa,  (L) 25    ml/m^2 29 - 61  2-p  LV end-systolic volume/bsa,       12    ml/m^2 8 - 24  2-p  Stroke volume/bsa, 2-p            12.8  ml/m^2 ---------  LV e', lateral                (L) 7.0   cm/sec >=10.0  LV E/e', lateral                  11           <=13  LV e', medial                     8.7   cm/sec >=7.0  LV E/e', medial                   9            ---------  LV e', average                    7.8   cm/sec ---------  LV E/e', average                  10           <=14  LVOT                              Value        Ref  LVOT ID                           1.6   cm     ---------  LVOT peak velocity, S             1.02  m/sec  ---------  LVOT VTI, S                       19.4  cm     ---------  LVOT peak gradient, S             4     mm Hg  ---------  LVOT mean gradient, S              3     mm Hg  ---------  Stroke volume (SV), LVOT DP       39    ml     ---------  Stroke index (SV/bsa), LVOT       25    ml/m^2 ---------  DP  Aortic valve                      Value        Ref  Aortic leaflet separation, MM     1.4   cm     ---------  Aortic root                       Value        Ref  Aortic root ID, STJ, ED           2.8   cm     2.0 - 3.2  Left atrium                       Value        Ref  LA volume, S                      35    ml     22 - 52  LA volume/bsa, S                  22    ml/m^2 16 - 34  LA volume, ES, 1-p A4C            34    ml     22 - 52  LA volume, ES, 1-p A2C            36    ml     22 - 52  LA volume, ES, A/L                37    ml     ---------  LA volume/bsa, ES, A/L            23    ml/m^2 16 - 34  Mitral valve                      Value        Ref  Mitral E-wave peak velocity       0.79  m/sec  ---------  Mitral A-wave peak velocity       1.72  m/sec  ---------  Mitral deceleration time          104   ms     ---------  Mitral peak gradient, D           3     mm Hg  ---------  Mitral E/A ratio, peak            0.5          ---------  Pulmonary artery                  Value        Ref  PA pressure, S, DP                33    mm Hg  ---------  Tricuspid valve                   Value        Ref  Tricuspid regurg peak             2.67  m/sec  <=2.8  velocity  Tricuspid peak RV-RA gradient     30    mm Hg  ---------  Systemic veins                    Value        Ref  Estimated CVP                     3     mm Hg  ---------  Inferior vena cava                Value        Ref  ID                                1.2   cm     <=2.1  Right ventricle                   Value        Ref  TAPSE, 2D                         2.08  cm     >=1.70  TAPSE, MM                         2.08  cm     >=1.70  RV pressure, S, DP                33    mm Hg  ---------  RV s', lateral                    15.6  cm/sec >=9.5 Legend: (L)  and  (H)  dior values outside specified reference range.  ---------------------------------------------------------------------------- Prepared and electronically signed by Kelton Mckeon MD 01/15/2024 15:22        Exam:     General: Alert and oriented x 3,  No apparent distress.  HEENT: No focal deficits.  Neck: No JVD, carotids 2+ no bruits.  Cardiac: Regular rate and rhythm, S1, S2 normal, no murmur  Lungs: Markedly diminished breath sounds.  Minimal wheezing.  No rales or rhonchi.  Normal excursions and effort.  Abdomen: Soft, non-tender.   Extremities: Without clubbing, cyanosis or edema.  Peripheral pulses are 2+.  Skin: Warm and dry.     Assessment and Plan:   Assessment:     1.  E. coli bacteremia/sepsis.  On antibiotics.  Hemodynamically stable.  Normal renal function, good urine output.     2.  Elevated troponins.  Likely type II MI.  LV global LV systolic dysfunction on echo.     3.  COPD.     4.  Thrombocytopenia.  Platelet count has stabilized, HIT panel negative.        Plan:     No further cardiac workup at this time.  Further discussion with Dr. Felix as outpatient regarding ischemic evaluation.      We will sign off, please contact us with any questions.    Luca Melvin MD  [facility]  1/19/2024

## 2024-01-19 NOTE — PROGRESS NOTES
[facility]    Progress Note    Mireya Wolfe Patient Status:  Inpatient    1935 MRN [id number]   Location [facility] 3W/SW Attending Aide Vu,    Hosp Day # 4 PCP Kathy Rao MD     Subjective:   Confused. Worried about . No complaints. No shortness of breath or cough. No fever or chills. On 3 L O2.    Objective:   Blood pressure 147/59, pulse 87, temperature 98 °F (36.7 °C), temperature source Axillary, resp. rate 16, weight 125 lb 3.2 oz (56.8 kg), SpO2 94%.  Physical Exam  Vitals and nursing note reviewed.   Constitutional:       General: She is awake. She is not in acute distress.     Appearance: She is ill-appearing.      Interventions: Nasal cannula in place.   HENT:      Head: Normocephalic and atraumatic.   Cardiovascular:      Rate and Rhythm: Normal rate and regular rhythm.   Pulmonary:      Effort: No respiratory distress.      Breath sounds: Examination of the left-lower field reveals decreased breath sounds. Decreased breath sounds present. No wheezing, rhonchi or rales.   Abdominal:      Palpations: Abdomen is soft.      Tenderness: There is no abdominal tenderness.   Musculoskeletal:      Cervical back: Normal range of motion and neck supple.      Right lower leg: No edema.      Left lower leg: No edema.   Skin:     General: Skin is warm and dry.   Neurological:      General: No focal deficit present.      Mental Status: She is alert.      Comments: A&Ox2 to person and year  Confused   Psychiatric:         Behavior: Behavior is cooperative.       Results:   Lab Results   Component Value Date    WBC 7.3 2024    HGB 11.3 (L) 2024    HCT 34.1 (L) 2024    PLT 76.0 (L) 2024    CREATSERUM 0.63 2024    BUN 5 (L) 2024     2024    K 3.5 2024     2024    CO2 33.0 (H) 2024     (H) 2024    CA 8.7 2024    ALB 3.5 2024    ALKPHO 66 2024    BILT 0.5 2024    TP  5.9 01/19/2024    AST 15 01/19/2024    ALT <7 (L) 01/19/2024    PTT 31.1 01/17/2024    T4F 1.1 07/15/2023    TSH 0.349 (L) 07/15/2023    DDIMER 1.63 (H) 07/14/2023    MG 2.0 01/19/2024    PHOS 2.8 07/19/2023    TROPHS 2,841 (HH) 01/16/2024    B12 1,241 (H) 11/09/2023       XR CHEST AP PORTABLE  (CPT=71045)    Result Date: 1/19/2024  CONCLUSION: No significant change    Dictated by (CST): Quincy Arteaga MD on 1/19/2024 at 10:37 AM     Finalized by (CST): Quincy Arteaga MD on 1/19/2024 at 10:37 AM               Assessment & Plan:   E coli bacteremia  Due to E coli UTI  Initial blood cultures with E coli  F/u blood cultures no growth x1 day  Plan:  -IV Ancef  -F/u blood cultures    Acute on chronic respiratory failure  COPD with chronic elevated of left hemidiaphragm, scoliosis, and basilar atelectasis  Chest x-ray 1/19 unchanged  Respiratory viral panel negative  Oxygenation improving - down to 2-3 L O2 (no O2 at baseline)  Plan:  -O2 and wean as tolerated  -Out of bed to chair  -Incentive spirometry  -Acapella flutter valve  -Nebs    Elevated troponin  Echo EF 45-50%  Plan:  -Outpatient cardiology follow-up for ischemic eval    Thrombocytopenia  Likely due to sepsis  HIT negative  Off heparin  Plan:  -Follow    DVT prophylaxis: SCDs    Code status: DNAR/Select    Romel Taylor PA-C  Pulmonary Medicine  1/19/2024

## 2024-01-19 NOTE — CM/SW NOTE
09:00AM  JAMES confirmed [facility] can accept pt. They have been reserved.    JAMES requested CHANTAL Rodriguez submit [employer] insurance auth request via web portal.    JAMES spoke to [facility] ([facility]) set on WILL CALL through 1/22. PCS completed and will need date added day of actual DC.    10:45AM  Notified by DSC Jennifer - insurance auth submitted and approved. Please see DSC's note for specifics.    PLAN: [facility], Amb on WC, PCS needs date - pending med clear      SW/CM to remain available for support and/or discharge planning.         Nahomi Hart, MSW, LSW [id number]

## 2024-01-19 NOTE — OCCUPATIONAL THERAPY NOTE
Patient received while lying in bed and politely declining to participate in OT treatment session. Reported she is upset because her son is taking her  to the hospital. OT will f/u on another date as appropriate.      PADMA Valdes/L  [facility]  #[id number]

## 2024-01-19 NOTE — CM/SW NOTE
Submitted clinical via [employer] portal.  [facility] Case/Auth ID is [id number].  Final insurance authorization is pending at this time.      SW/CM assigned to the case will continue to follow auth status.        Jennifer Connelly, DSC      Addendum: Stephen ID [id number], approved from 1/20 to 1/23.    Notified JAMES / NIKKY    er

## 2024-01-19 NOTE — PLAN OF CARE
Ivf continues. O2 5 l n/c.  Iv abx continues. Plan: [facility]- pending medical clearance.    Problem: Patient Centered Care  Goal: Patient preferences are identified and integrated in the patient's plan of care  Description: Interventions:  - What would you like us to know as we care for you? Im from home with my   - Provide timely, complete, and accurate information to patient/family  - Incorporate patient and family knowledge, values, beliefs, and cultural backgrounds into the planning and delivery of care  - Encourage patient/family to participate in care and decision-making at the level they choose  - Honor patient and family perspectives and choices  Outcome: Progressing     Problem: CARDIOVASCULAR - ADULT  Goal: Maintains optimal cardiac output and hemodynamic stability  Description: INTERVENTIONS:  - Monitor vital signs, rhythm, and trends  - Monitor for bleeding, hypotension and signs of decreased cardiac output  - Evaluate effectiveness of vasoactive medications to optimize hemodynamic stability  - Monitor arterial and/or venous puncture sites for bleeding and/or hematoma  - Assess quality of pulses, skin color and temperature  - Assess for signs of decreased coronary artery perfusion - ex. Angina  - Evaluate fluid balance, assess for edema, trend weights  Outcome: Progressing  Goal: Absence of cardiac arrhythmias or at baseline  Description: INTERVENTIONS:  - Continuous cardiac monitoring, monitor vital signs, obtain 12 lead EKG if indicated  - Evaluate effectiveness of antiarrhythmic and heart rate control medications as ordered  - Initiate emergency measures for life threatening arrhythmias  - Monitor electrolytes and administer replacement therapy as ordered  Outcome: Progressing     Problem: RESPIRATORY - ADULT  Goal: Achieves optimal ventilation and oxygenation  Description: INTERVENTIONS:  - Assess for changes in respiratory status  - Assess for changes in mentation and behavior  -  Position to facilitate oxygenation and minimize respiratory effort  - Oxygen supplementation based on oxygen saturation or ABGs  - Provide Smoking Cessation handout, if applicable  - Encourage broncho-pulmonary hygiene including cough, deep breathe, Incentive Spirometry  - Assess the need for suctioning and perform as needed  - Assess and instruct to report SOB or any respiratory difficulty  - Respiratory Therapy support as indicated  - Manage/alleviate anxiety  - Monitor for signs/symptoms of CO2 retention  Outcome: Progressing     Problem: GENITOURINARY - ADULT  Goal: Absence of urinary retention  Description: INTERVENTIONS:  - Assess patient’s ability to void and empty bladder  - Monitor intake/output and perform bladder scan as needed  - Follow urinary retention protocol/standard of care  - Consider collaborating with pharmacy to review patient's medication profile  - Implement strategies to promote bladder emptying  Outcome: Progressing     Problem: METABOLIC/FLUID AND ELECTROLYTES - ADULT  Goal: Electrolytes maintained within normal limits  Description: INTERVENTIONS:  - Monitor labs and rhythm and assess patient for signs and symptoms of electrolyte imbalances  - Administer electrolyte replacement as ordered  - Monitor response to electrolyte replacements, including rhythm and repeat lab results as appropriate  - Fluid restriction as ordered  - Instruct patient on fluid and nutrition restrictions as appropriate  Outcome: Progressing  Goal: Hemodynamic stability and optimal renal function maintained  Description: INTERVENTIONS:  - Monitor labs and assess for signs and symptoms of volume excess or deficit  - Monitor intake, output and patient weight  - Monitor urine specific gravity, serum osmolarity and serum sodium as indicated or ordered  - Monitor response to interventions for patient's volume status, including labs, urine output, blood pressure (other measures as available)  - Encourage oral intake as  appropriate  - Instruct patient on fluid and nutrition restrictions as appropriate  Outcome: Progressing     Problem: HEMATOLOGIC - ADULT  Goal: Maintains hematologic stability  Description: INTERVENTIONS  - Assess for signs and symptoms of bleeding or hemorrhage  - Monitor labs and vital signs for trends  - Administer supportive blood products/factors, fluids and medications as ordered and appropriate  - Administer supportive blood products/factors as ordered and appropriate  Outcome: Progressing     Problem: NEUROLOGICAL - ADULT  Goal: Achieves stable or improved neurological status  Description: INTERVENTIONS  - Assess for and report changes in neurological status  - Initiate measures to prevent increased intracranial pressure  - Maintain blood pressure and fluid volume within ordered parameters to optimize cerebral perfusion and minimize risk of hemorrhage  - Monitor temperature, glucose, and sodium. Initiate appropriate interventions as ordered  Outcome: Progressing     Problem: SAFETY ADULT - FALL  Goal: Free from fall injury  Description: INTERVENTIONS:  - Assess pt frequently for physical needs  - Identify cognitive and physical deficits and behaviors that affect risk of falls.  - [place] fall precautions as indicated by assessment.  - Educate pt/family on patient safety including physical limitations  - Instruct pt to call for assistance with activity based on assessment  - Modify environment to reduce risk of injury  - Provide assistive devices as appropriate  - Consider OT/PT consult to assist with strengthening/mobility  - Encourage toileting schedule  Outcome: Progressing     Problem: DISCHARGE PLANNING  Goal: Discharge to home or other facility with appropriate resources  Description: INTERVENTIONS:  - Identify barriers to discharge w/pt and caregiver  - Include patient/family/discharge partner in discharge planning  - Arrange for needed discharge resources and transportation as appropriate  -  Identify discharge learning needs (meds, wound care, etc)  - Arrange for interpreters to assist at discharge as needed  - Consider post-discharge preferences of patient/family/discharge partner  - Complete POLST form as appropriate  - Assess patient's ability to be responsible for managing their own health  - Refer to Case Management Department for coordinating discharge planning if the patient needs post-hospital services based on physician/LIP order or complex needs related to functional status, cognitive ability or social support system  Outcome: Progressing

## 2024-01-19 NOTE — PROGRESS NOTES
[facility]    Progress Note    Mireya Wolfe Patient Status:  Inpatient    1935 MRN [id number]   Location [facility] 3W/SW Attending Aide Vu, DO   Hosp Day # 4 PCP Kathy Rao MD       SUBJECTIVE:  Sleepy this morning; arousable.       OBJECTIVE:  BP (!) 161/72 (BP Location: Right arm)   Pulse 94   Temp 98.4 °F (36.9 °C) (Axillary)   Resp 15   Wt 125 lb 3.2 oz (56.8 kg)   SpO2 97%   BMI 22.18 kg/m²     Intake/Output:  I/O last 3 completed shifts:  In: 3243.8 [P.O.:700; I.V.:2483.8; IV PIGGYBACK:60]  Out: 3300 [Urine:3300]    Wt Readings from Last 3 Encounters:   24 125 lb 3.2 oz (56.8 kg)   23 125 lb 6.4 oz (56.9 kg)   23 129 lb 6.6 oz (58.7 kg)       Exam  Gen: No acute distress, sleeping  Pulm: L basilar crackles  CV: Heart RRR, no murmurs   Abd: Abdomen soft, nontender, nondistended, no organomegaly, bowel sounds present  MSK: Full range of motion in extremities, no clubbing, no cyanosis, no edema      Labs:   Recent Labs   Lab 24  0545 24  0646 24  0458   RBC 3.96 3.82 3.80   HGB 11.2* 10.7* 11.3*   HCT 37.2 35.0 34.1*   MCV 93.9 91.6 89.7   MCH 28.3 28.0 29.7   MCHC 30.1* 30.6* 33.1   RDW 14.0 13.7 13.2   NEPRELIM 11.50* 5.40 5.51   WBC 13.0* 6.5 7.3   PLT 72.0*  72.0* 56.0* 76.0*         Recent Labs   Lab 01/15/24  0530 01/15/24  1236 24  0419 24  0545 24  0646 24  0458   *   < > 139* 105* 99 102*   BUN 19   < > 15 12 9 5*   CREATSERUM 1.04*   < > 0.89 0.76 0.84 0.63   EGFRCR 52*   < > 62 75 67 85   CA 9.9   < > 8.8 9.1 9.2 8.7   ALB 4.2  --  3.4  --   --   --       < > 142 143 144 142   K 3.9   < > 4.7  4.7 4.3 3.7 3.5      < > 114* 113* 110 108   CO2 24.0   < > 25.0 28.0 33.0* 33.0*   ALKPHO 90  --  47*  --   --   --    AST 41*  --  49*  --   --   --    ALT 34  --  42  --   --   --    BILT 1.3*  --  0.6  --   --   --    TP 7.1  --  6.0  --   --   --     < > = values in this  interval not displayed.         Imaging:  XR CHEST AP PORTABLE  (CPT=71045)    Result Date: 1/17/2024  CONCLUSION:  1. Cardiomegaly.  Tortuous atherosclerotic aorta. 2. Left perihilar and bibasilar parenchymal abnormality and/or atelectatic changes.  Slight progression. 3. Large diaphragmatic hernia containing a large portion of the stomach which has become fluid-filled. 4. Left-sided subpulmonic effusion suspected..     Dictated by (CST): Naveen Martínez MD on 1/17/2024 at 8:34 AM     Finalized by (CST): Naveen Martínez MD on 1/17/2024 at 8:37 AM          XR CHEST AP PORTABLE  (CPT=71045)    Result Date: 1/16/2024  CONCLUSION: Increasing bibasilar atelectasis or consolidation    Dictated by (CST): Quincy Arteaga MD on 1/16/2024 at 9:37 AM     Finalized by (CST): Quincy Arteaga MD on 1/16/2024 at 9:38 AM                 Assessment and Plan:     Acute on chronic hypoxic respiratory failure (HCC)  -Pt hospitalized 7/2023--weakness, bibasilar pneumonia, COPD exacerbation. Treated with zosyn then cefepime. Initially home on O2 but weaned off.   -now admitted with hypoxia (sats 83% RA), altered mental status, fever to 104.2  CXR 1/16/24 -- L perihilar and bibasilar parenchymal abnormality and/or atelec changes, sl progression  Extended resp panel, RSV, covid, Flu neg  Lactic acidosis 4.6>2.2>1.8 (normal)  Procal slightly high 0.45  Initially placed on BiPAP; has been on 5L NC for the past few days; O2 sats 92-94%; wean as jagjit; may need O2 at discharge  Hayde Magana's pulm consult appreciated  Still on 5L O2     Sepsis with E.coli bacteremia  UTI  No significant hypotension  Fever to 104.2 on admission  Lactic acidosis 4.6, now normalized   UA with 2+ nitrite, >50 WBC -- likely source of infection  Blood cx x 2 growing E.coli -- R to amp/levaquin/cipro, S to zosyn and cefazolin  S/p Zosyn x 3 days, now on IV cefazolin (day 4 total abx)  WBC 10.5>33.3>28.3>13.0>7  Afebrile x 3 days  Repeat bcx (1/18/2024)  pending     Elevated Troponins  Trop 199>2249>3228  EKG-sinus tachycardia  On heparin gtt -- checking HIPA Ab's given decreased platelets -- still pending  Echo with new depressed LV syst function (EF 45-50%) -- prev echo in 2019 (EF 55-60%)  Likely demand ischemia related to hypoxia/tachycardia/sepsis  ; started lipitor 40mg qhs  Further recs per cardiology  Off heparin drip now  Plan for nuclear stress test per cardiology     Thrombocytopenia  Platelet 79>110>95>72>76  Likely 2/2 sepsis  HIPA neg     Depression and anxiety  -pt with long hx of depression (and anxiety), exacerbated by marital strife/stress  -previously saw psychiatrist Dr. Friend who retired  -on Lexapro 15mg/day  -on prn xanax     Hypertension  Amlodipine 5mg daily  On hold this admission 2/2 borderline BP     Hx of iron deficiency anemia  Had EGD/C-scope in 2010 (Dr. Ferrari) -- negative. 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 Continue FeSO4 325mg/day.    Hgb down 14.5 > 11.3 --- suspect 2/2 sepsis  Cont to trend     Hx of frequent UTI   On methenamine as outpt; sees urology Dr. Munir Cullen     Eventration of left hemidiaphragm  Previously thought to be due to large HH  CT chest clarified 7/2023 - small HH but large eventration of left hemidiaphragm     Osteoporosis  DEXA in 7/2017- osteoporosis of left femoral neck (T -2.9).  Pt was briefly on fosamax in 2014. Declines restarting fosamax or other meds so would not check repeat DEXA.     Vitamin B12 deficiency    Level normal (1241) in 11/2023; cont B12 1000 mcg/day     Lumbar spinal stenosis, chronic back pain    Has seen Dr. Salgado (ortho spine at [facility]).  Referred to pain specialist, Dr. Deirdre Elizabeth at [facility]. Had facet injections in 8/2018 which helped tremendously.  Saw Dr. Elizabeth again 9/25/18; did PT.  MRI in 4/2019 did not appear to show anything acute.  No longer taking  Norco.  Bilateral knee OA  Sees ortho Dr. Booth -- has had b/l cortisone injections with transient improvement.  Has been advised for TKR's but trying to avoid. Ambulates with a walker.       COPD  Essentially quit smoking in 8/2016.  Has been using the Breo.     Basal and squamous cell cancer, face  S/p Mohs surgeries in 4 areas of her face in 2018 (Dr. Jonas).      Post-herpetic neuralgia (dx'ed with shingles left upper back 6/8/22)  -still with pain but significantly improved  -on Lyrica 50mg BID      Cognitive decline, likely age-related, poss exacerbated by depression  -pt saw neurologist Dr. Lokesh Mcmullen in 9/2022. Was thought to have late onset Alzheimer's dementia (started on aricept), though pt had not shown obvious sx of dementia prior to this admission  Pt was referred for neuropsych testing and MRI brain in 2022 by Dr. Mcmullen though did not schedule.    -had delirium 7/2023 while hospitalized--zyprexa caused sedation; seroquel 25mg at bedtime, and abilify 0.5mg daily     Advance directives  -Discussed with her ; pt has POLST in chart, confirms DNR/select     PT/OT rec ROSEMARIE Vu,   1/19/2024  7:14 AM

## 2024-01-20 PROCEDURE — 99232 SBSQ HOSP IP/OBS MODERATE 35: CPT | Performed by: INTERNAL MEDICINE

## 2024-01-20 RX ORDER — IPRATROPIUM BROMIDE AND ALBUTEROL SULFATE 2.5; .5 MG/3ML; MG/3ML
3 SOLUTION RESPIRATORY (INHALATION)
Status: DISCONTINUED | OUTPATIENT
Start: 2024-01-20 | End: 2024-01-21

## 2024-01-20 RX ORDER — IPRATROPIUM BROMIDE AND ALBUTEROL SULFATE 2.5; .5 MG/3ML; MG/3ML
3 SOLUTION RESPIRATORY (INHALATION) EVERY 6 HOURS PRN
Status: DISCONTINUED | OUTPATIENT
Start: 2024-01-20 | End: 2024-01-21

## 2024-01-20 RX ORDER — METHYLPREDNISOLONE SODIUM SUCCINATE 40 MG/ML
40 INJECTION, POWDER, LYOPHILIZED, FOR SOLUTION INTRAMUSCULAR; INTRAVENOUS DAILY
Status: DISCONTINUED | OUTPATIENT
Start: 2024-01-20 | End: 2024-01-21

## 2024-01-20 RX ORDER — AMLODIPINE BESYLATE 5 MG/1
5 TABLET ORAL DAILY
Status: DISCONTINUED | OUTPATIENT
Start: 2024-01-20 | End: 2024-01-21

## 2024-01-20 RX ORDER — VANCOMYCIN HYDROCHLORIDE 125 MG/1
125 CAPSULE ORAL DAILY
Status: DISCONTINUED | OUTPATIENT
Start: 2024-01-20 | End: 2024-01-21

## 2024-01-20 NOTE — PROGRESS NOTES
[facility]    Progress Note      Assessment and Plan:   1.  E. coli bacteremia-urine cultures negative so far.  The patient clearly had a urinary source.  Okay to home with oxygen from my perspective.    Recommendations:  1.  Agree Ancef    2.  DVT prophylaxis-subcutaneous heparin     3.  CODE STATUS-DNAR select    4.  Stress ischemia-non-ST elevation myocardial infarction.  LV function on echo is 45 to 50%.  Troponin markedly elevated.    Recommendations: As per cardiology, likely nuclear stress test that empiric interval.    5.  Acute on chronic respiratory failure-breathing comfortably.  Still on 1 L supplemental oxygen.  Has severe scoliosis with large eventration of left hemidiaphragm and left basilar atelectasis.  Chest x-ray unchanged.    Recommendations:  1.  Incentive spirometry, out of bed, Acapella flutter valve  2.  Oxygen taper  3.  Nebulizer therapy  4.  Discharge planning-okay to home with supplemental oxygen from my perspective.    Subjective:   Mireya Wolfe is a(n) 88 year old female who is breathing more comfortably and out of bed.    Objective:   Blood pressure 155/71, pulse 91, temperature 98 °F (36.7 °C), temperature source Oral, resp. rate 17, weight 126 lb 11.2 oz (57.5 kg), SpO2 92%.    Physical Exam  Alert slightly confused female  HEENT with pupils equal round and reactive to light and accommodation.   Neck without adenopathy, thyromegaly, JVD nor bruit.   Lungs diminished breath sounds on the left to auscultation and percussion.  Cardiac regular rate and rhythm no murmur.   Abdomen nontender, without hepatosplenomegaly and no mass appreciable.   Extremities without clubbing cyanosis nor edema.   Neurologic grossly intact with symmetric tone and strength and reflex.  Skin without gross abnormality     Results:      Chest x-ray with Large left hiatal hernia with diaphragmatic eventration.  Little change from prior.    Tao Magana MD  Medical Director, Critical Care,  [facility]  Medical Director, [facility]  Pager: [phone]

## 2024-01-20 NOTE — PLAN OF CARE
Patient A&Ox3, weaned down to 2L/HFNC now. Declined C-pap. IVF rate decreased to 50 ml/hr. Finished Day 4 of IV zosyn yesterday. IV ancef continued. Nebs changed to PRN per respiratory. Safety precautions maintained, call light and personal belongings within reach. Plan is for [facility] when medically cleared.    Problem: Patient Centered Care  Goal: Patient preferences are identified and integrated in the patient's plan of care  Description: Interventions:  - What would you like us to know as we care for you? Im from home with my   - Provide timely, complete, and accurate information to patient/family  - Incorporate patient and family knowledge, values, beliefs, and cultural backgrounds into the planning and delivery of care  - Encourage patient/family to participate in care and decision-making at the level they choose  - Honor patient and family perspectives and choices  Outcome: Progressing     Problem: Patient/Family Goals  Goal: Patient/Family Long Term Goal  Description: Patient's Long Term Goal: Get stronger    Interventions:  - PT/OT  - See additional Care Plan goals for specific interventions  Outcome: Progressing  Goal: Patient/Family Short Term Goal  Description: Patient's Short Term Goal: Go home    Interventions:   - Take all my medications   - See additional Care Plan goals for specific interventions  Outcome: Progressing     Problem: CARDIOVASCULAR - ADULT  Goal: Maintains optimal cardiac output and hemodynamic stability  Description: INTERVENTIONS:  - Monitor vital signs, rhythm, and trends  - Monitor for bleeding, hypotension and signs of decreased cardiac output  - Evaluate effectiveness of vasoactive medications to optimize hemodynamic stability  - Monitor arterial and/or venous puncture sites for bleeding and/or hematoma  - Assess quality of pulses, skin color and temperature  - Assess for signs of decreased coronary artery perfusion - ex. Angina  - Evaluate fluid balance, assess  for edema, trend weights  Outcome: Progressing  Goal: Absence of cardiac arrhythmias or at baseline  Description: INTERVENTIONS:  - Continuous cardiac monitoring, monitor vital signs, obtain 12 lead EKG if indicated  - Evaluate effectiveness of antiarrhythmic and heart rate control medications as ordered  - Initiate emergency measures for life threatening arrhythmias  - Monitor electrolytes and administer replacement therapy as ordered  Outcome: Progressing     Problem: RESPIRATORY - ADULT  Goal: Achieves optimal ventilation and oxygenation  Description: INTERVENTIONS:  - Assess for changes in respiratory status  - Assess for changes in mentation and behavior  - Position to facilitate oxygenation and minimize respiratory effort  - Oxygen supplementation based on oxygen saturation or ABGs  - Provide Smoking Cessation handout, if applicable  - Encourage broncho-pulmonary hygiene including cough, deep breathe, Incentive Spirometry  - Assess the need for suctioning and perform as needed  - Assess and instruct to report SOB or any respiratory difficulty  - Respiratory Therapy support as indicated  - Manage/alleviate anxiety  - Monitor for signs/symptoms of CO2 retention  Outcome: Progressing     Problem: GENITOURINARY - ADULT  Goal: Absence of urinary retention  Description: INTERVENTIONS:  - Assess patient’s ability to void and empty bladder  - Monitor intake/output and perform bladder scan as needed  - Follow urinary retention protocol/standard of care  - Consider collaborating with pharmacy to review patient's medication profile  - Implement strategies to promote bladder emptying  Outcome: Progressing     Problem: METABOLIC/FLUID AND ELECTROLYTES - ADULT  Goal: Electrolytes maintained within normal limits  Description: INTERVENTIONS:  - Monitor labs and rhythm and assess patient for signs and symptoms of electrolyte imbalances  - Administer electrolyte replacement as ordered  - Monitor response to electrolyte  replacements, including rhythm and repeat lab results as appropriate  - Fluid restriction as ordered  - Instruct patient on fluid and nutrition restrictions as appropriate  Outcome: Progressing  Goal: Hemodynamic stability and optimal renal function maintained  Description: INTERVENTIONS:  - Monitor labs and assess for signs and symptoms of volume excess or deficit  - Monitor intake, output and patient weight  - Monitor urine specific gravity, serum osmolarity and serum sodium as indicated or ordered  - Monitor response to interventions for patient's volume status, including labs, urine output, blood pressure (other measures as available)  - Encourage oral intake as appropriate  - Instruct patient on fluid and nutrition restrictions as appropriate  Outcome: Progressing     Problem: HEMATOLOGIC - ADULT  Goal: Maintains hematologic stability  Description: INTERVENTIONS  - Assess for signs and symptoms of bleeding or hemorrhage  - Monitor labs and vital signs for trends  - Administer supportive blood products/factors, fluids and medications as ordered and appropriate  - Administer supportive blood products/factors as ordered and appropriate  Outcome: Progressing     Problem: NEUROLOGICAL - ADULT  Goal: Achieves stable or improved neurological status  Description: INTERVENTIONS  - Assess for and report changes in neurological status  - Initiate measures to prevent increased intracranial pressure  - Maintain blood pressure and fluid volume within ordered parameters to optimize cerebral perfusion and minimize risk of hemorrhage  - Monitor temperature, glucose, and sodium. Initiate appropriate interventions as ordered  Outcome: Progressing     Problem: SAFETY ADULT - FALL  Goal: Free from fall injury  Description: INTERVENTIONS:  - Assess pt frequently for physical needs  - Identify cognitive and physical deficits and behaviors that affect risk of falls.  - Rumsey fall precautions as indicated by assessment.  - Educate  pt/family on patient safety including physical limitations  - Instruct pt to call for assistance with activity based on assessment  - Modify environment to reduce risk of injury  - Provide assistive devices as appropriate  - Consider OT/PT consult to assist with strengthening/mobility  - Encourage toileting schedule  Outcome: Progressing     Problem: DISCHARGE PLANNING  Goal: Discharge to home or other facility with appropriate resources  Description: INTERVENTIONS:  - Identify barriers to discharge w/pt and caregiver  - Include patient/family/discharge partner in discharge planning  - Arrange for needed discharge resources and transportation as appropriate  - Identify discharge learning needs (meds, wound care, etc)  - Arrange for interpreters to assist at discharge as needed  - Consider post-discharge preferences of patient/family/discharge partner  - Complete POLST form as appropriate  - Assess patient's ability to be responsible for managing their own health  - Refer to Case Management Department for coordinating discharge planning if the patient needs post-hospital services based on physician/LIP order or complex needs related to functional status, cognitive ability or social support system  Outcome: Progressing

## 2024-01-20 NOTE — PROGRESS NOTES
[facility]    Progress Note    Mireya Wolfe Patient Status:  Inpatient    1935 MRN [id number]   Location [facility] 3W/SW Attending Aide Vu,    Hosp Day # 5 PCP Kathy Rao MD       SUBJECTIVE:  Pt says she feels \"fair.\"  Tired.      OBJECTIVE:  Vital signs in last 24 hours:  /71 (BP Location: Right arm)   Pulse 91   Temp 98 °F (36.7 °C) (Oral)   Resp 17   Wt 126 lb 11.2 oz (57.5 kg)   SpO2 92%   BMI 22.44 kg/m²     Intake/Output:    Intake/Output Summary (Last 24 hours) at 2024 1006  Last data filed at 2024 0520  Gross per 24 hour   Intake 1336.67 ml   Output 3270 ml   Net -1933.33 ml       Wt Readings from Last 3 Encounters:   24 126 lb 11.2 oz (57.5 kg)   23 125 lb 6.4 oz (56.9 kg)   23 129 lb 6.6 oz (58.7 kg)       EXAM:  GENERAL: frail, slightly tachypneic.  Oriented x 3, just forgetful. Was able to tell me that she spoke with Dr. Maagna this week and that he told her she would probably need to go home \"with some machine.\"  LUNGS: clear to auscultation but b/l exp wheezing  CARDIO: RRR, normal S1S2, without murmur or gallop  GI: soft, NT, ND, NABS, no HSM  EXTREMITIES: no cyanosis, clubbing or edema    Data Review:     Labs:          Imaging:  XR CHEST AP PORTABLE  (CPT=71045)    Result Date: 2024  CONCLUSION: No significant change    Dictated by (CST): Quincy Arteaga MD on 2024 at 10:37 AM     Finalized by (CST): Quincy Arteaga MD on 2024 at 10:37 AM          CARD ECHO 2D DOPPLER CONTRAST (CPT=93306)    Result Date: 1/15/2024  Transthoracic Echocardiogram Name:Mireya Wolfe Date: 01/15/2024 :  1935 Ht:  (63in)  BP: 112 / 69 MRN:  [id number]     Age:  88years    Wt:  (125lb) HR: 103bpm Loc:  EM       Gndr: F          BSA: 1.58m^2 Sonographer: Sarah JC Ordering:    Tommie Felix Consulting:  Tommie Felix ---------------------------------------------------------------------------- History/Indications:    Weakness, SOB, Elevated Troponins. ---------------------------------------------------------------------------- Procedure information:  A transthoracic complete 2D study was performed. Additional evaluation included M-mode, complete spectral Doppler, and color Doppler.  Patient status:  Inpatient.  Location:  Bedside.    Comparison was made to the study of 04/20/2019.    This was a routine study. Transthoracic echocardiography for diagnosis. Image quality was adequate. The study was technically limited due to poor acoustic window availability and body habitus. Intravenous contrast (Definity) was administered to evaluate left ventricular function. ---------------------------------------------------------------------------- Conclusions: 1. Left ventricle: The cavity size was normal. Wall thickness was normal.    Systolic function was mildly reduced. The estimated ejection fraction was    45-50%, by biplane method of disks. Doppler parameters are consistent    with abnormal left ventricular relaxation - grade 1 diastolic    dysfunction. 2. Left ventricle: There is mild hypokinesis of the apex. 3. Tricuspid valve: There was moderate regurgitation. Impressions:  No previous study from [facility] was available for comparison. * ---------------------------------------------------------------------------- * Findings: Left ventricle:  The cavity size was normal. Wall thickness was normal. Systolic function was mildly reduced. The estimated ejection fraction was 45-50%, by biplane method of disks. Regional wall motion:   There is mild hypokinesis of the apex.   Doppler parameters are consistent with abnormal left ventricular relaxation - grade 1 diastolic dysfunction. Left atrium:  Well visualized. The atrium was normal in size. Right ventricle:  The cavity size was normal. Systolic function was normal. Right atrium:  Well visualized. The atrium was normal in size. Mitral valve:  Well visualized. The valve was  structurally normal. The leaflets were normal thickness. Leaflet separation was normal. No evidence for prolapse.  Doppler:  Transvalvular velocity was within the normal range. There was no evidence for stenosis. There was trivial regurgitation. Aortic valve:  Well visualized.  The valve was trileaflet. The leaflets were normal thickness and mildly calcified. Cusp separation was normal.  Doppler:  Transvalvular velocity was within the normal range. There was no evidence for stenosis. There was trivial regurgitation. Tricuspid valve:  Well visualized. The annulus is normal-sized. The valve is structurally normal. The leaflets are normal thickness. Leaflet separation was normal. No echocardiographic evidence for tricuspid prolapse.  Doppler: Transvalvular velocity was within the normal range. There was no evidence for stenosis. There was moderate regurgitation. Pulmonic valve:   Well visualized. The annulus is normal-sized. The valve is structurally normal. The leaflets are normal thickness. Cusp separation was normal. No evidence for prolapse.  Doppler:  Transvalvular velocity was within the normal range. There was no evidence for stenosis. There was no significant regurgitation. Pericardium:   There was no pericardial effusion. Pleura:  No evidence of pleural fluid accumulation. Aorta: Aortic root: The aortic root was normal-sized. The aortic root was normal. Ascending aorta: The ascending aorta was normal. The ascending aorta was normal. Pulmonary arteries: The main pulmonary artery was normal-sized. There was no evidence of pulmonary hypertension. Systemic veins:  Central venous respirophasic diameter changes are in the normal range (>50%). Inferior vena cava: The IVC was normally collapsible and normal-sized. The IVC was normal-sized. ---------------------------------------------------------------------------- Measurements  Left ventricle                    Value        Ref  IVS thickness, ED, PLAX       (H) 1.1    cm     0.6 - 0.9  LV ID, ED, PLAX               (L) 2.4   cm     3.8 - 5.2  LV ID, ES, PLAX               (L) 1.8   cm     2.2 - 3.5  LV PW thickness, ED, PLAX     (H) 1.0   cm     0.6 - 0.9  IVS/LV PW ratio, ED, PLAX         1.10         ---------  LV PW/LV ID ratio, ED, PLAX       0.42         ---------  LV ejection fraction          (L) 52    %      54 - 74  Stroke volume/bsa, 2D             25    ml/m^2 ---------  LV end-diastolic volume, 1-p      48    ml     48 - 140  A4C  LV ejection fraction, 1-p A4C     52    %      46 - 78  Stroke volume, 1-p A4C            25    ml     ---------  LV end-diastolic volume/bsa,      30    ml/m^2 30 - 82  1-p A4C  Stroke volume/bsa, 1-p A4C        15    ml/m^2 ---------  LV end-diastolic volume, 2-p  (L) 39    ml     46 - 106  LV end-systolic volume, 2-p       19    ml     14 - 42  LV ejection fraction, 2-p     (L) 52    %      54 - 74  Stroke volume, 2-p                20    ml     ---------  LV end-diastolic volume/bsa,  (L) 25    ml/m^2 29 - 61  2-p  LV end-systolic volume/bsa,       12    ml/m^2 8 - 24  2-p  Stroke volume/bsa, 2-p            12.8  ml/m^2 ---------  LV e', lateral                (L) 7.0   cm/sec >=10.0  LV E/e', lateral                  11           <=13  LV e', medial                     8.7   cm/sec >=7.0  LV E/e', medial                   9            ---------  LV e', average                    7.8   cm/sec ---------  LV E/e', average                  10           <=14  LVOT                              Value        Ref  LVOT ID                           1.6   cm     ---------  LVOT peak velocity, S             1.02  m/sec  ---------  LVOT VTI, S                       19.4  cm     ---------  LVOT peak gradient, S             4     mm Hg  ---------  LVOT mean gradient, S             3     mm Hg  ---------  Stroke volume (SV), LVOT DP       39    ml     ---------  Stroke index (SV/bsa), LVOT       25    ml/m^2 ---------  DP  Aortic valve                       Value        Ref  Aortic leaflet separation, MM     1.4   cm     ---------  Aortic root                       Value        Ref  Aortic root ID, STJ, ED           2.8   cm     2.0 - 3.2  Left atrium                       Value        Ref  LA volume, S                      35    ml     22 - 52  LA volume/bsa, S                  22    ml/m^2 16 - 34  LA volume, ES, 1-p A4C            34    ml     22 - 52  LA volume, ES, 1-p A2C            36    ml     22 - 52  LA volume, ES, A/L                37    ml     ---------  LA volume/bsa, ES, A/L            23    ml/m^2 16 - 34  Mitral valve                      Value        Ref  Mitral E-wave peak velocity       0.79  m/sec  ---------  Mitral A-wave peak velocity       1.72  m/sec  ---------  Mitral deceleration time          104   ms     ---------  Mitral peak gradient, D           3     mm Hg  ---------  Mitral E/A ratio, peak            0.5          ---------  Pulmonary artery                  Value        Ref  PA pressure, S, DP                33    mm Hg  ---------  Tricuspid valve                   Value        Ref  Tricuspid regurg peak             2.67  m/sec  <=2.8  velocity  Tricuspid peak RV-RA gradient     30    mm Hg  ---------  Systemic veins                    Value        Ref  Estimated CVP                     3     mm Hg  ---------  Inferior vena cava                Value        Ref  ID                                1.2   cm     <=2.1  Right ventricle                   Value        Ref  TAPSE, 2D                         2.08  cm     >=1.70  TAPSE, MM                         2.08  cm     >=1.70  RV pressure, S, DP                33    mm Hg  ---------  RV s', lateral                    15.6  cm/sec >=9.5 Legend: (L)  and  (H)  dior values outside specified reference range. ---------------------------------------------------------------------------- Prepared and electronically signed by Kelton Mckeon MD 01/15/2024 15:22          Meds:   Current  Facility-Administered Medications   Medication Dose Route Frequency    ipratropium-albuterol (Duoneb) 0.5-2.5 (3) MG/3ML inhalation solution 3 mL  3 mL Nebulization Q6H PRN    ipratropium-albuterol (Duoneb) 0.5-2.5 (3) MG/3ML inhalation solution 3 mL  3 mL Nebulization Q6H WA    ceFAZolin (Ancef) 2 g in 20mL IV syringe premix  2 g Intravenous Q8H    atorvastatin (Lipitor) tab 40 mg  40 mg Oral Nightly    sodium chloride 0.9% infusion   Intravenous Continuous    ALPRAZolam (Xanax) tab 0.25 mg  0.25 mg Oral Nightly PRN    ARIPiprazole (Abilify) tab 0.5 mg  0.5 mg Oral Daily    escitalopram (Lexapro) tab 15 mg  15 mg Oral QAM    ferrous sulfate DR tab 325 mg  325 mg Oral Daily with breakfast    pregabalin (Lyrica) cap 50 mg  50 mg Oral BID    QUEtiapine (SEROquel) tab 25 mg  25 mg Oral Nightly    polyethylene glycol (PEG 3350) (Miralax) 17 g oral packet 17 g  17 g Oral Daily PRN       Assessment & Plan    Acute on chronic hypoxic respiratory failure (HCC)  -prev admit for pneumonia in 7/2023  -hypoxic on admission (sats 83% RA), altered mental status, fever to 104.2  CXR 1/16/24 -- L perihilar and bibasilar parenchymal abnormality and/or atelec changes, sl progression.  Clinically doubt pneumonia, more likely COPD exacerbation  Extended resp panel, RSV, covid, Flu neg  Lactic acidosis 4.6>2.2>1.8 (normal)  Procal slightly high 0.45  Initially placed on BiPAP; O2 weaned from 5L yesterday to 2L NC today, sats 92% (not on home O2 though may need O2 on discharge)  -cont IS, acapella  -significant wheezing today.  Will give IV solumedrol     Sepsis with E.coli bacteremia  UTI  No significant hypotension  Fever to 104.2 on admission -- afebrile since then  Lactic acidosis 4.6, now normalized   UA with 2+ nitrite, >50 WBC -- likely source of infection  Blood cx x 2 growing E.coli -- R to amp/levaquin/cipro, S to zosyn and cefazolin  S/p Zosyn x 3 days, now on IV cefazolin (day 6 total abx); plan for 10 day course  Add po  vanco for CDP  WBC 10.5>33.3>>>7.3  Repeat bcx (1/18/2024) NGTD     Elevated Troponins  Trop 199>2249>3228  EKG-sinus tachycardia  On heparin gtt -- checking HIPA Ab's given decreased platelets -- still pending  Echo with new depressed LV syst function (EF 45-50%) -- prev echo in 2019 (EF 55-60%)  Likely Type II MI --demand ischemia related to hypoxia/tachycardia/sepsis  ; started lipitor 40mg qhs  Pt to follow up as outpatient with Dr. Felix to discuss possible ischemic w/u     Thrombocytopenia  Platelet 79>110>95>72>76  Likely 2/2 sepsis  HIPA neg     Depression and anxiety  -pt with long hx of depression (and anxiety), exacerbated by marital strife/stress  -previously saw psychiatrist Dr. Friend who retired  -on Lexapro 15mg/day  -on prn xanax     Hypertension  Amlodipine 5mg daily  On hold this admission 2/2 borderline BP -- BP's now creeping up (140's-150's/70's-80's); restart amlodipine     Hx of iron deficiency anemia  Had EGD/C-scope in 2010 (Dr. Ferrari) -- negative. 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 Continue FeSO4 325mg/day.    Hgb down 14.5 > 11.3 but stable in the 11's --- suspect 2/2 sepsis  Cont to trend     Hx of frequent UTI   On methenamine as outpt; sees urology Dr. Munir Cullen     Eventration of left hemidiaphragm  Previously thought to be due to large HH  CT chest clarified 7/2023 - small HH but large eventration of left hemidiaphragm     Osteoporosis  DEXA in 7/2017- osteoporosis of left femoral neck (T -2.9).  Pt was briefly on fosamax in 2014. Declines restarting fosamax or other meds so would not check repeat DEXA.     Vitamin B12 deficiency    Level normal (1241) in 11/2023; cont B12 1000 mcg/day     Lumbar spinal stenosis, chronic back pain    Has seen Dr. Salgado (ortho spine at [facility]).  Referred to pain specialist, Dr. Deirdre Elizabeth at [facility]. Had facet injections in 8/2018 which  helped tremendously.  Saw Dr. Elizabeth again 9/25/18; did PT.  MRI in 4/2019 did not appear to show anything acute.  No longer taking Norco.  Bilateral knee OA  Sees ortho Dr. Booth -- has had b/l cortisone injections with transient improvement.  Has been advised for TKR's but trying to avoid. Ambulates with a walker.       COPD  Essentially quit smoking in 8/2016.  Has been using the Breo.     Basal and squamous cell cancer, face  S/p Mohs surgeries in 4 areas of her face in 2018 (Dr. Jonas).      Post-herpetic neuralgia (dx'ed with shingles left upper back 6/8/22)  -still with pain but significantly improved  -on Lyrica 50mg BID      Cognitive decline, likely age-related, poss exacerbated by depression  -pt saw neurologist Dr. Lokesh Mcmullen in 9/2022. Was thought to have late onset Alzheimer's dementia (started on aricept), though pt had not shown obvious sx of dementia prior to this admission  Pt was referred for neuropsych testing and MRI brain in 2022 by Dr. Mcmullen though did not schedule.    -had delirium 7/2023 while hospitalized--zyprexa caused sedation; seroquel 25mg at bedtime, and abilify 0.5mg daily     Advance directives  -Discussed with her ; pt has POLST in chart, confirms DNR/select     Dispo   PT/OT rec [facility]  Pt has been accepted at [facility]  If BCx remain negative tomorrow, will plan for tx to [facility]         Discussed with son, Alphonse (he and pt's  were just dx'ed with covid)  Pt's daughter is coming into town from [place], [place] tomorrow    Discussed with RN    Kathy Rao MD  1/20/2024  10:06 AM

## 2024-01-20 NOTE — PLAN OF CARE
Pt alert OX2. Pt on 3 L of oxygen on high flow NC. No complains of pain/discomfort. Pt on IV ancef, Pt on IV fluids. Cards cleared pt for discharge. Plan is to wean pt off of oxygen & medical clearance pending. Pt getting scheduled nebs. Call light within reach - safety precaution in place - frequent rounding by nursing staffs.     Problem: Patient Centered Care  Goal: Patient preferences are identified and integrated in the patient's plan of care  Description: Interventions:  - What would you like us to know as we care for you? Im from home with my   - Provide timely, complete, and accurate information to patient/family  - Incorporate patient and family knowledge, values, beliefs, and cultural backgrounds into the planning and delivery of care  - Encourage patient/family to participate in care and decision-making at the level they choose  - Honor patient and family perspectives and choices  Outcome: Progressing     Problem: Patient/Family Goals  Goal: Patient/Family Long Term Goal  Description: Patient's Long Term Goal: Get stronger    Interventions:  - PT/OT  - See additional Care Plan goals for specific interventions  Outcome: Progressing  Goal: Patient/Family Short Term Goal  Description: Patient's Short Term Goal: Go home    Interventions:   - Take all my medications   - See additional Care Plan goals for specific interventions  Outcome: Progressing     Problem: CARDIOVASCULAR - ADULT  Goal: Maintains optimal cardiac output and hemodynamic stability  Description: INTERVENTIONS:  - Monitor vital signs, rhythm, and trends  - Monitor for bleeding, hypotension and signs of decreased cardiac output  - Evaluate effectiveness of vasoactive medications to optimize hemodynamic stability  - Monitor arterial and/or venous puncture sites for bleeding and/or hematoma  - Assess quality of pulses, skin color and temperature  - Assess for signs of decreased coronary artery perfusion - ex. Angina  - Evaluate fluid  balance, assess for edema, trend weights  Outcome: Progressing  Goal: Absence of cardiac arrhythmias or at baseline  Description: INTERVENTIONS:  - Continuous cardiac monitoring, monitor vital signs, obtain 12 lead EKG if indicated  - Evaluate effectiveness of antiarrhythmic and heart rate control medications as ordered  - Initiate emergency measures for life threatening arrhythmias  - Monitor electrolytes and administer replacement therapy as ordered  Outcome: Progressing     Problem: RESPIRATORY - ADULT  Goal: Achieves optimal ventilation and oxygenation  Description: INTERVENTIONS:  - Assess for changes in respiratory status  - Assess for changes in mentation and behavior  - Position to facilitate oxygenation and minimize respiratory effort  - Oxygen supplementation based on oxygen saturation or ABGs  - Provide Smoking Cessation handout, if applicable  - Encourage broncho-pulmonary hygiene including cough, deep breathe, Incentive Spirometry  - Assess the need for suctioning and perform as needed  - Assess and instruct to report SOB or any respiratory difficulty  - Respiratory Therapy support as indicated  - Manage/alleviate anxiety  - Monitor for signs/symptoms of CO2 retention  Outcome: Progressing     Problem: GENITOURINARY - ADULT  Goal: Absence of urinary retention  Description: INTERVENTIONS:  - Assess patient’s ability to void and empty bladder  - Monitor intake/output and perform bladder scan as needed  - Follow urinary retention protocol/standard of care  - Consider collaborating with pharmacy to review patient's medication profile  - Implement strategies to promote bladder emptying  Outcome: Progressing     Problem: METABOLIC/FLUID AND ELECTROLYTES - ADULT  Goal: Electrolytes maintained within normal limits  Description: INTERVENTIONS:  - Monitor labs and rhythm and assess patient for signs and symptoms of electrolyte imbalances  - Administer electrolyte replacement as ordered  - Monitor response to  electrolyte replacements, including rhythm and repeat lab results as appropriate  - Fluid restriction as ordered  - Instruct patient on fluid and nutrition restrictions as appropriate  Outcome: Progressing  Goal: Hemodynamic stability and optimal renal function maintained  Description: INTERVENTIONS:  - Monitor labs and assess for signs and symptoms of volume excess or deficit  - Monitor intake, output and patient weight  - Monitor urine specific gravity, serum osmolarity and serum sodium as indicated or ordered  - Monitor response to interventions for patient's volume status, including labs, urine output, blood pressure (other measures as available)  - Encourage oral intake as appropriate  - Instruct patient on fluid and nutrition restrictions as appropriate  Outcome: Progressing     Problem: HEMATOLOGIC - ADULT  Goal: Maintains hematologic stability  Description: INTERVENTIONS  - Assess for signs and symptoms of bleeding or hemorrhage  - Monitor labs and vital signs for trends  - Administer supportive blood products/factors, fluids and medications as ordered and appropriate  - Administer supportive blood products/factors as ordered and appropriate  Outcome: Progressing     Problem: NEUROLOGICAL - ADULT  Goal: Achieves stable or improved neurological status  Description: INTERVENTIONS  - Assess for and report changes in neurological status  - Initiate measures to prevent increased intracranial pressure  - Maintain blood pressure and fluid volume within ordered parameters to optimize cerebral perfusion and minimize risk of hemorrhage  - Monitor temperature, glucose, and sodium. Initiate appropriate interventions as ordered  Outcome: Progressing     Problem: SAFETY ADULT - FALL  Goal: Free from fall injury  Description: INTERVENTIONS:  - Assess pt frequently for physical needs  - Identify cognitive and physical deficits and behaviors that affect risk of falls.  - [place] fall precautions as indicated by  assessment.  - Educate pt/family on patient safety including physical limitations  - Instruct pt to call for assistance with activity based on assessment  - Modify environment to reduce risk of injury  - Provide assistive devices as appropriate  - Consider OT/PT consult to assist with strengthening/mobility  - Encourage toileting schedule  Outcome: Progressing     Problem: DISCHARGE PLANNING  Goal: Discharge to home or other facility with appropriate resources  Description: INTERVENTIONS:  - Identify barriers to discharge w/pt and caregiver  - Include patient/family/discharge partner in discharge planning  - Arrange for needed discharge resources and transportation as appropriate  - Identify discharge learning needs (meds, wound care, etc)  - Arrange for interpreters to assist at discharge as needed  - Consider post-discharge preferences of patient/family/discharge partner  - Complete POLST form as appropriate  - Assess patient's ability to be responsible for managing their own health  - Refer to Case Management Department for coordinating discharge planning if the patient needs post-hospital services based on physician/LIP order or complex needs related to functional status, cognitive ability or social support system  Outcome: Progressing

## 2024-01-20 NOTE — PLAN OF CARE
Pt alert OX2 - forgetful at times. Pt on 2 L of oxygen on high flow nasal cannula - RA baseline - Plan is to wean pt off of oxygen before discharge. Pt getting scheduled nebs. No complains of pain/discomfort. Pt on IV ancef. Plan is to discharge tomorrow to [facility] if blood culture results remain negative as per MD. Family requested to give ensure with meals - MD notified. IV fluids discontinued as per MD order. Pt on IV steroids, Pt on PO vanco (C diff prophylaxis). Call light within reach -safety precautions in place - frequent rounding by nursing staffs.    Problem: Patient Centered Care  Goal: Patient preferences are identified and integrated in the patient's plan of care  Description: Interventions:  - What would you like us to know as we care for you? Im from home with my   - Provide timely, complete, and accurate information to patient/family  - Incorporate patient and family knowledge, values, beliefs, and cultural backgrounds into the planning and delivery of care  - Encourage patient/family to participate in care and decision-making at the level they choose  - Honor patient and family perspectives and choices  1/20/2024 1646 by Angeli Richards, RN  Outcome: Progressing  1/20/2024 1645 by Angeli Richards, RN  Outcome: Progressing     Problem: Patient/Family Goals  Goal: Patient/Family Long Term Goal  Description: Patient's Long Term Goal: Get stronger    Interventions:  - PT/OT  - See additional Care Plan goals for specific interventions  1/20/2024 1646 by Angeli Richards, RN  Outcome: Progressing  1/20/2024 1645 by Angeli Richards, RN  Outcome: Progressing  Goal: Patient/Family Short Term Goal  Description: Patient's Short Term Goal: Go home    Interventions:   - Take all my medications   - See additional Care Plan goals for specific interventions  1/20/2024 1646 by Angeli Richards, RN  Outcome: Progressing  1/20/2024 1645 by Angeli Richards, RN  Outcome: Progressing     Problem: CARDIOVASCULAR - ADULT  Goal:  Maintains optimal cardiac output and hemodynamic stability  Description: INTERVENTIONS:  - Monitor vital signs, rhythm, and trends  - Monitor for bleeding, hypotension and signs of decreased cardiac output  - Evaluate effectiveness of vasoactive medications to optimize hemodynamic stability  - Monitor arterial and/or venous puncture sites for bleeding and/or hematoma  - Assess quality of pulses, skin color and temperature  - Assess for signs of decreased coronary artery perfusion - ex. Angina  - Evaluate fluid balance, assess for edema, trend weights  1/20/2024 1646 by Angeli Richards RN  Outcome: Progressing  1/20/2024 1645 by Angeli Richards RN  Outcome: Progressing  Goal: Absence of cardiac arrhythmias or at baseline  Description: INTERVENTIONS:  - Continuous cardiac monitoring, monitor vital signs, obtain 12 lead EKG if indicated  - Evaluate effectiveness of antiarrhythmic and heart rate control medications as ordered  - Initiate emergency measures for life threatening arrhythmias  - Monitor electrolytes and administer replacement therapy as ordered  1/20/2024 1646 by Angeli Richards RN  Outcome: Progressing  1/20/2024 1645 by Angeli Richards RN  Outcome: Progressing     Problem: RESPIRATORY - ADULT  Goal: Achieves optimal ventilation and oxygenation  Description: INTERVENTIONS:  - Assess for changes in respiratory status  - Assess for changes in mentation and behavior  - Position to facilitate oxygenation and minimize respiratory effort  - Oxygen supplementation based on oxygen saturation or ABGs  - Provide Smoking Cessation handout, if applicable  - Encourage broncho-pulmonary hygiene including cough, deep breathe, Incentive Spirometry  - Assess the need for suctioning and perform as needed  - Assess and instruct to report SOB or any respiratory difficulty  - Respiratory Therapy support as indicated  - Manage/alleviate anxiety  - Monitor for signs/symptoms of CO2 retention  1/20/2024 1646 by Angeli Richards RN  Outcome:  Progressing  1/20/2024 1645 by Angeli Richards RN  Outcome: Progressing     Problem: GENITOURINARY - ADULT  Goal: Absence of urinary retention  Description: INTERVENTIONS:  - Assess patient’s ability to void and empty bladder  - Monitor intake/output and perform bladder scan as needed  - Follow urinary retention protocol/standard of care  - Consider collaborating with pharmacy to review patient's medication profile  - Implement strategies to promote bladder emptying  1/20/2024 1646 by Angeli Richards RN  Outcome: Progressing  1/20/2024 1645 by Angeli Richards RN  Outcome: Progressing     Problem: METABOLIC/FLUID AND ELECTROLYTES - ADULT  Goal: Electrolytes maintained within normal limits  Description: INTERVENTIONS:  - Monitor labs and rhythm and assess patient for signs and symptoms of electrolyte imbalances  - Administer electrolyte replacement as ordered  - Monitor response to electrolyte replacements, including rhythm and repeat lab results as appropriate  - Fluid restriction as ordered  - Instruct patient on fluid and nutrition restrictions as appropriate  1/20/2024 1646 by Angeli Richards RN  Outcome: Progressing  1/20/2024 1645 by Angeli Richards RN  Outcome: Progressing  Goal: Hemodynamic stability and optimal renal function maintained  Description: INTERVENTIONS:  - Monitor labs and assess for signs and symptoms of volume excess or deficit  - Monitor intake, output and patient weight  - Monitor urine specific gravity, serum osmolarity and serum sodium as indicated or ordered  - Monitor response to interventions for patient's volume status, including labs, urine output, blood pressure (other measures as available)  - Encourage oral intake as appropriate  - Instruct patient on fluid and nutrition restrictions as appropriate  1/20/2024 1646 by Angeli Richards RN  Outcome: Progressing  1/20/2024 1645 by Angeli Richards RN  Outcome: Progressing     Problem: HEMATOLOGIC - ADULT  Goal: Maintains hematologic stability  Description:  INTERVENTIONS  - Assess for signs and symptoms of bleeding or hemorrhage  - Monitor labs and vital signs for trends  - Administer supportive blood products/factors, fluids and medications as ordered and appropriate  - Administer supportive blood products/factors as ordered and appropriate  1/20/2024 1646 by Angeli Richards RN  Outcome: Progressing  1/20/2024 1645 by Angeli Richards RN  Outcome: Progressing     Problem: NEUROLOGICAL - ADULT  Goal: Achieves stable or improved neurological status  Description: INTERVENTIONS  - Assess for and report changes in neurological status  - Initiate measures to prevent increased intracranial pressure  - Maintain blood pressure and fluid volume within ordered parameters to optimize cerebral perfusion and minimize risk of hemorrhage  - Monitor temperature, glucose, and sodium. Initiate appropriate interventions as ordered  1/20/2024 1646 by Angeli Richards RN  Outcome: Progressing  1/20/2024 1645 by Angeli Richards RN  Outcome: Progressing     Problem: SAFETY ADULT - FALL  Goal: Free from fall injury  Description: INTERVENTIONS:  - Assess pt frequently for physical needs  - Identify cognitive and physical deficits and behaviors that affect risk of falls.  - [place] fall precautions as indicated by assessment.  - Educate pt/family on patient safety including physical limitations  - Instruct pt to call for assistance with activity based on assessment  - Modify environment to reduce risk of injury  - Provide assistive devices as appropriate  - Consider OT/PT consult to assist with strengthening/mobility  - Encourage toileting schedule  1/20/2024 1646 by Angeli Richards RN  Outcome: Progressing  1/20/2024 1645 by Angeli Richards RN  Outcome: Progressing     Problem: DISCHARGE PLANNING  Goal: Discharge to home or other facility with appropriate resources  Description: INTERVENTIONS:  - Identify barriers to discharge w/pt and caregiver  - Include patient/family/discharge partner in discharge  planning  - Arrange for needed discharge resources and transportation as appropriate  - Identify discharge learning needs (meds, wound care, etc)  - Arrange for interpreters to assist at discharge as needed  - Consider post-discharge preferences of patient/family/discharge partner  - Complete POLST form as appropriate  - Assess patient's ability to be responsible for managing their own health  - Refer to Case Management Department for coordinating discharge planning if the patient needs post-hospital services based on physician/LIP order or complex needs related to functional status, cognitive ability or social support system  1/20/2024 1646 by Angeli Richards, RN  Outcome: Progressing  1/20/2024 1645 by Angeli Richards, RN  Outcome: Progressing

## 2024-01-21 VITALS
HEART RATE: 98 BPM | SYSTOLIC BLOOD PRESSURE: 127 MMHG | BODY MASS INDEX: 22 KG/M2 | RESPIRATION RATE: 20 BRPM | WEIGHT: 125.31 LBS | TEMPERATURE: 98 F | DIASTOLIC BLOOD PRESSURE: 54 MMHG | OXYGEN SATURATION: 92 %

## 2024-01-21 LAB
ANION GAP SERPL CALC-SCNC: 5 MMOL/L (ref 0–18)
BASOPHILS # BLD AUTO: 0.03 X10(3) UL (ref 0–0.2)
BASOPHILS NFR BLD AUTO: 0.3 %
BUN BLD-MCNC: 10 MG/DL (ref 9–23)
BUN/CREAT SERPL: 15.9 (ref 10–20)
CALCIUM BLD-MCNC: 9.3 MG/DL (ref 8.7–10.4)
CHLORIDE SERPL-SCNC: 103 MMOL/L (ref 98–112)
CO2 SERPL-SCNC: 33 MMOL/L (ref 21–32)
CREAT BLD-MCNC: 0.63 MG/DL
DEPRECATED RDW RBC AUTO: 40.8 FL (ref 35.1–46.3)
EGFRCR SERPLBLD CKD-EPI 2021: 85 ML/MIN/1.73M2 (ref 60–?)
EOSINOPHIL # BLD AUTO: 0.02 X10(3) UL (ref 0–0.7)
EOSINOPHIL NFR BLD AUTO: 0.2 %
ERYTHROCYTE [DISTWIDTH] IN BLOOD BY AUTOMATED COUNT: 12.8 % (ref 11–15)
GLUCOSE BLD-MCNC: 107 MG/DL (ref 70–99)
HCT VFR BLD AUTO: 34.6 %
HGB BLD-MCNC: 11.7 G/DL
IMM GRANULOCYTES # BLD AUTO: 0.13 X10(3) UL (ref 0–1)
IMM GRANULOCYTES NFR BLD: 1.4 %
LYMPHOCYTES # BLD AUTO: 0.79 X10(3) UL (ref 1–4)
LYMPHOCYTES NFR BLD AUTO: 8.6 %
MCH RBC QN AUTO: 29.5 PG (ref 26–34)
MCHC RBC AUTO-ENTMCNC: 33.8 G/DL (ref 31–37)
MCV RBC AUTO: 87.2 FL
MONOCYTES # BLD AUTO: 0.57 X10(3) UL (ref 0.1–1)
MONOCYTES NFR BLD AUTO: 6.2 %
NEUTROPHILS # BLD AUTO: 7.6 X10 (3) UL (ref 1.5–7.7)
NEUTROPHILS # BLD AUTO: 7.6 X10(3) UL (ref 1.5–7.7)
NEUTROPHILS NFR BLD AUTO: 83.3 %
OSMOLALITY SERPL CALC.SUM OF ELEC: 292 MOSM/KG (ref 275–295)
PLATELET # BLD AUTO: 117 10(3)UL (ref 150–450)
POTASSIUM SERPL-SCNC: 3.1 MMOL/L (ref 3.5–5.1)
RBC # BLD AUTO: 3.97 X10(6)UL
SODIUM SERPL-SCNC: 141 MMOL/L (ref 136–145)
WBC # BLD AUTO: 9.1 X10(3) UL (ref 4–11)

## 2024-01-21 PROCEDURE — 99232 SBSQ HOSP IP/OBS MODERATE 35: CPT | Performed by: INTERNAL MEDICINE

## 2024-01-21 PROCEDURE — 99239 HOSP IP/OBS DSCHRG MGMT >30: CPT | Performed by: INTERNAL MEDICINE

## 2024-01-21 RX ORDER — PREDNISONE 20 MG/1
TABLET ORAL
Status: SHIPPED | COMMUNITY
Start: 2024-01-22

## 2024-01-21 RX ORDER — IPRATROPIUM BROMIDE AND ALBUTEROL SULFATE 2.5; .5 MG/3ML; MG/3ML
3 SOLUTION RESPIRATORY (INHALATION)
Status: SHIPPED | COMMUNITY
Start: 2024-01-21

## 2024-01-21 RX ORDER — VANCOMYCIN HYDROCHLORIDE 125 MG/1
125 CAPSULE ORAL DAILY
Status: SHIPPED | COMMUNITY
Start: 2024-01-22

## 2024-01-21 RX ORDER — POTASSIUM CHLORIDE 20 MEQ/1
40 TABLET, EXTENDED RELEASE ORAL ONCE
Status: COMPLETED | OUTPATIENT
Start: 2024-01-21 | End: 2024-01-21

## 2024-01-21 RX ORDER — ATORVASTATIN CALCIUM 40 MG/1
40 TABLET, FILM COATED ORAL NIGHTLY
Status: SHIPPED | COMMUNITY
Start: 2024-01-21

## 2024-01-21 RX ORDER — CEPHALEXIN 500 MG/1
500 CAPSULE ORAL 4 TIMES DAILY
Status: SHIPPED | COMMUNITY
Start: 2024-01-21

## 2024-01-21 NOTE — CM/SW NOTE
01/19/24 0901   Discharge disposition   Expected discharge disposition subacute   Post Acute Care Provider [facility]   Discharge transportation [facility]     JAMES received confirmation of insurance authorization. JAMES confirmed with RN that pt is medically ready for discharge today. JAMES discussed with  Kristal  to arrange a time for discharge. RN is aware of discharge time and location and will inform patient/ family. RN to attach IP Transfer Report to After Visit Summary packet for transfer to [facility].     Pt requires an ambulance according to the RN due to being a max assist. JAMES called and ordered an Ambulance from [facility] to transport pt to [facility] at 5:00 PM.  PCS flow sheet completed. RN to attached to AVS and print out.       JAMES confirmed PASR screen was completed. JAMES/CM to remain available for support and/or discharge planning.     PLAN: DC to [facility] via [facility] at 5:00 PM    RN to call report to [phone] room 336    JO Henry, MSW ext. [phone]

## 2024-01-21 NOTE — PLAN OF CARE
Pt alert OX2  - confused at times, hard of hearing. Pt on 3 L of oxygen on high flow nasal cannula. No complains of pain/discomfort. Electrolytes replaced as per protocol. Pt given the last dose of IV abx as per MD order. Pt medically cleared to discharge - discharge education given - IV out/tele out - family at bedside - belongings packed - emptied  - pt discharging to [facility] in ambulance - report given to nurse Analia @[phone] at 1536.     Problem: Patient Centered Care  Goal: Patient preferences are identified and integrated in the patient's plan of care  Description: Interventions:  - What would you like us to know as we care for you? Im from home with my   - Provide timely, complete, and accurate information to patient/family  - Incorporate patient and family knowledge, values, beliefs, and cultural backgrounds into the planning and delivery of care  - Encourage patient/family to participate in care and decision-making at the level they choose  - Honor patient and family perspectives and choices  Outcome: Completed     Problem: Patient/Family Goals  Goal: Patient/Family Long Term Goal  Description: Patient's Long Term Goal: Get stronger    Interventions:  - PT/OT  - See additional Care Plan goals for specific interventions  Outcome: Completed  Goal: Patient/Family Short Term Goal  Description: Patient's Short Term Goal: Go home    Interventions:   - Take all my medications   - See additional Care Plan goals for specific interventions  Outcome: Completed     Problem: CARDIOVASCULAR - ADULT  Goal: Maintains optimal cardiac output and hemodynamic stability  Description: INTERVENTIONS:  - Monitor vital signs, rhythm, and trends  - Monitor for bleeding, hypotension and signs of decreased cardiac output  - Evaluate effectiveness of vasoactive medications to optimize hemodynamic stability  - Monitor arterial and/or venous puncture sites for bleeding and/or hematoma  - Assess quality  of pulses, skin color and temperature  - Assess for signs of decreased coronary artery perfusion - ex. Angina  - Evaluate fluid balance, assess for edema, trend weights  Outcome: Completed  Goal: Absence of cardiac arrhythmias or at baseline  Description: INTERVENTIONS:  - Continuous cardiac monitoring, monitor vital signs, obtain 12 lead EKG if indicated  - Evaluate effectiveness of antiarrhythmic and heart rate control medications as ordered  - Initiate emergency measures for life threatening arrhythmias  - Monitor electrolytes and administer replacement therapy as ordered  Outcome: Completed     Problem: RESPIRATORY - ADULT  Goal: Achieves optimal ventilation and oxygenation  Description: INTERVENTIONS:  - Assess for changes in respiratory status  - Assess for changes in mentation and behavior  - Position to facilitate oxygenation and minimize respiratory effort  - Oxygen supplementation based on oxygen saturation or ABGs  - Provide Smoking Cessation handout, if applicable  - Encourage broncho-pulmonary hygiene including cough, deep breathe, Incentive Spirometry  - Assess the need for suctioning and perform as needed  - Assess and instruct to report SOB or any respiratory difficulty  - Respiratory Therapy support as indicated  - Manage/alleviate anxiety  - Monitor for signs/symptoms of CO2 retention  Outcome: Completed     Problem: GENITOURINARY - ADULT  Goal: Absence of urinary retention  Description: INTERVENTIONS:  - Assess patient’s ability to void and empty bladder  - Monitor intake/output and perform bladder scan as needed  - Follow urinary retention protocol/standard of care  - Consider collaborating with pharmacy to review patient's medication profile  - Implement strategies to promote bladder emptying  Outcome: Completed     Problem: METABOLIC/FLUID AND ELECTROLYTES - ADULT  Goal: Electrolytes maintained within normal limits  Description: INTERVENTIONS:  - Monitor labs and rhythm and assess patient for  signs and symptoms of electrolyte imbalances  - Administer electrolyte replacement as ordered  - Monitor response to electrolyte replacements, including rhythm and repeat lab results as appropriate  - Fluid restriction as ordered  - Instruct patient on fluid and nutrition restrictions as appropriate  Outcome: Completed  Goal: Hemodynamic stability and optimal renal function maintained  Description: INTERVENTIONS:  - Monitor labs and assess for signs and symptoms of volume excess or deficit  - Monitor intake, output and patient weight  - Monitor urine specific gravity, serum osmolarity and serum sodium as indicated or ordered  - Monitor response to interventions for patient's volume status, including labs, urine output, blood pressure (other measures as available)  - Encourage oral intake as appropriate  - Instruct patient on fluid and nutrition restrictions as appropriate  Outcome: Completed     Problem: HEMATOLOGIC - ADULT  Goal: Maintains hematologic stability  Description: INTERVENTIONS  - Assess for signs and symptoms of bleeding or hemorrhage  - Monitor labs and vital signs for trends  - Administer supportive blood products/factors, fluids and medications as ordered and appropriate  - Administer supportive blood products/factors as ordered and appropriate  Outcome: Completed     Problem: NEUROLOGICAL - ADULT  Goal: Achieves stable or improved neurological status  Description: INTERVENTIONS  - Assess for and report changes in neurological status  - Initiate measures to prevent increased intracranial pressure  - Maintain blood pressure and fluid volume within ordered parameters to optimize cerebral perfusion and minimize risk of hemorrhage  - Monitor temperature, glucose, and sodium. Initiate appropriate interventions as ordered  Outcome: Completed     Problem: SAFETY ADULT - FALL  Goal: Free from fall injury  Description: INTERVENTIONS:  - Assess pt frequently for physical needs  - Identify cognitive and  physical deficits and behaviors that affect risk of falls.  - [place] fall precautions as indicated by assessment.  - Educate pt/family on patient safety including physical limitations  - Instruct pt to call for assistance with activity based on assessment  - Modify environment to reduce risk of injury  - Provide assistive devices as appropriate  - Consider OT/PT consult to assist with strengthening/mobility  - Encourage toileting schedule  Outcome: Completed     Problem: DISCHARGE PLANNING  Goal: Discharge to home or other facility with appropriate resources  Description: INTERVENTIONS:  - Identify barriers to discharge w/pt and caregiver  - Include patient/family/discharge partner in discharge planning  - Arrange for needed discharge resources and transportation as appropriate  - Identify discharge learning needs (meds, wound care, etc)  - Arrange for interpreters to assist at discharge as needed  - Consider post-discharge preferences of patient/family/discharge partner  - Complete POLST form as appropriate  - Assess patient's ability to be responsible for managing their own health  - Refer to Case Management Department for coordinating discharge planning if the patient needs post-hospital services based on physician/LIP order or complex needs related to functional status, cognitive ability or social support system  Outcome: Completed

## 2024-01-21 NOTE — PLAN OF CARE
Patient is alert & oriented x2 on 3L of O2. Vital signs stable at this time. No acute changes noted throughout shift; patient slept. Fall precautions maintained - bed alarm on, bed locked in lowest position, call light and personal belongings within reach, non-skid socks in place. Frequent rounding by nursing staff. Plan iv abx, awaiting bcx results.     Problem: Patient Centered Care  Goal: Patient preferences are identified and integrated in the patient's plan of care  Description: Interventions:  - What would you like us to know as we care for you? Im from home with my   - Provide timely, complete, and accurate information to patient/family  - Incorporate patient and family knowledge, values, beliefs, and cultural backgrounds into the planning and delivery of care  - Encourage patient/family to participate in care and decision-making at the level they choose  - Honor patient and family perspectives and choices  Outcome: Progressing     Problem: Patient/Family Goals  Goal: Patient/Family Long Term Goal  Description: Patient's Long Term Goal: Get stronger    Interventions:  - PT/OT  - See additional Care Plan goals for specific interventions  Outcome: Progressing  Goal: Patient/Family Short Term Goal  Description: Patient's Short Term Goal: Go home    Interventions:   - Take all my medications   - See additional Care Plan goals for specific interventions  Outcome: Progressing     Problem: CARDIOVASCULAR - ADULT  Goal: Maintains optimal cardiac output and hemodynamic stability  Description: INTERVENTIONS:  - Monitor vital signs, rhythm, and trends  - Monitor for bleeding, hypotension and signs of decreased cardiac output  - Evaluate effectiveness of vasoactive medications to optimize hemodynamic stability  - Monitor arterial and/or venous puncture sites for bleeding and/or hematoma  - Assess quality of pulses, skin color and temperature  - Assess for signs of decreased coronary artery perfusion - ex.  Angina  - Evaluate fluid balance, assess for edema, trend weights  Outcome: Progressing  Goal: Absence of cardiac arrhythmias or at baseline  Description: INTERVENTIONS:  - Continuous cardiac monitoring, monitor vital signs, obtain 12 lead EKG if indicated  - Evaluate effectiveness of antiarrhythmic and heart rate control medications as ordered  - Initiate emergency measures for life threatening arrhythmias  - Monitor electrolytes and administer replacement therapy as ordered  Outcome: Progressing     Problem: RESPIRATORY - ADULT  Goal: Achieves optimal ventilation and oxygenation  Description: INTERVENTIONS:  - Assess for changes in respiratory status  - Assess for changes in mentation and behavior  - Position to facilitate oxygenation and minimize respiratory effort  - Oxygen supplementation based on oxygen saturation or ABGs  - Provide Smoking Cessation handout, if applicable  - Encourage broncho-pulmonary hygiene including cough, deep breathe, Incentive Spirometry  - Assess the need for suctioning and perform as needed  - Assess and instruct to report SOB or any respiratory difficulty  - Respiratory Therapy support as indicated  - Manage/alleviate anxiety  - Monitor for signs/symptoms of CO2 retention  Outcome: Progressing     Problem: GENITOURINARY - ADULT  Goal: Absence of urinary retention  Description: INTERVENTIONS:  - Assess patient’s ability to void and empty bladder  - Monitor intake/output and perform bladder scan as needed  - Follow urinary retention protocol/standard of care  - Consider collaborating with pharmacy to review patient's medication profile  - Implement strategies to promote bladder emptying  Outcome: Progressing     Problem: METABOLIC/FLUID AND ELECTROLYTES - ADULT  Goal: Electrolytes maintained within normal limits  Description: INTERVENTIONS:  - Monitor labs and rhythm and assess patient for signs and symptoms of electrolyte imbalances  - Administer electrolyte replacement as ordered  -  Monitor response to electrolyte replacements, including rhythm and repeat lab results as appropriate  - Fluid restriction as ordered  - Instruct patient on fluid and nutrition restrictions as appropriate  Outcome: Progressing  Goal: Hemodynamic stability and optimal renal function maintained  Description: INTERVENTIONS:  - Monitor labs and assess for signs and symptoms of volume excess or deficit  - Monitor intake, output and patient weight  - Monitor urine specific gravity, serum osmolarity and serum sodium as indicated or ordered  - Monitor response to interventions for patient's volume status, including labs, urine output, blood pressure (other measures as available)  - Encourage oral intake as appropriate  - Instruct patient on fluid and nutrition restrictions as appropriate  Outcome: Progressing     Problem: HEMATOLOGIC - ADULT  Goal: Maintains hematologic stability  Description: INTERVENTIONS  - Assess for signs and symptoms of bleeding or hemorrhage  - Monitor labs and vital signs for trends  - Administer supportive blood products/factors, fluids and medications as ordered and appropriate  - Administer supportive blood products/factors as ordered and appropriate  Outcome: Progressing     Problem: NEUROLOGICAL - ADULT  Goal: Achieves stable or improved neurological status  Description: INTERVENTIONS  - Assess for and report changes in neurological status  - Initiate measures to prevent increased intracranial pressure  - Maintain blood pressure and fluid volume within ordered parameters to optimize cerebral perfusion and minimize risk of hemorrhage  - Monitor temperature, glucose, and sodium. Initiate appropriate interventions as ordered  Outcome: Progressing     Problem: SAFETY ADULT - FALL  Goal: Free from fall injury  Description: INTERVENTIONS:  - Assess pt frequently for physical needs  - Identify cognitive and physical deficits and behaviors that affect risk of falls.  - [place] fall precautions as  indicated by assessment.  - Educate pt/family on patient safety including physical limitations  - Instruct pt to call for assistance with activity based on assessment  - Modify environment to reduce risk of injury  - Provide assistive devices as appropriate  - Consider OT/PT consult to assist with strengthening/mobility  - Encourage toileting schedule  Outcome: Progressing     Problem: DISCHARGE PLANNING  Goal: Discharge to home or other facility with appropriate resources  Description: INTERVENTIONS:  - Identify barriers to discharge w/pt and caregiver  - Include patient/family/discharge partner in discharge planning  - Arrange for needed discharge resources and transportation as appropriate  - Identify discharge learning needs (meds, wound care, etc)  - Arrange for interpreters to assist at discharge as needed  - Consider post-discharge preferences of patient/family/discharge partner  - Complete POLST form as appropriate  - Assess patient's ability to be responsible for managing their own health  - Refer to Case Management Department for coordinating discharge planning if the patient needs post-hospital services based on physician/LIP order or complex needs related to functional status, cognitive ability or social support system  Outcome: Progressing

## 2024-01-21 NOTE — DISCHARGE SUMMARY
[facility]    Discharge Summary    Mireya Wolfe Patient Status:  Inpatient    1935 MRN [id number]   Location [facility] 3W/SW Attending Aide Vu,    Hosp Day # 6 PCP Kathy Rao MD     Date of Admission: 1/15/2024   Date of Discharge:  2024    Admitting Diagnosis: Febrile illness [R50.9]  Acute respiratory failure with hypoxia (HCC) [J96.01]    Disposition: Transfer to Skilled Nursing Facility: [facility]    Discharge Diagnosis: .Principal Problem:    Acute respiratory failure with hypoxia (HCC)  Active Problems:    Febrile illness      Hospital Course:   Reason for Admission:   Sepsis 2/2 E.coli UTI/bacteremia  Acute on chronic hypoxic resp failure/COPD exacerbation  Type 2 MI 2/2 demand ischemia    Discharge Physical Exam:  Vital Signs:  Blood pressure 127/54, pulse 107, temperature 97.8 °F (36.6 °C), temperature source Oral, resp. rate 20, weight 125 lb 4.8 oz (56.8 kg), SpO2 92%.     See progress note    Hospital Course:     Acute on chronic hypoxic respiratory failure (HCC)  COPD exacerbation  -hypoxic on admission (sats 83% RA), altered mental status, fever to 104.2  -CXR 24 -- L perihilar and bibasilar parenchymal abnormality and/or atelec changes, sl progression.    -Clinically doubt pneumonia, more likely COPD exacerbation  -Extended resp panel, RSV, covid, Flu neg  -Initially placed on BiPAP; O2 weaned from 5L to 2L, now back up to 3L.  Will need O2 on discharge  -cont IS, acapella  -started on IV solumedrol 40mg/day on ; transition to prednisone taper x 5 more days     Sepsis with E.coli bacteremia  UTI  No significant hypotension  Fever to 104.2 on admission -- afebrile since then  Lactic acidosis 4.6, now normalized   Blood cx x 2 growing E.coli -- R to amp/levaquin/cipro, S to zosyn and cefazolin  S/p Zosyn x 3 days, then IV cefazolin (day 6 total abx); plan for 10 day course; change to cephalexin 500mg QID x 4 more days  cont po vanco for CDP  x 7 more days  WBC 10.5>33.3>>>7.3  Repeat bcx (1/18/2024) NGTD x 3d     Elevated Troponins  Trop 199>2249>3228  EKG-sinus tachycardia  On heparin gtt -- checking HIPA Ab's given decreased platelets -- still pending  Echo with new depressed LV syst function (EF 45-50%) -- prev echo in 2019 (EF 55-60%)  Likely Type II MI --demand ischemia related to hypoxia/tachycardia/sepsis  ; started lipitor 40mg qhs  Pt to follow up as outpatient with Dr. Felix to discuss possible ischemic w/u.  Discussed with son John, and dtr Nelli at bedside     Thrombocytopenia  Platelet 79>110>95>72>76>117  Likely 2/2 sepsis  HIPA neg     Depression and anxiety  -pt with long hx of depression (and anxiety), exacerbated by marital strife/stress  -previously saw psychiatrist Dr. Friend who retired  -on Lexapro 15mg/day  -on prn xanax     Hypertension  Amlodipine 5mg daily  On hold this admission 2/2 borderline BP -- BP's now creeping up (140's-150's/70's-80's); restarted amlodipine.  BP's improved - now 127/54     Hx of iron deficiency anemia  Had EGD/C-scope in 2010 (Dr. Ferrari) -- negative. 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 Continue FeSO4 325mg/day.    Hgb down 14.5 > 11.3 but stable in the 11's --- suspect 2/2 sepsis  Cont to trend     Hx of frequent UTI   On methenamine as outpt; sees urology Dr. Munir Cullen     Eventration of left hemidiaphragm  Previously thought to be due to large HH  CT chest clarified 7/2023 - small HH but large eventration of left hemidiaphragm     Osteoporosis  DEXA in 7/2017- osteoporosis of left femoral neck (T -2.9).  Pt was briefly on fosamax in 2014. Declines restarting fosamax or other meds so would not check repeat DEXA.     Vitamin B12 deficiency    Level normal (1241) in 11/2023; cont B12 1000 mcg/day     Lumbar spinal stenosis, chronic back pain    Has seen Dr. Salgado (ortho spine at [facility]).  Referred to pain specialist, Dr. Gilmore   at [facility]. Had facet injections in 8/2018 which helped tremendously.  Saw Dr. Elizabeth again 9/25/18; did PT.  MRI in 4/2019 did not appear to show anything acute.  No longer taking Norco.  Bilateral knee OA  Sees ortho Dr. Booth -- has had b/l cortisone injections with transient improvement.  Has been advised for TKR's but trying to avoid. Ambulates with a walker.       COPD  Essentially quit smoking in 8/2016.  Has been using the Breo.     Basal and squamous cell cancer, face  S/p Mohs surgeries in 4 areas of her face in 2018 (Dr. Jonas).      Post-herpetic neuralgia (dx'ed with shingles left upper back 6/8/22)  -still with pain but significantly improved  -on Lyrica 50mg BID      Cognitive decline, likely age-related, poss exacerbated by depression  -pt saw neurologist Dr. Lokesh Mcmullen in 9/2022. Was thought to have late onset Alzheimer's dementia (started on aricept), though pt had not shown obvious sx of dementia prior to this admission  Pt was referred for neuropsych testing and MRI brain in 2022 by Dr. Mcmullen though did not schedule.    -had delirium 7/2023 while hospitalized--zyprexa caused sedation; started on seroquel 25mg at bedtime, and abilify 0.5mg daily  -Pt does not want to take seroquel anymore due to daytime sedation; refused her dose on 1/19 and 1/20/24.  Will stop for now. Could consider 12.5mg in the future if needed     Advance directives  - POLST in chart,  DNR/select      Dispo   PT/OT rec ROSEMARIE  Pt has been accepted at [facility]  Stable for transfer today    Consultants         Provider   Role Specialty     Tommie Felix MD  Consulting Physician Interventional, Cardiology     Tao Magana MD  Consulting Physician PULMONARY DISEASES                Discharge Plan:   Discharge Condition: Stable    Current Discharge Medication List        New Orders    Details   atorvastatin 40 MG Oral Tab Take 1 tablet (40 mg total) by mouth nightly.       ipratropium-albuterol 0.5-2.5 (3) MG/3ML Inhalation Solution Take 3 mL by nebulization every 6 (six) hours while awake.      vancomycin 125 MG Oral Cap Take 1 capsule (125 mg total) by mouth daily.           Home Meds - Unchanged    Details   cephalexin 500 MG Oral Cap Take 1 capsule (500 mg total) by mouth 4 (four) times daily.      predniSONE 20 MG Oral Tab Take 2 tablets (40 mg total) by mouth daily, THEN 1 tablet (20 mg total) daily.      PREGABALIN 50 MG Oral Cap TAKE 1 CAPSULE BY MOUTH TWICE DAILY      AMLODIPINE 5 MG Oral Tab TAKE 1 TABLET BY MOUTH EVERY DAY. HOLD FOR SYSTOLIC BLOOD PRESSURE< 110      escitalopram 5 MG Oral Tab Take 3 tablets (15 mg total) by mouth every morning.      ALPRAZolam 0.25 MG Oral Tab Take 1 tablet (0.25 mg total) by mouth nightly as needed for Anxiety.      FLUTICASONE FUROATE-VILANTEROL 100-25 MCG/ACT Inhalation Aerosol Powder, Breath Activated INHALE 1 PUFF INTO THE LUNGS DAILY      Cholecalciferol (VITAMIN D) 125 MCG (5000 UT) Oral Cap Take 1 capsule (5,000 Units total) by mouth daily.      ferrous sulfate 325 (65 FE) MG Oral Tab EC Take 1 tablet (325 mg total) by mouth daily with breakfast.      Vitamin B-12 1000 MCG Oral Tab Take 1 tablet (1,000 mcg total) by mouth daily.      TRIAMCINOLONE 0.1 % External Cream APPLY TOPICALLY TO THE AFFECTED AREA TWICE DAILY FOR RASH      ARIPiprazole 2 MG Oral Tab Take 0.5 tablets (1 mg total) by mouth daily.      polyethylene glycol, PEG 3350, 17 g Oral Powd Pack Take 17 g by mouth daily as needed.                 Discharge Diet: As tolerated    Discharge Activity: As tolerated    Follow up:      Follow-up Information       Kate Calloway NP Follow up in 2 week(s).    Specialty: Nurse Practitioner  Contact information:  [address]  [address]  [facility] [address]  [phone]               Tommie Felix MD Follow up in 1 month(s).    Specialties: Interventional, Cardiology, CARDIOLOGY  Contact information:  [address]  [facility] [address]  [phone]               Kathy Rao MD Follow up in 3 week(s).    Specialty: Internal Medicine  Why: see Dr. Roa after discharge from [facility]  Contact information:  [address] [place] [address] [phone]                                   >30 min were spent counseling patient and coordinating care    Kathy Rao MD  1/21/2024

## 2024-01-21 NOTE — PROGRESS NOTES
[facility]    Progress Note    Mireya Wolfe Patient Status:  Inpatient    1935 MRN [id number]   Location [facility] 3W/SW Attending Aide Vu, DO   Hosp Day # 6 PCP Kathy Rao MD       SUBJECTIVE:  Feels better today.  Has repeatedly said she does not want \"the little nighttime pill\" as it makes her sleepy during the day.  Has refused it the last 2 nights.    OBJECTIVE:  Vital signs in last 24 hours:  /54 (BP Location: Right arm)   Pulse 107   Temp 97.8 °F (36.6 °C) (Oral)   Resp 20   Wt 125 lb 4.8 oz (56.8 kg)   SpO2 92%   BMI 22.20 kg/m²     Intake/Output:    Intake/Output Summary (Last 24 hours) at 2024 1445  Last data filed at 2024 1259  Gross per 24 hour   Intake 520 ml   Output 1000 ml   Net -480 ml       Wt Readings from Last 3 Encounters:   24 125 lb 4.8 oz (56.8 kg)   23 125 lb 6.4 oz (56.9 kg)   23 129 lb 6.6 oz (58.7 kg)       EXAM:  GENERAL: frail, slightly tachypneic  LUNGS: coarse BS; sl wheezing  CARDIO: RRR, normal S1S2, without murmur or gallop  GI: soft, NT, ND, NABS  EXTREMITIES: no cyanosis, clubbing or edema    Data Review:     Labs:   Lab Results   Component Value Date    WBC 9.1 2024    HGB 11.7 2024    HCT 34.6 2024    .0 2024    CREATSERUM 0.63 2024    BUN 10 2024     2024    K 3.1 2024     2024    CO2 33.0 2024     2024    CA 9.3 2024         Imaging:  No results found.   CARD ECHO 2D DOPPLER CONTRAST (CPT=93306)    Result Date: 1/15/2024  Transthoracic Echocardiogram Name:Mireya Wolfe Date: 01/15/2024 :  1935 Ht:  (63in)  BP: 112 / 69 MRN:  [id number]     Age:  88years    Wt:  (125lb) HR: 103bpm Loc:  [facility]       Gndr: F          BSA: 1.58m^2 Sonographer: Sarah JC Ordering:    Tommie Felix Consulting:  Tommie Felix ----------------------------------------------------------------------------  History/Indications:   Weakness, SOB, Elevated Troponins. ---------------------------------------------------------------------------- Procedure information:  A transthoracic complete 2D study was performed. Additional evaluation included M-mode, complete spectral Doppler, and color Doppler.  Patient status:  Inpatient.  Location:  Bedside.    Comparison was made to the study of 04/20/2019.    This was a routine study. Transthoracic echocardiography for diagnosis. Image quality was adequate. The study was technically limited due to poor acoustic window availability and body habitus. Intravenous contrast (Definity) was administered to evaluate left ventricular function. ---------------------------------------------------------------------------- Conclusions: 1. Left ventricle: The cavity size was normal. Wall thickness was normal.    Systolic function was mildly reduced. The estimated ejection fraction was    45-50%, by biplane method of disks. Doppler parameters are consistent    with abnormal left ventricular relaxation - grade 1 diastolic    dysfunction. 2. Left ventricle: There is mild hypokinesis of the apex. 3. Tricuspid valve: There was moderate regurgitation. Impressions:  No previous study from [facility] was available for comparison. * ---------------------------------------------------------------------------- * Findings: Left ventricle:  The cavity size was normal. Wall thickness was normal. Systolic function was mildly reduced. The estimated ejection fraction was 45-50%, by biplane method of disks. Regional wall motion:   There is mild hypokinesis of the apex.   Doppler parameters are consistent with abnormal left ventricular relaxation - grade 1 diastolic dysfunction. Left atrium:  Well visualized. The atrium was normal in size. Right ventricle:  The cavity size was normal. Systolic function was normal. Right atrium:  Well visualized. The atrium was normal in size. Mitral valve:  Well  visualized. The valve was structurally normal. The leaflets were normal thickness. Leaflet separation was normal. No evidence for prolapse.  Doppler:  Transvalvular velocity was within the normal range. There was no evidence for stenosis. There was trivial regurgitation. Aortic valve:  Well visualized.  The valve was trileaflet. The leaflets were normal thickness and mildly calcified. Cusp separation was normal.  Doppler:  Transvalvular velocity was within the normal range. There was no evidence for stenosis. There was trivial regurgitation. Tricuspid valve:  Well visualized. The annulus is normal-sized. The valve is structurally normal. The leaflets are normal thickness. Leaflet separation was normal. No echocardiographic evidence for tricuspid prolapse.  Doppler: Transvalvular velocity was within the normal range. There was no evidence for stenosis. There was moderate regurgitation. Pulmonic valve:   Well visualized. The annulus is normal-sized. The valve is structurally normal. The leaflets are normal thickness. Cusp separation was normal. No evidence for prolapse.  Doppler:  Transvalvular velocity was within the normal range. There was no evidence for stenosis. There was no significant regurgitation. Pericardium:   There was no pericardial effusion. Pleura:  No evidence of pleural fluid accumulation. Aorta: Aortic root: The aortic root was normal-sized. The aortic root was normal. Ascending aorta: The ascending aorta was normal. The ascending aorta was normal. Pulmonary arteries: The main pulmonary artery was normal-sized. There was no evidence of pulmonary hypertension. Systemic veins:  Central venous respirophasic diameter changes are in the normal range (>50%). Inferior vena cava: The IVC was normally collapsible and normal-sized. The IVC was normal-sized. ---------------------------------------------------------------------------- Measurements  Left ventricle                    Value        Ref  IVS  thickness, ED, PLAX       (H) 1.1   cm     0.6 - 0.9  LV ID, ED, PLAX               (L) 2.4   cm     3.8 - 5.2  LV ID, ES, PLAX               (L) 1.8   cm     2.2 - 3.5  LV PW thickness, ED, PLAX     (H) 1.0   cm     0.6 - 0.9  IVS/LV PW ratio, ED, PLAX         1.10         ---------  LV PW/LV ID ratio, ED, PLAX       0.42         ---------  LV ejection fraction          (L) 52    %      54 - 74  Stroke volume/bsa, 2D             25    ml/m^2 ---------  LV end-diastolic volume, 1-p      48    ml     48 - 140  A4C  LV ejection fraction, 1-p A4C     52    %      46 - 78  Stroke volume, 1-p A4C            25    ml     ---------  LV end-diastolic volume/bsa,      30    ml/m^2 30 - 82  1-p A4C  Stroke volume/bsa, 1-p A4C        15    ml/m^2 ---------  LV end-diastolic volume, 2-p  (L) 39    ml     46 - 106  LV end-systolic volume, 2-p       19    ml     14 - 42  LV ejection fraction, 2-p     (L) 52    %      54 - 74  Stroke volume, 2-p                20    ml     ---------  LV end-diastolic volume/bsa,  (L) 25    ml/m^2 29 - 61  2-p  LV end-systolic volume/bsa,       12    ml/m^2 8 - 24  2-p  Stroke volume/bsa, 2-p            12.8  ml/m^2 ---------  LV e', lateral                (L) 7.0   cm/sec >=10.0  LV E/e', lateral                  11           <=13  LV e', medial                     8.7   cm/sec >=7.0  LV E/e', medial                   9            ---------  LV e', average                    7.8   cm/sec ---------  LV E/e', average                  10           <=14  LVOT                              Value        Ref  LVOT ID                           1.6   cm     ---------  LVOT peak velocity, S             1.02  m/sec  ---------  LVOT VTI, S                       19.4  cm     ---------  LVOT peak gradient, S             4     mm Hg  ---------  LVOT mean gradient, S             3     mm Hg  ---------  Stroke volume (SV), LVOT DP       39    ml     ---------  Stroke index (SV/bsa), LVOT       25    ml/m^2  ---------  DP  Aortic valve                      Value        Ref  Aortic leaflet separation, MM     1.4   cm     ---------  Aortic root                       Value        Ref  Aortic root ID, STJ, ED           2.8   cm     2.0 - 3.2  Left atrium                       Value        Ref  LA volume, S                      35    ml     22 - 52  LA volume/bsa, S                  22    ml/m^2 16 - 34  LA volume, ES, 1-p A4C            34    ml     22 - 52  LA volume, ES, 1-p A2C            36    ml     22 - 52  LA volume, ES, A/L                37    ml     ---------  LA volume/bsa, ES, A/L            23    ml/m^2 16 - 34  Mitral valve                      Value        Ref  Mitral E-wave peak velocity       0.79  m/sec  ---------  Mitral A-wave peak velocity       1.72  m/sec  ---------  Mitral deceleration time          104   ms     ---------  Mitral peak gradient, D           3     mm Hg  ---------  Mitral E/A ratio, peak            0.5          ---------  Pulmonary artery                  Value        Ref  PA pressure, S, DP                33    mm Hg  ---------  Tricuspid valve                   Value        Ref  Tricuspid regurg peak             2.67  m/sec  <=2.8  velocity  Tricuspid peak RV-RA gradient     30    mm Hg  ---------  Systemic veins                    Value        Ref  Estimated CVP                     3     mm Hg  ---------  Inferior vena cava                Value        Ref  ID                                1.2   cm     <=2.1  Right ventricle                   Value        Ref  TAPSE, 2D                         2.08  cm     >=1.70  TAPSE, MM                         2.08  cm     >=1.70  RV pressure, S, DP                33    mm Hg  ---------  RV s', lateral                    15.6  cm/sec >=9.5 Legend: (L)  and  (H)  dior values outside specified reference range. ---------------------------------------------------------------------------- Prepared and electronically signed by Kelton Mckeon MD  01/15/2024 15:22          Meds:   Current Facility-Administered Medications   Medication Dose Route Frequency    ipratropium-albuterol (Duoneb) 0.5-2.5 (3) MG/3ML inhalation solution 3 mL  3 mL Nebulization Q6H PRN    ipratropium-albuterol (Duoneb) 0.5-2.5 (3) MG/3ML inhalation solution 3 mL  3 mL Nebulization Q6H WA    amLODIPine (Norvasc) tab 5 mg  5 mg Oral Daily    vancomycin (Vancocin) cap 125 mg  125 mg Oral Daily    methylPREDNISolone sodium succinate (Solu-MEDROL) injection 40 mg  40 mg Intravenous Daily    ceFAZolin (Ancef) 2 g in 20mL IV syringe premix  2 g Intravenous Q8H    atorvastatin (Lipitor) tab 40 mg  40 mg Oral Nightly    ALPRAZolam (Xanax) tab 0.25 mg  0.25 mg Oral Nightly PRN    ARIPiprazole (Abilify) tab 0.5 mg  0.5 mg Oral Daily    escitalopram (Lexapro) tab 15 mg  15 mg Oral QAM    ferrous sulfate DR tab 325 mg  325 mg Oral Daily with breakfast    pregabalin (Lyrica) cap 50 mg  50 mg Oral BID    QUEtiapine (SEROquel) tab 25 mg  25 mg Oral Nightly    polyethylene glycol (PEG 3350) (Miralax) 17 g oral packet 17 g  17 g Oral Daily PRN       Assessment & Plan    Acute on chronic hypoxic respiratory failure (HCC)  COPD exacerbation  -prev admit for pneumonia in 7/2023  -hypoxic on admission (sats 83% RA), altered mental status, fever to 104.2  CXR 1/16/24 -- L perihilar and bibasilar parenchymal abnormality and/or atelec changes, sl progression.  Clinically doubt pneumonia, more likely COPD exacerbation  Extended resp panel, RSV, covid, Flu neg  Lactic acidosis 4.6>2.2>1.8 (normal)  Procal slightly high 0.45  Initially placed on BiPAP; O2 weaned from 5L to 2L, now back up to 3L.  Will need O2 on discharge  -cont IS, acapella  -significant wheezing yesterday; improved today.  Started on solumedrol 40mg IV every day yesterday 1/20; will change to short prednisone course on discharge     Sepsis with E.coli bacteremia  UTI  No significant hypotension  Fever to 104.2 on admission -- afebrile since  then  Lactic acidosis 4.6, now normalized   UA with 2+ nitrite, >50 WBC -- likely source of infection  Blood cx x 2 growing E.coli -- R to amp/levaquin/cipro, S to zosyn and cefazolin  S/p Zosyn x 3 days, now on IV cefazolin (day 7 total abx); plan for 10 day course; change to cephalexin on d/c  Add po vanco for CDP  WBC 10.5>33.3>>>7.3  Repeat bcx (1/18/2024) NGTD x 3d     Elevated Troponins  Trop 199>2249>3228  EKG-sinus tachycardia  On heparin gtt -- checking HIPA Ab's given decreased platelets -- still pending  Echo with new depressed LV syst function (EF 45-50%) -- prev echo in 2019 (EF 55-60%)  Likely Type II MI --demand ischemia related to hypoxia/tachycardia/sepsis  ; started lipitor 40mg qhs  Pt to follow up as outpatient with Dr. Felix to discuss possible ischemic w/u.  Discussed with son John, and dtr Nelli at bedside     Thrombocytopenia  Platelet 79>110>95>72>76>117  Likely 2/2 sepsis  HIPA neg     Depression and anxiety  -pt with long hx of depression (and anxiety), exacerbated by marital strife/stress  -previously saw psychiatrist Dr. Friend who retired  -on Lexapro 15mg/day  -on prn xanax     Hypertension  Amlodipine 5mg daily  On hold this admission 2/2 borderline BP -- BP's now creeping up (140's-150's/70's-80's); restarted amlodipine.  BP's improved - now 127/54     Hx of iron deficiency anemia  Had EGD/C-scope in 2010 (Dr. Ferrari) -- negative. 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 Continue FeSO4 325mg/day.    Hgb down 14.5 > 11.3 but stable in the 11's --- suspect 2/2 sepsis  Cont to trend     Hx of frequent UTI   On methenamine as outpt; sees urology Dr. Munir Cullen     Eventration of left hemidiaphragm  Previously thought to be due to large HH  CT chest clarified 7/2023 - small HH but large eventration of left hemidiaphragm     Osteoporosis  DEXA in 7/2017- osteoporosis of left femoral neck (T -2.9).  Pt was briefly on fosamax in 2014.  Declines restarting fosamax or other meds so would not check repeat DEXA.     Vitamin B12 deficiency    Level normal (1241) in 11/2023; cont B12 1000 mcg/day     Lumbar spinal stenosis, chronic back pain    Has seen Dr. Salgado (ortho spine at [facility]).  Referred to pain specialist, Dr. Deirdre Elizabeth at [facility]. Had facet injections in 8/2018 which helped tremendously.  Saw Dr. Elizabeth again 9/25/18; did PT.  MRI in 4/2019 did not appear to show anything acute.  No longer taking Norco.  Bilateral knee OA  Sees ortho Dr. Booth -- has had b/l cortisone injections with transient improvement.  Has been advised for TKR's but trying to avoid. Ambulates with a walker.       COPD  Essentially quit smoking in 8/2016.  Has been using the Breo.     Basal and squamous cell cancer, face  S/p Mohs surgeries in 4 areas of her face in 2018 (Dr. Jonas).      Post-herpetic neuralgia (dx'ed with shingles left upper back 6/8/22)  -still with pain but significantly improved  -on Lyrica 50mg BID      Cognitive decline, likely age-related, poss exacerbated by depression  -pt saw neurologist Dr. Lokesh Mcmullen in 9/2022. Was thought to have late onset Alzheimer's dementia (started on aricept), though pt had not shown obvious sx of dementia prior to this admission  Pt was referred for neuropsych testing and MRI brain in 2022 by Dr. Mcmullen though did not schedule.    -had delirium 7/2023 while hospitalized--zyprexa caused sedation; started on seroquel 25mg at bedtime, and abilify 0.5mg daily  -Pt does not want to take seroquel anymore due to daytime sedation; refused her dose on 1/19 and 1/20/24.  Will stop for now. Could consider 12.5mg in the future if needed     Advance directives  - POLST in chart,  DNR/select      Dispo   PT/OT rec ROSEAMRIE  Pt has been accepted at [facility]  Stable for transfer today                      Discussed with son,John, and daughter, Nelli (who just flew in from  [place], at bedside     Discussed with RN, Angeli Rao MD  1/21/2024  2:45 PM

## 2024-01-21 NOTE — PROGRESS NOTES
[facility]    Progress Note    Mireya Wolfe Patient Status:  Inpatient    1935 MRN [id number]   Location [facility] 3W/SW Attending Aide Vu,    Hosp Day # 6 PCP Kathy Rao MD         Subjective:   Subjective:  Patient was seen and examined  Feels better today with less cough and dyspnea  No fever  No abdominal pain  Objective:   Blood pressure 127/54, pulse 107, temperature 97.8 °F (36.6 °C), temperature source Oral, resp. rate 20, weight 125 lb 4.8 oz (56.8 kg), SpO2 92%.  Physical Exam  Constitutional:       General: She is not in acute distress.     Appearance: She is ill-appearing.   HENT:      Head: Atraumatic.      Nose: Nose normal.      Mouth/Throat:      Mouth: Mucous membranes are moist.   Cardiovascular:      Rate and Rhythm: Normal rate.      Heart sounds:      No gallop.   Pulmonary:      Comments: Good air exchange to both lungs  Mild basilar rales  No wheezes or rhonchi  Abdominal:      General: Abdomen is flat. Bowel sounds are normal.      Palpations: Abdomen is soft.   Musculoskeletal:      Right lower leg: No edema.      Left lower leg: No edema.   Skin:     General: Skin is dry.   Neurological:      Mental Status: Mental status is at baseline.         Results:   Lab Results   Component Value Date    WBC 9.1 2024    HGB 11.7 (L) 2024    HCT 34.6 (L) 2024    .0 (L) 2024    CREATSERUM 0.63 2024    BUN 10 2024     2024    K 3.1 (L) 2024     2024    CO2 33.0 (H) 2024     (H) 2024    CA 9.3 2024    ALB 3.5 2024    ALKPHO 66 2024    BILT 0.5 2024    TP 5.9 2024    AST 15 2024    ALT <7 (L) 2024    PTT 31.1 2024    T4F 1.1 07/15/2023    TSH 0.349 (L) 07/15/2023    DDIMER 1.63 (H) 2023    MG 2.0 2024    PHOS 2.8 2023    TROPHS 2,841 (HH) 2024    B12 1,241 (H) 2023         Assessment & Plan:        1-E. coli bacteremia / UTI   Antibiotics with Ancef  Hemodynamically stable    2-acute on chronic respiratory failure with hypoxia  - Scoliosis  - Elevated left hemidiaphragm with basilar atelectasis    On O2 therapy and currently on 1 L  Incentive spirometry  Bronchial hygiene and nebulizers    3-stress ischemia  LVEF 45%    4-DVT prophylaxis  Heparin subcu    5-DNAR/select          Venus Segovia MD  1/21/2024

## 2024-02-12 ENCOUNTER — PATIENT MESSAGE (OUTPATIENT)
Dept: INTERNAL MEDICINE CLINIC | Facility: CLINIC | Age: 89
End: 2024-02-12
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
## 2024-02-14 ENCOUNTER — PATIENT MESSAGE (OUTPATIENT)
Dept: INTERNAL MEDICINE CLINIC | Facility: CLINIC | Age: 89
End: 2024-02-14

## 2024-02-14 DIAGNOSIS — J44.9 CHRONIC OBSTRUCTIVE PULMONARY DISEASE, UNSPECIFIED COPD TYPE (HCC): Primary | ICD-10-CM

## 2024-02-14 NOTE — TELEPHONE ENCOUNTER
From: Mireya S Aiden  To: Kathy Rao  Sent: 2/14/2024 3:46 PM CST  Subject: Second Oxygen Machine For Mireya Wolfe    Hi again Jalen Handley,    We'd like to request a prescription for a second oxygen machine for my mom to place on the first floor so that we do not have to move the one one in her second floor bedroom down each day.    We've spoken with the team at [facility] that supplies the machines and they said that we can rent one month-to-month with a prescription.    Separately, I believe mom is supposed to see you two weeks post-rehab. Should we schedule that appointment for next week?    Thank you,    Nelli Strange  [phone]  [email or web address]

## 2024-02-14 NOTE — TELEPHONE ENCOUNTER
Pended referral/order for a month to month rental oxygen machine for MD to review if agreeable with this request. Please review dx is appropriate.     Otherwise MD please read rest of patients message below in regards when she would be seen.     Please advise'

## 2024-02-19 ENCOUNTER — TELEPHONE (OUTPATIENT)
Dept: INTERNAL MEDICINE CLINIC | Facility: CLINIC | Age: 89
End: 2024-02-19

## 2024-02-19 NOTE — TELEPHONE ENCOUNTER
Cynthia/[facility] called  Patient start of care done on 2/17/24  Fyi, no call back needed  Tasked to nursing

## 2024-03-14 ENCOUNTER — TELEPHONE (OUTPATIENT)
Dept: INTERNAL MEDICINE CLINIC | Facility: CLINIC | Age: 89
End: 2024-03-14

## 2024-03-14 ENCOUNTER — APPOINTMENT (OUTPATIENT)
Dept: GENERAL RADIOLOGY | Facility: HOSPITAL | Age: 89
End: 2024-03-14
Payer: MEDICARE

## 2024-03-14 ENCOUNTER — HOSPITAL ENCOUNTER (INPATIENT)
Facility: HOSPITAL | Age: 89
LOS: 4 days | Discharge: HOME HEALTH | End: 2024-03-18
Attending: EMERGENCY MEDICINE | Admitting: INTERNAL MEDICINE
Payer: MEDICARE

## 2024-03-14 DIAGNOSIS — N30.01 ACUTE CYSTITIS WITH HEMATURIA: Primary | ICD-10-CM

## 2024-03-14 LAB
ALBUMIN SERPL-MCNC: 4 G/DL (ref 3.2–4.8)
ALP LIVER SERPL-CCNC: 63 U/L
ALT SERPL-CCNC: 14 U/L
ANION GAP SERPL CALC-SCNC: 4 MMOL/L (ref 0–18)
AST SERPL-CCNC: 14 U/L (ref ?–34)
BASOPHILS # BLD AUTO: 0.03 X10(3) UL (ref 0–0.2)
BASOPHILS NFR BLD AUTO: 0.5 %
BILIRUB DIRECT SERPL-MCNC: 0.1 MG/DL (ref ?–0.3)
BILIRUB SERPL-MCNC: 0.4 MG/DL (ref 0.2–1.1)
BILIRUB UR QL: NEGATIVE
BUN BLD-MCNC: 17 MG/DL (ref 9–23)
BUN/CREAT SERPL: 22.1 (ref 10–20)
CALCIUM BLD-MCNC: 10.1 MG/DL (ref 8.7–10.4)
CHLORIDE SERPL-SCNC: 107 MMOL/L (ref 98–112)
CO2 SERPL-SCNC: 30 MMOL/L (ref 21–32)
CREAT BLD-MCNC: 0.77 MG/DL
DEPRECATED RDW RBC AUTO: 47.1 FL (ref 35.1–46.3)
EGFRCR SERPLBLD CKD-EPI 2021: 74 ML/MIN/1.73M2 (ref 60–?)
EOSINOPHIL # BLD AUTO: 0.12 X10(3) UL (ref 0–0.7)
EOSINOPHIL NFR BLD AUTO: 2 %
ERYTHROCYTE [DISTWIDTH] IN BLOOD BY AUTOMATED COUNT: 13.9 % (ref 11–15)
FLUAV + FLUBV RNA SPEC NAA+PROBE: NEGATIVE
FLUAV + FLUBV RNA SPEC NAA+PROBE: NEGATIVE
GLUCOSE BLD-MCNC: 107 MG/DL (ref 70–99)
GLUCOSE UR-MCNC: NORMAL MG/DL
HCT VFR BLD AUTO: 40.1 %
HGB BLD-MCNC: 12.9 G/DL
HGB UR QL STRIP.AUTO: NEGATIVE
IMM GRANULOCYTES # BLD AUTO: 0.01 X10(3) UL (ref 0–1)
IMM GRANULOCYTES NFR BLD: 0.2 %
KETONES UR-MCNC: NEGATIVE MG/DL
LEUKOCYTE ESTERASE UR QL STRIP.AUTO: 500
LYMPHOCYTES # BLD AUTO: 1.34 X10(3) UL (ref 1–4)
LYMPHOCYTES NFR BLD AUTO: 21.8 %
MCH RBC QN AUTO: 29.5 PG (ref 26–34)
MCHC RBC AUTO-ENTMCNC: 32.2 G/DL (ref 31–37)
MCV RBC AUTO: 91.8 FL
MONOCYTES # BLD AUTO: 0.33 X10(3) UL (ref 0.1–1)
MONOCYTES NFR BLD AUTO: 5.4 %
NEUTROPHILS # BLD AUTO: 4.31 X10 (3) UL (ref 1.5–7.7)
NEUTROPHILS # BLD AUTO: 4.31 X10(3) UL (ref 1.5–7.7)
NEUTROPHILS NFR BLD AUTO: 70.1 %
OSMOLALITY SERPL CALC.SUM OF ELEC: 294 MOSM/KG (ref 275–295)
PH UR: 6.5 [PH] (ref 5–8)
PLATELET # BLD AUTO: 169 10(3)UL (ref 150–450)
POTASSIUM SERPL-SCNC: 4.4 MMOL/L (ref 3.5–5.1)
PROT SERPL-MCNC: 7.1 G/DL (ref 5.7–8.2)
PROT UR-MCNC: NEGATIVE MG/DL
RBC # BLD AUTO: 4.37 X10(6)UL
RSV RNA SPEC NAA+PROBE: NEGATIVE
SARS-COV-2 RNA RESP QL NAA+PROBE: NOT DETECTED
SODIUM SERPL-SCNC: 141 MMOL/L (ref 136–145)
SP GR UR STRIP: 1.01 (ref 1–1.03)
UROBILINOGEN UR STRIP-ACNC: NORMAL
WBC # BLD AUTO: 6.1 X10(3) UL (ref 4–11)

## 2024-03-14 PROCEDURE — 71045 X-RAY EXAM CHEST 1 VIEW: CPT

## 2024-03-14 RX ORDER — FLUTICASONE FUROATE AND VILANTEROL 100; 25 UG/1; UG/1
1 POWDER RESPIRATORY (INHALATION) DAILY
Status: DISCONTINUED | OUTPATIENT
Start: 2024-03-15 | End: 2024-03-18

## 2024-03-14 RX ORDER — CHOLECALCIFEROL (VITAMIN D3) 125 MCG
1000 CAPSULE ORAL DAILY
Status: DISCONTINUED | OUTPATIENT
Start: 2024-03-15 | End: 2024-03-18

## 2024-03-14 RX ORDER — AMLODIPINE BESYLATE 5 MG/1
5 TABLET ORAL DAILY
Status: DISCONTINUED | OUTPATIENT
Start: 2024-03-15 | End: 2024-03-18

## 2024-03-14 RX ORDER — ARIPIPRAZOLE 2 MG/1
1 TABLET ORAL DAILY
Status: DISCONTINUED | OUTPATIENT
Start: 2024-03-15 | End: 2024-03-18

## 2024-03-14 RX ORDER — ALPRAZOLAM 0.25 MG/1
0.25 TABLET ORAL NIGHTLY PRN
Status: DISCONTINUED | OUTPATIENT
Start: 2024-03-14 | End: 2024-03-18

## 2024-03-14 RX ORDER — PREGABALIN 50 MG/1
50 CAPSULE ORAL 2 TIMES DAILY
Status: DISCONTINUED | OUTPATIENT
Start: 2024-03-14 | End: 2024-03-18

## 2024-03-14 RX ORDER — POLYETHYLENE GLYCOL 3350 17 G/17G
17 POWDER, FOR SOLUTION ORAL DAILY PRN
Status: DISCONTINUED | OUTPATIENT
Start: 2024-03-14 | End: 2024-03-18

## 2024-03-14 RX ORDER — IPRATROPIUM BROMIDE AND ALBUTEROL SULFATE 2.5; .5 MG/3ML; MG/3ML
3 SOLUTION RESPIRATORY (INHALATION)
Status: DISCONTINUED | OUTPATIENT
Start: 2024-03-14 | End: 2024-03-18

## 2024-03-14 RX ORDER — MELATONIN
325
Status: DISCONTINUED | OUTPATIENT
Start: 2024-03-15 | End: 2024-03-18

## 2024-03-14 RX ORDER — ATORVASTATIN CALCIUM 40 MG/1
40 TABLET, FILM COATED ORAL NIGHTLY
Status: DISCONTINUED | OUTPATIENT
Start: 2024-03-15 | End: 2024-03-18

## 2024-03-14 RX ORDER — VANCOMYCIN HYDROCHLORIDE 125 MG/1
125 CAPSULE ORAL DAILY
Status: DISCONTINUED | OUTPATIENT
Start: 2024-03-15 | End: 2024-03-18

## 2024-03-14 NOTE — ED PROVIDER NOTES
Patient Seen in: [facility] Emergency Department    History     Chief Complaint   Patient presents with    Fatigue       HPI    History is provided by patient/independent historian: patient, patient's family  89 year old female with history of COPD, bacteremia, UTI recurrently, brought in by family with fatigue for the past 3 days.  Patient states that she feels like she just wants to be in bed all day which is atypical for her.  Contrary to triage note, patient denies any cough or congestion.  No associated nausea vomiting or fevers or chills.  No urinary symptoms.   reports she had bacteria in her blood in the past and was concerned for something similar.  She is not ambulatory out of baseline.  Family reports feels comfortable with her going home today, but were just concerned she was too weak to participate in physical therapy.    History reviewed.   Past Medical History:   Diagnosis Date    Anemia     Basal cell carcinoma     Mouth    Calculus, kidney     COPD (chronic obstructive pulmonary disease) (HCC)     Esophageal reflux     Hematuria     History of recurrent UTIs 02/26/2015    Hypercholesterolemia     Kidney stone     Osteopenia     Other and unspecified hyperlipidemia     Recurrent UTI     Scoliosis     Spinal stenosis     Squamous cell cancer of lip     Thyroid nodule     Urinary frequency     Vitamin D deficiency          History reviewed.   Past Surgical History:   Procedure Laterality Date    CATARACT Right 5/2016    CYSTO/URETERO W/LITHOTRIPSY Right 10/29/2015    Procedure: LITHOTRIPSY HOLMIUM LASER WITH CYSTOSCOPY;  Surgeon: Tommie Majano MD;  Location: [facility]    CYSTOSCOPY,INSERT URETERAL STENT Right 9/24/2015    Procedure: LITHOTRIPSY WITH CYSTOSCOPY, STENT PLACEMENT;  Surgeon: Tommei Majano MD;  Location: [facility]    FRAGMENTING OF KIDNEY STONE Right 9/24/2015    Procedure: LITHOTRIPSY WITH CYSTOSCOPY, STENT PLACEMENT;  Surgeon: Melecio  MD Tommie;  Location: [facility]    OTHER SURGICAL HISTORY  1960    Surgery of Blockage in Urethral Tube    OTHER SURGICAL HISTORY   and on 00    ESWL - left    OTHER SURGICAL HISTORY  00    Cystourethroscopy with Urethral Calibration and Dilation with Bilateral Retrograde Pyelogram and Left Ureteral Stone Manipulation with insertion of Double-J Stent    OTHER SURGICAL HISTORY  2015    R eye surgery for bleed and then development of blood clot--resolved    OTHER SURGICAL HISTORY  16    Cystoscpy stent removal    OTHER SURGICAL HISTORY      OTHER SURGICAL HISTORY  2017    Mohs surgery    PATIENT DOCUMENTED NOT TO HAVE EXPERIENCED ANY OF THE FOLLOWING EVENTS Right 2015    Procedure: LITHOTRIPSY WITH CYSTOSCOPY, STENT PLACEMENT;  Surgeon: Tommie Majano MD;  Location: [facility]    PATIENT DOCUMENTED NOT TO HAVE EXPERIENCED ANY OF THE FOLLOWING EVENTS Right 10/29/2015    Procedure: CYSTOSCOPY/URETEROSCOPY/STONE EXTRACTION;  Surgeon: Tommie Majano MD;  Location: [facility]    PATIENT WITH PREOPERATIVE ORDER FOR IV ANTIBIOTIC SURGICAL SITE INFECT Right 2015    Procedure: LITHOTRIPSY WITH CYSTOSCOPY, STENT PLACEMENT;  Surgeon: Tommie Majano MD;  Location: [facility]    PATIENT WITH PREOPERATIVE ORDER FOR IV ANTIBIOTIC SURGICAL SITE INFECT Right 10/29/2015    Procedure: CYSTOSCOPY/URETEROSCOPY/STONE EXTRACTION;  Surgeon: Tommie Majano MD;  Location: [facility]         Home Medications reviewed :  (Not in a hospital admission)        History reviewed.   Social History     Socioeconomic History    Marital status:    Tobacco Use    Smoking status: Former     Packs/day: 0.25     Years: 10.00     Additional pack years: 0.00     Total pack years: 2.50     Types: Cigarettes     Quit date: 2016     Years since quittin.5     Passive exposure: Past    Smokeless tobacco: Never   Vaping Use    Vaping Use:  Never used   Substance and Sexual Activity    Alcohol use: No     Alcohol/week: 0.0 standard drinks of alcohol    Drug use: No   Other Topics Concern    Caffeine Concern No         ROS  Review of Systems   Constitutional:  Positive for fatigue.   Respiratory:  Negative for shortness of breath.    Cardiovascular:  Negative for chest pain.   All other systems reviewed and are negative.     All other pertinent organ systems are reviewed and are negative.      Physical Exam     ED Triage Vitals [03/14/24 1419]   /77   Pulse 96   Resp 18   Temp 97.5 °F (36.4 °C)   Temp src Oral   SpO2 93 %   O2 Device None (Room air)     Vital signs reviewed.      Physical Exam  Vitals and nursing note reviewed.   Cardiovascular:      Pulses: Normal pulses.   Pulmonary:      Effort: No respiratory distress.   Abdominal:      General: There is no distension.   Neurological:      Mental Status: She is alert.         ED Course       Labs:     Labs Reviewed   BASIC METABOLIC PANEL (8) - Abnormal; Notable for the following components:       Result Value    Glucose 107 (*)     BUN/CREA Ratio 22.1 (*)     All other components within normal limits   URINALYSIS, ROUTINE - Abnormal; Notable for the following components:    Clarity Urine Turbid (*)     Nitrite Urine 2+ (*)     Leukocyte Esterase Urine 500 (*)     WBC Urine 21-50 (*)     Bacteria Urine Rare (*)     All other components within normal limits   CBC W/ DIFFERENTIAL - Abnormal; Notable for the following components:    RDW-SD 47.1 (*)     All other components within normal limits   HEPATIC FUNCTION PANEL (7) - Normal   SARS-COV-2/FLU A AND B/RSV BY PCR (GENEXPERT) - Normal    Narrative:     This test is intended for the qualitative detection and differentiation of SARS-CoV-2, influenza A, influenza B, and respiratory syncytial virus (RSV) viral RNA in nasopharyngeal or nares swabs from individuals suspected of respiratory viral infection consistent with COVID-19 by their healthcare  provider. Signs and symptoms of respiratory viral infection due to SARS-CoV-2, influenza, and RSV can be similar.                                    Test performed using the Xpert Xpress SARS-CoV-2/FLU/RSV (real time RT-PCR)  assay on the Tandem instrument, [employer], [place] [address].                   This test is being used under the Food and Drug Administration's Emergency Use Authorization.                                    The authorized Fact Sheet for Healthcare Providers for this assay is available upon request from the laboratory.   CBC WITH DIFFERENTIAL WITH PLATELET    Narrative:     The following orders were created for panel order CBC With Differential With Platelet.                  Procedure                               Abnormality         Status                                     ---------                               -----------         ------                                     CBC W/ DIFFERENTIAL[[id number]]          Abnormal            Final result                                                 Please view results for these tests on the individual orders.   RAINBOW DRAW LAVENDER   RAINBOW DRAW LIGHT GREEN   RAINBOW DRAW BLUE   BLOOD CULTURE   BLOOD CULTURE         My EKG Interpretation:   As reviewed and Interpreted by me      Imaging Results Available and Reviewed while in ED:   XR CHEST AP PORTABLE  (CPT=71045)    Result Date: 3/14/2024  CONCLUSION: Small left pleural effusion, which has decreased in size since 1/19/24.  Left basal pulmonary opacity which could represent atelectasis or pneumonia.  Chronic scoliotic curvature of the spine.    Dictated by (CST): Meek Luna MD on 3/14/2024 at 4:41 PM     Finalized by (CST): Meek Luna MD on 3/14/2024 at 4:42 PM         My review and independent interpretation of XR images: scoliosis. Radiology report corroborates this in addition to other details as reported by them.      Decision rules/scores evaluated: none      Diagnostic labs/tests  considered but not ordered: none    ED Medications Administered:   Medications   cefTRIAXone (Rocephin) 1 g in D5W 100 mL IVPB-ADD (has no administration in time range)                MDM       Medical Decision Making      Differential Diagnosis: After obtaining the patient's history, performing the physical exam and reviewing the diagnostics, multiple initial diagnoses were considered based on the presenting problem including bacteremia, UTI, viral syndrome    External document review: I personally reviewed available external medical records for any recent pertinent discharge summaries, testing, and procedures - the findings are as follows: 1/15/24 admission for  febrile illness/hypoxia    Complicating Factors: The patient already  has a past medical history of Anemia, Basal cell carcinoma, Calculus, kidney, COPD (chronic obstructive pulmonary disease) (HCC), Esophageal reflux, Hematuria, History of recurrent UTIs (02/26/2015), Hypercholesterolemia, Kidney stone, Osteopenia, Other and unspecified hyperlipidemia, Recurrent UTI, Scoliosis, Spinal stenosis, Squamous cell cancer of lip, Thyroid nodule, Urinary frequency, and Vitamin D deficiency. to contribute to the complexity of this ED evaluation.    Procedures performed: none    Discussed management with physician/appropriate source: Dr. Maldonado    Considered admission/deescalation of care for: generalized weakness    Social determinants of health affecting patient care: none    Prescription medications considered: discussed continuing current medication regimen    The patient requires continuous monitoring for: fatigue    Shared decision making: discussed possible admission          Disposition and Plan     Clinical Impression:  1. Acute cystitis with hematuria        Disposition:  Admit    Follow-up:  No follow-up provider specified.    Medications Prescribed:  Current Discharge Medication List          Hospital Problems       Present on Admission  Date  Reviewed: 11/9/2023            ICD-10-CM Noted POA    * (Principal) Acute cystitis with hematuria N30.01 3/14/2024 Unknown

## 2024-03-14 NOTE — ED QUICK NOTES
Orders for admission, patient is aware of plan and ready to go upstairs. Any questions, please call ED RN Deja at extension [phone].     Patient Covid vaccination status: Fully vaccinated     COVID Test Ordered in ED: SARS-CoV-2/Flu A and B/RSV by PCR (GeneXpert)    COVID Suspicion at Admission: N/A    Running Infusions:      Mental Status/LOC at time of transport: Alert and oriented x4  [place]    Other pertinent information: FAll risk precautions  External female catheter in place.   CIWA score: N/A   NIH score:  N/A

## 2024-03-14 NOTE — ED INITIAL ASSESSMENT (HPI)
Pt to ED for c/o fatigue, cough & congestion x 3 days.  Pt denies n/v/d, fevers/chills.  Pt w/ hx COPD, is supposed to wear home O2, but only wears it intermittently.  Pt in NAD, speaking full, complete sentences w/ out any apparent difficulty/distress.

## 2024-03-14 NOTE — ED QUICK NOTES
Pt to ED with  and caregiver with c/o increasing fatigue over the last few days. Pt denies cough or fever. Pt denies fall or head injury. Pt denies diarrhea. Pt with recent hx of Sepsis with Ecoli bacteremia in 1/2024.   Pt able to speak in full sentences. No respiratory distress noted. Bilateral lungs diminished at bases. Pt [place]. Pt denies vomiting. Pt denies chest pain. Pt is alert and oriented x4. IV established to RAC #20G in triage- SL.   Awaiting labs and imaging. Continuous pulse oximetry on. Will continue to monitor.

## 2024-03-14 NOTE — TELEPHONE ENCOUNTER
Patient is calling and states she is not feeling well at all.  Patient had an appt yesterday and did not come because she was not feeling well and now she is feeling worse.  Patient states she was recently released from the hospital and then sent to [facility] and just got home a week or so ago.     Patient was unable to give any more information about how she is feeling.    Please call and advise

## 2024-03-14 NOTE — ED QUICK NOTES
Straight cath done with sterile technique x1 attempt. Pt tolerated well. External female catheter placed. Comfort measures provided.

## 2024-03-15 LAB
ANION GAP SERPL CALC-SCNC: 4 MMOL/L (ref 0–18)
BASOPHILS # BLD AUTO: 0.04 X10(3) UL (ref 0–0.2)
BASOPHILS NFR BLD AUTO: 0.7 %
BUN BLD-MCNC: 18 MG/DL (ref 9–23)
BUN/CREAT SERPL: 24.7 (ref 10–20)
CALCIUM BLD-MCNC: 9.6 MG/DL (ref 8.7–10.4)
CHLORIDE SERPL-SCNC: 109 MMOL/L (ref 98–112)
CO2 SERPL-SCNC: 30 MMOL/L (ref 21–32)
CREAT BLD-MCNC: 0.73 MG/DL
DEPRECATED RDW RBC AUTO: 47.5 FL (ref 35.1–46.3)
EGFRCR SERPLBLD CKD-EPI 2021: 79 ML/MIN/1.73M2 (ref 60–?)
EOSINOPHIL # BLD AUTO: 0.15 X10(3) UL (ref 0–0.7)
EOSINOPHIL NFR BLD AUTO: 2.8 %
ERYTHROCYTE [DISTWIDTH] IN BLOOD BY AUTOMATED COUNT: 14.1 % (ref 11–15)
GLUCOSE BLD-MCNC: 92 MG/DL (ref 70–99)
HCT VFR BLD AUTO: 38.4 %
HGB BLD-MCNC: 11.9 G/DL
IMM GRANULOCYTES # BLD AUTO: 0.01 X10(3) UL (ref 0–1)
IMM GRANULOCYTES NFR BLD: 0.2 %
LYMPHOCYTES # BLD AUTO: 1.58 X10(3) UL (ref 1–4)
LYMPHOCYTES NFR BLD AUTO: 29.2 %
MCH RBC QN AUTO: 28.3 PG (ref 26–34)
MCHC RBC AUTO-ENTMCNC: 31 G/DL (ref 31–37)
MCV RBC AUTO: 91.2 FL
MONOCYTES # BLD AUTO: 0.35 X10(3) UL (ref 0.1–1)
MONOCYTES NFR BLD AUTO: 6.5 %
NEUTROPHILS # BLD AUTO: 3.28 X10 (3) UL (ref 1.5–7.7)
NEUTROPHILS # BLD AUTO: 3.28 X10(3) UL (ref 1.5–7.7)
NEUTROPHILS NFR BLD AUTO: 60.6 %
OSMOLALITY SERPL CALC.SUM OF ELEC: 298 MOSM/KG (ref 275–295)
PLATELET # BLD AUTO: 174 10(3)UL (ref 150–450)
POTASSIUM SERPL-SCNC: 3.9 MMOL/L (ref 3.5–5.1)
RBC # BLD AUTO: 4.21 X10(6)UL
SODIUM SERPL-SCNC: 143 MMOL/L (ref 136–145)
WBC # BLD AUTO: 5.4 X10(3) UL (ref 4–11)

## 2024-03-15 PROCEDURE — 99222 1ST HOSP IP/OBS MODERATE 55: CPT | Performed by: INTERNAL MEDICINE

## 2024-03-15 NOTE — PROGRESS NOTES
OT shae attempted w/ CG at bedside. Pt again refusing to participate in therapy stating \"We are NOT doing that\" OT Ot eval deferred per pt refusal

## 2024-03-15 NOTE — PLAN OF CARE
Problem: Patient Centered Care  Goal: Patient preferences are identified and integrated in the patient's plan of care  Description: Interventions:  - What would you like us to know as we care for you?   - Provide timely, complete, and accurate information to patient/family  - Incorporate patient and family knowledge, values, beliefs, and cultural backgrounds into the planning and delivery of care  - Encourage patient/family to participate in care and decision-making at the level they choose  - Honor patient and family perspectives and choices  Outcome: Progressing     Problem: Patient/Family Goals  Goal: Patient/Family Long Term Goal  Description: Patient's Long Term Goal: gain strength    Interventions:  - Monitor vitals  - Monitor appropriate labs  - Administer medications as ordered  - Follow MD's orders  - Update patient on plan of care   - Discharge planning     - See additional Care Plan goals for specific interventions  Outcome: Progressing  Goal: Patient/Family Short Term Goal  Description: Patient's Short Term Goal: go home    Interventions:   - Monitor vitals  - Monitor appropriate labs  - Administer medications as ordered  - Follow MD's orders  - Update patient on plan of care   - Discharge planning   - See additional Care Plan goals for specific interventions  Outcome: Progressing     Problem: PAIN - ADULT  Goal: Verbalizes/displays adequate comfort level or patient's stated pain goal  Description: INTERVENTIONS:  - Encourage pt to monitor pain and request assistance  - Assess pain using appropriate pain scale  - Administer analgesics based on type and severity of pain and evaluate response  - Implement non-pharmacological measures as appropriate and evaluate response  - Consider cultural and social influences on pain and pain management  - Manage/alleviate anxiety  - Utilize distraction and/or relaxation techniques  - Monitor for opioid side effects  - Notify MD/LIP if interventions unsuccessful or  patient reports new pain  - Anticipate increased pain with activity and pre-medicate as appropriate  Outcome: Progressing     Problem: RISK FOR INFECTION - ADULT  Goal: Absence of fever/infection during anticipated neutropenic period  Description: INTERVENTIONS  - Monitor WBC  - Administer growth factors as ordered  - Implement neutropenic guidelines  Outcome: Progressing     Problem: SAFETY ADULT - FALL  Goal: Free from fall injury  Description: INTERVENTIONS:  - Assess pt frequently for physical needs  - Identify cognitive and physical deficits and behaviors that affect risk of falls.  - [place] fall precautions as indicated by assessment.  - Educate pt/family on patient safety including physical limitations  - Instruct pt to call for assistance with activity based on assessment  - Modify environment to reduce risk of injury  - Provide assistive devices as appropriate  - Consider OT/PT consult to assist with strengthening/mobility  - Encourage toileting schedule  Outcome: Progressing     Problem: RESPIRATORY - ADULT  Goal: Achieves optimal ventilation and oxygenation  Description: INTERVENTIONS:  - Assess for changes in respiratory status  - Assess for changes in mentation and behavior  - Position to facilitate oxygenation and minimize respiratory effort  - Oxygen supplementation based on oxygen saturation or ABGs  - Provide Smoking Cessation handout, if applicable  - Encourage broncho-pulmonary hygiene including cough, deep breathe, Incentive Spirometry  - Assess the need for suctioning and perform as needed  - Assess and instruct to report SOB or any respiratory difficulty  - Respiratory Therapy support as indicated  - Manage/alleviate anxiety  - Monitor for signs/symptoms of CO2 retention  Outcome: Progressing     Problem: METABOLIC/FLUID AND ELECTROLYTES - ADULT  Goal: Electrolytes maintained within normal limits  Description: INTERVENTIONS:  - Monitor labs and rhythm and assess patient for signs and  symptoms of electrolyte imbalances  - Administer electrolyte replacement as ordered  - Monitor response to electrolyte replacements, including rhythm and repeat lab results as appropriate  - Fluid restriction as ordered  - Instruct patient on fluid and nutrition restrictions as appropriate  Outcome: Progressing  Goal: Hemodynamic stability and optimal renal function maintained  Description: INTERVENTIONS:  - Monitor labs and assess for signs and symptoms of volume excess or deficit  - Monitor intake, output and patient weight  - Monitor urine specific gravity, serum osmolarity and serum sodium as indicated or ordered  - Monitor response to interventions for patient's volume status, including labs, urine output, blood pressure (other measures as available)  - Encourage oral intake as appropriate  - Instruct patient on fluid and nutrition restrictions as appropriate  Outcome: Progressing     Problem: MUSCULOSKELETAL - ADULT  Goal: Return mobility to safest level of function  Description: INTERVENTIONS:  - Assess patient stability and activity tolerance for standing, transferring and ambulating w/ or w/o assistive devices  - Assist with transfers and ambulation using safe patient handling equipment as needed  - Ensure adequate protection for wounds/incisions during mobilization  - Obtain PT/OT consults as needed  - Advance activity as appropriate  - Communicate ordered activity level and limitations with patient/family  Outcome: Progressing

## 2024-03-15 NOTE — PHYSICAL THERAPY NOTE
8:35am: PT evaluation order received, chart review completed. RN cleared pt to participate in PT evaluation this morning. Pt received in bed, firmly declining to participate. Provided encouragement and education on importance of out of bed mobility. Pt remained firm and becoming more irritated with further encouragement. Pt with some noted confusion, difficulty sequencing the events of the last few months. Pt also stating that she will only participate if the physician insists on it.     11:30am: Pts caregiver present, 2nd attempt to see if patient was willing to participate in out of bed mobility. Pt sleeping soundly. Caregiver notes pt lives at home with spouse and has part time caregiver services 4 days week x 8 hrs. Pt ambulatory short distances only with RW. Pt awakened a little but firmly declined to participate. Will plan to follow up tomorrow, hopefully can coordinated with physician and family to encourage participation.

## 2024-03-15 NOTE — H&P
[facility]  part of [facility]    History and Physical    Mireya Wolfe Patient Status:  Inpatient    1935 MRN [id number]   Location [facility] 5SW/SE Attending Kathy Rao MD   Hosp Day # 1 PCP Kathy Rao MD     Date:  3/15/2024  Date of Admission:  3/14/2024    History provided by:spouse  HPI:     Chief Complaint   Patient presents with    Fatigue     Mrs. Wolfe is an 89 year old female with past medical history of COPD, dyslipidemia, recurrent UTIs presenting with weakness and dysuria. I spoke with  today. Caregiver, Portia, felt that patient was developing a UTI again.   She receives physical therapy and yesterday pt could not do therapy as she was too weak.   Has been eating and drinking well.  This morning, pt is overall ok. Reports still feeling weak. Does feel hungry.   She is also very anxious about being in the hospital again.     She has not been consistently taking her medications at home. She does use the inhalers but also not consistent with this.     Of note, pt was admitted 2024 with urosepsis. She was also admitted in 2023 for weakness and pneumonia. Spouse states she has not been feeling well ever since.              History     Past Medical History:   Diagnosis Date    Anemia     Basal cell carcinoma     Mouth    Calculus, kidney     COPD (chronic obstructive pulmonary disease) (HCC)     Esophageal reflux     Hematuria     History of recurrent UTIs 2015    Hypercholesterolemia     Kidney stone     Osteopenia     Other and unspecified hyperlipidemia     Recurrent UTI     Scoliosis     Spinal stenosis     Squamous cell cancer of lip     Thyroid nodule     Urinary frequency     Vitamin D deficiency      Past Surgical History:   Procedure Laterality Date    CATARACT Right 2016    CYSTO/URETERO W/LITHOTRIPSY Right 10/29/2015    Procedure: LITHOTRIPSY HOLMIUM LASER WITH CYSTOSCOPY;  Surgeon: Tommie Majano MD;  Location: [facility] [place] SURGICAL  [facility]    CYSTOSCOPY,INSERT URETERAL STENT Right 9/24/2015    Procedure: LITHOTRIPSY WITH CYSTOSCOPY, STENT PLACEMENT;  Surgeon: Tommie Majano MD;  Location: [facility]    FRAGMENTING OF KIDNEY STONE Right 9/24/2015    Procedure: LITHOTRIPSY WITH CYSTOSCOPY, STENT PLACEMENT;  Surgeon: Tommie Majano MD;  Location: [facility]    OTHER SURGICAL HISTORY  1960    Surgery of Blockage in Urethral Tube    OTHER SURGICAL HISTORY  1985 and on 08/24/00    ESWL - left    OTHER SURGICAL HISTORY  07/19/00    Cystourethroscopy with Urethral Calibration and Dilation with Bilateral Retrograde Pyelogram and Left Ureteral Stone Manipulation with insertion of Double-J Stent    OTHER SURGICAL HISTORY  June 2015    R eye surgery for bleed and then development of blood clot--resolved    OTHER SURGICAL HISTORY  2/8/16    Cystoscpy stent removal    OTHER SURGICAL HISTORY      OTHER SURGICAL HISTORY  03/2017    Mohs surgery    PATIENT DOCUMENTED NOT TO HAVE EXPERIENCED ANY OF THE FOLLOWING EVENTS Right 9/24/2015    Procedure: LITHOTRIPSY WITH CYSTOSCOPY, STENT PLACEMENT;  Surgeon: Tommie Majano MD;  Location: [facility]    PATIENT DOCUMENTED NOT TO HAVE EXPERIENCED ANY OF THE FOLLOWING EVENTS Right 10/29/2015    Procedure: CYSTOSCOPY/URETEROSCOPY/STONE EXTRACTION;  Surgeon: Tommie Majano MD;  Location: [facility]    PATIENT WITH PREOPERATIVE ORDER FOR IV ANTIBIOTIC SURGICAL SITE INFECT Right 9/24/2015    Procedure: LITHOTRIPSY WITH CYSTOSCOPY, STENT PLACEMENT;  Surgeon: Tommie Majano MD;  Location: [facility]    PATIENT WITH PREOPERATIVE ORDER FOR IV ANTIBIOTIC SURGICAL SITE INFECT Right 10/29/2015    Procedure: CYSTOSCOPY/URETEROSCOPY/STONE EXTRACTION;  Surgeon: Tommie Majano MD;  Location: [facility]     Family History   Problem Relation Age of Onset    Breast Cancer Mother 67    Cancer Brother      Social History:  Social History      Socioeconomic History    Marital status:    Tobacco Use    Smoking status: Former     Packs/day: 0.25     Years: 10.00     Additional pack years: 0.00     Total pack years: 2.50     Types: Cigarettes     Quit date: 2016     Years since quittin.5     Passive exposure: Past    Smokeless tobacco: Never   Vaping Use    Vaping Use: Never used   Substance and Sexual Activity    Alcohol use: No     Alcohol/week: 0.0 standard drinks of alcohol    Drug use: No   Other Topics Concern    Caffeine Concern No     Social Determinants of Health     Food Insecurity: No Food Insecurity (3/14/2024)    Food Insecurity     Food Insecurity: Never true   Transportation Needs: No Transportation Needs (3/14/2024)    Transportation Needs     Lack of Transportation: No   Housing Stability: Low Risk  (3/14/2024)    Housing Stability     Housing Instability: No     Allergies/Medications:   Allergies:   Allergies   Allergen Reactions    Shellfish-Derived Products NAUSEA AND VOMITING    Erythromycin UNKNOWN    Iodine (Topical) OTHER (SEE COMMENTS)    Ioversol UNKNOWN    Other OTHER (SEE COMMENTS)    Radiology Contrast Iodinated Dyes DIZZINESS     Medications Prior to Admission   Medication Sig    ipratropium-albuterol 0.5-2.5 (3) MG/3ML Inhalation Solution Take 3 mL by nebulization every 6 (six) hours while awake.    PREGABALIN 50 MG Oral Cap TAKE 1 CAPSULE BY MOUTH TWICE DAILY    AMLODIPINE 5 MG Oral Tab TAKE 1 TABLET BY MOUTH EVERY DAY. HOLD FOR SYSTOLIC BLOOD PRESSURE< 110    escitalopram 5 MG Oral Tab Take 3 tablets (15 mg total) by mouth every morning.    FLUTICASONE FUROATE-VILANTEROL 100-25 MCG/ACT Inhalation Aerosol Powder, Breath Activated INHALE 1 PUFF INTO THE LUNGS DAILY    Cholecalciferol (VITAMIN D) 125 MCG (5000 UT) Oral Cap Take 1 capsule (5,000 Units total) by mouth daily.    ferrous sulfate 325 (65 FE) MG Oral Tab EC Take 1 tablet (325 mg total) by mouth daily with breakfast.    Vitamin B-12 1000 MCG Oral  Tab Take 1 tablet (1,000 mcg total) by mouth daily.       Review of Systems:     Constitutional:  Positive for activity change and fatigue. Negative for appetite change, chills and fever.   HENT: Negative.     Respiratory:  Negative for cough and shortness of breath.    Cardiovascular: Negative.    Gastrointestinal: Negative.    Genitourinary:  Positive for dysuria.   Neurological:  Positive for weakness.   Psychiatric/Behavioral:  The patient is nervous/anxious.        Physical Exam:   Vital Signs:  Blood pressure 119/55, pulse 79, temperature 97.7 °F (36.5 °C), temperature source Oral, resp. rate 18, height 5' 3\" (1.6 m), weight 127 lb 6.4 oz (57.8 kg), SpO2 94%.  Physical Exam  Constitutional:       Appearance: Normal appearance.   HENT:      Head: Normocephalic.      Mouth/Throat:      Mouth: Mucous membranes are dry.   Cardiovascular:      Rate and Rhythm: Normal rate and regular rhythm.      Pulses: Normal pulses.   Pulmonary:      Effort: Pulmonary effort is normal.      Breath sounds: Normal breath sounds. No wheezing or rhonchi.   Abdominal:      General: Abdomen is flat. Bowel sounds are normal.      Palpations: Abdomen is soft.   Musculoskeletal:      Right lower leg: No edema.      Left lower leg: No edema.   Skin:     General: Skin is warm and dry.      Capillary Refill: Capillary refill takes less than 2 seconds.   Neurological:      General: No focal deficit present.      Mental Status: She is alert and oriented to person, place, and time.   Psychiatric:      Comments: anxious         Results:     Lab Results   Component Value Date    WBC 5.4 03/15/2024    HGB 11.9 (L) 03/15/2024    HCT 38.4 03/15/2024    .0 03/15/2024    CREATSERUM 0.73 03/15/2024    BUN 18 03/15/2024     03/15/2024    K 3.9 03/15/2024     03/15/2024    CO2 30.0 03/15/2024    GLU 92 03/15/2024    CA 9.6 03/15/2024    ALB 4.0 03/14/2024    ALKPHO 63 03/14/2024    BILT 0.4 03/14/2024    TP 7.1 03/14/2024    AST 14  03/14/2024    ALT 14 03/14/2024    PTT 31.1 01/17/2024    T4F 1.1 07/15/2023    TSH 0.349 (L) 07/15/2023    DDIMER 1.63 (H) 07/14/2023    MG 2.0 01/19/2024    PHOS 2.8 07/19/2023    TROPHS 2,841 (HH) 01/16/2024    B12 1,241 (H) 11/09/2023     XR CHEST AP PORTABLE  (CPT=71045)    Result Date: 3/14/2024  CONCLUSION: Small left pleural effusion, which has decreased in size since 1/19/24.  Left basal pulmonary opacity which could represent atelectasis or pneumonia.  Chronic scoliotic curvature of the spine.    Dictated by (CST): Meek Luna MD on 3/14/2024 at 4:41 PM     Finalized by (CST): Meek Luna MD on 3/14/2024 at 4:42 PM               Assessment/Plan:     Acute cystitis with hematuria  Wbc normal  Ua +nitrite, LE, wbcs.   Ucx pending (prior with ecoli resistant to ampicillin, FQ)  Bcx pending  Continue rocephin 1g IV q24h  PT/OT to evaluate    chronic hypoxic respiratory failure (HCC)  On O2      H/o Sepsis with E.coli bacteremia  UTI  E.coli -- R to amp/levaquin/cipro, S to zosyn and cefazolin       Depression and anxiety  -pt with long hx of depression (and anxiety), exacerbated by marital strife/stress  -previously saw psychiatrist Dr. Friend who retired  -on Lexapro 15mg/day       Hypertension  Amlodipine 5mg daily    Hx of iron deficiency anemia  Had EGD/C-scope in 2010 (Dr. Ferrari) -- negative. 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 Continue FeSO4 325mg/day.      Hx of frequent UTI   On methenamine as outpt; sees urology Dr. Munir Cullen     Eventration of left hemidiaphragm  Previously thought to be due to large HH  CT chest clarified 7/2023 - small HH but large eventration of left hemidiaphragm     Osteoporosis  DEXA in 7/2017- osteoporosis of left femoral neck (T -2.9).  Pt was briefly on fosamax in 2014. Declines restarting fosamax or other meds so would not check repeat DEXA.     Vitamin B12 deficiency    Level normal (1241) in 11/2023; cont B12 1000  mcg/day     Lumbar spinal stenosis, chronic back pain    Has seen Dr. Salgado (ortho spine at [facility]).  Referred to pain specialist, Dr. Deirdre Elizabeth at [facility]. Had facet injections in 8/2018 which helped tremendously.  Saw Dr. Elizabeth again 9/25/18; did PT.  MRI in 4/2019 did not appear to show anything acute.  No longer taking Norco.    Bilateral knee OA  Sees ortho Dr. Booth -- has had b/l cortisone injections with transient improvement.  Has been advised for TKR's but trying to avoid. Ambulates with a walker.       COPD  Essentially quit smoking in 8/2016.  Has been using the Breo.     Basal and squamous cell cancer, face  S/p Mohs surgeries in 4 areas of her face in 2018 (Dr. Jonas).      Post-herpetic neuralgia (dx'ed with shingles left upper back 6/8/22)  -still with pain but significantly improved  -on Lyrica 50mg BID      Cognitive decline, likely age-related, poss exacerbated by depression  -pt saw neurologist Dr. Lokesh Mcmullen in 9/2022. Was thought to have late onset Alzheimer's dementia (started on aricept), though pt had not shown obvious sx of dementia prior to this admission  Pt was referred for neuropsych testing and MRI brain in 2022 by Dr. Mcmullen though did not schedule.    -had delirium 7/2023 while hospitalized--zyprexa caused sedation;   -Pt does not want to take seroquel anymore due to daytime sedation  -abilify 1mg daily     Advance directives  - POLST in chart,  DNR/select     Discussed with     **Certification      PHYSICIAN Certification of Need for Inpatient Hospitalization - Initial Certification    Patient will require inpatient services that will reasonably be expected to span two midnight's based on the clinical documentation in H+P.   Based on patients current state of illness, I anticipate that, after discharge, patient will require TBD.        Aide Vu DO  3/15/2024

## 2024-03-15 NOTE — PROGRESS NOTES
OT orders received and chart reviewed. RN approving of pt participation in therapy. Contact coordinated w/ PT. Pt received in bed, alert and oriented to self and place. Role of therapy discussed. Pt adamantly refusing all attempts to engage in oob activities stating that unless her doctor told her she needed to get up she was staying in bed. OT eval deferred per pt refusal. RN aware. Will proceed w/ treatment when pt is agreeable

## 2024-03-15 NOTE — PLAN OF CARE
Problem: Patient Centered Care  Goal: Patient preferences are identified and integrated in the patient's plan of care  Description: Interventions:  - What would you like us to know as we care for you?   - Provide timely, complete, and accurate information to patient/family  - Incorporate patient and family knowledge, values, beliefs, and cultural backgrounds into the planning and delivery of care  - Encourage patient/family to participate in care and decision-making at the level they choose  - Honor patient and family perspectives and choices  Outcome: Progressing     Problem: PAIN - ADULT  Goal: Verbalizes/displays adequate comfort level or patient's stated pain goal  Description: INTERVENTIONS:  - Encourage pt to monitor pain and request assistance  - Assess pain using appropriate pain scale  - Administer analgesics based on type and severity of pain and evaluate response  - Implement non-pharmacological measures as appropriate and evaluate response  - Consider cultural and social influences on pain and pain management  - Manage/alleviate anxiety  - Utilize distraction and/or relaxation techniques  - Monitor for opioid side effects  - Notify MD/LIP if interventions unsuccessful or patient reports new pain  - Anticipate increased pain with activity and pre-medicate as appropriate  Outcome: Progressing     Problem: RISK FOR INFECTION - ADULT  Goal: Absence of fever/infection during anticipated neutropenic period  Description: INTERVENTIONS  - Monitor WBC  - Administer growth factors as ordered  - Implement neutropenic guidelines  Outcome: Progressing     Problem: SAFETY ADULT - FALL  Goal: Free from fall injury  Description: INTERVENTIONS:  - Assess pt frequently for physical needs  - Identify cognitive and physical deficits and behaviors that affect risk of falls.  - [place] fall precautions as indicated by assessment.  - Educate pt/family on patient safety including physical limitations  - Instruct pt to call  for assistance with activity based on assessment  - Modify environment to reduce risk of injury  - Provide assistive devices as appropriate  - Consider OT/PT consult to assist with strengthening/mobility  - Encourage toileting schedule  Outcome: Progressing     Problem: RESPIRATORY - ADULT  Goal: Achieves optimal ventilation and oxygenation  Description: INTERVENTIONS:  - Assess for changes in respiratory status  - Assess for changes in mentation and behavior  - Position to facilitate oxygenation and minimize respiratory effort  - Oxygen supplementation based on oxygen saturation or ABGs  - Provide Smoking Cessation handout, if applicable  - Encourage broncho-pulmonary hygiene including cough, deep breathe, Incentive Spirometry  - Assess the need for suctioning and perform as needed  - Assess and instruct to report SOB or any respiratory difficulty  - Respiratory Therapy support as indicated  - Manage/alleviate anxiety  - Monitor for signs/symptoms of CO2 retention  Outcome: Progressing     Problem: METABOLIC/FLUID AND ELECTROLYTES - ADULT  Goal: Electrolytes maintained within normal limits  Description: INTERVENTIONS:  - Monitor labs and rhythm and assess patient for signs and symptoms of electrolyte imbalances  - Administer electrolyte replacement as ordered  - Monitor response to electrolyte replacements, including rhythm and repeat lab results as appropriate  - Fluid restriction as ordered  - Instruct patient on fluid and nutrition restrictions as appropriate  Outcome: Progressing  Goal: Hemodynamic stability and optimal renal function maintained  Description: INTERVENTIONS:  - Monitor labs and assess for signs and symptoms of volume excess or deficit  - Monitor intake, output and patient weight  - Monitor urine specific gravity, serum osmolarity and serum sodium as indicated or ordered  - Monitor response to interventions for patient's volume status, including labs, urine output, blood pressure (other measures  as available)  - Encourage oral intake as appropriate  - Instruct patient on fluid and nutrition restrictions as appropriate  Outcome: Progressing     Problem: MUSCULOSKELETAL - ADULT  Goal: Return mobility to safest level of function  Description: INTERVENTIONS:  - Assess patient stability and activity tolerance for standing, transferring and ambulating w/ or w/o assistive devices  - Assist with transfers and ambulation using safe patient handling equipment as needed  - Ensure adequate protection for wounds/incisions during mobilization  - Obtain PT/OT consults as needed  - Advance activity as appropriate  - Communicate ordered activity level and limitations with patient/family  Outcome: Progressing       Patient A/Ox2-3, forgetful and confused at times. Pain manage per protocol. PT/OT eval, refused today. Will encourage activity. Fall precaution. Continue IV antibx. Call light within reach.

## 2024-03-16 LAB
BILIRUB UR QL: NEGATIVE
CLARITY UR: CLEAR
GLUCOSE UR-MCNC: NORMAL MG/DL
KETONES UR-MCNC: NEGATIVE MG/DL
LEUKOCYTE ESTERASE UR QL STRIP.AUTO: 500
NITRITE UR QL STRIP.AUTO: NEGATIVE
PH UR: 6 [PH] (ref 5–8)
PROT UR-MCNC: NEGATIVE MG/DL
SP GR UR STRIP: 1.01 (ref 1–1.03)
UROBILINOGEN UR STRIP-ACNC: NORMAL

## 2024-03-16 PROCEDURE — 99232 SBSQ HOSP IP/OBS MODERATE 35: CPT | Performed by: INTERNAL MEDICINE

## 2024-03-16 RX ORDER — ASPIRIN 81 MG/1
81 TABLET ORAL DAILY
Status: DISCONTINUED | OUTPATIENT
Start: 2024-03-17 | End: 2024-03-18

## 2024-03-16 NOTE — PROGRESS NOTES
Attempted eval, interrupted by OT entering room and started assessment, will re-attempt asap. Communicated w/ RN.   Ana Cramer, PT

## 2024-03-16 NOTE — PLAN OF CARE
Problem: Patient Centered Care  Goal: Patient preferences are identified and integrated in the patient's plan of care  Description: Interventions:  - What would you like us to know as we care for you?   - Provide timely, complete, and accurate information to patient/family  - Incorporate patient and family knowledge, values, beliefs, and cultural backgrounds into the planning and delivery of care  - Encourage patient/family to participate in care and decision-making at the level they choose  - Honor patient and family perspectives and choices  Outcome: Progressing     Problem: PAIN - ADULT  Goal: Verbalizes/displays adequate comfort level or patient's stated pain goal  Description: INTERVENTIONS:  - Encourage pt to monitor pain and request assistance  - Assess pain using appropriate pain scale  - Administer analgesics based on type and severity of pain and evaluate response  - Implement non-pharmacological measures as appropriate and evaluate response  - Consider cultural and social influences on pain and pain management  - Manage/alleviate anxiety  - Utilize distraction and/or relaxation techniques  - Monitor for opioid side effects  - Notify MD/LIP if interventions unsuccessful or patient reports new pain  - Anticipate increased pain with activity and pre-medicate as appropriate  Outcome: Progressing     Problem: RISK FOR INFECTION - ADULT  Goal: Absence of fever/infection during anticipated neutropenic period  Description: INTERVENTIONS  - Monitor WBC  - Administer growth factors as ordered  - Implement neutropenic guidelines  Outcome: Progressing     Problem: SAFETY ADULT - FALL  Goal: Free from fall injury  Description: INTERVENTIONS:  - Assess pt frequently for physical needs  - Identify cognitive and physical deficits and behaviors that affect risk of falls.  - [place] fall precautions as indicated by assessment.  - Educate pt/family on patient safety including physical limitations  - Instruct pt to call  for assistance with activity based on assessment  - Modify environment to reduce risk of injury  - Provide assistive devices as appropriate  - Consider OT/PT consult to assist with strengthening/mobility  - Encourage toileting schedule  Outcome: Progressing     Problem: RESPIRATORY - ADULT  Goal: Achieves optimal ventilation and oxygenation  Description: INTERVENTIONS:  - Assess for changes in respiratory status  - Assess for changes in mentation and behavior  - Position to facilitate oxygenation and minimize respiratory effort  - Oxygen supplementation based on oxygen saturation or ABGs  - Provide Smoking Cessation handout, if applicable  - Encourage broncho-pulmonary hygiene including cough, deep breathe, Incentive Spirometry  - Assess the need for suctioning and perform as needed  - Assess and instruct to report SOB or any respiratory difficulty  - Respiratory Therapy support as indicated  - Manage/alleviate anxiety  - Monitor for signs/symptoms of CO2 retention  Outcome: Progressing     Problem: METABOLIC/FLUID AND ELECTROLYTES - ADULT  Goal: Electrolytes maintained within normal limits  Description: INTERVENTIONS:  - Monitor labs and rhythm and assess patient for signs and symptoms of electrolyte imbalances  - Administer electrolyte replacement as ordered  - Monitor response to electrolyte replacements, including rhythm and repeat lab results as appropriate  - Fluid restriction as ordered  - Instruct patient on fluid and nutrition restrictions as appropriate  Outcome: Progressing  Goal: Hemodynamic stability and optimal renal function maintained  Description: INTERVENTIONS:  - Monitor labs and assess for signs and symptoms of volume excess or deficit  - Monitor intake, output and patient weight  - Monitor urine specific gravity, serum osmolarity and serum sodium as indicated or ordered  - Monitor response to interventions for patient's volume status, including labs, urine output, blood pressure (other measures  as available)  - Encourage oral intake as appropriate  - Instruct patient on fluid and nutrition restrictions as appropriate  Outcome: Progressing     Problem: MUSCULOSKELETAL - ADULT  Goal: Return mobility to safest level of function  Description: INTERVENTIONS:  - Assess patient stability and activity tolerance for standing, transferring and ambulating w/ or w/o assistive devices  - Assist with transfers and ambulation using safe patient handling equipment as needed  - Ensure adequate protection for wounds/incisions during mobilization  - Obtain PT/OT consults as needed  - Advance activity as appropriate  - Communicate ordered activity level and limitations with patient/family  Outcome: Progressing     Patient A/Ox3-4, in bed resting, encourage activity. Denies pain. Fall precaution. PT/OT eval. Continue IV antibx. Urine specimen collected. Call light within reach.

## 2024-03-16 NOTE — PROGRESS NOTES
[facility]  part of [facility]    Progress Note    Mireya Wolfe Patient Status:  Inpatient    1935 MRN [id number]   Location [facility] 5SW/SE Attending Kathy Rao MD   Hosp Day # 2 PCP Kathy Rao MD       SUBJECTIVE:  Pt awake, alert and oriented to place/self.  Recognizes me.  Able to recount details of yesterday    OBJECTIVE:  Vital signs in last 24 hours:  /63 (BP Location: Right arm)   Pulse 93   Temp 97.6 °F (36.4 °C) (Oral)   Resp 18   Ht 5' 3\" (1.6 m)   Wt 127 lb 6.4 oz (57.8 kg)   SpO2 94%   BMI 22.57 kg/m²     Intake/Output:    Intake/Output Summary (Last 24 hours) at 3/16/2024 1218  Last data filed at 3/16/2024 0954  Gross per 24 hour   Intake 118 ml   Output 500 ml   Net -382 ml       Wt Readings from Last 3 Encounters:   24 127 lb 6.4 oz (57.8 kg)   24 125 lb 4.8 oz (56.8 kg)   23 125 lb 6.4 oz (56.9 kg)       EXAM:  GENERAL: well developed, well nourished, in no apparent distress  LUNGS: clear to auscultation  CARDIO: RRR, normal S1S2, without murmur or gallop  GI: soft, NT, ND, NABS, no HSM  EXTREMITIES: no cyanosis, clubbing or edema    Data Review:     Labs:          Imaging:  XR CHEST AP PORTABLE  (CPT=71045)    Result Date: 3/14/2024  CONCLUSION: Small left pleural effusion, which has decreased in size since 24.  Left basal pulmonary opacity which could represent atelectasis or pneumonia.  Chronic scoliotic curvature of the spine.    Dictated by (CST): Meek Luna MD on 3/14/2024 at 4:41 PM     Finalized by (CST): Meek Luna MD on 3/14/2024 at 4:42 PM                   Meds:   Current Facility-Administered Medications   Medication Dose Route Frequency    ALPRAZolam (Xanax) tab 0.25 mg  0.25 mg Oral Nightly PRN    amLODIPine (Norvasc) tab 5 mg  5 mg Oral Daily    ARIPiprazole (Abilify) tab 1 mg  1 mg Oral Daily    atorvastatin (Lipitor) tab 40 mg  40 mg Oral Nightly    cholecalciferol (Vitamin D3) tab 5,000  Units  5,000 Units Oral Daily    escitalopram (Lexapro) tab 15 mg  15 mg Oral QAM    ferrous sulfate DR tab 325 mg  325 mg Oral Daily with breakfast    fluticasone furoate-vilanterol (Breo Ellipta) 100-25 MCG/ACT inhaler 1 puff  1 puff Inhalation Daily    ipratropium-albuterol (Duoneb) 0.5-2.5 (3) MG/3ML inhalation solution 3 mL  3 mL Nebulization Q6H WA    polyethylene glycol (PEG 3350) (Miralax) 17 g oral packet 17 g  17 g Oral Daily PRN    pregabalin (Lyrica) cap 50 mg  50 mg Oral BID    vancomycin (Vancocin) cap 125 mg  125 mg Oral Daily    cyanocobalamin (Vitamin B12) tab 1,000 mcg  1,000 mcg Oral Daily    cefTRIAXone (Rocephin) 1 g in D5W 100 mL IVPB-ADD  1 g Intravenous Q24H       Assessment & Plan    Acute cystitis with hematuria  Presented with severe fatigue, sleeping a lot  Wbc normal  Ua +nitrite, LE, wbcs.   Unfortunately a urine culture was not sent in the ED (only a routine UA) (prior urine cx in 1/2024 with ecoli resistant to ampicillin, FQ).  Will check a urine cx now just to ensure that it is responding to the ceftriaxone  Bcx x 2 NGTD  Continue ceftriaxone 1g IV q24h  Po vanco for CDP  Pt adamantly refused PT and OT yesterday; re-attempt today -- discussed with pt the importance of participating with PT/OT in order to determine if she is safe to go home; pt promises to participate today    Chronic hypoxic respiratory failure (HCC)  COPD  -quit smoking in 8/2016  -cont Breo, prn nalinionebs  -On O2 2L NC (has O2 at home)     H/o Sepsis with E.coli bacteremia  UTI  E.coli -- R to amp/levaquin/cipro  -s/p zosyn, then cefazolin, then po keflex     Depression and anxiety  -pt with long hx of depression (and anxiety), exacerbated by marital strife/stress  -previously saw psychiatrist Dr. Friend who retired  -on Lexapro 15mg/day     Hx of elevated Troponins (during hosp admission for urosepsis in 1/2024)  Trop 199>2249>3228  EKG-sinus tachycardia  Echo with new depressed LV syst function (EF 45-50%) -- prev  echo in 2019 (EF 55-60%)  Likely Type II MI --demand ischemia related to hypoxia/tachycardia/sepsis  ; started lipitor 40mg at bedtime in 1/2024  Pt was to follow up as outpatient with Dr. Felix to discuss possible ischemic w/u.  Discussed with son John, and dtr Nelli at that time  Start ASA 81mg/day     Hypertension  Amlodipine 5mg daily     Hx of iron deficiency anemia  Had EGD/C-scope in 2010 (Dr. Ferrari) -- negative. 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 Continue FeSO4 325mg/day.       Hx of frequent UTI   On methenamine as outpt; sees urology Dr. Munir Cullen     Eventration of left hemidiaphragm  Previously thought to be due to large HH  CT chest clarified 7/2023 - small HH but large eventration of left hemidiaphragm     Osteoporosis  DEXA in 7/2017- osteoporosis of left femoral neck (T -2.9).  Pt was briefly on fosamax in 2014. Declines restarting fosamax or other meds so would not check repeat DEXA.     Vitamin B12 deficiency    Level normal (1241) in 11/2023; cont B12 1000 mcg/day     Lumbar spinal stenosis, chronic back pain    Has seen Dr. Salgado (ortho spine at [facility]).  Referred to pain specialist, Dr. Deirdre Elizabeth at [facility]. Had facet injections in 8/2018 which helped tremendously.  Saw Dr. Elizabeth again 9/25/18; did PT.  MRI in 4/2019 did not appear to show anything acute.  No longer taking Norco.     Bilateral knee OA  Sees ortho Dr. Booth -- has had b/l cortisone injections with transient improvement.  Has been advised for TKR's but trying to avoid. Ambulates with a walker.       Basal and squamous cell cancer, face  S/p Mohs surgeries in 4 areas of her face in 2018 (Dr. Jonas).      Post-herpetic neuralgia (dx'ed with shingles left upper back 6/8/22)  -still with pain but significantly improved  -on Lyrica 50mg BID      Cognitive decline, likely age-related, poss exacerbated by depression  -pt saw  neurologist Dr. Lokesh Mcmullen in 9/2022. Was thought to have late onset Alzheimer's dementia (started on aricept), though pt had not shown obvious sx of dementia prior to this admission  Pt was referred for neuropsych testing and MRI brain in 2022 by Dr. Mcmullen though did not schedule.    -had delirium 7/2023 while hospitalized--zyprexa caused sedation;   -Pt does not want to take seroquel anymore due to daytime sedation  -abilify 1mg daily     Advance directives  - POLST in chart,  DNR/select       Discussed with RN  Left detailed message on 's VM             Kathy Rao MD  3/16/2024  12:18 PM

## 2024-03-16 NOTE — PLAN OF CARE
Problem: Patient Centered Care  Goal: Patient preferences are identified and integrated in the patient's plan of care  Description: Interventions:  - What would you like us to know as we care for you?   - Provide timely, complete, and accurate information to patient/family  - Incorporate patient and family knowledge, values, beliefs, and cultural backgrounds into the planning and delivery of care  - Encourage patient/family to participate in care and decision-making at the level they choose  - Honor patient and family perspectives and choices  Outcome: Progressing     Problem: Patient/Family Goals  Goal: Patient/Family Long Term Goal  Description: Patient's Long Term Goal: gain strength    Interventions:  - Monitor vitals  - Monitor appropriate labs  - Administer medications as ordered  - Follow MD's orders  - Update patient on plan of care   - Discharge planning     - See additional Care Plan goals for specific interventions  Outcome: Progressing  Goal: Patient/Family Short Term Goal  Description: Patient's Short Term Goal: go home    Interventions:   - Monitor vitals  - Monitor appropriate labs  - Administer medications as ordered  - Follow MD's orders  - Update patient on plan of care   - Discharge planning   - See additional Care Plan goals for specific interventions  Outcome: Progressing     Problem: PAIN - ADULT  Goal: Verbalizes/displays adequate comfort level or patient's stated pain goal  Description: INTERVENTIONS:  - Encourage pt to monitor pain and request assistance  - Assess pain using appropriate pain scale  - Administer analgesics based on type and severity of pain and evaluate response  - Implement non-pharmacological measures as appropriate and evaluate response  - Consider cultural and social influences on pain and pain management  - Manage/alleviate anxiety  - Utilize distraction and/or relaxation techniques  - Monitor for opioid side effects  - Notify MD/LIP if interventions unsuccessful or  patient reports new pain  - Anticipate increased pain with activity and pre-medicate as appropriate  Outcome: Progressing     Problem: RISK FOR INFECTION - ADULT  Goal: Absence of fever/infection during anticipated neutropenic period  Description: INTERVENTIONS  - Monitor WBC  - Administer growth factors as ordered  - Implement neutropenic guidelines  Outcome: Progressing     Problem: SAFETY ADULT - FALL  Goal: Free from fall injury  Description: INTERVENTIONS:  - Assess pt frequently for physical needs  - Identify cognitive and physical deficits and behaviors that affect risk of falls.  - [place] fall precautions as indicated by assessment.  - Educate pt/family on patient safety including physical limitations  - Instruct pt to call for assistance with activity based on assessment  - Modify environment to reduce risk of injury  - Provide assistive devices as appropriate  - Consider OT/PT consult to assist with strengthening/mobility  - Encourage toileting schedule  Outcome: Progressing     Problem: RESPIRATORY - ADULT  Goal: Achieves optimal ventilation and oxygenation  Description: INTERVENTIONS:  - Assess for changes in respiratory status  - Assess for changes in mentation and behavior  - Position to facilitate oxygenation and minimize respiratory effort  - Oxygen supplementation based on oxygen saturation or ABGs  - Provide Smoking Cessation handout, if applicable  - Encourage broncho-pulmonary hygiene including cough, deep breathe, Incentive Spirometry  - Assess the need for suctioning and perform as needed  - Assess and instruct to report SOB or any respiratory difficulty  - Respiratory Therapy support as indicated  - Manage/alleviate anxiety  - Monitor for signs/symptoms of CO2 retention  Outcome: Progressing     Problem: METABOLIC/FLUID AND ELECTROLYTES - ADULT  Goal: Electrolytes maintained within normal limits  Description: INTERVENTIONS:  - Monitor labs and rhythm and assess patient for signs and  symptoms of electrolyte imbalances  - Administer electrolyte replacement as ordered  - Monitor response to electrolyte replacements, including rhythm and repeat lab results as appropriate  - Fluid restriction as ordered  - Instruct patient on fluid and nutrition restrictions as appropriate  Outcome: Progressing  Goal: Hemodynamic stability and optimal renal function maintained  Description: INTERVENTIONS:  - Monitor labs and assess for signs and symptoms of volume excess or deficit  - Monitor intake, output and patient weight  - Monitor urine specific gravity, serum osmolarity and serum sodium as indicated or ordered  - Monitor response to interventions for patient's volume status, including labs, urine output, blood pressure (other measures as available)  - Encourage oral intake as appropriate  - Instruct patient on fluid and nutrition restrictions as appropriate  Outcome: Progressing     Problem: MUSCULOSKELETAL - ADULT  Goal: Return mobility to safest level of function  Description: INTERVENTIONS:  - Assess patient stability and activity tolerance for standing, transferring and ambulating w/ or w/o assistive devices  - Assist with transfers and ambulation using safe patient handling equipment as needed  - Ensure adequate protection for wounds/incisions during mobilization  - Obtain PT/OT consults as needed  - Advance activity as appropriate  - Communicate ordered activity level and limitations with patient/family  Outcome: Progressing

## 2024-03-16 NOTE — OCCUPATIONAL THERAPY NOTE
OCCUPATIONAL THERAPY EVALUATION - INPATIENT     Room Number: 570/570-A  Evaluation Date: 3/16/2024  Type of Evaluation: Initial  Presenting Problem: acute cystitis, fatigue, cough, congestion  Hx COPD, chronic respiratory failure, HTN     Physician Order: IP Consult to Occupational Therapy  Reason for Therapy: ADL/IADL Dysfunction and Discharge Planning    OCCUPATIONAL THERAPY ASSESSMENT   Patient is a 89 year old female admitted 3/14/2024 for acute cystitis, fatigue, cough, congestion.  Patient's spouse reported he or part-time caregiver assist with ADLs as needed. Patient is MI for fx mobility using rollator.  Patient is currently functioning near baseline with ADLs and fx mobility/transfers.  Patient is requiring up to mod assist as a result of the following impairments: decreased functional strength, decreased functional reach, decreased endurance, impaired balance, and decreased muscular endurance. Occupational Therapy will continue to follow for duration of hospitalization.    Patient will benefit from continued skilled OT Services at discharge to promote functional independence and safety with additional support and return home with home health OT    PLAN  OT Treatment Plan: Balance activities;Energy conservation/work simplification techniques;ADL training;Functional transfer training;Endurance training;Patient/Family education;Patient/Family training;Equipment eval/education;Compensatory technique education  OT Device Recommendations: TBD    OCCUPATIONAL THERAPY MEDICAL/SOCIAL HISTORY   Problem List  Principal Problem:    Acute cystitis with hematuria    HOME SITUATION  Type of Home: House  Home Layout: Two level (stairlift)  Lives With: Spouse; Caregiver part-time (IRIS Pearce 4x/week for assist with I/ADLs)  Shower/Tub and Equipment: Walk-in shower (spongebathes)  Drives: No  Patient Regularly Uses: Glasses  Stairs in Home: 2 SENA  Use of Assistive Device(s): Rollator; owns RW    Prior Level of [place]:  Patient's spouse reported he or part-time caregiver assist with ADLs as needed. Patient is MI for fx mobility using rollator.     SUBJECTIVE  \"I'm glad my doctor stopped by.\"    OCCUPATIONAL THERAPY EXAMINATION    OBJECTIVE  Precautions: Bed/chair alarm; Hard of hearing  Fall Risk: -- (moderate fall risk)    PAIN ASSESSMENT  Ratin    ACTIVITY TOLERANCE  Pulse: 108  Heart Rate Source: Monitor     BP: 141/66  BP Location: Right arm  BP Method: Automatic  Patient Position: Sitting    O2 SATURATIONS  Oxygen Therapy  SPO2% Ambulation on Oxygen: 96  Ambulation oxygen flow (liters per minute): 2    COGNITION  Orientation Level:  oriented to place, oriented to situation, and oriented to person  Following Commands:  follows one step commands with repetition    SENSATION  Light touch:  intact    RANGE OF MOTION   Upper extremity ROM is within functional limits     STRENGTH ASSESSMENT  Upper extremity strength is within functional limits     COORDINATION  Gross Motor: WFL   Fine Motor: WFL     ACTIVITIES OF DAILY LIVING ASSESSMENT  AM-PAC ‘6-Clicks’ Inpatient Daily Activity Short Form  How much help from another person does the patient currently need…  -   Putting on and taking off regular lower body clothing?: A Lot  -   Bathing (including washing, rinsing, drying)?: A Lot  -   Toileting, which includes using toilet, bedpan or urinal? : A Lot  -   Putting on and taking off regular upper body clothing?: A Little  -   Taking care of personal grooming such as brushing teeth?: A Little  -   Eating meals?: None    AM-PAC Score:  Score: 16  Approx Degree of Impairment: 53.32%  Standardized Score (AM-PAC Scale): 35.96  CMS Modifier (G-Code): CK    BED MOBILITY  Supine to Sit: CGA     FUNCTIONAL TRANSFER ASSESSMENT  Sit to Stand from elevated EOB: CGA  Chair Transfer: CGA    FUNCTIONAL MOBILITY  Min assist for steps from EOB to chair using RW. Declined to progress mobility at this time s/t meal arrival.    FUNCTIONAL ADL  ASSESSMENT  Eating: setup assist seated (per obs)   Grooming: setup assist seated (per obs)   UB Dressing: setup assist seated (per obs)   LB Dressing: mod assist  Toileting: mod assist (per obs)     EDUCATION PROVIDED  Patient: Role of Occupational Therapy; Plan of Care; Discharge Recommendations; Functional Transfer Techniques; Fall Prevention; Posture/Positioning; Proper Body Mechanics  Patient's Response to Education: Verbalized Understanding; Requires Further Education  Family/Caregiver: Role of Occupational Therapy; Discharge Recommendations; Plan of Care; Fall Prevention  Family/Caregiver's Response to Education: Verbalized Understanding    The patient's Approx Degree of Impairment: 53.32% has been calculated based on documentation in the [facility] '6 clicks' Inpatient Daily Activity Short Form.  Research supports that patients with this level of impairment may benefit from rehab. However, patient prefers to return home.  Final disposition will be made by interdisciplinary medical team.     Patient End of Session: Up in chair;Needs met;Call light within reach;RN aware of session/findings;All patient questions and concerns addressed;Alarm set;Family present    OT Goals  Patients self stated goal is: return home     Patient will complete functional transfer with SUP  Comment:     Patient will complete toileting with SUP  Comment:     Patient will complete grooming in standing at sink with SUP  Comment:    Patient will complete item retrieval with SUP  Comment:          Goals  on: 3/30/24  Frequency: 3-5x/week    Patient Evaluation Complexity Level:   Occupational Profile/Medical History LOW - Brief history including review of medical or therapy records    Specific performance deficits impacting engagement in ADL/IADL MODERATE  3 - 5 performance deficits   Client Assessment/Performance Deficits MODERATE - Comorbidities and min to mod modifications of tasks    Clinical Decision Making LOW - Analysis of  occupational profile, problem-focused assessments, limited treatment options    Overall Complexity LOW     OT Session Time  Therapeutic Activity: 12 minutes    Lanny Pressley OTR/L  [facility]  #[id number]

## 2024-03-17 PROCEDURE — 99232 SBSQ HOSP IP/OBS MODERATE 35: CPT | Performed by: INTERNAL MEDICINE

## 2024-03-17 NOTE — PROGRESS NOTES
[facility]  part of [facility]    Progress Note    Mireya Wolfe Patient Status:  Inpatient    1935 MRN [id number]   Location [facility] 5SW/SE Attending Kathy Rao MD   Hosp Day # 3 PCP Kathy Rao MD       SUBJECTIVE:  Feels okay.  Asking if she is on the 5th floor (she is).  States she did not feel well last night when trying to go to sleep -- couldn't give details, just stated she didn't feel good    OBJECTIVE:  Vital signs in last 24 hours:  /57 (BP Location: Right arm)   Pulse 85   Temp 98.1 °F (36.7 °C) (Oral)   Resp 18   Ht 5' 3\" (1.6 m)   Wt 127 lb 6.4 oz (57.8 kg)   SpO2 94%   BMI 22.57 kg/m²     Intake/Output:    Intake/Output Summary (Last 24 hours) at 3/17/2024 1058  Last data filed at 3/17/2024 0549  Gross per 24 hour   Intake 237 ml   Output 1065 ml   Net -828 ml       Wt Readings from Last 3 Encounters:   24 127 lb 6.4 oz (57.8 kg)   24 125 lb 4.8 oz (56.8 kg)   23 125 lb 6.4 oz (56.9 kg)       EXAM:  GENERAL: well developed, well nourished, in no apparent distress  LUNGS: clear to auscultation  CARDIO: RRR, normal S1S2, without murmur or gallop  GI: soft, NT, ND, NABS, no HSM  EXTREMITIES: no cyanosis, clubbing or edema    Data Review:     Labs:          Imaging:  No results found.            Meds:   Current Facility-Administered Medications   Medication Dose Route Frequency    aspirin DR tab 81 mg  81 mg Oral Daily    ALPRAZolam (Xanax) tab 0.25 mg  0.25 mg Oral Nightly PRN    amLODIPine (Norvasc) tab 5 mg  5 mg Oral Daily    ARIPiprazole (Abilify) tab 1 mg  1 mg Oral Daily    atorvastatin (Lipitor) tab 40 mg  40 mg Oral Nightly    cholecalciferol (Vitamin D3) tab 5,000 Units  5,000 Units Oral Daily    escitalopram (Lexapro) tab 15 mg  15 mg Oral QAM    ferrous sulfate DR tab 325 mg  325 mg Oral Daily with breakfast    fluticasone furoate-vilanterol (Breo Ellipta) 100-25 MCG/ACT inhaler 1 puff  1 puff Inhalation Daily     ipratropium-albuterol (Duoneb) 0.5-2.5 (3) MG/3ML inhalation solution 3 mL  3 mL Nebulization Q6H WA    polyethylene glycol (PEG 3350) (Miralax) 17 g oral packet 17 g  17 g Oral Daily PRN    pregabalin (Lyrica) cap 50 mg  50 mg Oral BID    vancomycin (Vancocin) cap 125 mg  125 mg Oral Daily    cyanocobalamin (Vitamin B12) tab 1,000 mcg  1,000 mcg Oral Daily    cefTRIAXone (Rocephin) 1 g in D5W 100 mL IVPB-ADD  1 g Intravenous Q24H       Assessment & Plan    Acute cystitis with hematuria  Presented with severe fatigue, sleeping a lot  Wbc normal  Ua +nitrite, LE, wbcs.   Unfortunately a urine culture was not sent in the ED (only a routine UA) (prior urine cx in 1/2024 with ecoli resistant to ampicillin, FQ).  Urine cx sent yesterday 3/16 (pending) -- can at least eval if ceftriaxone is effective  Bcx x 3 NGTD  Continue ceftriaxone 1g IV q24h (day 4)  Po vanco for CDP    Chronic hypoxic respiratory failure (HCC)  COPD  -quit smoking in 8/2016  -cont Breo, prn duonebs  -On O2 1L NC (94%) -- pt was off O2 when I saw her this am -- sats 90%.  D/w pt that she will likely need to continue O2 at home (she already has the O2 set up)     H/o Sepsis with E.coli bacteremia  UTI  E.coli -- R to amp/levaquin/cipro  -s/p zosyn, then cefazolin, then po keflex     Depression and anxiety  -pt with long hx of depression (and anxiety), exacerbated by marital strife/stress  -previously saw psychiatrist Dr. Friend who retired  -on Lexapro 15mg/day     Hx of elevated Troponins (during hosp admission for urosepsis in 1/2024)  Trop 199>2249>3228  EKG-sinus tachycardia  Echo with new depressed LV syst function (EF 45-50%) -- prev echo in 2019 (EF 55-60%)  Likely Type II MI --demand ischemia related to hypoxia/tachycardia/sepsis  ; started lipitor 40mg at bedtime in 1/2024  Pt was to follow up as outpatient with Dr. Felix to discuss possible ischemic w/u.  Discussed with son John, and dtr Nelli at that time  Start ASA 81mg/day      Hypertension  Amlodipine 5mg daily     Hx of iron deficiency anemia  Had EGD/C-scope in 2010 (Dr. Ferrari) -- negative. 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 Continue FeSO4 325mg/day.       Hx of frequent UTI   On methenamine as outpt; sees urology Dr. Munir Cullen     Eventration of left hemidiaphragm  Previously thought to be due to large HH  CT chest clarified 7/2023 - small HH but large eventration of left hemidiaphragm     Osteoporosis  DEXA in 7/2017- osteoporosis of left femoral neck (T -2.9).  Pt was briefly on fosamax in 2014. Declines restarting fosamax or other meds so would not check repeat DEXA.     Vitamin B12 deficiency    Level normal (1241) in 11/2023; cont B12 1000 mcg/day     Lumbar spinal stenosis, chronic back pain    Has seen Dr. Salgado (ortho spine at [facility]).  Referred to pain specialist, Dr. Deirdre Elizabeth at [facility]. Had facet injections in 8/2018 which helped tremendously.  Saw Dr. Elizabeth again 9/25/18; did PT.  MRI in 4/2019 did not appear to show anything acute.  No longer taking Norco.     Bilateral knee OA  Sees ortho Dr. Booth -- has had b/l cortisone injections with transient improvement.  Has been advised for TKR's but trying to avoid. Ambulates with a walker.       Basal and squamous cell cancer, face  S/p Mohs surgeries in 4 areas of her face in 2018 (Dr. Jonas).      Post-herpetic neuralgia (dx'ed with shingles left upper back 6/8/22)  -still with pain but significantly improved  -on Lyrica 50mg BID      Cognitive decline, likely age-related, poss exacerbated by depression  -pt saw neurologist Dr. Lokesh Mcmullen in 9/2022. Was thought to have late onset Alzheimer's dementia (started on aricept), though pt had not shown obvious sx of dementia prior to this admission  Pt was referred for neuropsych testing and MRI brain in 2022 by Dr. Mcmullen though did not schedule.    -had delirium  7/2023 while hospitalized--zyprexa caused sedation;   -Pt does not want to take seroquel anymore due to daytime sedation  -abilify 1mg daily     Advance directives  - POLST in chart,  DNR/select      Dispo  -seen by OT -- note implies rec for [facility]  -await PT eval  -will ask SW to assist    Detailed message again left on pt's 's cell VM, as well as home VM    D/w RN Zenaida Rao MD  3/17/2024  10:58 AM

## 2024-03-17 NOTE — PHYSICAL THERAPY NOTE
PHYSICAL THERAPY EVALUATION - INPATIENT     Room Number: 570/570-A  Evaluation Date: 3/17/2024  Type of Evaluation: Initial   Physician Order: PT Eval and Treat    Presenting Problem: cough, congestion; acute cystitis with hematuria  Co-Morbidities : COPD, recent UTI, spinal stenosis  Reason for Therapy: Mobility Dysfunction and Discharge Planning    PHYSICAL THERAPY ASSESSMENT   Patient is a 89 year old female admitted 3/14/2024 for cough, congestion, UTI.  Prior to admission, patient's baseline is amb with rollator, and assist provided for ADLs..  Patient is currently functioning below baseline with transfers and gait.  Patient is requiring contact guard assist as a result of the following impairments: decreased functional strength and decreased endurance/aerobic capacity.  Physical Therapy will continue to follow for duration of hospitalization.    Patient will benefit from continued skilled PT Services at discharge to promote functional independence in home.  Anticipate patient will return home with home health PT.    PLAN  PT Treatment Plan: Bed mobility;Endurance;Energy conservation;Patient education;Family education;Gait training;Strengthening;Stair training;Transfer training;Balance training  Rehab Potential : Fair       PHYSICAL THERAPY MEDICAL/SOCIAL HISTORY   History related to current admission: pt with recent UTI     Problem List  Principal Problem:    Acute cystitis with hematuria      HOME SITUATION  Home Situation  Type of Home: House  Home Layout: Two level;Stairlift  Stairs to Enter : 2  Railing: Yes  Stairs to Bedroom:  (uses stair lift)  Lives With: Spouse;Caregiver part-time  Drives: No  Patient Owned Equipment: Rollator (stair lift)  Patient Regularly Uses: Glasses     Prior Level of [place]: CG assists with ADLs, she also amb with pt when she is at pt's house 4days/week    SUBJECTIVE  They are checking my oxygen.    PHYSICAL THERAPY EXAMINATION   OBJECTIVE  Precautions: Bed/chair  alarm  Fall Risk: Standard fall risk    WEIGHT BEARING RESTRICTION  Weight Bearing Restriction: None                PAIN ASSESSMENT  Ratin  Location: denies       COGNITION  Overall Cognitive Status:  WFL - within functional limits    RANGE OF MOTION AND STRENGTH ASSESSMENT  Upper extremity ROM and strength are within functional limits   Lower extremity ROM is within functional limits   Lower extremity strength is functionally limited--self limited amb distance d/t weakness    BALANCE  Static Sitting: Fair  Dynamic Sitting: Fair -  Static Standing: Fair -  Dynamic Standing: Fair -    ADDITIONAL TESTS                                    NEUROLOGICAL FINDINGS                      ACTIVITY TOLERANCE  Pulse: 92  Heart Rate Source: Monitor     BP: 128/58  BP Location: Right arm  BP Method: Automatic  Patient Position: Lying    O2 WALK  Oxygen Therapy  SPO2% on Oxygen at Rest: 91 (95 when up in chair and talking)  At rest oxygen flow (liters per minute): 1  SPO2% Ambulation on Oxygen: 92  Ambulation oxygen flow (liters per minute): 1    AM-PAC '6-Clicks' INPATIENT SHORT FORM - BASIC MOBILITY  How much difficulty does the patient currently have...  Patient Difficulty: Turning over in bed (including adjusting bedclothes, sheets and blankets)?: A Little   Patient Difficulty: Sitting down on and standing up from a chair with arms (e.g., wheelchair, bedside commode, etc.): A Little   Patient Difficulty: Moving from lying on back to sitting on the side of the bed?: A Little   How much help from another person does the patient currently need...   Help from Another: Moving to and from a bed to a chair (including a wheelchair)?: A Little   Help from Another: Need to walk in hospital room?: A Little   Help from Another: Climbing 3-5 steps with a railing?: A Lot     AM-PAC Score:  Raw Score: 17   Approx Degree of Impairment: 50.57%   Standardized Score (AM-PAC Scale): 42.13   CMS Modifier (G-Code): CK    FUNCTIONAL ABILITY  STATUS  Functional Mobility/Gait Assessment  Gait Assistance: Contact guard assist  Distance (ft): 10  Assistive Device: Rolling walker  Pattern: Shuffle  Rolling: modified independent  Supine to Sit: minimal assist  Sit to Supine:  NT  Sit to Stand: contact guard assist    Exercise/Education Provided:  Bed mobility  Gait training  Transfer training  PT perezal    Pt ok to be seen per JANA Cha. Pt willing to participate in PT. PT POC explained to pt and spouse. Pt ordered lunch and is motivated to get OOB and sit up in chair. Pt liked sitting up chair with food on the chair tray earlier. Pt Rashad for bed mobility, CGA for sit to stand and amb. Pt cued for hand placement. Pt attempted to amb around bed to curtain, but only amb a few feet before deciding to turn back and sit in chair d/t weakness. Pt receives assist at baseline from CG and her amb is limited to household distances, though pt amb farther than 10 feet at baseline. Pt educ in benefits of OOB activity, incl sitting up in chair. While sitting up in chair and conversing, pt's O2 at 95-96. Pt provided with brush, able to brush hair while sitting up, O2 again at 95. Pt is demo'ing the mobility required to d/c home with assist and HHPT.    The patient's Approx Degree of Impairment: 50.57% has been calculated based on documentation in the [facility] '6 clicks' Inpatient Basic Mobility Short Form.  Research supports that patients with this level of impairment may benefit from gradual rehab vs home with HHPT; planning for home with HHPT as pt has assist at home and a stair lift.  Final disposition will be made by interdisciplinary medical team.    Patient End of Session: Up in chair;Needs met;Call light within reach;RN aware of session/findings;All patient questions and concerns addressed;Family present    CURRENT GOALS  Goals to be met by: 3/24/24  Patient Goal Patient's self-stated goal is: better oxygen readings   Goal #1 Patient is able to demonstrate supine - sit EOB  @ level: modified independent     Goal #1   Current Status    Goal #2 Patient is able to demonstrate transfers Sit to/from Stand at assistance level: supervision with walker - rolling     Goal #2  Current Status    Goal #3 Patient is able to ambulate 50 feet with assist device: walker - rolling at assistance level: supervision   Goal #3   Current Status    Goal #4 Patient will negotiate 2 stairs/one curb w/ assistive device and supervision   Goal #4   Current Status    Goal #5 Patient to demonstrate independence with home activity/exercise instructions provided to patient in preparation for discharge.   Goal #5   Current Status    Goal #6    Goal #6  Current Status      Patient Evaluation Complexity Level:  History Moderate - 1 or 2 personal factors and/or co-morbidities   Examination of body systems Low -  addressing 1-2 elements   Clinical Presentation Low- Stable   Clinical Decision Making  Low Complexity       Therapeutic Activity:  15 minutes

## 2024-03-17 NOTE — PLAN OF CARE
1L NC. Fall precautions in place.  Problem: PAIN - ADULT  Goal: Verbalizes/displays adequate comfort level or patient's stated pain goal  Description: INTERVENTIONS:  - Encourage pt to monitor pain and request assistance  - Assess pain using appropriate pain scale  - Administer analgesics based on type and severity of pain and evaluate response  - Implement non-pharmacological measures as appropriate and evaluate response  - Consider cultural and social influences on pain and pain management  - Manage/alleviate anxiety  - Utilize distraction and/or relaxation techniques  - Monitor for opioid side effects  - Notify MD/LIP if interventions unsuccessful or patient reports new pain  - Anticipate increased pain with activity and pre-medicate as appropriate  Outcome: Progressing     Problem: RISK FOR INFECTION - ADULT  Goal: Absence of fever/infection during anticipated neutropenic period  Description: INTERVENTIONS  - Monitor WBC  - Administer growth factors as ordered  - Implement neutropenic guidelines  Outcome: Progressing     Problem: SAFETY ADULT - FALL  Goal: Free from fall injury  Description: INTERVENTIONS:  - Assess pt frequently for physical needs  - Identify cognitive and physical deficits and behaviors that affect risk of falls.  - [place] fall precautions as indicated by assessment.  - Educate pt/family on patient safety including physical limitations  - Instruct pt to call for assistance with activity based on assessment  - Modify environment to reduce risk of injury  - Provide assistive devices as appropriate  - Consider OT/PT consult to assist with strengthening/mobility  - Encourage toileting schedule  Outcome: Progressing     Problem: RESPIRATORY - ADULT  Goal: Achieves optimal ventilation and oxygenation  Description: INTERVENTIONS:  - Assess for changes in respiratory status  - Assess for changes in mentation and behavior  - Position to facilitate oxygenation and minimize respiratory effort  -  Oxygen supplementation based on oxygen saturation or ABGs  - Provide Smoking Cessation handout, if applicable  - Encourage broncho-pulmonary hygiene including cough, deep breathe, Incentive Spirometry  - Assess the need for suctioning and perform as needed  - Assess and instruct to report SOB or any respiratory difficulty  - Respiratory Therapy support as indicated  - Manage/alleviate anxiety  - Monitor for signs/symptoms of CO2 retention  Outcome: Progressing

## 2024-03-17 NOTE — PLAN OF CARE
Problem: Patient Centered Care  Goal: Patient preferences are identified and integrated in the patient's plan of care  Description: Interventions:  - What would you like us to know as we care for you?   - Provide timely, complete, and accurate information to patient/family  - Incorporate patient and family knowledge, values, beliefs, and cultural backgrounds into the planning and delivery of care  - Encourage patient/family to participate in care and decision-making at the level they choose  - Honor patient and family perspectives and choices  Outcome: Progressing     Problem: PAIN - ADULT  Goal: Verbalizes/displays adequate comfort level or patient's stated pain goal  Description: INTERVENTIONS:  - Encourage pt to monitor pain and request assistance  - Assess pain using appropriate pain scale  - Administer analgesics based on type and severity of pain and evaluate response  - Implement non-pharmacological measures as appropriate and evaluate response  - Consider cultural and social influences on pain and pain management  - Manage/alleviate anxiety  - Utilize distraction and/or relaxation techniques  - Monitor for opioid side effects  - Notify MD/LIP if interventions unsuccessful or patient reports new pain  - Anticipate increased pain with activity and pre-medicate as appropriate  Outcome: Progressing     Problem: RISK FOR INFECTION - ADULT  Goal: Absence of fever/infection during anticipated neutropenic period  Description: INTERVENTIONS  - Monitor WBC  - Administer growth factors as ordered  - Implement neutropenic guidelines  Outcome: Progressing     Problem: SAFETY ADULT - FALL  Goal: Free from fall injury  Description: INTERVENTIONS:  - Assess pt frequently for physical needs  - Identify cognitive and physical deficits and behaviors that affect risk of falls.  - [place] fall precautions as indicated by assessment.  - Educate pt/family on patient safety including physical limitations  - Instruct pt to call  for assistance with activity based on assessment  - Modify environment to reduce risk of injury  - Provide assistive devices as appropriate  - Consider OT/PT consult to assist with strengthening/mobility  - Encourage toileting schedule  Outcome: Progressing     Problem: RESPIRATORY - ADULT  Goal: Achieves optimal ventilation and oxygenation  Description: INTERVENTIONS:  - Assess for changes in respiratory status  - Assess for changes in mentation and behavior  - Position to facilitate oxygenation and minimize respiratory effort  - Oxygen supplementation based on oxygen saturation or ABGs  - Provide Smoking Cessation handout, if applicable  - Encourage broncho-pulmonary hygiene including cough, deep breathe, Incentive Spirometry  - Assess the need for suctioning and perform as needed  - Assess and instruct to report SOB or any respiratory difficulty  - Respiratory Therapy support as indicated  - Manage/alleviate anxiety  - Monitor for signs/symptoms of CO2 retention  Outcome: Progressing     Problem: METABOLIC/FLUID AND ELECTROLYTES - ADULT  Goal: Electrolytes maintained within normal limits  Description: INTERVENTIONS:  - Monitor labs and rhythm and assess patient for signs and symptoms of electrolyte imbalances  - Administer electrolyte replacement as ordered  - Monitor response to electrolyte replacements, including rhythm and repeat lab results as appropriate  - Fluid restriction as ordered  - Instruct patient on fluid and nutrition restrictions as appropriate  Outcome: Progressing  Goal: Hemodynamic stability and optimal renal function maintained  Description: INTERVENTIONS:  - Monitor labs and assess for signs and symptoms of volume excess or deficit  - Monitor intake, output and patient weight  - Monitor urine specific gravity, serum osmolarity and serum sodium as indicated or ordered  - Monitor response to interventions for patient's volume status, including labs, urine output, blood pressure (other measures  as available)  - Encourage oral intake as appropriate  - Instruct patient on fluid and nutrition restrictions as appropriate  Outcome: Progressing     Problem: MUSCULOSKELETAL - ADULT  Goal: Return mobility to safest level of function  Description: INTERVENTIONS:  - Assess patient stability and activity tolerance for standing, transferring and ambulating w/ or w/o assistive devices  - Assist with transfers and ambulation using safe patient handling equipment as needed  - Ensure adequate protection for wounds/incisions during mobilization  - Obtain PT/OT consults as needed  - Advance activity as appropriate  - Communicate ordered activity level and limitations with patient/family  Outcome: Progressing     Patient A/Ox3, [place], forgetful redirectable. Encourage activity, up in chair with safety use of walker, and assistance. Pain manage per protocol. Continue IV antibx.  for discharge planning. PT/OT eval. Call light within reach.

## 2024-03-17 NOTE — CM/SW NOTE
03/17/24 0900   CM/SW Referral Data   Referral Source Social Work (self-referral)   Reason for Referral Discharge planning   Informant EMR;Clinical Staff Member;Spouse/Significant Other   Medical Hx   Does patient have an established PCP? Yes  (Dr. Kathy Rao)   Significant Past Medical/Mental Health Hx COPD   Patient Info   Advanced directives? Yes   Patient's Current Mental Status at Time of Assessment Confused or unable to complete assessment   Patient's Home Environment House   Number of Levels in Home 2   Patient lives with Spouse/Significant other   Patient Status Prior to Admission   Independent with ADLs and Mobility No   Pt. requires assistance with Housework;Driving;Bathing;Meals;Ambulating;Dressing;Medications;Toileting;Feeding;Finances   Services in place prior to admission Home Health Care;DME/Supplies at home;senior living Home Care   Home Health Provider Info [facility]   Type of DME/Supplies Wheeled Walker;Rollator Walker;Wheelchair;Stair Lift;Nebulizer   senior living Care days per week 4   Discharge Needs   Anticipated D/C needs No anticipated discharge needs;Medical equipment   Choice of Post-Acute Provider   Informed patient of right to choose their preferred provider Yes       SW self-referred to this case for discharge planning.    Pt admitted from home for weakness/cough/ acute cystitis.      Pt is from home w/ and part-time caregiver Portia (4x per week).    Pt current with [facility]- per OT evaluation pt was too weak for home pT session before admission.    JAMES sent referral to [facility] in [place].  ELADIO entered.    It is unclear whether pt continues to have home oxygen- pt has dx of COPD and has been discharged in the past with O2 outpatient.  JAMES left VM for pt's spouse Alphonse to clarify.  Pt does have a home nebulizer per EMR but does not use it.    PLAN: DC home w/[facility].  SW to clarify home oxygen usage/needs at discharge  TBD    / to remain available for support and/or discharge planning.     Tena Martínez MSW, LSW [phone]

## 2024-03-17 NOTE — CM/SW NOTE
03/17/24 0900   CM/SW Referral Data   Referral Source Social Work (self-referral)   Reason for Referral Discharge planning   Informant EMR;Clinical Staff Member;Spouse/Significant Other   Medical Hx   Does patient have an established PCP? Yes  (Dr. Kathy Rao)   Significant Past Medical/Mental Health Hx COPD   Patient Info   Advanced directives? Yes   Patient's Current Mental Status at Time of Assessment Confused or unable to complete assessment   Patient's Home Environment House   Number of Levels in Home 2   Patient lives with Spouse/Significant other   Patient Status Prior to Admission   Independent with ADLs and Mobility No   Pt. requires assistance with Housework;Driving;Bathing;Meals;Ambulating;Dressing;Medications;Toileting;Feeding;Finances   Services in place prior to admission Home Health Care;DME/Supplies at home;longterm Home Care   Home Health Provider Info [facility]   Type of DME/Supplies Wheeled Walker;Rollator Walker;Wheelchair;Stair Lift   longterm Care days per week 4   Discharge Needs   Anticipated D/C needs No anticipated discharge needs;Medical equipment   Choice of Post-Acute Provider   Informed patient of right to choose their preferred provider Yes     SW self-referred to this case for discharge planning.    Pt admitted form home for acute cystitis.

## 2024-03-18 VITALS
HEART RATE: 81 BPM | SYSTOLIC BLOOD PRESSURE: 141 MMHG | BODY MASS INDEX: 22.57 KG/M2 | TEMPERATURE: 98 F | OXYGEN SATURATION: 93 % | WEIGHT: 127.38 LBS | RESPIRATION RATE: 18 BRPM | HEIGHT: 63 IN | DIASTOLIC BLOOD PRESSURE: 56 MMHG

## 2024-03-18 PROCEDURE — 99239 HOSP IP/OBS DSCHRG MGMT >30: CPT | Performed by: INTERNAL MEDICINE

## 2024-03-18 RX ORDER — SULFAMETHOXAZOLE AND TRIMETHOPRIM 800; 160 MG/1; MG/1
1 TABLET ORAL EVERY 12 HOURS SCHEDULED
Qty: 12 TABLET | Refills: 0 | Status: SHIPPED | OUTPATIENT
Start: 2024-03-18 | End: 2024-03-21

## 2024-03-18 RX ORDER — ACETAMINOPHEN 325 MG/1
650 TABLET ORAL EVERY 6 HOURS PRN
Status: DISCONTINUED | OUTPATIENT
Start: 2024-03-18 | End: 2024-03-18

## 2024-03-18 RX ORDER — ATORVASTATIN CALCIUM 40 MG/1
40 TABLET, FILM COATED ORAL NIGHTLY
Qty: 90 TABLET | Refills: 3 | Status: SHIPPED | OUTPATIENT
Start: 2024-03-18

## 2024-03-18 RX ORDER — ASPIRIN 81 MG/1
81 TABLET ORAL DAILY
Qty: 90 TABLET | Refills: 3 | Status: SHIPPED | OUTPATIENT
Start: 2024-03-18

## 2024-03-18 RX ORDER — SULFAMETHOXAZOLE AND TRIMETHOPRIM 800; 160 MG/1; MG/1
1 TABLET ORAL EVERY 12 HOURS SCHEDULED
Status: DISCONTINUED | OUTPATIENT
Start: 2024-03-18 | End: 2024-03-18

## 2024-03-18 NOTE — PROGRESS NOTES
[facility]  part of [facility]    Progress Note    Mireya Wolfe Patient Status:  Inpatient    1935 MRN [id number]   Location [facility] 5SW/SE Attending Kathy Rao MD   Hosp Day # 4 PCP Kathy Rao MD       SUBJECTIVE:  She's feeling sleepy. No pain anywhere    OBJECTIVE:  Vital signs in last 24 hours:  /53 (BP Location: Right arm)   Pulse 80   Temp 98 °F (36.7 °C) (Oral)   Resp 16   Ht 5' 3\" (1.6 m)   Wt 127 lb 6.4 oz (57.8 kg)   SpO2 93%   BMI 22.57 kg/m²     Intake/Output:    Intake/Output Summary (Last 24 hours) at 3/18/2024 0812  Last data filed at 3/18/2024 0639  Gross per 24 hour   Intake 340 ml   Output 800 ml   Net -460 ml       Wt Readings from Last 3 Encounters:   24 127 lb 6.4 oz (57.8 kg)   24 125 lb 4.8 oz (56.8 kg)   23 125 lb 6.4 oz (56.9 kg)       EXAM:  GENERAL: well developed, well nourished, in no apparent distress  LUNGS: clear to auscultation  CARDIO: RRR, normal S1S2, without murmur or gallop  GI: soft, NT, ND, NABS, no HSM  EXTREMITIES: no cyanosis, clubbing or edema    Data Review:     Labs:          Imaging:  No results found.            Meds:         Assessment & Plan    Acute cystitis with hematuria  Presented with severe fatigue, sleeping a lot  Wbc normal  Ua +nitrite, LE, wbcs.   Unfortunately a urine culture was not sent in the ED (only a routine UA) (prior urine cx in 2024 with ecoli resistant to ampicillin, FQ).    Urine cx sent yesterday 3/16 - no growth - though Abx started prior to obtaining urine cultre  Bcx x 3 NGTD  Continue ceftriaxone 1g IV q24h (day 4) - transition to bactrim for additional 6 days based on last positive blood culture  Po vanco for CDP    Chronic hypoxic respiratory failure (HCC)  COPD  -quit smoking in 2016  -cont Breo, prn duonebs  -On O2 1L NC (94%) -- pt was off O2 when I saw her this am -- sats 90%.  D/w pt that she will likely need to continue O2 at home (she already  has the O2 set up)     H/o Sepsis with E.coli bacteremia  UTI  E.coli -- R to amp/levaquin/cipro  -s/p zosyn, then cefazolin, then po keflex     Depression and anxiety  -pt with long hx of depression (and anxiety), exacerbated by marital strife/stress  -previously saw psychiatrist Dr. Friend who retired  -on Lexapro 15mg/day     Hx of elevated Troponins (during hosp admission for urosepsis in 1/2024)  Trop 199>2249>3228  EKG-sinus tachycardia  Echo with new depressed LV syst function (EF 45-50%) -- prev echo in 2019 (EF 55-60%)  Likely Type II MI --demand ischemia related to hypoxia/tachycardia/sepsis  ; started lipitor 40mg at bedtime in 1/2024  Pt was to follow up as outpatient with Dr. Felix to discuss possible ischemic w/u.  Discussed with son John, and dtr Nelli at that time  Start ASA 81mg/day     Hypertension  Amlodipine 5mg daily     Hx of iron deficiency anemia  Had EGD/C-scope in 2010 (Dr. Ferrari) -- negative. 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 Continue FeSO4 325mg/day.       Hx of frequent UTI   On methenamine as outpt; sees urology Dr. Munir Cullen     Eventration of left hemidiaphragm  Previously thought to be due to large HH  CT chest clarified 7/2023 - small HH but large eventration of left hemidiaphragm     Osteoporosis  DEXA in 7/2017- osteoporosis of left femoral neck (T -2.9).  Pt was briefly on fosamax in 2014. Declines restarting fosamax or other meds so would not check repeat DEXA.     Vitamin B12 deficiency    Level normal (1241) in 11/2023; cont B12 1000 mcg/day     Lumbar spinal stenosis, chronic back pain    Has seen Dr. Salgado (ortho spine at [facility]).  Referred to pain specialist, Dr. Deirdre Elizabeth at [facility]. Had facet injections in 8/2018 which helped tremendously.  Saw Dr. Elizabeth again 9/25/18; did PT.  MRI in 4/2019 did not appear to show anything acute.  No longer taking Norco.      Bilateral knee OA  Sees ortho Dr. Booth -- has had b/l cortisone injections with transient improvement.  Has been advised for TKR's but trying to avoid. Ambulates with a walker.       Basal and squamous cell cancer, face  S/p Mohs surgeries in 4 areas of her face in 2018 (Dr. Jonas).      Post-herpetic neuralgia (dx'ed with shingles left upper back 6/8/22)  -still with pain but significantly improved  -on Lyrica 50mg BID      Cognitive decline, likely age-related, poss exacerbated by depression  -pt saw neurologist Dr. Lokesh Mcmullen in 9/2022. Was thought to have late onset Alzheimer's dementia (started on aricept), though pt had not shown obvious sx of dementia prior to this admission  Pt was referred for neuropsych testing and MRI brain in 2022 by Dr. Mcmullen though did not schedule.    -had delirium 7/2023 while hospitalized--zyprexa caused sedation;   -Pt does not want to take seroquel anymore due to daytime sedation  -abilify 1mg daily     Advance directives  - POLST in chart,  DNR/select      Dispo  -seen by OT -- note implies rec for [facility]  - PT anticipates - anticipate home with HH  -will ask SW to assist  - PT/OT to  reassess - if HH, can likely go home today if does well with bactrim.     Reno Lee MD, 03/18/24, 8:30 AM    Addendum: Spoke with spouse Alphonse.  Gave an update.  States that Mireya typically uses 2 L nasal cannula at home, most times during the day.  They are established with interim health at their assisted living facility in the event home health is recommended.  Aware of possible discharge today.

## 2024-03-18 NOTE — DISCHARGE INSTRUCTIONS
You were seen in the hospital due to urinary tract infection.  You responded to antibiotics and will need to complete a course of antibiotics:    5 days of Bactrim 1 tablet twice a day  -Reviewed food and water to minimize upset stomach, diarrhea    Please continue cholesterol medication:  - Atorvastatin 40 mg nightly  - Aspirin 81 mg daily    You will need to continue oxygen at home    Follow-up with Dr. Rao in 1-2 weeks

## 2024-03-18 NOTE — OCCUPATIONAL THERAPY NOTE
OCCUPATIONAL THERAPY TREATMENT NOTE - INPATIENT        Room Number: 570/570-A     Presenting Problem: acute cystitis, fatigue, cough, congestion    Problem List  Principal Problem:    Acute cystitis with hematuria  Active Problems:    Chronic obstructive pulmonary disease (HCC)    Anxiety and depression    Severe episode of recurrent major depressive disorder, without psychotic features ([facility])      OCCUPATIONAL THERAPY ASSESSMENT   Patient demonstrates fair progress this session, goals remain in progress.    Patient continues to function near baseline with  ADLs and fx mobility/transfers .   Contributing factors to remaining limitations include decreased functional strength, decreased functional reach, decreased endurance, and decreased muscular endurance.  Next session anticipate patient to progress lower body dressing and functional standing tolerance.  Occupational Therapy will continue to follow patient for duration of hospitalization.    Patient continues to benefit from continued skilled OT services: at discharge to promote functional independence and safety with additional support and return home with home health OT.     PLAN  OT Treatment Plan: Balance activities;Energy conservation/work simplification techniques;ADL training;Functional transfer training;Endurance training;Patient/Family education;Patient/Family training;Equipment eval/education;Compensatory technique education  OT Device Recommendations: TBD    SUBJECTIVE  \"I've been having to go to the bathroom a lot so I haven't slept well.\"    OBJECTIVE  Precautions: Bed/chair alarm    WEIGHT BEARING RESTRICTION  None    PAIN ASSESSMENT  Ratin      ACTIVITIES OF DAILY LIVING ASSESSMENT  AM-PAC ‘6-Clicks’ Inpatient Daily Activity Short Form  How much help from another person does the patient currently need…  -   Putting on and taking off regular lower body clothing?: A Lot  -   Bathing (including washing, rinsing, drying)?: A Lot  -   Toileting, which  includes using toilet, bedpan or urinal? : A Lot  -   Putting on and taking off regular upper body clothing?: A Little  -   Taking care of personal grooming such as brushing teeth?: A Little  -   Eating meals?: None    AM-PAC Score:  Score: 16  Approx Degree of Impairment: 53.32%  Standardized Score (AM-PAC Scale): 35.96  CMS Modifier (G-Code): CK    BED MOBILITY  Supine to Sit: CGA   Comments:  Patient required encouragement for OOB mobility on this date.    FUNCTIONAL TRANSFER ASSESSMENT  Sit to Stand from EOB: CGA  Chair Transfer: CGA    FUNCTIONAL MOBILITY  CGA for steps from bed>chair using RW    FUNCTIONAL ADL ASSESSMENT  LB Dressing: min assist - demonstrated limited fx reach for d/d socks while seated at EOB    Addendum 1215: Patient's spouse and caregiver arrived to prepare patient for discharge. Caregiver stated patient ambulates short distances using rollator with 1-person assist and requires occ min assist with transfers at baseline; min assist for ADLs overall. Required min assist for UB/LB dressing today.    EDUCATION PROVIDED  Patient: Role of Occupational Therapy; Plan of Care; Discharge Recommendations; Functional Transfer Techniques; Fall Prevention; Posture/Positioning; Proper Body Mechanics  Patient's Response to Education: Requires Further Education; Verbalized Understanding  Family/Caregiver: Role of Occupational Therapy; Discharge Recommendations; Plan of Care; Fall Prevention  Family/Caregiver's Response to Education: Verbalized Understanding    The patient's Approx Degree of Impairment: 53.32% has been calculated based on documentation in the [facility] '6 clicks' Inpatient Daily Activity Short Form.  Research supports that patients with this level of impairment may benefit from rehab.  Final disposition will be made by interdisciplinary medical team.    Patient End of Session: Up in chair;Needs met;Call light within reach;RN aware of session/findings;All patient questions and concerns  addressed;Alarm set    OT Goals:  Patient's self stated goal is: return home     Patient will complete functional transfer with SUP  Comment: ongoing    Patient will complete toileting with SUP  Comment: ongoing    Patient will complete grooming in standing at sink with SUP  Comment: ongoing    Patient will complete item retrieval with SUP  Comment: ongoing          Goals  on: 3/30/24  Frequency: 3-5x/week    OT Session Time  Therapeutic Activity: 15 minutes  Self Care Management: 10 minutes    PADMA Valdes/L  [facility]  #[id number]

## 2024-03-18 NOTE — CM/SW NOTE
03/18/24 1200   Discharge disposition   Expected discharge disposition Home or Self   Post Acute Care Provider [facility]   DME/Infusion Providers [facility]   Discharge transportation Private car     Pt discussed in RN DC rounds. SW confirmed that patient is current with [facility], patient is current with [facility]. They were notified of DC today via aidin.    SW/CM to remain available for support and/or discharge planning.     Cherie Purdy, MSW, LSW   x [phone]

## 2024-03-18 NOTE — PLAN OF CARE
No acute changes overnight. PRN tylenol given for pain. Safety precautions in place, call light within reach.    Problem: Patient Centered Care  Goal: Patient preferences are identified and integrated in the patient's plan of care  Description: Interventions:  - What would you like us to know as we care for you?   - Provide timely, complete, and accurate information to patient/family  - Incorporate patient and family knowledge, values, beliefs, and cultural backgrounds into the planning and delivery of care  - Encourage patient/family to participate in care and decision-making at the level they choose  - Honor patient and family perspectives and choices  Outcome: Progressing     Problem: Patient/Family Goals  Goal: Patient/Family Long Term Goal  Description: Patient's Long Term Goal: gain strength    Interventions:  - Monitor vitals  - Monitor appropriate labs  - Administer medications as ordered  - Follow MD's orders  - Update patient on plan of care   - Discharge planning     - See additional Care Plan goals for specific interventions  Outcome: Progressing  Goal: Patient/Family Short Term Goal  Description: Patient's Short Term Goal: go home    Interventions:   - Monitor vitals  - Monitor appropriate labs  - Administer medications as ordered  - Follow MD's orders  - Update patient on plan of care   - Discharge planning   - See additional Care Plan goals for specific interventions  Outcome: Progressing     Problem: PAIN - ADULT  Goal: Verbalizes/displays adequate comfort level or patient's stated pain goal  Description: INTERVENTIONS:  - Encourage pt to monitor pain and request assistance  - Assess pain using appropriate pain scale  - Administer analgesics based on type and severity of pain and evaluate response  - Implement non-pharmacological measures as appropriate and evaluate response  - Consider cultural and social influences on pain and pain management  - Manage/alleviate anxiety  - Utilize distraction  and/or relaxation techniques  - Monitor for opioid side effects  - Notify MD/LIP if interventions unsuccessful or patient reports new pain  - Anticipate increased pain with activity and pre-medicate as appropriate  Outcome: Progressing     Problem: RISK FOR INFECTION - ADULT  Goal: Absence of fever/infection during anticipated neutropenic period  Description: INTERVENTIONS  - Monitor WBC  - Administer growth factors as ordered  - Implement neutropenic guidelines  Outcome: Progressing     Problem: SAFETY ADULT - FALL  Goal: Free from fall injury  Description: INTERVENTIONS:  - Assess pt frequently for physical needs  - Identify cognitive and physical deficits and behaviors that affect risk of falls.  - [place] fall precautions as indicated by assessment.  - Educate pt/family on patient safety including physical limitations  - Instruct pt to call for assistance with activity based on assessment  - Modify environment to reduce risk of injury  - Provide assistive devices as appropriate  - Consider OT/PT consult to assist with strengthening/mobility  - Encourage toileting schedule  Outcome: Progressing     Problem: RESPIRATORY - ADULT  Goal: Achieves optimal ventilation and oxygenation  Description: INTERVENTIONS:  - Assess for changes in respiratory status  - Assess for changes in mentation and behavior  - Position to facilitate oxygenation and minimize respiratory effort  - Oxygen supplementation based on oxygen saturation or ABGs  - Provide Smoking Cessation handout, if applicable  - Encourage broncho-pulmonary hygiene including cough, deep breathe, Incentive Spirometry  - Assess the need for suctioning and perform as needed  - Assess and instruct to report SOB or any respiratory difficulty  - Respiratory Therapy support as indicated  - Manage/alleviate anxiety  - Monitor for signs/symptoms of CO2 retention  Outcome: Progressing     Problem: METABOLIC/FLUID AND ELECTROLYTES - ADULT  Goal: Electrolytes maintained  within normal limits  Description: INTERVENTIONS:  - Monitor labs and rhythm and assess patient for signs and symptoms of electrolyte imbalances  - Administer electrolyte replacement as ordered  - Monitor response to electrolyte replacements, including rhythm and repeat lab results as appropriate  - Fluid restriction as ordered  - Instruct patient on fluid and nutrition restrictions as appropriate  Outcome: Progressing  Goal: Hemodynamic stability and optimal renal function maintained  Description: INTERVENTIONS:  - Monitor labs and assess for signs and symptoms of volume excess or deficit  - Monitor intake, output and patient weight  - Monitor urine specific gravity, serum osmolarity and serum sodium as indicated or ordered  - Monitor response to interventions for patient's volume status, including labs, urine output, blood pressure (other measures as available)  - Encourage oral intake as appropriate  - Instruct patient on fluid and nutrition restrictions as appropriate  Outcome: Progressing     Problem: MUSCULOSKELETAL - ADULT  Goal: Return mobility to safest level of function  Description: INTERVENTIONS:  - Assess patient stability and activity tolerance for standing, transferring and ambulating w/ or w/o assistive devices  - Assist with transfers and ambulation using safe patient handling equipment as needed  - Ensure adequate protection for wounds/incisions during mobilization  - Obtain PT/OT consults as needed  - Advance activity as appropriate  - Communicate ordered activity level and limitations with patient/family  Outcome: Progressing

## 2024-03-18 NOTE — PLAN OF CARE
Up to chair with assist. Tolerating diet. Pt  and caregiver at bedside for discharge instructions. Pt verbalized understanding of new medication and follow up care. Educated on pt needing to wear oxygen at all times at home, family confirms they have every thing set up and have a pulse ox monitor at home as well. IV taken out. Pt discharged home in stable condition.     Problem: Patient Centered Care  Goal: Patient preferences are identified and integrated in the patient's plan of care  Description: Interventions:  - What would you like us to know as we care for you? I have a CG zachery  - Provide timely, complete, and accurate information to patient/family  - Incorporate patient and family knowledge, values, beliefs, and cultural backgrounds into the planning and delivery of care  - Encourage patient/family to participate in care and decision-making at the level they choose  - Honor patient and family perspectives and choices  Outcome: Completed     Problem: Patient/Family Goals  Goal: Patient/Family Long Term Goal  Description: Patient's Long Term Goal: gain strength    Interventions:  - Monitor vitals  - Monitor appropriate labs  - Administer medications as ordered  - Follow MD's orders  - Update patient on plan of care   - Discharge planning     - See additional Care Plan goals for specific interventions  Outcome: Completed  Goal: Patient/Family Short Term Goal  Description: Patient's Short Term Goal: go home    Interventions:   - Monitor vitals  - Monitor appropriate labs  - Administer medications as ordered  - Follow MD's orders  - Update patient on plan of care   - Discharge planning   - See additional Care Plan goals for specific interventions  Outcome: Completed     Problem: PAIN - ADULT  Goal: Verbalizes/displays adequate comfort level or patient's stated pain goal  Description: INTERVENTIONS:  - Encourage pt to monitor pain and request assistance  - Assess pain using appropriate pain scale  -  Administer analgesics based on type and severity of pain and evaluate response  - Implement non-pharmacological measures as appropriate and evaluate response  - Consider cultural and social influences on pain and pain management  - Manage/alleviate anxiety  - Utilize distraction and/or relaxation techniques  - Monitor for opioid side effects  - Notify MD/LIP if interventions unsuccessful or patient reports new pain  - Anticipate increased pain with activity and pre-medicate as appropriate  Outcome: Completed     Problem: RISK FOR INFECTION - ADULT  Goal: Absence of fever/infection during anticipated neutropenic period  Description: INTERVENTIONS  - Monitor WBC  - Administer growth factors as ordered  - Implement neutropenic guidelines  Outcome: Completed     Problem: SAFETY ADULT - FALL  Goal: Free from fall injury  Description: INTERVENTIONS:  - Assess pt frequently for physical needs  - Identify cognitive and physical deficits and behaviors that affect risk of falls.  - [place] fall precautions as indicated by assessment.  - Educate pt/family on patient safety including physical limitations  - Instruct pt to call for assistance with activity based on assessment  - Modify environment to reduce risk of injury  - Provide assistive devices as appropriate  - Consider OT/PT consult to assist with strengthening/mobility  - Encourage toileting schedule  Outcome: Completed     Problem: RESPIRATORY - ADULT  Goal: Achieves optimal ventilation and oxygenation  Description: INTERVENTIONS:  - Assess for changes in respiratory status  - Assess for changes in mentation and behavior  - Position to facilitate oxygenation and minimize respiratory effort  - Oxygen supplementation based on oxygen saturation or ABGs  - Provide Smoking Cessation handout, if applicable  - Encourage broncho-pulmonary hygiene including cough, deep breathe, Incentive Spirometry  - Assess the need for suctioning and perform as needed  - Assess and instruct  to report SOB or any respiratory difficulty  - Respiratory Therapy support as indicated  - Manage/alleviate anxiety  - Monitor for signs/symptoms of CO2 retention  Outcome: Completed     Problem: METABOLIC/FLUID AND ELECTROLYTES - ADULT  Goal: Electrolytes maintained within normal limits  Description: INTERVENTIONS:  - Monitor labs and rhythm and assess patient for signs and symptoms of electrolyte imbalances  - Administer electrolyte replacement as ordered  - Monitor response to electrolyte replacements, including rhythm and repeat lab results as appropriate  - Fluid restriction as ordered  - Instruct patient on fluid and nutrition restrictions as appropriate  Outcome: Completed  Goal: Hemodynamic stability and optimal renal function maintained  Description: INTERVENTIONS:  - Monitor labs and assess for signs and symptoms of volume excess or deficit  - Monitor intake, output and patient weight  - Monitor urine specific gravity, serum osmolarity and serum sodium as indicated or ordered  - Monitor response to interventions for patient's volume status, including labs, urine output, blood pressure (other measures as available)  - Encourage oral intake as appropriate  - Instruct patient on fluid and nutrition restrictions as appropriate  Outcome: Completed     Problem: MUSCULOSKELETAL - ADULT  Goal: Return mobility to safest level of function  Description: INTERVENTIONS:  - Assess patient stability and activity tolerance for standing, transferring and ambulating w/ or w/o assistive devices  - Assist with transfers and ambulation using safe patient handling equipment as needed  - Ensure adequate protection for wounds/incisions during mobilization  - Obtain PT/OT consults as needed  - Advance activity as appropriate  - Communicate ordered activity level and limitations with patient/family  Outcome: Completed

## 2024-03-18 NOTE — PHYSICAL THERAPY NOTE
PHYSICAL THERAPY TREATMENT NOTE - INPATIENT     Room Number: 570/570-A       Presenting Problem: cough, congestion; acute cystitis with hematuria  Co-Morbidities : COPD, recent UTI, spinal stenosis    Problem List  Principal Problem:    Acute cystitis with hematuria  Active Problems:    Chronic obstructive pulmonary disease (HCC)    Anxiety and depression    Severe episode of recurrent major depressive disorder, without psychotic features ([facility])      PHYSICAL THERAPY ASSESSMENT   Patient demonstrates fair progress this session, goals  remain in progress.    Patient continues to function below baseline with bed mobility, transfers, and gait.  Contributing factors to remaining limitations include decreased functional strength, decreased endurance/aerobic capacity, and medical status.  Next session anticipate patient to progress bed mobility, transfers, and gait.  Physical Therapy will continue to follow patient for duration of hospitalization.    Patient continues to benefit from continued skilled PT services: at discharge to promote functional independence and safety with additional support and return home with home health PT pending confirmation of spouse ability to provide hands on assist.     PLAN  PT Treatment Plan: Bed mobility;Endurance;Energy conservation;Patient education;Family education;Gait training;Strengthening;Stair training;Transfer training;Balance training       SUBJECTIVE  \"I'm tired.\"     OBJECTIVE  Precautions: Bed/chair alarm    WEIGHT BEARING RESTRICTION      No restrictions           PAIN ASSESSMENT   Ratin  Location: denies       BALANCE  Static Sitting: Fair  Dynamic Sitting: Fair -  Static Standing: Fair -  Dynamic Standing: Fair -    AM-PAC '6-Clicks' INPATIENT SHORT FORM - BASIC MOBILITY  How much difficulty does the patient currently have...  Patient Difficulty: Turning over in bed (including adjusting bedclothes, sheets and blankets)?: A Little   Patient Difficulty: Sitting down on  and standing up from a chair with arms (e.g., wheelchair, bedside commode, etc.): A Little   Patient Difficulty: Moving from lying on back to sitting on the side of the bed?: A Little   How much help from another person does the patient currently need...   Help from Another: Moving to and from a bed to a chair (including a wheelchair)?: A Little   Help from Another: Need to walk in hospital room?: A Little   Help from Another: Climbing 3-5 steps with a railing?: A Lot     AM-PAC Score:  Raw Score: 17   Approx Degree of Impairment: 50.57%   Standardized Score (AM-PAC Scale): 42.13   CMS Modifier (G-Code): CK    FUNCTIONAL ABILITY STATUS  Functional Mobility/Gait Assessment  Gait Assistance: Contact guard assist;Minimum assistance  Distance (ft): 4'  Assistive Device: Rolling walker  Pattern: Shuffle  Rolling: contact guard assist  Supine to Sit: contact guard assist  Sit to Supine: contact guard assist  Sit to Stand: contact guard assist    The patient's Approx Degree of Impairment: 50.57% has been calculated based on documentation in the [facility] '6 clicks' Inpatient Daily Activity Short Form.  Research supports that patients with this level of impairment may benefit from ROSEMARIE.  Final disposition will be made by interdisciplinary medical team.    Patient End of Session: Up in chair;Needs met;Call light within reach;RN aware of session/findings;All patient questions and concerns addressed;Alarm set    CURRENT GOALS   Goals to be met by: 3/24/24  Patient Goal Patient's self-stated goal is: better oxygen readings   Goal #1 Patient is able to demonstrate supine - sit EOB @ level: modified independent      Goal #1   Current Status  progressing    Goal #2 Patient is able to demonstrate transfers Sit to/from Stand at assistance level: supervision with walker - rolling      Goal #2  Current Status  progressing    Goal #3 Patient is able to ambulate 50 feet with assist device: walker - rolling at assistance level: supervision    Goal #3   Current Status  progressing    Goal #4 Patient will negotiate 2 stairs/one curb w/ assistive device and supervision   Goal #4   Current Status  progressing    Goal #5 Patient to demonstrate independence with home activity/exercise instructions provided to patient in preparation for discharge.   Goal #5   Current Status  progressing    Goal #6     Goal #6  Current Status       Therapeutic Activity: 15 minutes

## 2024-03-19 ENCOUNTER — PATIENT OUTREACH (OUTPATIENT)
Dept: CASE MANAGEMENT | Age: 89
End: 2024-03-19

## 2024-03-19 ENCOUNTER — TELEPHONE (OUTPATIENT)
Dept: INTERNAL MEDICINE CLINIC | Facility: CLINIC | Age: 89
End: 2024-03-19

## 2024-03-19 DIAGNOSIS — N30.01 ACUTE CYSTITIS WITH HEMATURIA: ICD-10-CM

## 2024-03-19 DIAGNOSIS — Z02.9 ENCOUNTERS FOR UNSPECIFIED ADMINISTRATIVE PURPOSE: Primary | ICD-10-CM

## 2024-03-19 DIAGNOSIS — F32.A ANXIETY AND DEPRESSION: ICD-10-CM

## 2024-03-19 DIAGNOSIS — F41.9 ANXIETY AND DEPRESSION: ICD-10-CM

## 2024-03-19 DIAGNOSIS — J44.9 COPD WITHOUT EXACERBATION (HCC): ICD-10-CM

## 2024-03-19 PROCEDURE — 1159F MED LIST DOCD IN RCRD: CPT

## 2024-03-19 PROCEDURE — 1111F DSCHRG MED/CURRENT MED MERGE: CPT

## 2024-03-19 NOTE — DISCHARGE SUMMARY
[facility]  part of [facility]    Discharge Summary    Mireya Wolfe Patient Status:  Inpatient    1935 MRN [id number]   Location [facility] 5SW/SE Attending No att. providers found   Hosp Day # 4 PCP Kathy Rao MD     Date of Admission: 3/14/2024   Date of Discharge: 3/18/2024    Admitting Diagnosis: Acute cystitis with hematuria [N30.01]    Disposition: Home with Home Health Care    Discharge Diagnosis: .Principal Problem:    Acute cystitis with hematuria  Active Problems:    Chronic obstructive pulmonary disease (HCC)    Anxiety and depression    Severe episode of recurrent major depressive disorder, without psychotic features (HCC)      Hospital Course:   Reason for Admission: Acute cystitis    Discharge Physical Exam:  Vital Signs:  Blood pressure 141/56, pulse 81, temperature 98.2 °F (36.8 °C), temperature source Oral, resp. rate 18, height 5' 3\" (1.6 m), weight 127 lb 6.4 oz (57.8 kg), SpO2 93%.     General: No acute distress. Alert and oriented x 3.  HEENT: Moist mucous membranes. EOM-I. PERRL  Neck: No lymphadenopathy.  No JVD. No carotid bruits.  Respiratory: Clear to auscultation bilaterally.  No wheezes. No rhonchi. On 1 L NC  Cardiovascular: S1, S2.  Regular rate and rhythm.  No murmurs. Equal pulses   Abdomen: Soft, nontender, nondistended.    Neurologic: No focal neurological deficits.  Musculoskeletal: Full range of motion of all extremities.  No swelling noted.  Integument: No lesions. No erythema.  Psychiatric: Appropriate mood and affect.    Hospital Course:   89 year old female with past medical history of COPD, dyslipidemia, recurrent UTIs presenting with weakness and dysuria. Started on empiric ceftriaxone. BCx negative, though UCx was not sent prior to starting Abx in ER. Patient with intermittent home O2 use. Continued on home inhaler therapy. She worked with PT and OT with gradual increase in her energy levels. She was deemed appropriate of .  Discharged on bactrim for additional 6 days.      Acute cystitis with hematuria  Presented with severe fatigue, sleeping a lot  Wbc normal  Ua +nitrite, LE, wbcs.   Unfortunately a urine culture was not sent in the ED (only a routine UA) (prior urine cx in 1/2024 with ecoli resistant to ampicillin, FQ).    Urine cx sent yesterday 3/16 - no growth - though Abx started prior to obtaining urine cultre  Bcx x 3 NGTD  Continue ceftriaxone 1g IV q24h (day 4) - transition to bactrim for additional 6 days based on last positive blood culture  Po vanco for CDP    Chronic hypoxic respiratory failure (HCC)  COPD  -quit smoking in 8/2016  -cont Breo, prn duonebs  -On O2 1L NC (94%) -- pt was off O2 when I saw her this am -- sats 90%.  D/w pt that she will likely need to continue O2 at home (she already has the O2 set up)     H/o Sepsis with E.coli bacteremia  UTI  E.coli -- R to amp/levaquin/cipro  -s/p zosyn, then cefazolin, then po keflex     Depression and anxiety  -pt with long hx of depression (and anxiety), exacerbated by marital strife/stress  -previously saw psychiatrist Dr. Friend who retired  -on Lexapro 15mg/day     Hx of elevated Troponins (during hosp admission for urosepsis in 1/2024)  Trop 199>2249>3228  EKG-sinus tachycardia  Echo with new depressed LV syst function (EF 45-50%) -- prev echo in 2019 (EF 55-60%)  Likely Type II MI --demand ischemia related to hypoxia/tachycardia/sepsis  ; started lipitor 40mg at bedtime in 1/2024  Pt was to follow up as outpatient with Dr. Felix to discuss possible ischemic w/u.  Discussed with son John, and dtr Nelli at that time  Start ASA 81mg/day     Hypertension  Amlodipine 5mg daily     Hx of iron deficiency anemia  Had EGD/C-scope in 2010 (Dr. Ferrari) -- negative. 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 Continue FeSO4 325mg/day.       Hx of frequent UTI   On methenamine as outpt; sees urology Dr. Munir Cullen      Eventration of left hemidiaphragm  Previously thought to be due to large HH  CT chest clarified 7/2023 - small HH but large eventration of left hemidiaphragm     Osteoporosis  DEXA in 7/2017- osteoporosis of left femoral neck (T -2.9).  Pt was briefly on fosamax in 2014. Declines restarting fosamax or other meds so would not check repeat DEXA.     Vitamin B12 deficiency    Level normal (1241) in 11/2023; cont B12 1000 mcg/day     Lumbar spinal stenosis, chronic back pain    Has seen Dr. Salgado (ortho spine at [facility]).  Referred to pain specialist, Dr. Deirdre Elizabeth at [facility]. Had facet injections in 8/2018 which helped tremendously.  Saw Dr. Elizabeth again 9/25/18; did PT.  MRI in 4/2019 did not appear to show anything acute.  No longer taking Norco.     Bilateral knee OA  Sees ortho Dr. Booth -- has had b/l cortisone injections with transient improvement.  Has been advised for TKR's but trying to avoid. Ambulates with a walker.       Basal and squamous cell cancer, face  S/p Mohs surgeries in 4 areas of her face in 2018 (Dr. Jonas).      Post-herpetic neuralgia (dx'ed with shingles left upper back 6/8/22)  -still with pain but significantly improved  -on Lyrica 50mg BID      Cognitive decline, likely age-related, poss exacerbated by depression  -pt saw neurologist Dr. Lokesh Mcmullen in 9/2022. Was thought to have late onset Alzheimer's dementia (started on aricept), though pt had not shown obvious sx of dementia prior to this admission  Pt was referred for neuropsych testing and MRI brain in 2022 by Dr. Mcmullen though did not schedule.    -had delirium 7/2023 while hospitalized--zyprexa caused sedation;   -Pt does not want to take seroquel anymore due to daytime sedation  -abilify 1mg daily     Advance directives  - POLST in chart,  DNR/select        Complications: None        Pending Labs       Order Current Status    Blood Culture Preliminary result    Blood Culture  Preliminary result            Discharge Plan:   Discharge Condition: Stable    Discharge Medication List as of 3/18/2024 11:45 AM        New Orders    Details   aspirin 81 MG Oral Tab EC Take 1 tablet (81 mg total) by mouth daily., Normal, Disp-90 tablet, R-3      sulfamethoxazole-trimethoprim -160 MG Oral Tab per tablet Take 1 tablet by mouth every 12 (twelve) hours for 6 days., Normal, Disp-12 tablet, R-0           Home Meds - Modified    Details   atorvastatin 40 MG Oral Tab Take 1 tablet (40 mg total) by mouth nightly., Normal, Disp-90 tablet, R-3           Home Meds - Unchanged    Details   ipratropium-albuterol 0.5-2.5 (3) MG/3ML Inhalation Solution Take 3 mL by nebulization every 6 (six) hours while awake., Historical      PREGABALIN 50 MG Oral Cap TAKE 1 CAPSULE BY MOUTH TWICE DAILY, Normal, Disp-180 capsule, R-1      AMLODIPINE 5 MG Oral Tab TAKE 1 TABLET BY MOUTH EVERY DAY. HOLD FOR SYSTOLIC BLOOD PRESSURE< 110, Normal, Disp-90 tablet, R-3**Patient requests 90 days supply**      escitalopram 5 MG Oral Tab Take 3 tablets (15 mg total) by mouth every morning., Normal, Disp-270 tablet, R-3      FLUTICASONE FUROATE-VILANTEROL 100-25 MCG/ACT Inhalation Aerosol Powder, Breath Activated INHALE 1 PUFF INTO THE LUNGS DAILY, Normal, Disp-180 each, R-3      Cholecalciferol (VITAMIN D) 125 MCG (5000 UT) Oral Cap Take 1 capsule (5,000 Units total) by mouth daily., Historical, Disp-30 capsule, R-0      ferrous sulfate 325 (65 FE) MG Oral Tab EC Take 1 tablet (325 mg total) by mouth daily with breakfast., Historical      Vitamin B-12 1000 MCG Oral Tab Take 1 tablet (1,000 mcg total) by mouth daily., Historical                 Discharge Diet: As tolerated    Discharge Activity: As tolerated    Follow up:      Follow-up Information       aKthy Rao MD. Schedule an appointment as soon as possible for a visit in 1 week(s).    Specialty: Internal Medicine  Why: Hospitalization follow-up in 1-2 weeks  Contact  information:  [address] [facility] [address]  [phone]                             Follow up Labs: None         Other Discharge Instructions:         You were seen in the hospital due to urinary tract infection.  You responded to antibiotics and will need to complete a course of antibiotics:    5 days of Bactrim 1 tablet twice a day  -Reviewed food and water to minimize upset stomach, diarrhea    Please continue cholesterol medication:  - Atorvastatin 40 mg nightly  - Aspirin 81 mg daily    You will need to continue oxygen at home    Follow-up with Dr. Rao in 1-2 weeks        I spent > 30 minutes in the coordination of care    Reno Lee MD  3/18/2024

## 2024-03-19 NOTE — PROGRESS NOTES
Initial Post Discharge Follow Up   Discharge Date: 3/18/24  Contact Date: 3/19/2024    Consent Verification:  Assessment Completed With: Patient  Spouse: Alphonse Permission received per patient?  verbal  HIPAA Verified?  Yes    Discharge Dx:   Principal Problem:    Acute cystitis with hematuria  Active Problems:    Chronic obstructive pulmonary disease (HCC)    Anxiety and depression    Severe episode of recurrent major depressive disorder, without psychotic features (HCC)    General:   How have you been since your discharge from the hospital? Mireya asked NCM to hold on and talk to her  Alphonse. Alphonse states that Mireya is doing ok, she does not have a fever/chills, no shortness of breath she is wearing her 02 at 2 Liters per nasal cannula. Alphonse states her 02 Sat's are good above 90%. Alphonse reports that he has a caregiver during the day and one in the evening, they check her vitals and keep a record of them, NCM instructed Alphonse to bring the records with to Mireya's follow up appointments. Alphonse reports that Mireya is not coughing, she does not have chest pain, no pain radiating from chest to neck, jaw, shoulders, arms or upper back. Alphonse states that Mireya does not have any abdominal pain, no complaints of burning urgency or frequency when urinating. NCM reviewed S&S of UTI's to watch out for and how to prevent UTI's. NCM instructed patient to change positions frequently, walk as tolerated, rest when needed, stay hydrated and continue to take up to ten deep breaths an hour while awake, or if watching television take a deep breath with every commercial. NC reviewed discharge instructions, medications, S&S of infection/blood clots with Alphonse, he verbalized understanding of these. Alphonse denies any further questions or needs at this time.  Do you have any pain since discharge?  No    When you were leaving the hospital were your discharge instructions reviewed with you? Yes  Do you have any  questions about your discharge instructions?  No  Before leaving the hospital was your diagnoses explained to you? Yes  Do you have any questions about your diagnoses? No  Are you able to perform normal daily activities of living as you have prior to your hospital stay (dressing, bathing, ambulating to the bathroom, etc)? no  If No, What are some barriers or concerns?  Alphonse states that he has a care giver 4 times a week. He reports that Mireya needs some assist with bathing and some ADL's.  (NCM) Was patient given a different diet per AVS? no, however, she does not eat a lot. Alphonse states if she is not eating much he will make sure Mireya has some Ensure.      Medications:   Current Outpatient Medications   Medication Sig Dispense Refill    atorvastatin 40 MG Oral Tab Take 1 tablet (40 mg total) by mouth nightly. 90 tablet 3    aspirin 81 MG Oral Tab EC Take 1 tablet (81 mg total) by mouth daily. 90 tablet 3    sulfamethoxazole-trimethoprim -160 MG Oral Tab per tablet Take 1 tablet by mouth every 12 (twelve) hours for 6 days. 12 tablet 0    ipratropium-albuterol 0.5-2.5 (3) MG/3ML Inhalation Solution Take 3 mL by nebulization every 6 (six) hours while awake.      PREGABALIN 50 MG Oral Cap TAKE 1 CAPSULE BY MOUTH TWICE DAILY 180 capsule 1    AMLODIPINE 5 MG Oral Tab TAKE 1 TABLET BY MOUTH EVERY DAY. HOLD FOR SYSTOLIC BLOOD PRESSURE< 110 90 tablet 3    escitalopram 5 MG Oral Tab Take 3 tablets (15 mg total) by mouth every morning. 270 tablet 3    FLUTICASONE FUROATE-VILANTEROL 100-25 MCG/ACT Inhalation Aerosol Powder, Breath Activated INHALE 1 PUFF INTO THE LUNGS DAILY 180 each 3    Cholecalciferol (VITAMIN D) 125 MCG (5000 UT) Oral Cap Take 1 capsule (5,000 Units total) by mouth daily. 30 capsule 0    ferrous sulfate 325 (65 FE) MG Oral Tab EC Take 1 tablet (325 mg total) by mouth daily with breakfast.      Vitamin B-12 1000 MCG Oral Tab Take 1 tablet (1,000 mcg total) by mouth daily.       Were there any  changes to your current medication(s) noted on the AVS? No  If a new medication was prescribed:    Was the new medication's purpose & side effects reviewed? Yes  Do you have any questions about your new medication? No  Did you  your discharge medications when you left the hospital? Yes  Let's go over your medications together to make sure we are not missing anything. Medications Reviewed  Are there any reasons that keep you from taking your medication as prescribed? No  Are you having any concerns with constipation? No  Did patient receive their flu shot (Sept-March)? Yes  Alphonse states that he is having a hard time trying to get Mireya to take her medications. He states he is crushing them up if he can. Alphonse states he wants to discuss this with Dr Rao.     Discharge medications reviewed/discussed/and reconciled against outpatient medications with patient.  Any changes or updates to medications sent to PCP.  Patient Acknowledged     Referrals/orders at D/C:  Referrals/orders placed at D/C? yes  What services:   Home health, PT, and OT   (If HH was ordered) Has HH been set up?  No, pt's  states that they received a call from a different HH company trying to set up a time to come out. Initially Mireya his  answered the call (she is very hard of hearing) she did not hear the name of the HH company. Alphonse did not know it was [employer] calling and told the person calling that Mireya has HH set up already. So the person they talked to at [facility] discharged the wife stating the patient went with a different HH company. NC spoke with JAMES Crespo at [facility] and she said the only HH company was [employer] and that they had sent the orders to [facility] and sent a message to [facility] via [employer] to confirm she had discharged home on 3/18/24. NC placed a call to [facility] and spoke with Isela who stated that the patient was discharged as the patient and her  stated that they had a HH company already set up,  Isela transferred [facility] to supervisor Kacy who finally agreed that there was a miscommunication and she would reverse the discharge and get the patient back on their services. [facility] called patient's  Alphonse to let him know that [facility] would be calling to set up a time to come out for resumption of care. Alphonse was happy to hear that stating that Mireya is familiar with the nurse and therapists from [facility].     If Yes: With Whom: [facility] [phone]    DME ordered at D/C? No, patient has home 02 already she uses it at 2 Liters per nasal cannula. Patient also has a care giver that comes in 4 times a week.       Discharge orders, AVS reviewed and discussed with patient. Any changes or updates to orders sent to PCP.  Patient Acknowledged      SDOH:   Transportation:    Transportation Needs: No Transportation Needs (3/14/2024)    Transportation Needs     Lack of Transportation: No       Financial Strain:    Financial Resource Strain: Low Risk  (3/19/2024)    Financial Resource Strain     Difficulty of Paying Living Expenses: Not on file     Med Affordability: No       Diagnosis specifics:   You were seen in the hospital due to urinary tract infection.   You responded to antibiotics and will need to complete a course of antibiotics:  5 days of Bactrim 1 tablet twice a day  -Reviewed food and water to minimize upset stomach, diarrhea    Please continue cholesterol medication:  - Atorvastatin 40 mg nightly  - Aspirin 81 mg daily  You will need to continue oxygen at home  Follow-up with Dr. Rao in 1-2 weeks    Notify physician if you experience any of the followin. persistent N/V  2. severe uncontrolled pain  3. redness, tenderness, or signs of infection (pain, swelling,  redness, odor or green/yellow discharge around incision site)  4. difficulty breathing, headache or visual disturbances  5. hives  6. persistent dizziness or light-headedness  7. extreme fatigue  8. Notify of Temp 100.4 or  greater    NCM reviewed some causes of UTI's - how to prevent them in women  Wiping incorrectly after urinating. Always wipe from front to back.  Bowel incontinence  Procedures such as having a catheter put in  Older age  Not emptying your bladder. This can give bacteria a chance to grow in your urine.  Fluid loss (dehydration), stay hydrated so the urine is a clear yellow color  Constipation      Follow up appointments:      TCC  Was TCC ordered: No    PCP (If no TCC appointment)  Does patient already have a PCP appointment scheduled? No  NCM Attempted to schedule PCP office TCM appointment with patient's spouse   If no appointment scheduled: Explain, Alphonse states he will call to schedule Mireya's appointment with Dr Rao. Message sent to MD's office.    Specialist    Does the patient have any other follow up appointment(s) needing to be scheduled? No  Does the patient need assistance scheduling appointment(s): No    Is there any reason as to why you cannot make your appointment(s)?  No     Needs post D/C:   Now that you are home, are there any needs or concerns you need addressed before your next visit with your PCP?  (DME, meds, questions, etc.): No    Interventions by NC:   NC reviewed medications, discharge instructions, S&S of infection/blood clots. Alphonse instructed to report any new or worsening symptoms, when to call the doctor and when to call 911. Alphonse verbalized understanding of these. [facility] instructed Alphonse to call Mireya's PCP with any questions or needs, he states he will.      [facility] referral placed:    No, not at this time.    BOOK BY DATE: 4/1/2024

## 2024-03-19 NOTE — TELEPHONE ENCOUNTER
Patients daughter Nelli called, she was wondering who is the home health services that could be resumed after patients discharge

## 2024-03-19 NOTE — TELEPHONE ENCOUNTER
Spoke to patient for TCM today.  Patient does not have an appointment scheduled at this time.  TCM appointment recommended by 3/25/2024 as patient is a High risk for readmission.  Please advise.    NCM Attempted to schedule PCP office TCM appointment with patient, Alphonse states he will call to schedule Mireya's appointment with Dr Rao. Message sent to MD's office.    BOOK BY DATE (last date for TCM): 4/1/2024    Clinical staff:  Please notify Dr Rao of the above, then please follow-up with patient or her  Alphonse and try to schedule a TCM appointment within the TCM timeframe.     Thank you!

## 2024-03-19 NOTE — PAYOR COMM NOTE
--------------  DISCHARGE REVIEW    Payor: [employer]  Subscriber #:  [id number]  Authorization Number: [id number]    Admit date: 3/14/24  Admit time:   8:59 PM  Discharge Date: 3/18/2024 12:43 PM     Admitting Physician: Kathy Rao MD  Attending Physician:  No att. providers found  Primary Care Physician: Kathy Rao MD          Discharge Summary Notes        Discharge Summary signed by Reno Lee MD at 3/18/2024  8:39 PM       Author: Reno Lee MD Specialty: Internal Medicine Author Type: Physician    Filed: 3/18/2024  8:39 PM Date of Service: 3/18/2024  8:31 PM Status: Signed    : Reno Lee MD (Physician)         [facility]  part of [facility]    Discharge Summary    Mireya Wolfe Patient Status:  Inpatient    1935 MRN [id number]   Location [facility] 5SW/SE Attending No att. providers found   Hosp Day # 4 PCP Kathy Rao MD     Date of Admission: 3/14/2024   Date of Discharge: 3/18/2024    Admitting Diagnosis: Acute cystitis with hematuria [N30.01]    Disposition: Home with Home Health Care    Discharge Diagnosis: .Principal Problem:    Acute cystitis with hematuria  Active Problems:    Chronic obstructive pulmonary disease (HCC)    Anxiety and depression    Severe episode of recurrent major depressive disorder, without psychotic features (HCC)      Hospital Course:   Reason for Admission: Acute cystitis    Discharge Physical Exam:  Vital Signs:  Blood pressure 141/56, pulse 81, temperature 98.2 °F (36.8 °C), temperature source Oral, resp. rate 18, height 5' 3\" (1.6 m), weight 127 lb 6.4 oz (57.8 kg), SpO2 93%.     General: No acute distress. Alert and oriented x 3.  HEENT: Moist mucous membranes. EOM-I. PERRL  Neck: No lymphadenopathy.  No JVD. No carotid bruits.  Respiratory: Clear to auscultation bilaterally.  No wheezes. No rhonchi. On 1 L NC  Cardiovascular: S1, S2.  Regular rate and rhythm.  No murmurs. Equal pulses    Abdomen: Soft, nontender, nondistended.    Neurologic: No focal neurological deficits.  Musculoskeletal: Full range of motion of all extremities.  No swelling noted.  Integument: No lesions. No erythema.  Psychiatric: Appropriate mood and affect.    Hospital Course:   89 year old female with past medical history of COPD, dyslipidemia, recurrent UTIs presenting with weakness and dysuria. Started on empiric ceftriaxone. BCx negative, though UCx was not sent prior to starting Abx in ER. Patient with intermittent home O2 use. Continued on home inhaler therapy. She worked with PT and OT with gradual increase in her energy levels. She was deemed appropriate of HH. Discharged on bactrim for additional 6 days.      Acute cystitis with hematuria  Presented with severe fatigue, sleeping a lot  Wbc normal  Ua +nitrite, LE, wbcs.   Unfortunately a urine culture was not sent in the ED (only a routine UA) (prior urine cx in 1/2024 with ecoli resistant to ampicillin, FQ).    Urine cx sent yesterday 3/16 - no growth - though Abx started prior to obtaining urine cultre  Bcx x 3 NGTD  Continue ceftriaxone 1g IV q24h (day 4) - transition to bactrim for additional 6 days based on last positive blood culture  Po vanco for CDP    Chronic hypoxic respiratory failure (HCC)  COPD  -quit smoking in 8/2016  -cont Breo, prn asha  -On O2 1L NC (94%) -- pt was off O2 when I saw her this am -- sats 90%.  D/w pt that she will likely need to continue O2 at home (she already has the O2 set up)     H/o Sepsis with E.coli bacteremia  UTI  E.coli -- R to amp/levaquin/cipro  -s/p zosyn, then cefazolin, then po keflex     Depression and anxiety  -pt with long hx of depression (and anxiety), exacerbated by marital strife/stress  -previously saw psychiatrist Dr. Friend who retired  -on Lexapro 15mg/day     Hx of elevated Troponins (during hosp admission for urosepsis in 1/2024)  Trop 199>2249>3228  EKG-sinus tachycardia  Echo with new depressed LV  syst function (EF 45-50%) -- prev echo in 2019 (EF 55-60%)  Likely Type II MI --demand ischemia related to hypoxia/tachycardia/sepsis  ; started lipitor 40mg at bedtime in 1/2024  Pt was to follow up as outpatient with Dr. Felix to discuss possible ischemic w/u.  Discussed with son John, and dtr Nelli at that time  Start ASA 81mg/day     Hypertension  Amlodipine 5mg daily     Hx of iron deficiency anemia  Had EGD/C-scope in 2010 (Dr. Ferrari) -- negative. 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 Continue FeSO4 325mg/day.       Hx of frequent UTI   On methenamine as outpt; sees urology Dr. Munir Cullen     Eventration of left hemidiaphragm  Previously thought to be due to large HH  CT chest clarified 7/2023 - small HH but large eventration of left hemidiaphragm     Osteoporosis  DEXA in 7/2017- osteoporosis of left femoral neck (T -2.9).  Pt was briefly on fosamax in 2014. Declines restarting fosamax or other meds so would not check repeat DEXA.     Vitamin B12 deficiency    Level normal (1241) in 11/2023; cont B12 1000 mcg/day     Lumbar spinal stenosis, chronic back pain    Has seen Dr. Salgado (ortho spine at [facility]).  Referred to pain specialist, Dr. Deirdre Elizabeth at [facility]. Had facet injections in 8/2018 which helped tremendously.  Saw Dr. Elizabeth again 9/25/18; did PT.  MRI in 4/2019 did not appear to show anything acute.  No longer taking Norco.     Bilateral knee OA  Sees ortho Dr. Booth -- has had b/l cortisone injections with transient improvement.  Has been advised for TKR's but trying to avoid. Ambulates with a walker.       Basal and squamous cell cancer, face  S/p Mohs surgeries in 4 areas of her face in 2018 (Dr. Jonas).      Post-herpetic neuralgia (dx'ed with shingles left upper back 6/8/22)  -still with pain but significantly improved  -on Lyrica 50mg BID      Cognitive decline, likely age-related, poss  exacerbated by depression  -pt saw neurologist Dr. Lokesh Mcmullen in 9/2022. Was thought to have late onset Alzheimer's dementia (started on aricept), though pt had not shown obvious sx of dementia prior to this admission  Pt was referred for neuropsych testing and MRI brain in 2022 by Dr. Mcmullen though did not schedule.    -had delirium 7/2023 while hospitalized--zyprexa caused sedation;   -Pt does not want to take seroquel anymore due to daytime sedation  -abilify 1mg daily     Advance directives  - POLST in chart,  DNR/select        Complications: None        Pending Labs       Order Current Status    Blood Culture Preliminary result    Blood Culture Preliminary result            Discharge Plan:   Discharge Condition: Stable    Discharge Medication List as of 3/18/2024 11:45 AM        New Orders    Details   aspirin 81 MG Oral Tab EC Take 1 tablet (81 mg total) by mouth daily., Normal, Disp-90 tablet, R-3      sulfamethoxazole-trimethoprim -160 MG Oral Tab per tablet Take 1 tablet by mouth every 12 (twelve) hours for 6 days., Normal, Disp-12 tablet, R-0           Home Meds - Modified    Details   atorvastatin 40 MG Oral Tab Take 1 tablet (40 mg total) by mouth nightly., Normal, Disp-90 tablet, R-3           Home Meds - Unchanged    Details   ipratropium-albuterol 0.5-2.5 (3) MG/3ML Inhalation Solution Take 3 mL by nebulization every 6 (six) hours while awake., Historical      PREGABALIN 50 MG Oral Cap TAKE 1 CAPSULE BY MOUTH TWICE DAILY, Normal, Disp-180 capsule, R-1      AMLODIPINE 5 MG Oral Tab TAKE 1 TABLET BY MOUTH EVERY DAY. HOLD FOR SYSTOLIC BLOOD PRESSURE< 110, Normal, Disp-90 tablet, R-3**Patient requests 90 days supply**      escitalopram 5 MG Oral Tab Take 3 tablets (15 mg total) by mouth every morning., Normal, Disp-270 tablet, R-3      FLUTICASONE FUROATE-VILANTEROL 100-25 MCG/ACT Inhalation Aerosol Powder, Breath Activated INHALE 1 PUFF INTO THE LUNGS DAILY, Normal, Disp-180 each, R-3       Cholecalciferol (VITAMIN D) 125 MCG (5000 UT) Oral Cap Take 1 capsule (5,000 Units total) by mouth daily., Historical, Disp-30 capsule, R-0      ferrous sulfate 325 (65 FE) MG Oral Tab EC Take 1 tablet (325 mg total) by mouth daily with breakfast., Historical      Vitamin B-12 1000 MCG Oral Tab Take 1 tablet (1,000 mcg total) by mouth daily., Historical                 Discharge Diet: As tolerated    Discharge Activity: As tolerated    Follow up:      Follow-up Information       Kathy Rao MD. Schedule an appointment as soon as possible for a visit in 1 week(s).    Specialty: Internal Medicine  Why: Hospitalization follow-up in 1-2 weeks  Contact information:  [address] [place] [id number] [phone]                             Follow up Labs: None         Other Discharge Instructions:         You were seen in the hospital due to urinary tract infection.  You responded to antibiotics and will need to complete a course of antibiotics:    5 days of Bactrim 1 tablet twice a day  -Reviewed food and water to minimize upset stomach, diarrhea    Please continue cholesterol medication:  - Atorvastatin 40 mg nightly  - Aspirin 81 mg daily    You will need to continue oxygen at home    Follow-up with Dr. Rao in 1-2 weeks        I spent > 30 minutes in the coordination of care    Reno Lee MD  3/18/2024      Electronically signed by Reno Lee MD on 3/18/2024  8:39 PM         REVIEWER COMMENTS

## 2024-03-19 NOTE — TELEPHONE ENCOUNTER
[facility] faxed      Care Certification and Plan of Care     Placed in Dr Rao' mail slot

## 2024-03-19 NOTE — CM/SW NOTE
JAMES received a call from Kcay RICARDO in CM regards to patient's  saying that he received a call from a different HH. James informed Kacy that patient is current with [facility] and there is no other HH company. JAMES provided updated [facility] messages to Kacy that Mary Ann and Sarah intake at [facility] from [facility] was made aware of patient's discharge yesterday and aware of patient going home yesterday. James called [facility], left  for [facility] to schedule visit with patient.     SW/CM to remain available for support and/or discharge planning.     Cherie uPrdy, MSW, LSW   x [phone]

## 2024-03-20 ENCOUNTER — TELEPHONE (OUTPATIENT)
Dept: INTERNAL MEDICINE CLINIC | Facility: CLINIC | Age: 89
End: 2024-03-20

## 2024-03-20 NOTE — TELEPHONE ENCOUNTER
She got 4 days of ceftriaxone and now a day of bactrim.  She can just stop the antibiotics altogether

## 2024-03-20 NOTE — TELEPHONE ENCOUNTER
Kacy from [facility] called to inform   Dr Rao of     Delay in care to resume patient's HH services     [facility] will see patient tomorrow 3/21

## 2024-03-20 NOTE — TELEPHONE ENCOUNTER
Pt's daughter Katlyn called asking if there is another antibiotic that is not as strong as Bactrim?  Pt. Has been on the Bactrim since yesterday,  She has stomach pain from it, extremely weak, can't get out of bed.

## 2024-03-20 NOTE — TELEPHONE ENCOUNTER
Per the 's note, the pt is already current with [facility], so I assume that's what was arranged when the pt was discharged yesterday 3/18

## 2024-03-21 ENCOUNTER — OFFICE VISIT (OUTPATIENT)
Dept: INTERNAL MEDICINE CLINIC | Facility: CLINIC | Age: 89
End: 2024-03-21

## 2024-03-21 VITALS
OXYGEN SATURATION: 94 % | DIASTOLIC BLOOD PRESSURE: 62 MMHG | SYSTOLIC BLOOD PRESSURE: 96 MMHG | BODY MASS INDEX: 23 KG/M2 | HEART RATE: 108 BPM | TEMPERATURE: 99 F | HEIGHT: 63 IN

## 2024-03-21 DIAGNOSIS — E86.0 DEHYDRATION: ICD-10-CM

## 2024-03-21 DIAGNOSIS — N30.01 ACUTE CYSTITIS WITH HEMATURIA: Primary | ICD-10-CM

## 2024-03-21 DIAGNOSIS — R79.89 ELEVATED TROPONIN: ICD-10-CM

## 2024-03-21 PROBLEM — D69.6 THROMBOCYTOPENIA: Chronic | Status: ACTIVE | Noted: 2024-03-21

## 2024-03-21 PROBLEM — R50.9 FEBRILE ILLNESS: Status: RESOLVED | Noted: 2024-01-15 | Resolved: 2024-03-21

## 2024-03-21 PROBLEM — D69.6 THROMBOCYTOPENIA (HCC): Chronic | Status: ACTIVE | Noted: 2024-03-21

## 2024-03-21 PROBLEM — J96.01 ACUTE RESPIRATORY FAILURE WITH HYPOXIA (HCC): Status: RESOLVED | Noted: 2024-01-15 | Resolved: 2024-03-21

## 2024-03-21 PROCEDURE — 3008F BODY MASS INDEX DOCD: CPT | Performed by: INTERNAL MEDICINE

## 2024-03-21 PROCEDURE — 3074F SYST BP LT 130 MM HG: CPT | Performed by: INTERNAL MEDICINE

## 2024-03-21 PROCEDURE — 3078F DIAST BP <80 MM HG: CPT | Performed by: INTERNAL MEDICINE

## 2024-03-21 PROCEDURE — 1126F AMNT PAIN NOTED NONE PRSNT: CPT | Performed by: INTERNAL MEDICINE

## 2024-03-21 PROCEDURE — 1111F DSCHRG MED/CURRENT MED MERGE: CPT | Performed by: INTERNAL MEDICINE

## 2024-03-21 PROCEDURE — 99496 TRANSJ CARE MGMT HIGH F2F 7D: CPT | Performed by: INTERNAL MEDICINE

## 2024-03-21 PROCEDURE — 1159F MED LIST DOCD IN RCRD: CPT | Performed by: INTERNAL MEDICINE

## 2024-03-21 NOTE — TELEPHONE ENCOUNTER
Patient is scheduled to see  at 5pm today. Will await finalized office visit note to fax. Clinical team to follow-up tomorrow

## 2024-03-21 NOTE — TELEPHONE ENCOUNTER
Kacy / Dariel  is calling to notify Dr Yadav of delay of Care until 3/22.    Please fax OV Notes from today's visit fax # [phone]    Phone [phone]

## 2024-03-21 NOTE — PROGRESS NOTES
Subjective:   Mireya Wolfe is a 89 year old female who presents for hospital follow up.   She was discharged from [facility] to Home or Self Care  Admission Date: 3/14/24   Discharge Date: 3/18/24  Hospital Discharge Diagnosis: UTI    Interactive contact within 2 business days post discharge first initiated on Date: 3/19/2024    During the visit, the following was completed:  Obtained and reviewed discharge summary, continuity of care documents, and Hospitalist notes  Reviewed Labs (CBC, CMP)    HPI:     Follow up of UTI -- presented with weakness.  Received 4 days of ceftriaxone -- sent home with Bactrim which she took on 3/18 and 3/19 -- developed severe lower abdominal pain so stopped and pain resolved.  Feels okay today but slightly lightheaded.  Admits to not drinking much fluids.    History/Other:   Current Medications:  Medication Reconciliation:  I am aware of an inpatient discharge within the last 30 days.  The discharge medication list has been reconciled with the patient's current medication list and reviewed by me.  See medication list for additions of new medication, and changes to current doses of medications and discontinued medications.  Outpatient Medications Marked as Taking for the 3/21/24 encounter (Office Visit) with Kathy Rao MD   Medication Sig    atorvastatin 40 MG Oral Tab Take 1 tablet (40 mg total) by mouth nightly.    aspirin 81 MG Oral Tab EC Take 1 tablet (81 mg total) by mouth daily.    ipratropium-albuterol 0.5-2.5 (3) MG/3ML Inhalation Solution Take 3 mL by nebulization every 6 (six) hours while awake.    PREGABALIN 50 MG Oral Cap TAKE 1 CAPSULE BY MOUTH TWICE DAILY    AMLODIPINE 5 MG Oral Tab TAKE 1 TABLET BY MOUTH EVERY DAY. HOLD FOR SYSTOLIC BLOOD PRESSURE< 110    escitalopram 5 MG Oral Tab Take 3 tablets (15 mg total) by mouth every morning.    FLUTICASONE FUROATE-VILANTEROL 100-25 MCG/ACT Inhalation Aerosol Powder, Breath Activated INHALE 1 PUFF INTO THE LUNGS DAILY     Cholecalciferol (VITAMIN D) 125 MCG (5000 UT) Oral Cap Take 1 capsule (5,000 Units total) by mouth daily.    ferrous sulfate 325 (65 FE) MG Oral Tab EC Take 1 tablet (325 mg total) by mouth daily with breakfast.    Vitamin B-12 1000 MCG Oral Tab Take 1 tablet (1,000 mcg total) by mouth daily.       Review of Systems:  GENERAL: weight stable, energy stable, no sweating  SKIN: denies any unusual skin lesions  EYES: denies blurred vision or double vision  HEENT: denies nasal congestion, sinus pain or ST  LUNGS: denies shortness of breath with exertion  CARDIOVASCULAR: denies chest pain on exertion or palpitations  GI: denies abdominal pain, denies heartburn, denies diarrhea  MUSCULOSKELETAL: denies pain, normal range of motion of extremities  NEURO: denies headaches, denies dizziness, denies weakness  PSYCHE: denies depression or anxiety  HEMATOLOGIC: denies hx of anemia, or bruising, denies bleeding  ENDOCRINE: denies thyroid history  ALL/ASTHMA: denies hx of allergy or asthma    Objective:   No LMP recorded. Patient is postmenopausal.  Estimated body mass index is 22.57 kg/m² as calculated from the following:    Height as of this encounter: 5' 3\" (1.6 m).    Weight as of 3/14/24: 127 lb 6.4 oz (57.8 kg).   BP 96/62 (BP Location: Right arm, Patient Position: Sitting, Cuff Size: adult)   Pulse 108   Temp 98.7 °F (37.1 °C) (Oral)   Ht 5' 3\" (1.6 m)   SpO2 94%   BMI 22.57 kg/m²    GENERAL: well developed, well nourished, in no apparent distress  LUNGS: clear to auscultation  CARDIO: RRR without murmur  GI: good BS's, no masses, no tenderness  EXTREMITIES: no cyanosis, clubbing or edema      Assessment & Plan:     UTI  -urine cx not sent in ED  -UA positive  -blood cx negative  -discussed with pt the absolute importance of pushing fluids.  Per son, she has only had about 6 oz of fluid today.  Encouraged her to get a large water bottle and sip on it all day  -has an am and pm caregiver ( still  works)    Chronic hypoxic respiratory failure (HCC)  COPD  -quit smoking in 8/2016  -cont Breo, prn duonebs  -On O2 1L NC (94%) -- pt was off O2 when I saw her this am -- sats 90%.  D/w pt that she will likely need to continue O2 at home (she already has the O2 set up)     H/o Sepsis with E.coli bacteremia  UTI  E.coli -- R to amp/levaquin/cipro  -s/p zosyn, then cefazolin, then po keflex     Depression and anxiety  -pt with long hx of depression (and anxiety), exacerbated by marital strife/stress  -previously saw psychiatrist Dr. Friend who retired  -on Lexapro 15mg/day     Hx of elevated Troponins (during hosp admission for urosepsis in 1/2024)  -Trop 199>2249>3228  -Echo with new depressed LV syst function (EF 45-50%) -- prev echo in 2019 (EF 55-60%)  -Likely Type II MI --demand ischemia related to hypoxia/tachycardia/sepsis  -; started lipitor 40mg at bedtime in 1/2024-- repeat lipids at next visit  -Cont ASA 81mg/day  -Dr. Felix ordered a Lexiscan which pt then canceled; discussed with pt (and  and son) and urged her to reschedule       Hypertension  Amlodipine 5mg daily     Hx of iron deficiency anemia  Had EGD/C-scope in 2010 (Dr. Ferrari) -- negative. 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 Continue FeSO4 325mg/day.       Hx of frequent UTI   sees urology Dr. Munir Cullen  Previously on methenamine     Eventration of left hemidiaphragm  Previously thought to be due to large HH  CT chest clarified 7/2023 - small HH but large eventration of left hemidiaphragm     Osteoporosis  DEXA in 7/2017- osteoporosis of left femoral neck (T -2.9).  Pt was briefly on fosamax in 2014. Declines restarting fosamax or other meds so would not check repeat DEXA.     Vitamin B12 deficiency    Level normal (1241) in 11/2023; cont B12 1000 mcg/day     Lumbar spinal stenosis, chronic back pain    Has seen Dr. Salgado (ortho spine at [facility]).  Referred to  pain specialist, Dr. Deirdre Elizabeth at [facility]. Had facet injections in 8/2018 which helped tremendously.  Saw Dr. Elizabeth again 9/25/18; did PT.  MRI in 4/2019 did not appear to show anything acute.       Bilateral knee OA  Sees ortho Dr. Booth -- has had b/l cortisone injections with transient improvement.  Has been advised for TKR's but trying to avoid. Ambulates with a walker.       Basal and squamous cell cancer, face  S/p Mohs surgeries in 4 areas of her face in 2018 (Dr. Jonas).      Post-herpetic neuralgia (dx'ed with shingles left upper back 6/8/22)  -still with pain but significantly improved  -on Lyrica 50mg BID      Cognitive decline, likely age-related, poss exacerbated by depression  -pt saw neurologist Dr. Lokesh Mcmullen in 9/2022. Was thought to have late onset Alzheimer's dementia (started on aricept), though pt had not shown obvious sx of dementia prior to this admission  Pt was referred for neuropsych testing and MRI brain in 2022 by Dr. Mcmullen though did not schedule.    -had delirium 7/2023 while hospitalized--zyprexa caused sedation;   -stopped seroquel due to daytime sedation  -pt stopped abilify     Advance directives  - POLST in chart,  DNR/select

## 2024-03-22 NOTE — TELEPHONE ENCOUNTER
Yaa / [facility] is calling they have received the Office Note 6 times, please stop faxing

## 2024-03-26 ENCOUNTER — TELEPHONE (OUTPATIENT)
Dept: INTERNAL MEDICINE CLINIC | Facility: CLINIC | Age: 89
End: 2024-03-26

## 2024-03-27 ENCOUNTER — TELEPHONE (OUTPATIENT)
Dept: INTERNAL MEDICINE CLINIC | Facility: CLINIC | Age: 89
End: 2024-03-27

## 2024-03-28 NOTE — TELEPHONE ENCOUNTER
Per chart review, patient was admitted and [facility] 3/14/24 and subsequently was seen in the office for hospital follow up. Refer to 3/21/24 visit note.

## 2024-04-03 ENCOUNTER — TELEPHONE (OUTPATIENT)
Dept: INTERNAL MEDICINE CLINIC | Facility: CLINIC | Age: 89
End: 2024-04-03

## 2024-04-08 ENCOUNTER — TELEPHONE (OUTPATIENT)
Dept: INTERNAL MEDICINE CLINIC | Facility: CLINIC | Age: 89
End: 2024-04-08

## 2024-04-08 DIAGNOSIS — R39.9 UTI SYMPTOMS: Primary | ICD-10-CM

## 2024-04-08 NOTE — TELEPHONE ENCOUNTER
Called patient ,spoke with caregiver and explained that order for UA and culture was placed . RN Instructed caregiver that if any fever or  other SX , then patient needs to go to UC - she verbalized understanding

## 2024-04-08 NOTE — TELEPHONE ENCOUNTER
Caregiver and patient called back.  Patient doesn't want to go to urgent care.  Made appointment with Dr. Rao for Wednesday.

## 2024-04-08 NOTE — TELEPHONE ENCOUNTER
UTI symptoms:    [x]Frequency  [x]Urgency  []Pain/burning  []Blood in urine  [x]Low back pain Slight  [x]Flank pain;Slight left side   []Fevers/chills  [x]Odor  []Confusion  weakness  NOTES:    Called patient who started with Symptoms couple weeks , flank pain left side -does not recall when it started -RN spoke with caregiver -patient very hard of hearing - RN advised that patient be evaluated at  - agrees F/U 4/9

## 2024-04-08 NOTE — TELEPHONE ENCOUNTER
Patient called to speak with Dr Rao     Pt's urine has a terrible odor; she is not feeling well, feels sleepy/tired  Patient is not sure what she can take for UTI   as the last prescription she had gave her terrible pains    [phone]

## 2024-04-10 ENCOUNTER — LAB ENCOUNTER (OUTPATIENT)
Dept: LAB | Age: 89
End: 2024-04-10
Attending: INTERNAL MEDICINE
Payer: MEDICARE

## 2024-04-10 ENCOUNTER — OFFICE VISIT (OUTPATIENT)
Dept: INTERNAL MEDICINE CLINIC | Facility: CLINIC | Age: 89
End: 2024-04-10

## 2024-04-10 VITALS
HEART RATE: 86 BPM | TEMPERATURE: 99 F | BODY MASS INDEX: 23 KG/M2 | HEIGHT: 63 IN | DIASTOLIC BLOOD PRESSURE: 64 MMHG | SYSTOLIC BLOOD PRESSURE: 130 MMHG | OXYGEN SATURATION: 93 %

## 2024-04-10 DIAGNOSIS — F32.A DEPRESSION, UNSPECIFIED DEPRESSION TYPE: ICD-10-CM

## 2024-04-10 DIAGNOSIS — R39.9 UTI SYMPTOMS: ICD-10-CM

## 2024-04-10 DIAGNOSIS — N30.00 ACUTE CYSTITIS WITHOUT HEMATURIA: Primary | ICD-10-CM

## 2024-04-10 PROCEDURE — 1126F AMNT PAIN NOTED NONE PRSNT: CPT | Performed by: INTERNAL MEDICINE

## 2024-04-10 PROCEDURE — 87186 SC STD MICRODIL/AGAR DIL: CPT

## 2024-04-10 PROCEDURE — 99214 OFFICE O/P EST MOD 30 MIN: CPT | Performed by: INTERNAL MEDICINE

## 2024-04-10 PROCEDURE — 3008F BODY MASS INDEX DOCD: CPT | Performed by: INTERNAL MEDICINE

## 2024-04-10 PROCEDURE — 87086 URINE CULTURE/COLONY COUNT: CPT

## 2024-04-10 PROCEDURE — 87077 CULTURE AEROBIC IDENTIFY: CPT

## 2024-04-10 PROCEDURE — 1111F DSCHRG MED/CURRENT MED MERGE: CPT | Performed by: INTERNAL MEDICINE

## 2024-04-10 PROCEDURE — 3078F DIAST BP <80 MM HG: CPT | Performed by: INTERNAL MEDICINE

## 2024-04-10 PROCEDURE — 1159F MED LIST DOCD IN RCRD: CPT | Performed by: INTERNAL MEDICINE

## 2024-04-10 PROCEDURE — 3075F SYST BP GE 130 - 139MM HG: CPT | Performed by: INTERNAL MEDICINE

## 2024-04-10 PROCEDURE — 81001 URINALYSIS AUTO W/SCOPE: CPT

## 2024-04-10 RX ORDER — ESCITALOPRAM OXALATE 20 MG/1
20 TABLET ORAL DAILY
Qty: 90 TABLET | Refills: 3 | Status: SHIPPED | OUTPATIENT
Start: 2024-04-10

## 2024-04-10 RX ORDER — CEPHALEXIN 500 MG/1
500 CAPSULE ORAL 2 TIMES DAILY
Qty: 10 CAPSULE | Refills: 0 | Status: SHIPPED | OUTPATIENT
Start: 2024-04-10

## 2024-04-10 NOTE — PROGRESS NOTES
Miryea Wlofe is a 89 year old female.  Chief Complaint   Patient presents with    UTI     Urinary frequency, urgency, strong odor, slight flank pain left side      HPI:     Pt notes urinary frequency.  No burning with urination.  Her  noted a strong smell to the urine.  Recalls being on bactrim last month -- knew it was a sulfa medication that caused a rash.   Denies fever/chills.  Admits to not drinking as much water as she should.    Just discharged from [facility] on 3/18/24 -- after treatment for UTI (urine cx was not sent in the ED; blood cx negative; urine cx after starting ceftriaxone was negative); sent home on bactrim.    Still doing home PT    Also feels more depressed lately.  Still taking lexapro 15mg/day.  Previously saw psych (Dr. Friend -- pt was able to recall her name)      Current Outpatient Medications   Medication Sig Dispense Refill    ipratropium-albuterol 0.5-2.5 (3) MG/3ML Inhalation Solution Take 3 mL by nebulization every 6 (six) hours while awake.      AMLODIPINE 5 MG Oral Tab TAKE 1 TABLET BY MOUTH EVERY DAY. HOLD FOR SYSTOLIC BLOOD PRESSURE< 110 90 tablet 3    escitalopram 5 MG Oral Tab Take 3 tablets (15 mg total) by mouth every morning. 270 tablet 3    FLUTICASONE FUROATE-VILANTEROL 100-25 MCG/ACT Inhalation Aerosol Powder, Breath Activated INHALE 1 PUFF INTO THE LUNGS DAILY 180 each 3    Cholecalciferol (VITAMIN D) 125 MCG (5000 UT) Oral Cap Take 1 capsule (5,000 Units total) by mouth daily. 30 capsule 0    ferrous sulfate 325 (65 FE) MG Oral Tab EC Take 1 tablet (325 mg total) by mouth daily with breakfast.      Vitamin B-12 1000 MCG Oral Tab Take 1 tablet (1,000 mcg total) by mouth daily.      atorvastatin 40 MG Oral Tab Take 1 tablet (40 mg total) by mouth nightly. (Patient not taking: Reported on 4/10/2024) 90 tablet 3    aspirin 81 MG Oral Tab EC Take 1 tablet (81 mg total) by mouth daily. (Patient not taking: Reported on 4/10/2024) 90 tablet 3    PREGABALIN 50 MG Oral Cap TAKE  1 CAPSULE BY MOUTH TWICE DAILY 180 capsule 1      Past Medical History:    Anemia    Anxiety    Basal cell carcinoma    Mouth    Calculus of kidney    Calculus, kidney    COPD (chronic obstructive pulmonary disease) (HCC)    Depression    Esophageal reflux    Hematuria    History of recurrent UTIs    Hypercholesterolemia    Kidney stone    Osteopenia    Other and unspecified hyperlipidemia    Recurrent UTI    Scoliosis    Spinal stenosis    Squamous cell cancer of lip    Thyroid nodule    Urinary frequency    Vitamin D deficiency      Social History:  Social History     Socioeconomic History    Marital status:    Tobacco Use    Smoking status: Former     Current packs/day: 0.00     Average packs/day: 0.3 packs/day for 10.0 years (2.5 ttl pk-yrs)     Types: Cigarettes     Start date: 2006     Quit date: 2016     Years since quittin.6     Passive exposure: Past    Smokeless tobacco: Never   Vaping Use    Vaping status: Never Used   Substance and Sexual Activity    Alcohol use: No    Drug use: No   Other Topics Concern    Caffeine Concern No     Social Determinants of Health     Financial Resource Strain: Low Risk  (3/19/2024)    Financial Resource Strain     Med Affordability: No   Food Insecurity: No Food Insecurity (3/14/2024)    Food Insecurity     Food Insecurity: Never true   Transportation Needs: No Transportation Needs (3/14/2024)    Transportation Needs     Lack of Transportation: No   Housing Stability: Low Risk  (3/14/2024)    Housing Stability     Housing Instability: No        REVIEW OF SYSTEMS:   GENERAL HEALTH: feels well otherwise  RESPIRATORY: no SOB  CARDIOVASCULAR: no chest pain/pressure  GI: no nausea, vomiting, diarrhea    Wt Readings from Last 5 Encounters:   24 127 lb 6.4 oz (57.8 kg)   24 125 lb 4.8 oz (56.8 kg)   23 125 lb 6.4 oz (56.9 kg)   23 129 lb 6.6 oz (58.7 kg)   23 120 lb (54.4 kg)     Body mass index is 22.57 kg/m².      EXAM:   BP  130/64 (BP Location: Right arm, Patient Position: Sitting, Cuff Size: adult)   Pulse 86   Temp 98.6 °F (37 °C) (Oral)   Ht 5' 3\" (1.6 m)   SpO2 93%   BMI 22.57 kg/m²   GENERAL: well developed, well nourished, in no apparent distress  HEENT: normal oropharynx, normal TM's  NECK: supple, no adenopathy, no bruits  LUNGS: clear to auscultation  CARDIO: RRR, normal S1S2, without murmur or gallop  GI: soft, NT, ND, NABS, no HSM  BACK: no CVA tenderness    ASSESSMENT AND PLAN:     UTI  -pt with recurrent UTI's -- suspect due in part to poor fluid intake  -again reviewed the pt the importance of drinking plenty of water daily  -pt was unable to successfully give a urine sample at the lab today (missed the cup)  -gave pt a urine collection hat (along w/sterile cup) -- she will give sample at home and  will bring to the lab  -empiric keflex x 5 days    Depression  -previously saw psych Dr. Friend who retired a few years ago  -has been on lexapro 15mg/day -- increase to 20mg/day  -pt was asking about the xanax she used to take -- explained that xanax is not for depression and that it is relatively contraindicated in older pts, nito those at risk for falling    Discussed with pt as well as her  and caregiver    The patient indicates understanding of these issues and agrees to the plan.    Kathy Rao MD, 04/10/24, 4:04 PM

## 2024-04-11 ENCOUNTER — LAB ENCOUNTER (OUTPATIENT)
Dept: LAB | Facility: HOSPITAL | Age: 89
End: 2024-04-11
Attending: INTERNAL MEDICINE
Payer: MEDICARE

## 2024-04-11 DIAGNOSIS — R39.9 UTI SYMPTOMS: ICD-10-CM

## 2024-04-11 LAB
BILIRUB UR QL: NEGATIVE
COLOR UR: YELLOW
GLUCOSE UR-MCNC: NORMAL MG/DL
KETONES UR-MCNC: NEGATIVE MG/DL
LEUKOCYTE ESTERASE UR QL STRIP.AUTO: 500
PH UR: 6.5 [PH] (ref 5–8)
PROT UR-MCNC: 20 MG/DL
RBC #/AREA URNS AUTO: >10 /HPF
SP GR UR STRIP: 1.01 (ref 1–1.03)
UROBILINOGEN UR STRIP-ACNC: NORMAL

## 2024-04-12 ENCOUNTER — TELEPHONE (OUTPATIENT)
Dept: INTERNAL MEDICINE CLINIC | Facility: CLINIC | Age: 89
End: 2024-04-12

## 2024-04-12 NOTE — TELEPHONE ENCOUNTER
On call telephone call from pt's  about pt's U/A and Urine C+S.  U/A is suspicious for a UTI.  Urine C+S shows >100,000 E. Coli.  Final sensitivities are not back yet.   has not yet picked up the Keflex that Dr Rao ordered. I have advised  to  the prescription for Keflex and start it tonight.  We will await the final sensitivities.  I will forward this message to Dr Rao as an FYI.

## 2024-04-16 ENCOUNTER — TELEPHONE (OUTPATIENT)
Dept: INTERNAL MEDICINE CLINIC | Facility: CLINIC | Age: 89
End: 2024-04-16

## 2024-04-16 NOTE — TELEPHONE ENCOUNTER
Joaquim physical therapist left the following voicemail message -     Re certified patient for  physical therapy yesterday - Plan of Care to see patient   1 X this week  2 X week for 2 weeks  1 X week for 1 week

## 2024-04-24 ENCOUNTER — TELEPHONE (OUTPATIENT)
Dept: INTERNAL MEDICINE CLINIC | Facility: CLINIC | Age: 89
End: 2024-04-24

## 2024-04-24 NOTE — TELEPHONE ENCOUNTER
Left message to call back.       Called Alexus OT who got a call from patient one hour before his visit that he should not come and she told him she fell the day before 4/23/24. The caregiver was not there that day and she did not mention anything more .    To     Called son per HIPAA and explained about the fall and message from OT - he will get in touch with is father . .RN instructed son that if patient got hurt she needs to be evaluated in ER - verbalized understanding F/UI 4/25    Called patient who states she got out of bed last night  and fell on her buttocks .did not hit her head.when walking discomfort . When sitting no pain . RN instructed patient to go to UC -   Caregiver wrote down the information for Estefani    Patient verbalizes understanding that noncompliance with the medical recommendations provided could result in increased risk of morbidity or even mortality.

## 2024-04-24 NOTE — TELEPHONE ENCOUNTER
Patient called and relayed Dr Rao' message. Patient will call back if she does have any symptoms arise.

## 2024-05-16 ENCOUNTER — TELEPHONE (OUTPATIENT)
Dept: INTERNAL MEDICINE CLINIC | Facility: CLINIC | Age: 89
End: 2024-05-16

## 2024-05-16 NOTE — TELEPHONE ENCOUNTER
Patient is calling and states she is very depressed needs to figure out the correct dosage for her Lexapro.    Please call and advise    Patient is aware Dr Rao is not in the office but would like to speak to a nurse or a doctor.

## 2024-05-20 ENCOUNTER — TELEPHONE (OUTPATIENT)
Dept: INTERNAL MEDICINE CLINIC | Facility: CLINIC | Age: 89
End: 2024-05-20

## 2024-05-20 DIAGNOSIS — R41.0 CONFUSION: Primary | ICD-10-CM

## 2024-05-20 DIAGNOSIS — R82.90 ABNORMAL URINE ODOR: ICD-10-CM

## 2024-05-20 NOTE — TELEPHONE ENCOUNTER
Patient called and states she is really feeling depressed and was wondering if a nurse could call her.    Please see message below also.

## 2024-05-20 NOTE — TELEPHONE ENCOUNTER
Daughter called  Concerned her mom may have a UTI   Patient's urine has an odor  Her behavior is erratic and mean spirited    Asks for an order for urine testing/& urine cup  so they can drop of a sample     Nelli (on HIPAA) can be reached at [phone]

## 2024-05-20 NOTE — TELEPHONE ENCOUNTER
Left message to call back for daughterNelli. Informed of after hours MD page option on voicemail.     To Dr. Alex & PCP Dr. Rao

## 2024-05-20 NOTE — TELEPHONE ENCOUNTER
Called daughter Nelli per HIPAA who states she thinks patient has another urinary tract infection- she is  confused , urine has an odor . Also patients behavior is erratic and mean spirited  UA and culture ordered per protocol  As FYI to

## 2024-05-20 NOTE — TELEPHONE ENCOUNTER
Unable to reach patient x3 attempts. Phone line goes directly to .    I called daughter, dev (HIPAA verified), and explained that pt reached out to our office and reported depression. I informed Dev that I was unable to reach patient to discuss her concern and screen for SI/HI. Informed Dev that ER is advised for mental health evaluation if SI/HI present. Explained that safety concern needs to be assessed.  Dve was not aware of pt  call to our office. States, pt has been acting erratically, confused, and mean to spouse (per caregiver report). Dev lives in [place]. States, pt has a caregiver 4x/week that is present with the pt now. I asked that Dev reach out to caregiver to assess situation. Dev will call office back with an update. I gave dev my direct extension to be reached since it's after-hours and service is on.     Dev will ave a family member or caregiver come to the lab today or tomorrow to pickup urine collection supplies.

## 2024-05-21 ENCOUNTER — LAB ENCOUNTER (OUTPATIENT)
Dept: LAB | Facility: HOSPITAL | Age: 89
End: 2024-05-21
Attending: INTERNAL MEDICINE

## 2024-05-21 DIAGNOSIS — R82.90 ABNORMAL URINE ODOR: ICD-10-CM

## 2024-05-21 DIAGNOSIS — R41.0 CONFUSION: ICD-10-CM

## 2024-05-21 LAB
BILIRUB UR QL: NEGATIVE
GLUCOSE UR-MCNC: NORMAL MG/DL
HGB UR QL STRIP.AUTO: NEGATIVE
KETONES UR-MCNC: NEGATIVE MG/DL
LEUKOCYTE ESTERASE UR QL STRIP.AUTO: 500
PH UR: 7 [PH] (ref 5–8)
PROT UR-MCNC: NEGATIVE MG/DL
SP GR UR STRIP: 1.01 (ref 1–1.03)
UROBILINOGEN UR STRIP-ACNC: NORMAL

## 2024-05-21 PROCEDURE — 81001 URINALYSIS AUTO W/SCOPE: CPT

## 2024-05-21 PROCEDURE — 87077 CULTURE AEROBIC IDENTIFY: CPT

## 2024-05-21 PROCEDURE — 87186 SC STD MICRODIL/AGAR DIL: CPT

## 2024-05-21 PROCEDURE — 87086 URINE CULTURE/COLONY COUNT: CPT

## 2024-06-05 ENCOUNTER — TELEPHONE (OUTPATIENT)
Age: 89
End: 2024-06-05

## 2024-06-05 NOTE — TELEPHONE ENCOUNTER
Therapy Resources:    Vicky Arnett  [address]  [place] [address]  Phone: [phone]    Brook Raman   [address]  [place] [address]  Phone: [phone]    Sade Ceja   [address]  [place] [address]  Phone: [phone]    Katlyn Deng  [address] [place] [address]  Phone: [phone]    Psychiatry Resources:    Josefina Calderon MD  [address] [facility] [address]  [place] [address]  Phone: [phone]    Daniel Field MD  [address] [facility] [address]  [place] [address]  Phone: [phone]

## 2024-06-28 ENCOUNTER — TELEPHONE (OUTPATIENT)
Dept: INTERNAL MEDICINE CLINIC | Facility: CLINIC | Age: 89
End: 2024-06-28

## 2024-06-28 DIAGNOSIS — B02.29 POSTHERPETIC NEURALGIA: Primary | ICD-10-CM

## 2024-06-28 NOTE — TELEPHONE ENCOUNTER
To MD:  The above refill request is for a controlled substance.  Please review pended medication order.   Print and sign for staff to fax to pharmacy or prescribe electronically.    Last office visit: 04/10/2024  Last time refill sent and quantity/refills: 9/7/2023 qty 180 plus 1

## 2024-06-29 ENCOUNTER — APPOINTMENT (OUTPATIENT)
Dept: GENERAL RADIOLOGY | Facility: HOSPITAL | Age: 89
End: 2024-06-29
Attending: EMERGENCY MEDICINE
Payer: MEDICARE

## 2024-06-29 ENCOUNTER — HOSPITAL ENCOUNTER (EMERGENCY)
Facility: HOSPITAL | Age: 89
Discharge: HOME | End: 2024-06-29
Attending: EMERGENCY MEDICINE
Payer: MEDICARE

## 2024-06-29 VITALS
WEIGHT: 125 LBS | RESPIRATION RATE: 29 BRPM | DIASTOLIC BLOOD PRESSURE: 59 MMHG | TEMPERATURE: 98 F | SYSTOLIC BLOOD PRESSURE: 119 MMHG | HEIGHT: 65 IN | HEART RATE: 72 BPM | OXYGEN SATURATION: 96 % | BODY MASS INDEX: 20.83 KG/M2

## 2024-06-29 DIAGNOSIS — N30.01 ACUTE CYSTITIS WITH HEMATURIA: Primary | ICD-10-CM

## 2024-06-29 LAB
ALBUMIN SERPL-MCNC: 4.3 G/DL (ref 3.2–4.8)
ALP LIVER SERPL-CCNC: 89 U/L
ALT SERPL-CCNC: 22 U/L
ANION GAP SERPL CALC-SCNC: 1 MMOL/L (ref 0–18)
AST SERPL-CCNC: 23 U/L (ref ?–34)
BASOPHILS # BLD AUTO: 0.03 X10(3) UL (ref 0–0.2)
BASOPHILS NFR BLD AUTO: 0.5 %
BILIRUB DIRECT SERPL-MCNC: 0.2 MG/DL (ref ?–0.3)
BILIRUB SERPL-MCNC: 0.7 MG/DL (ref 0.2–1.1)
BILIRUB UR QL: NEGATIVE
BUN BLD-MCNC: 16 MG/DL (ref 9–23)
BUN/CREAT SERPL: 18.8 (ref 10–20)
CALCIUM BLD-MCNC: 9.9 MG/DL (ref 8.7–10.4)
CHLORIDE SERPL-SCNC: 106 MMOL/L (ref 98–112)
CO2 SERPL-SCNC: 32 MMOL/L (ref 21–32)
CREAT BLD-MCNC: 0.85 MG/DL
DEPRECATED RDW RBC AUTO: 43.6 FL (ref 35.1–46.3)
EGFRCR SERPLBLD CKD-EPI 2021: 65 ML/MIN/1.73M2 (ref 60–?)
EOSINOPHIL # BLD AUTO: 0.05 X10(3) UL (ref 0–0.7)
EOSINOPHIL NFR BLD AUTO: 0.8 %
ERYTHROCYTE [DISTWIDTH] IN BLOOD BY AUTOMATED COUNT: 13.3 % (ref 11–15)
FLUAV + FLUBV RNA SPEC NAA+PROBE: NEGATIVE
FLUAV + FLUBV RNA SPEC NAA+PROBE: NEGATIVE
GLUCOSE BLD-MCNC: 101 MG/DL (ref 70–99)
GLUCOSE UR-MCNC: NORMAL MG/DL
HCT VFR BLD AUTO: 42.3 %
HGB BLD-MCNC: 13.6 G/DL
IMM GRANULOCYTES # BLD AUTO: 0.02 X10(3) UL (ref 0–1)
IMM GRANULOCYTES NFR BLD: 0.3 %
KETONES UR-MCNC: NEGATIVE MG/DL
LEUKOCYTE ESTERASE UR QL STRIP.AUTO: 500
LYMPHOCYTES # BLD AUTO: 0.79 X10(3) UL (ref 1–4)
LYMPHOCYTES NFR BLD AUTO: 12.2 %
MCH RBC QN AUTO: 28.8 PG (ref 26–34)
MCHC RBC AUTO-ENTMCNC: 32.2 G/DL (ref 31–37)
MCV RBC AUTO: 89.4 FL
MONOCYTES # BLD AUTO: 0.39 X10(3) UL (ref 0.1–1)
MONOCYTES NFR BLD AUTO: 6 %
NEUTROPHILS # BLD AUTO: 5.19 X10 (3) UL (ref 1.5–7.7)
NEUTROPHILS # BLD AUTO: 5.19 X10(3) UL (ref 1.5–7.7)
NEUTROPHILS NFR BLD AUTO: 80.2 %
OSMOLALITY SERPL CALC.SUM OF ELEC: 289 MOSM/KG (ref 275–295)
PH UR: 6.5 [PH] (ref 5–8)
PLATELET # BLD AUTO: 137 10(3)UL (ref 150–450)
PLATELETS.RETICULATED NFR BLD AUTO: 5.6 % (ref 0–7)
POTASSIUM SERPL-SCNC: 4 MMOL/L (ref 3.5–5.1)
PROT SERPL-MCNC: 7.3 G/DL (ref 5.7–8.2)
PROT UR-MCNC: 20 MG/DL
RBC # BLD AUTO: 4.73 X10(6)UL
RBC #/AREA URNS AUTO: >10 /HPF
RSV RNA SPEC NAA+PROBE: NEGATIVE
SARS-COV-2 RNA RESP QL NAA+PROBE: NOT DETECTED
SODIUM SERPL-SCNC: 139 MMOL/L (ref 136–145)
SP GR UR STRIP: 1.01 (ref 1–1.03)
UROBILINOGEN UR STRIP-ACNC: NORMAL
WBC # BLD AUTO: 6.5 X10(3) UL (ref 4–11)
WBC #/AREA URNS AUTO: >50 /HPF
WBC CLUMPS UR QL AUTO: PRESENT /HPF

## 2024-06-29 PROCEDURE — 71045 X-RAY EXAM CHEST 1 VIEW: CPT | Performed by: EMERGENCY MEDICINE

## 2024-06-29 PROCEDURE — 80048 BASIC METABOLIC PNL TOTAL CA: CPT | Performed by: EMERGENCY MEDICINE

## 2024-06-29 PROCEDURE — 99284 EMERGENCY DEPT VISIT MOD MDM: CPT

## 2024-06-29 PROCEDURE — 87186 SC STD MICRODIL/AGAR DIL: CPT | Performed by: EMERGENCY MEDICINE

## 2024-06-29 PROCEDURE — 0241U SARS-COV-2/FLU A AND B/RSV BY PCR (GENEXPERT): CPT | Performed by: EMERGENCY MEDICINE

## 2024-06-29 PROCEDURE — 99285 EMERGENCY DEPT VISIT HI MDM: CPT

## 2024-06-29 PROCEDURE — 87086 URINE CULTURE/COLONY COUNT: CPT | Performed by: EMERGENCY MEDICINE

## 2024-06-29 PROCEDURE — 81001 URINALYSIS AUTO W/SCOPE: CPT | Performed by: EMERGENCY MEDICINE

## 2024-06-29 PROCEDURE — 80076 HEPATIC FUNCTION PANEL: CPT | Performed by: EMERGENCY MEDICINE

## 2024-06-29 PROCEDURE — 85025 COMPLETE CBC W/AUTO DIFF WBC: CPT | Performed by: EMERGENCY MEDICINE

## 2024-06-29 PROCEDURE — 96365 THER/PROPH/DIAG IV INF INIT: CPT

## 2024-06-29 PROCEDURE — 96361 HYDRATE IV INFUSION ADD-ON: CPT

## 2024-06-29 PROCEDURE — 87077 CULTURE AEROBIC IDENTIFY: CPT | Performed by: EMERGENCY MEDICINE

## 2024-06-29 RX ORDER — CEPHALEXIN 500 MG/1
500 CAPSULE ORAL 2 TIMES DAILY
Qty: 14 CAPSULE | Refills: 0 | Status: SHIPPED | OUTPATIENT
Start: 2024-06-29 | End: 2024-07-06

## 2024-06-29 RX ORDER — ACETAMINOPHEN 500 MG
1000 TABLET ORAL ONCE
Status: COMPLETED | OUTPATIENT
Start: 2024-06-29 | End: 2024-06-29

## 2024-06-29 NOTE — ED PROVIDER NOTES
Patient Seen in: [facility] Emergency Department    History     Chief Complaint   Patient presents with    Weakness       HPI    History is provided by patient/independent historian: patient, EMS  89 year old female with history of COPD, hyperlipidemia, here with complaints of generalized weakness.  No urinary symptoms.  No diarrhea, nausea or vomiting.  No cough cold symptoms, no wounds.   reports he called 911 secondary to her having difficulty getting out of bed whenever she has an infection.     History reviewed.   Past Medical History:    Anemia    Anxiety    Basal cell carcinoma    Mouth    Calculus of kidney    Calculus, kidney    COPD (chronic obstructive pulmonary disease) (HCC)    Depression    Esophageal reflux    Hematuria    History of recurrent UTIs    Hypercholesterolemia    Kidney stone    Osteopenia    Other and unspecified hyperlipidemia    Recurrent UTI    Scoliosis    Spinal stenosis    Squamous cell cancer of lip    Thyroid nodule    Urinary frequency    Vitamin D deficiency         History reviewed.   Past Surgical History:   Procedure Laterality Date    Cataract Right 05/2016    Cysto/uretero w/lithotripsy Right 10/29/2015    Procedure: LITHOTRIPSY HOLMIUM LASER WITH CYSTOSCOPY;  Surgeon: Tommie Majano MD;  Location: [facility]    Cystoscopy,insert ureteral stent Right 09/24/2015    Procedure: LITHOTRIPSY WITH CYSTOSCOPY, STENT PLACEMENT;  Surgeon: Tommie Majano MD;  Location: [facility]    Fragmenting of kidney stone Right 09/24/2015    Procedure: LITHOTRIPSY WITH CYSTOSCOPY, STENT PLACEMENT;  Surgeon: Tommie Majano MD;  Location: [facility]    Other surgical history  1960    Surgery of Blockage in Urethral Tube    Other surgical history  1985 and on 08/24/00    ESWL - left    Other surgical history  07/19/2000    Cystourethroscopy with Urethral Calibration and Dilation with Bilateral Retrograde Pyelogram and Left Ureteral Stone  Manipulation with insertion of Double-J Stent    Other surgical history  2015    R eye surgery for bleed and then development of blood clot--resolved    Other surgical history  2016    Cystoscpy stent removal    Other surgical history      Other surgical history  2017    Mohs surgery    Patient documented not to have experienced any of the following events Right 2015    Procedure: LITHOTRIPSY WITH CYSTOSCOPY, STENT PLACEMENT;  Surgeon: Tommie Majano MD;  Location: [facility]    Patient documented not to have experienced any of the following events Right 10/29/2015    Procedure: CYSTOSCOPY/URETEROSCOPY/STONE EXTRACTION;  Surgeon: Tommie Majano MD;  Location: [facility]    Patient with preoperative order for iv antibiotic surgical site infect Right 2015    Procedure: LITHOTRIPSY WITH CYSTOSCOPY, STENT PLACEMENT;  Surgeon: Tommie Majano MD;  Location: [facility]    Patient with preoperative order for iv antibiotic surgical site infect Right 10/29/2015    Procedure: CYSTOSCOPY/URETEROSCOPY/STONE EXTRACTION;  Surgeon: Tommie Majano MD;  Location: [facility]    Skin surgery           Home Medications reviewed :  (Not in a hospital admission)        History reviewed.   Social History     Socioeconomic History    Marital status:    Tobacco Use    Smoking status: Former     Current packs/day: 0.00     Average packs/day: 0.3 packs/day for 10.0 years (2.5 ttl pk-yrs)     Types: Cigarettes     Start date: 2006     Quit date: 2016     Years since quittin.8     Passive exposure: Past    Smokeless tobacco: Never   Vaping Use    Vaping status: Never Used   Substance and Sexual Activity    Alcohol use: No    Drug use: No   Other Topics Concern    Caffeine Concern No         ROS  Review of Systems   Constitutional:  Positive for fatigue.   Respiratory:  Negative for shortness of breath.    Cardiovascular:  Negative for chest  pain.   All other systems reviewed and are negative.     All other pertinent organ systems are reviewed and are negative.      Physical Exam     ED Triage Vitals   BP 06/29/24 1700 134/72   Pulse 06/29/24 1700 87   Resp 06/29/24 1700 14   Temp 06/29/24 1918 98 °F (36.7 °C)   Temp src 06/29/24 1918 Oral   SpO2 06/29/24 1700 (!) 85 %   O2 Device 06/29/24 1700 None (Room air)     Vital signs reviewed.      Physical Exam  Vitals and nursing note reviewed.   HENT:      Mouth/Throat:      Mouth: Mucous membranes are dry.   Cardiovascular:      Pulses: Normal pulses.   Pulmonary:      Effort: No respiratory distress.   Abdominal:      General: There is no distension.   Neurological:      Mental Status: She is alert.         ED Course       Labs:     Labs Reviewed   URINALYSIS WITH CULTURE REFLEX - Abnormal; Notable for the following components:       Result Value    Clarity Urine Turbid (*)     Blood Urine 1+ (*)     Protein Urine 20 (*)     Nitrite Urine 2+ (*)     Leukocyte Esterase Urine 500 (*)     WBC Urine >50 (*)     RBC Urine >10 (*)     Bacteria Urine 1+ (*)     Squamous Epi. Cells Few (*)     WBC Clump Present (*)     All other components within normal limits   BASIC METABOLIC PANEL (8) - Abnormal; Notable for the following components:    Glucose 101 (*)     All other components within normal limits   CBC W/ DIFFERENTIAL - Abnormal; Notable for the following components:    .0 (*)     Lymphocyte Absolute 0.79 (*)     All other components within normal limits   HEPATIC FUNCTION PANEL (7) - Normal   SARS-COV-2/FLU A AND B/RSV BY PCR (GENEXPERT) - Normal    Narrative:     This test is intended for the qualitative detection and differentiation of SARS-CoV-2, influenza A, influenza B, and respiratory syncytial virus (RSV) viral RNA in nasopharyngeal or nares swabs from individuals suspected of respiratory viral infection consistent with COVID-19 by their healthcare provider. Signs and symptoms of respiratory  viral infection due to SARS-CoV-2, influenza, and RSV can be similar.                                    Test performed using the Xpert Xpress SARS-CoV-2/FLU/RSV (real time RT-PCR)  assay on the [employer] instrument, [employer], [place] [address].                   This test is being used under the Food and Drug Administration's Emergency Use Authorization.                                    The authorized Fact Sheet for Healthcare Providers for this assay is available upon request from the laboratory.   CBC WITH DIFFERENTIAL WITH PLATELET    Narrative:     The following orders were created for panel order CBC With Differential With Platelet.                  Procedure                               Abnormality         Status                                     ---------                               -----------         ------                                     CBC W/ DIFFERENTIAL[[id number]]          Abnormal            Final result                                                 Please view results for these tests on the individual orders.   RAINBOW DRAW LAVENDER   RAINBOW DRAW LIGHT GREEN   RAINBOW DRAW GOLD   RAINBOW DRAW BLUE   URINE CULTURE, ROUTINE         My EKG Interpretation:   As reviewed and Interpreted by me      Imaging Results Available and Reviewed while in ED:   XR CHEST AP PORTABLE  (CPT=71045)    Result Date: 6/29/2024  CONCLUSION: No acute cardiopulmonary abnormality.  Large hiatal hernia.   Dictated by (CST): Meek Luna MD on 6/29/2024 at 6:30 PM     Finalized by (CST): Meek Luna MD on 6/29/2024 at 6:31 PM         My review and independent interpretation of XR images: no infiltrate. Radiology report corroborates this in addition to other details as reported by them.      Decision rules/scores evaluated: none      Diagnostic labs/tests considered but not ordered: none    ED Medications Administered:   Medications   sodium chloride 0.9 % IV bolus 1,000 mL (0 mL Intravenous Stopped 6/29/24 5324)    acetaminophen (Tylenol Extra Strength) tab 1,000 mg (1,000 mg Oral Given 6/29/24 1732)   cefTRIAXone (Rocephin) 1 g in sodium chloride 0.9% 100 mL IVPB-ADDV (0 g Intravenous Stopped 6/29/24 1916)            - pt ambulatory with walker - family comfortable with dc home    MDM       Medical Decision Making      Differential Diagnosis: After obtaining the patient's history, performing the physical exam and reviewing the diagnostics, multiple initial diagnoses were considered based on the presenting problem including UTI, pneumonia, viral syndrome, SON, anemia    External document review: I personally reviewed available external medical records for any recent pertinent discharge summaries, testing, and procedures - the findings are as follows: 4/10/24 visit with Dr. Rao for cystitis    Complicating Factors: The patient already  has a past medical history of Anemia, Anxiety, Basal cell carcinoma, Calculus of kidney, Calculus, kidney, COPD (chronic obstructive pulmonary disease) (HCC), Depression, Esophageal reflux, Hematuria, History of recurrent UTIs (02/26/2015), Hypercholesterolemia, Kidney stone, Osteopenia, Other and unspecified hyperlipidemia, Recurrent UTI, Scoliosis, Spinal stenosis, Squamous cell cancer of lip, Thyroid nodule, Urinary frequency, and Vitamin D deficiency. to contribute to the complexity of this ED evaluation.    Procedures performed: none    Discussed management with physician/appropriate source: none    Considered admission/deescalation of care for: weakness    Social determinants of health affecting patient care: none    Prescription medications considered: keflex,  discussed continuing current medication regimen    The patient requires continuous monitoring for: weakness    Shared decision making: discussed possible admission          Disposition and Plan     Clinical Impression:  1. Acute cystitis with hematuria        Disposition:  Discharge    Follow-up:  Kathy Rao MD  [address]   [facility] [address]  [phone]    Follow up        Medications Prescribed:  Discharge Medication List as of 6/29/2024  7:52 PM        START taking these medications    Details   !! cephalexin 500 MG Oral Cap Take 1 capsule (500 mg total) by mouth 2 (two) times daily for 7 days., Normal, Disp-14 capsule, R-0       !! - Potential duplicate medications found. Please discuss with provider.

## 2024-06-30 NOTE — ED QUICK NOTES
Patient dressed and wheelchaired out to husbands car with this RN, patient got into car with no issues.

## 2024-07-01 RX ORDER — PREGABALIN 50 MG/1
50 CAPSULE ORAL 2 TIMES DAILY
Qty: 180 CAPSULE | Refills: 1 | Status: SHIPPED | OUTPATIENT
Start: 2024-07-01

## 2024-07-01 NOTE — PROGRESS NOTES
ED Culture Callback Results Review    Pharmacist reviewed culture results from ED visit .    Final urine culture positive for E. coli. Patient was prescribed Cephalexin (Keflex) on discharge. Current therapy is appropriate based on reported susceptibilities. No further intervention required at this time.      Danielito Quintanilla PharmD  Emergency Medicine Pharmacist Specialist  07/01/24; 12:02 PM

## 2024-07-03 ENCOUNTER — PATIENT OUTREACH (OUTPATIENT)
Dept: CASE MANAGEMENT | Age: 89
End: 2024-07-03

## 2024-07-03 NOTE — PROGRESS NOTES
Pt had recent ED visit, calling to offer PCP follow-up apt (discharged 06/29)    Dr Олег Rao  Internal Medicine  [address] [place] [id number] [phone]  LVM to call [phone] to assist w/scheduling apt

## 2024-07-08 NOTE — PROGRESS NOTES
Pt had recent ED visit, calling to offer PCP follow-up apt (discharged 06/29)     Dr Олег Rao  Internal Medicine  [address] [place] [id number]  [phone]  Pt declined a sooner apt; pt has existing apt Wed 07/17 @2:30pm  Closing encounter

## 2024-07-17 ENCOUNTER — LAB ENCOUNTER (OUTPATIENT)
Dept: LAB | Age: 89
End: 2024-07-17
Attending: INTERNAL MEDICINE
Payer: MEDICARE

## 2024-07-17 ENCOUNTER — OFFICE VISIT (OUTPATIENT)
Dept: INTERNAL MEDICINE CLINIC | Facility: CLINIC | Age: 89
End: 2024-07-17

## 2024-07-17 VITALS
BODY MASS INDEX: 22.82 KG/M2 | TEMPERATURE: 98 F | HEART RATE: 93 BPM | WEIGHT: 137 LBS | OXYGEN SATURATION: 93 % | SYSTOLIC BLOOD PRESSURE: 126 MMHG | DIASTOLIC BLOOD PRESSURE: 60 MMHG | HEIGHT: 65 IN

## 2024-07-17 DIAGNOSIS — E55.9 VITAMIN D DEFICIENCY: ICD-10-CM

## 2024-07-17 DIAGNOSIS — E53.8 VITAMIN B12 DEFICIENCY: ICD-10-CM

## 2024-07-17 DIAGNOSIS — M81.0 AGE-RELATED OSTEOPOROSIS WITHOUT CURRENT PATHOLOGICAL FRACTURE: ICD-10-CM

## 2024-07-17 DIAGNOSIS — M48.061 SPINAL STENOSIS OF LUMBAR REGION, UNSPECIFIED WHETHER NEUROGENIC CLAUDICATION PRESENT: ICD-10-CM

## 2024-07-17 DIAGNOSIS — F33.2 SEVERE EPISODE OF RECURRENT MAJOR DEPRESSIVE DISORDER, WITHOUT PSYCHOTIC FEATURES (HCC): ICD-10-CM

## 2024-07-17 DIAGNOSIS — D50.0 IRON DEFICIENCY ANEMIA DUE TO CHRONIC BLOOD LOSS: ICD-10-CM

## 2024-07-17 DIAGNOSIS — F41.9 ANXIETY AND DEPRESSION: ICD-10-CM

## 2024-07-17 DIAGNOSIS — F32.A ANXIETY AND DEPRESSION: ICD-10-CM

## 2024-07-17 DIAGNOSIS — E53.8 VITAMIN B12 DEFICIENCY: Primary | ICD-10-CM

## 2024-07-17 DIAGNOSIS — J44.9 CHRONIC OBSTRUCTIVE PULMONARY DISEASE, UNSPECIFIED COPD TYPE (HCC): ICD-10-CM

## 2024-07-17 PROBLEM — D69.6 THROMBOCYTOPENIA (HCC): Chronic | Status: RESOLVED | Noted: 2024-03-21 | Resolved: 2024-07-17

## 2024-07-17 PROBLEM — D69.6 THROMBOCYTOPENIA: Chronic | Status: RESOLVED | Noted: 2024-03-21 | Resolved: 2024-07-17

## 2024-07-17 PROBLEM — N30.01 ACUTE CYSTITIS WITH HEMATURIA: Status: RESOLVED | Noted: 2024-03-14 | Resolved: 2024-07-17

## 2024-07-17 LAB — VIT B12 SERPL-MCNC: 611 PG/ML (ref 211–911)

## 2024-07-17 PROCEDURE — 1126F AMNT PAIN NOTED NONE PRSNT: CPT | Performed by: INTERNAL MEDICINE

## 2024-07-17 PROCEDURE — 1159F MED LIST DOCD IN RCRD: CPT | Performed by: INTERNAL MEDICINE

## 2024-07-17 PROCEDURE — 3074F SYST BP LT 130 MM HG: CPT | Performed by: INTERNAL MEDICINE

## 2024-07-17 PROCEDURE — 36415 COLL VENOUS BLD VENIPUNCTURE: CPT

## 2024-07-17 PROCEDURE — 3078F DIAST BP <80 MM HG: CPT | Performed by: INTERNAL MEDICINE

## 2024-07-17 PROCEDURE — 96160 PT-FOCUSED HLTH RISK ASSMT: CPT | Performed by: INTERNAL MEDICINE

## 2024-07-17 PROCEDURE — 82607 VITAMIN B-12: CPT

## 2024-07-17 PROCEDURE — 1170F FXNL STATUS ASSESSED: CPT | Performed by: INTERNAL MEDICINE

## 2024-07-17 PROCEDURE — 99214 OFFICE O/P EST MOD 30 MIN: CPT | Performed by: INTERNAL MEDICINE

## 2024-07-17 PROCEDURE — G0439 PPPS, SUBSEQ VISIT: HCPCS | Performed by: INTERNAL MEDICINE

## 2024-07-17 PROCEDURE — 3008F BODY MASS INDEX DOCD: CPT | Performed by: INTERNAL MEDICINE

## 2024-07-17 RX ORDER — QUETIAPINE FUMARATE 25 MG/1
TABLET, FILM COATED ORAL
COMMUNITY
Start: 2024-02-16

## 2024-07-17 NOTE — PROGRESS NOTES
Mireya Wolfe is a 89 year old female.  Chief Complaint   Patient presents with    Physical     Pt here for medicare annual exam      HPI:     Here for Medicare Annual wellness complete physical and follow up of chronic dx.  The permanent partial in her lower teeth fell out yesterday. Has to schedule with dentist    Has a caregiver (Portia) who comes M, W, Th, Fri -- from 8:30am to 6pm.   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  No longer driving;  drove her here.     Recently treated in ED for E.coli UTI on 6/29/24  Admits to not drinking a lot of water; trying to be better about it.     Ambulates with a rolling walker.  Doesn't get out much -- states she needs hand rails built onto her front steps.   Has a chair lift on her stairs.       Current Outpatient Medications   Medication Sig Dispense Refill    QUEtiapine 25 MG Oral Tab QUETIAPINE 25 MG Oral Tab, [RxNorm: 282844]      PREGABALIN 50 MG Oral Cap TAKE 1 CAPSULE BY MOUTH TWICE DAILY 180 capsule 1    escitalopram 20 MG Oral Tab Take 1 tablet (20 mg total) by mouth daily. 90 tablet 3    atorvastatin 40 MG Oral Tab Take 1 tablet (40 mg total) by mouth nightly. 90 tablet 3    aspirin 81 MG Oral Tab EC Take 1 tablet (81 mg total) by mouth daily. 90 tablet 3    ipratropium-albuterol 0.5-2.5 (3) MG/3ML Inhalation Solution Take 3 mL by nebulization every 6 (six) hours while awake.      AMLODIPINE 5 MG Oral Tab TAKE 1 TABLET BY MOUTH EVERY DAY. HOLD FOR SYSTOLIC BLOOD PRESSURE< 110 90 tablet 3    FLUTICASONE FUROATE-VILANTEROL 100-25 MCG/ACT Inhalation Aerosol Powder, Breath Activated INHALE 1 PUFF INTO THE LUNGS DAILY 180 each 3    Cholecalciferol (VITAMIN D) 125 MCG (5000 UT) Oral Cap Take 1 capsule (5,000 Units total) by mouth daily. 30 capsule 0    ferrous sulfate 325 (65 FE) MG Oral Tab EC Take 1 tablet (325 mg total) by mouth daily with breakfast.      Vitamin B-12 1000 MCG Oral Tab Take 1 tablet (1,000 mcg total) by mouth  daily.        Past Medical History:    Anemia    Anxiety    Basal cell carcinoma    Mouth    Calculus of kidney    Calculus, kidney    COPD (chronic obstructive pulmonary disease) (HCC)    Depression    Esophageal reflux    Hematuria    History of recurrent UTIs    Hypercholesterolemia    Kidney stone    Osteopenia    Other and unspecified hyperlipidemia    Recurrent UTI    Scoliosis    Spinal stenosis    Squamous cell cancer of lip    Thyroid nodule    Urinary frequency    Vitamin D deficiency      Social History:  Social History     Socioeconomic History    Marital status:    Tobacco Use    Smoking status: Former     Current packs/day: 0.00     Average packs/day: 0.3 packs/day for 10.0 years (2.5 ttl pk-yrs)     Types: Cigarettes     Start date: 2006     Quit date: 2016     Years since quittin.9     Passive exposure: Past    Smokeless tobacco: Never   Vaping Use    Vaping status: Never Used   Substance and Sexual Activity    Alcohol use: No    Drug use: No   Other Topics Concern    Caffeine Concern No     Social Determinants of Health     Financial Resource Strain: Low Risk  (3/19/2024)    Financial Resource Strain     Med Affordability: No   Food Insecurity: No Food Insecurity (3/14/2024)    Food Insecurity     Food Insecurity: Never true   Transportation Needs: No Transportation Needs (3/14/2024)    Transportation Needs     Lack of Transportation: No   Housing Stability: Low Risk  (3/14/2024)    Housing Stability     Housing Instability: No          General Health     In the past six months, have you lost more than 10 pounds without trying?: 2 - No    Has your appetite been poor?: No    Type of Diet: Balanced    How does the patient maintain a good energy level?: Other    How would you describe your daily physical activity?: None    How would you describe your current health state?: Fair    How do you maintain positive mental well-being?: Social Interaction         Have you had any  immunizations at another office such as Influenza, Hepatitis B, Tetanus, or Pneumococcal?: No     Functional Ability     Bathing or Showering: Able without help    Toileting: Able without help    Dressing: Able without help    Eating: Able without help    Driving: Does not drive    Preparing your meals: Need some help    Managing money/bills: Need some help    Taking medications as prescribed: Able without help    Are you able to afford your medications?: Yes    Hearing Problems?: Yes     Functional Status     Hearing Problems?: Yes    Vision Problems? : No    Difficulty walking?: Yes    Difficulty dressing or bathing?: No    Problems with daily activities? : Yes    Memory Problems?: No      Fall/Risk Assessment                                                              Depression Screening (PHQ-2/PHQ-9): Over the LAST 2 WEEKS   Little interest or pleasure in doing things (over the last two weeks)?: Not at all    Feeling down, depressed, or hopeless (over the last two weeks)?: Not at all    PHQ-2 SCORE: 0        Advance Directives     Do you have a healthcare power of ?: Yes    Do you have a living will?: Yes     Hearing Assessment (Required for AWV/SWV)    Finger Rub    Visual Acuity     Right Eye Visual Acuity: Corrected Left Eye Visual Acuity: Corrected   Right Eye Chart Acuity: 20/20 Left Eye Chart Acuity: 20/20     Cognitive Assessment     What day of the week is this?: Correct    What month is it?: Correct    What year is it?: Correct    Recall \"Ball\": Correct    Recall \"Flag\": Correct    Recall \"Tree\": Correct        Mireya Wolfe's SCREENING SCHEDULE   Tests on this list are recommended by your physician but may not be covered, or covered at this frequency, by your insurer. Please check with your insurance carrier before scheduling to verify coverage.   PREVENTATIVE SERVICES  INDICATIONS AND SCHEDULE Internal Lab or Procedure External Lab or Procedure   Diabetes Screening      HbgA1C   Annually No  results found for: \"A1C\"      No data to display                Fasting Blood Sugar (FSB)Annually Glucose (mg/dL)   Date Value   06/29/2024 101 (H)   04/13/2019 153 (H)   09/16/2015 92         No data to display               Cardiovascular Disease Screening     LDL Annually LDL Cholesterol (mg/dL)   Date Value   01/15/2024 140 (H)        EKG One Time y    Colorectal Cancer Screening      Colonoscopy Screen every 10 years No recommendations at this time Update Health Maintenance if applicable    Flex Sigmoidoscopy Screen every 5 years No results found for this or any previous visit.      No data to display                 Fecal Occult Blood Annually No results found for: \"FOB\", \"OCCULTSTOOL\"      No data to display                Glaucoma Screening      Ophthalmology Visit Annually y    Bone Density Screening      Dexascan Every two years Last Dexa Scan:    XR DEXA BONE DENSITY AXIAL (CPT=77080) 07/24/2017        No data to display               Pap and Pelvic      Pap: Every 3 yrs age 21-65 or Pap+HPV every 5 yrs age 30-65, age 65 and older at high risk   No recommendations at this time Update Health Maintenance if applicable    Chlamydia  Annually if high risk No results found for: \"CHLAMYDIA\"      No data to display                Screening Mammogram      Mammogram  Annually No recommendations at this time Update Health Maintenance if applicable   Immunizations      Influenza No orders found for this or any previous visit. Update Immunization Activity if applicable    Pneumococcal Orders placed or performed in visit on 07/05/17    PNEUMOCOCCAL IMM, 23   Orders placed or performed in visit on 04/21/16    PNEUMOCOCCAL VACC, 13 NINA IM    Update Immunization Activity if applicable    Hepatitis B No orders found for this or any previous visit. Update Immunization Activity if applicable    Tetanus No orders found for this or any previous visit. Update Immunization Activity if applicable    Zoster (Not covered by  Medicare Part B) No orders found for this or any previous visit. Update Immunization Activity if applicable     SPECIFIC DISEASE MONITORING Internal Lab or Procedure External Lab or Procedure   Annual Monitoring of Persistent     Medications (ACE/ARB, digoxin, diuretics)    Potassium  Annually Potassium (mmol/L)   Date Value   06/29/2024 4.0   04/13/2019 3.30 (L)   09/16/2015 4.4         No data to display                Creatinine  Annually Creatinine, Serum (mg/dL)   Date Value   09/16/2015 0.79     Creatinine (mg/dL)   Date Value   06/29/2024 0.85   04/13/2019 1.15 (H)         No data to display                Digoxin Serum Conc  Annually No results found for: \"DIGOXIN\"      No data to display                Diabetes      HgbA1C  Annually No results found for: \"A1C\"      No data to display                Creat/alb ratio  Annually      LDL  Annually LDL Cholesterol (mg/dL)   Date Value   01/15/2024 140 (H)         No data to display                 Dilated Eye exam  Annually      No data to display                   No data to display                COPD      Spirometry Testing Annually No results found for this or any previous visit.      No data to display                        REVIEW OF SYSTEMS:   GENERAL HEALTH: feels well otherwise  RESPIRATORY: no SOB  CARDIOVASCULAR: no chest pain/pressure  GI: no nausea, vomiting, diarrhea    Wt Readings from Last 5 Encounters:   07/17/24 137 lb (62.1 kg)   06/29/24 125 lb (56.7 kg)   03/14/24 127 lb 6.4 oz (57.8 kg)   01/21/24 125 lb 4.8 oz (56.8 kg)   11/09/23 125 lb 6.4 oz (56.9 kg)     Body mass index is 22.8 kg/m².      EXAM:   /60   Pulse 93   Temp 97.9 °F (36.6 °C)   Ht 5' 5\" (1.651 m)   Wt 137 lb (62.1 kg)   SpO2 93%   BMI 22.80 kg/m²      GENERAL: well developed, well nourished, in no apparent distress  HEENT: normal oropharynx, normal TM's  NECK: supple, no adenopathy, no bruits  LUNGS: clear to auscultation  CARDIO: RRR, normal S1S2, without murmur  or gallop  GI: soft, NT, ND, NABS, no HSM  EXTREMITIES: no cyanosis, clubbing or edema  BREASTS: no masses    ASSESSMENT AND PLAN:      History of recurrent UTIs, nephrolithiasis   Seeing urologist, Dr. Munir Cullen -- started on methenamine   Had E.coli UTI in 6/2024 -- resolved     Osteoporosis  DEXA in 7/2017- osteoporosis of left femoral neck (T -2.9).  Pt was briefly on fosamax in 2014. Declines restarting fosamax or other meds so would not check repeat DEXA.  Vitamin B12 deficiency    Level normal in 11/2023  cont B12 1000 mcg/day  Hx of iron deficiency anemia  Had EGD/C-scope in 2010 (Dr. Ferrari) -- negative. 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 Continue FeSO4 325mg/day.  Hgb normal (13.6) on 6/29/24  Lumbar spinal stenosis, chronic back pain    Has seen Dr. Salgado (ortho spine at [facility]).  Referred to pain specialist, Dr. Deirdre Elizabeth at [facility]. Had facet injections in 8/2018 which helped tremendously.  Saw Dr. Elizabeth again 9/25/18; did PT.  MRI in 4/2019 did not appear to show anything acute.  No longer taking Norco.  Bilateral knee OA  Sees ortho Dr. Booth -- has had b/l cortisone injections with transient improvement.  Has been advised for TKR's but trying to avoid. Ambulates with a walker.    COPD  Essentially quit smoking in 8/2016.  Has been using the Breo.  Basal and squamous cell cancer, face  S/p  Mohs surgeries in 4 areas of her face in 2018 (Dr. Jonas).     Post-herpetic neuralgia (dx'ed with shingles left upper back 6/8/22)  -still with pain but significantly improved  -still taking Lyrica 50mg BID     Depression and anxiety  -pt with long hx of depression (and anxiety)  -previously saw psychiatrist Dr. Friend who has since retired  -lexapro increased from 15 to 20mg/day in 4/204  -on quetiapine 25mg nightly  -have previously discussed that alprazolam is relatively contraindicated for her based on age and  risk of falls.      Cognitive decline, likely age-related, poss exacerbated by depression  -pt saw neurologist Dr. Lokesh Mcmullen in 9/2022. Was thought to have late onset Alzheimer's dementia (started on aricept), though pt does not show sx of any significant dementia from my standpoint.  We had a long conversation today, and she was able to recall details of our previous conversations.  She remembered that I had moved to a new home and knew that my kids were in college.  She was able to relay details of where her grandkids were in college and other details about her family members.  She was also able to relay the medications she takes and for what purpose.  Pt was referred for neuropsych testing and MRI brain in 2022 by Dr. Mcmullen though did not schedule.  Would hold off for now.    Health maintenance    Mammo normal 7/2017. Pt has opted to stop mammogram screening given her age.    Prevnar 13 given 4/2016 -- PPSV23 7/5/17.   repeat Tdap next visit  Had #1 Shingrix shingles vaccine in 11/2018 -- had shingles with PHN in 8/2022  UTD on covid vax    Check TSH, vitamin D (recent CBC, BMP, hepatic panel normal)    RTC 6 mos      The patient indicates understanding of these issues and agrees to the plan.

## 2024-07-23 ENCOUNTER — PATIENT MESSAGE (OUTPATIENT)
Dept: INTERNAL MEDICINE CLINIC | Facility: CLINIC | Age: 89
End: 2024-07-23

## 2024-07-23 ENCOUNTER — LAB ENCOUNTER (OUTPATIENT)
Dept: LAB | Facility: HOSPITAL | Age: 89
End: 2024-07-23
Attending: INTERNAL MEDICINE
Payer: MEDICARE

## 2024-07-23 ENCOUNTER — TELEPHONE (OUTPATIENT)
Dept: INTERNAL MEDICINE CLINIC | Facility: CLINIC | Age: 89
End: 2024-07-23

## 2024-07-23 DIAGNOSIS — R82.90 ABNORMAL URINE ODOR: ICD-10-CM

## 2024-07-23 DIAGNOSIS — R39.9 UTI SYMPTOMS: ICD-10-CM

## 2024-07-23 DIAGNOSIS — R82.90 ABNORMAL URINE ODOR: Primary | ICD-10-CM

## 2024-07-23 DIAGNOSIS — G47.9 SLEEP DISTURBANCE: ICD-10-CM

## 2024-07-23 LAB
BILIRUB UR QL: NEGATIVE
COLOR UR: YELLOW
GLUCOSE UR-MCNC: NORMAL MG/DL
KETONES UR-MCNC: NEGATIVE MG/DL
LEUKOCYTE ESTERASE UR QL STRIP.AUTO: 500
PH UR: 6 [PH] (ref 5–8)
RBC #/AREA URNS AUTO: >10 /HPF
SP GR UR STRIP: 1.01 (ref 1–1.03)
UROBILINOGEN UR STRIP-ACNC: NORMAL
WBC #/AREA URNS AUTO: >50 /HPF

## 2024-07-23 PROCEDURE — 87186 SC STD MICRODIL/AGAR DIL: CPT

## 2024-07-23 PROCEDURE — 87086 URINE CULTURE/COLONY COUNT: CPT

## 2024-07-23 PROCEDURE — 87077 CULTURE AEROBIC IDENTIFY: CPT

## 2024-07-23 PROCEDURE — 81001 URINALYSIS AUTO W/SCOPE: CPT

## 2024-07-23 RX ORDER — NITROFURANTOIN 25; 75 MG/1; MG/1
100 CAPSULE ORAL 2 TIMES DAILY
Qty: 14 CAPSULE | Refills: 0 | Status: SHIPPED | OUTPATIENT
Start: 2024-07-23

## 2024-07-23 NOTE — TELEPHONE ENCOUNTER
Per patient's  Alphonse, he believes that the patient may have another UTI because the color of her urine is darker (\"goldish\"), urine is \"very smelly\", patient is having trouble sleeping and eating, and she is very irritable which per Alphonse are the usual symptoms when she has a UTI. Denies any other symptoms.   Order for urinalysis and urine culture placed per protocol. Alphonse will go to the lab to get the sterile specimen cup and return it once the sample is provided by the patient.    To  to please advise if anything else is needed before the urine test results become available

## 2024-07-23 NOTE — TELEPHONE ENCOUNTER
Patient  is calling and states that he thinks the patient has another UTI.   would like orders placed and would like to come into the lab and  a speciman cup.    Please call  Alphonse when orders have been placed

## 2024-07-24 NOTE — TELEPHONE ENCOUNTER
Per MD morris to send mychart response below.         Neal Aiken,     Our nursing team spoke with Alphonse as well earlier. A urine culture was ordered and Dr. Rao will look out for the result and treat appropriately. Moving forward, Mireya should be seeing her Urologist, Dr. Munir Cullen ---Please ask them for advice on the reoccurring infections. For this instance, a prescription was sent to the local pharmacy for Macrobid. Also, please make sure mom is taking Methenamine regularly.     Thank you

## 2024-08-07 ENCOUNTER — PATIENT MESSAGE (OUTPATIENT)
Dept: INTERNAL MEDICINE CLINIC | Facility: CLINIC | Age: 89
End: 2024-08-07

## 2024-08-07 ENCOUNTER — TELEPHONE (OUTPATIENT)
Dept: INTERNAL MEDICINE CLINIC | Facility: CLINIC | Age: 89
End: 2024-08-07

## 2024-08-07 ENCOUNTER — LAB ENCOUNTER (OUTPATIENT)
Dept: LAB | Facility: HOSPITAL | Age: 89
End: 2024-08-07
Attending: INTERNAL MEDICINE
Payer: MEDICARE

## 2024-08-07 DIAGNOSIS — R39.9 UTI SYMPTOMS: ICD-10-CM

## 2024-08-07 DIAGNOSIS — R39.9 UTI SYMPTOMS: Primary | ICD-10-CM

## 2024-08-07 LAB
BILIRUB UR QL: NEGATIVE
GLUCOSE UR-MCNC: NORMAL MG/DL
HGB UR QL STRIP.AUTO: NEGATIVE
HYALINE CASTS #/AREA URNS AUTO: PRESENT /LPF
KETONES UR-MCNC: NEGATIVE MG/DL
LEUKOCYTE ESTERASE UR QL STRIP.AUTO: 500
NITRITE UR QL STRIP.AUTO: NEGATIVE
PH UR: 6.5 [PH] (ref 5–8)
SP GR UR STRIP: 1.01 (ref 1–1.03)
UROBILINOGEN UR STRIP-ACNC: NORMAL
WBC CLUMPS UR QL AUTO: PRESENT /HPF

## 2024-08-07 PROCEDURE — 87186 SC STD MICRODIL/AGAR DIL: CPT

## 2024-08-07 PROCEDURE — 81001 URINALYSIS AUTO W/SCOPE: CPT

## 2024-08-07 PROCEDURE — 87086 URINE CULTURE/COLONY COUNT: CPT

## 2024-08-07 PROCEDURE — 87077 CULTURE AEROBIC IDENTIFY: CPT

## 2024-08-07 NOTE — TELEPHONE ENCOUNTER
Please see 7/23/24 telephone encounter.   I spoke to patient's  Alphonse who states that the patient finished the Nitrofurantoin that was ordered by  on 7/23 but is now having symptoms again - very tired, barely getting out of bed, bad urine odor, poor appetite, irritable.   I asked about seeing urologist  and Alphonse stated that they called 's [place] office yesterday and they were closed. They will keep trying as they want the patient to see urology and hopefully get prescribed a daily medication that she can take to prevent these frequent UTI's.  Urinalysis and urine culture ordered per protocol since patient has not yet seen urology. Alphonse will  the sterile cup from the lab and return it with the specimen.    To  to please advise if anything else is needed before the urine test results become available

## 2024-08-07 NOTE — TELEPHONE ENCOUNTER
From: Mireya Wolfe  To: Kathy Rao  Sent: 8/7/2024 3:49 PM CDT  Subject: Daily UTI Medicine?    Hi Dr. Rao,    I understand that my father has called your office to order another UTI test kit for my mom as she is having symptoms again.    Your team recommended we make an appointment with Dr. Cullen, a urologist moving forward.    May I ask if he would be the person to consult to see if there is a daily medicine that mom can take to keep these recurring UTIs at bay?    When she had her last UTI, you prescribed Macrobid and Angeli from your team responded back to me and asked if mom was also taking Methenamine regularly.    I meant to follow-up at that time. Is this a daily medicinei that she can take to alleviate UTI recurrences?    Thank you,    Nelli Strange  (Valente Bocanegra's daughter)  [phone] (cell)

## 2024-08-07 NOTE — TELEPHONE ENCOUNTER
Patients  is calling and states the patient has another UTI.   Patient will not get out of bed, and the urine smell is really bad, very crabby and wont eat.  This started about 3 days ago.     would like to come in and  the urine analysis kit.   states he will drop the urine off at the [facility].    Please call and advise

## 2024-08-07 NOTE — TELEPHONE ENCOUNTER
HORTENSIA De Los Santos Sp:    Pt's family also sent below [employer] msg.       From: Mireya Wolfe  To: Kathy Jalen  Sent: 8/7/2024  3:49 PM CDT  Subject: Daily UTI Medicine?    Hi Dr. Rao,    I understand that my father has called your office to order another UTI test kit for my mom as she is having symptoms again.    Your team recommended we make an appointment with Dr. Cullen, a urologist moving forward.    May I ask if he would be the person to consult to see if there is a daily medicine that mom can take to keep these recurring UTIs at bay?    When she had her last UTI, you prescribed Macrobid and Angeli from your team responded back to me and asked if mom was also taking Methenamine regularly.    I meant to follow-up at that time. Is this a daily medicinei that she can take to alleviate UTI recurrences?    Thank you,    Nelli Strange  (Valente Bocanegra's daughter)  [phone] (cell)

## 2024-08-08 NOTE — TELEPHONE ENCOUNTER
Spoke with spouse, Alphonse, and relayed MD's message. Verbalized understanding and agreement with plan.   spouse was advised to call the office with any persisting/new/worsening symptoms, or concerns.     He received a call today from Dr. Cerna office informing him that script for methenamine was sent to pharmacy (he will send details via [employer] fro record). He was given instruction for pt to start after she completed current antibiotic for urinary tract infection.   No appointment scheduled yet with dr. Cullen. Spouse will schedule once pt recovers from urinary tract infection.

## 2024-08-08 NOTE — TELEPHONE ENCOUNTER
Please call patient's  with urine test results & prescription if needed    Call back # [phone]

## 2024-08-08 NOTE — TELEPHONE ENCOUNTER
To Dr. Trevino--preliminary result available    URINE CULTURE >100,000 CFU/ML Escherichia coli Abnormal            Resulting Agency: [facility] ([facility])           Specimen Collected: 08/07/24  5:17 PM Last Resulted: 08/08/24  2:48 PM

## 2024-08-08 NOTE — TELEPHONE ENCOUNTER
Urine cx noted -- E.coli; sensitivities still pending  Start empiric Macrobid 100mg BID x 5 days.   Meanwhile restart methenamine and see urology Dr. Munir Cullen

## 2024-08-09 ENCOUNTER — TELEPHONE (OUTPATIENT)
Dept: INTERNAL MEDICINE CLINIC | Facility: CLINIC | Age: 89
End: 2024-08-09

## 2024-08-09 RX ORDER — NITROFURANTOIN 25; 75 MG/1; MG/1
100 CAPSULE ORAL 2 TIMES DAILY
Qty: 10 CAPSULE | Refills: 0 | Status: SHIPPED | OUTPATIENT
Start: 2024-08-09 | End: 2024-08-14

## 2024-08-09 NOTE — TELEPHONE ENCOUNTER
Status: Final result       Dx: UTI symptoms    Specimen Information: Urine, clean catch   0 Result Notes       1 Follow-up Encounter  URINE CULTURE >100,000 CFU/ML Escherichia coli Abnormal            Resulting Agency: [facility] ([facility])     Susceptibility     Escherichia coli     Not Specified    Ampicillin >=32 Resistant    Ampicillin + Sulbactam 16 Intermediate    Cefazolin <=4 Sensitive    Ciprofloxacin >=4 Resistant    Gentamicin <=1 Sensitive    Levofloxacin >=8 Resistant    Meropenem <=0.25 Sensitive    Nitrofurantoin <=16 Sensitive    Piperacillin + Tazobactam <=4 Sensitive    Trimethoprim/Sulfa <=20 Sensitive                   Please advise for  thanks - to

## 2024-08-16 ENCOUNTER — TELEPHONE (OUTPATIENT)
Dept: INTERNAL MEDICINE CLINIC | Facility: CLINIC | Age: 89
End: 2024-08-16

## 2024-08-16 NOTE — TELEPHONE ENCOUNTER
Patient called and scheduled an appointment on 9/4 she stated she would like to discuss with Dr Rao possibly getting a medication to stop her period

## 2024-08-16 NOTE — TELEPHONE ENCOUNTER
Called patient , spoke with caregiver who states patient wants to discuss urinary tract infection medications. Patient stated she wants medication to \"stop her period\" when she spoke with PSR Rosemary DEANI to

## 2024-08-19 NOTE — TELEPHONE ENCOUNTER
I spoke to the caregiver as well as the patient and informed them of 's message below. Dr.Amit Cullen's contact information provided.

## 2024-08-19 NOTE — TELEPHONE ENCOUNTER
She needs to see her urologist  (Dr. Munir Cullen) re this -- I have reiterated this with the patient and family several times.   Please let the  or caregiver know (pt with mild dementia/short term memory loss)

## 2024-09-13 ENCOUNTER — TELEPHONE (OUTPATIENT)
Dept: INTERNAL MEDICINE CLINIC | Facility: CLINIC | Age: 89
End: 2024-09-13

## 2024-09-13 NOTE — TELEPHONE ENCOUNTER
Kristel from [facility] left a message, requesting last office visit notes to re-certify patient for oxygen

## 2024-10-21 ENCOUNTER — TELEPHONE (OUTPATIENT)
Dept: INTERNAL MEDICINE CLINIC | Facility: CLINIC | Age: 89
End: 2024-10-21

## 2024-10-21 NOTE — TELEPHONE ENCOUNTER
Patient called to request assistance from Dr. Rao in getting an earlier appointment with Dr. Munir Cullen.     Patient was scheduled to see Dr. Cullen last week but appointment was too early for patient. Patient was unable to get to appointment and ultimately had to cancel it.     Patient called back to reschedule and first available appointment is not until 2025.     Patient states she is sick and needs to see Dr. Cullen.     Please advise.

## 2024-10-21 NOTE — TELEPHONE ENCOUNTER
Called and spoke with pt and she will call Dr Cullen's office back and ask to be placed on the cancellation list fro a sooner appointment.    Pt informed that she will eliseo to adjust her schedule based on Dr Cullen's availability and she voiced understanding

## 2024-10-21 NOTE — TELEPHONE ENCOUNTER
Patient returned our call, nurse unavailable.     Patient was not able to keep appointment as it was too early in the morning and patient does not do well in the morning. She tried to make appointment but just could not get there.     Patient is needing an afternoon around 3pm appointment.

## 2024-10-31 ENCOUNTER — TELEPHONE (OUTPATIENT)
Dept: INTERNAL MEDICINE CLINIC | Facility: CLINIC | Age: 89
End: 2024-10-31

## 2024-10-31 ENCOUNTER — LAB ENCOUNTER (OUTPATIENT)
Dept: LAB | Facility: HOSPITAL | Age: 89
End: 2024-10-31
Attending: INTERNAL MEDICINE
Payer: MEDICARE

## 2024-10-31 DIAGNOSIS — R39.9 UTI SYMPTOMS: Primary | ICD-10-CM

## 2024-10-31 DIAGNOSIS — R39.9 UTI SYMPTOMS: ICD-10-CM

## 2024-10-31 LAB
BILIRUB UR QL: NEGATIVE
COLOR UR: YELLOW
GLUCOSE UR-MCNC: NORMAL MG/DL
KETONES UR-MCNC: NEGATIVE MG/DL
LEUKOCYTE ESTERASE UR QL STRIP.AUTO: 500
PH UR: 6 [PH] (ref 5–8)
PROT UR-MCNC: 30 MG/DL
SP GR UR STRIP: 1.02 (ref 1–1.03)
UROBILINOGEN UR STRIP-ACNC: NORMAL
WBC #/AREA URNS AUTO: >50 /HPF
WBC CLUMPS UR QL AUTO: PRESENT /HPF

## 2024-10-31 PROCEDURE — 81001 URINALYSIS AUTO W/SCOPE: CPT

## 2024-10-31 PROCEDURE — 87086 URINE CULTURE/COLONY COUNT: CPT

## 2024-10-31 RX ORDER — CEPHALEXIN 500 MG/1
500 CAPSULE ORAL 3 TIMES DAILY
Qty: 21 CAPSULE | Refills: 0 | Status: SHIPPED | OUTPATIENT
Start: 2024-10-31

## 2024-10-31 NOTE — TELEPHONE ENCOUNTER
I spoke to the patient and her  who report that for the last 2-3 weeks patient has been feeling very weak and it seems to be getting worse. Patient is sleeping a lot, not getting out of bed much. Per patient's , patient is taking Methenamine Hippurate 1 tab BID again (prescribed by  - started taking again on 8/28/24) for the issues with UTI's.   Per , vitals ok (/70, 95% O2sat) but they are not sure what could be causing her weakness. Usually it was caused by a UTI but now she is taking this medication again that  prescribed.     To  to please advise

## 2024-10-31 NOTE — TELEPHONE ENCOUNTER
I spoke to  and relayed 's message below. He requested orders for UA and urine culture be entered (done) and he can grab the urine cup from the lab and return it with the specimen. They would like to rule-out a UTI first and schedule the patient to be seen after that if needed or anything changes.

## 2024-10-31 NOTE — TELEPHONE ENCOUNTER
Patient called complaining of fatigue, weakness, no appetite, laying in bed day and night, for the last 3 weeks. Patient thinks something is wrong.      Care taker Portia is there with her, oxygen normal, temperature normal, blood pressure is normal   Per Portia, when this happens patient usually has a urinary tract infection but she is currently on medication for a urinary tract infection    Please advise

## 2024-10-31 NOTE — TELEPHONE ENCOUNTER
The methenamine is for UTI prevention, not treatment.  She could still have a UTI.  Should probably be seen, or at the least do a UA and urine culture

## 2024-11-01 NOTE — TELEPHONE ENCOUNTER
Tried to call pt's number (busy signal x 2)    Called pt's  and LM on VM -- informed him that pt's UA is c/w UTI and that I am sending a Rx for cephalexin 500mg TID x 7 days -sent to [facility] in [place]    (Prev 3 urine cultures showed E.coli - R to cipro/levaquin, S- to ancef, bactrim, macrobid.   Pt with sulfa allergy, so will given cephalexin)

## 2024-11-01 NOTE — TELEPHONE ENCOUNTER
S/w patient's spouse/Alphonse and relayed MD message.  Verbalizes understanding and agreement with tx. plan   HIPAA verified

## 2024-11-01 NOTE — TELEPHONE ENCOUNTER
Please confirm that pt's  got my message (on his voicemail) about pt's UTI -- I sent Rx for keflex to [facility] in [place].    Urine cx pending

## 2024-11-09 ENCOUNTER — MOBILE ENCOUNTER (OUTPATIENT)
Dept: INTERNAL MEDICINE CLINIC | Facility: CLINIC | Age: 89
End: 2024-11-09

## 2024-11-11 ENCOUNTER — APPOINTMENT (OUTPATIENT)
Dept: GENERAL RADIOLOGY | Facility: HOSPITAL | Age: 89
End: 2024-11-11
Attending: STUDENT IN AN ORGANIZED HEALTH CARE EDUCATION/TRAINING PROGRAM
Payer: MEDICARE

## 2024-11-11 ENCOUNTER — HOSPITAL ENCOUNTER (INPATIENT)
Facility: HOSPITAL | Age: 89
LOS: 4 days | Discharge: HOME HEALTH | End: 2024-11-15
Attending: STUDENT IN AN ORGANIZED HEALTH CARE EDUCATION/TRAINING PROGRAM | Admitting: INTERNAL MEDICINE
Payer: MEDICARE

## 2024-11-11 ENCOUNTER — HOSPITAL ENCOUNTER (INPATIENT)
Facility: HOSPITAL | Age: 89
LOS: 4 days | Discharge: HOME | End: 2024-11-15
Attending: STUDENT IN AN ORGANIZED HEALTH CARE EDUCATION/TRAINING PROGRAM | Admitting: INTERNAL MEDICINE
Payer: MEDICARE

## 2024-11-11 DIAGNOSIS — N30.00 ACUTE CYSTITIS WITHOUT HEMATURIA: Primary | ICD-10-CM

## 2024-11-11 LAB
ANION GAP SERPL CALC-SCNC: 5 MMOL/L (ref 0–18)
BASOPHILS # BLD AUTO: 0.03 X10(3) UL (ref 0–0.2)
BASOPHILS NFR BLD AUTO: 0.5 %
BILIRUB UR QL: NEGATIVE
BUN BLD-MCNC: 16 MG/DL (ref 9–23)
BUN/CREAT SERPL: 18.8 (ref 10–20)
CALCIUM BLD-MCNC: 10.6 MG/DL (ref 8.7–10.4)
CHLORIDE SERPL-SCNC: 107 MMOL/L (ref 98–112)
CO2 SERPL-SCNC: 31 MMOL/L (ref 21–32)
CREAT BLD-MCNC: 0.85 MG/DL
DEPRECATED RDW RBC AUTO: 41.8 FL (ref 35.1–46.3)
EGFRCR SERPLBLD CKD-EPI 2021: 65 ML/MIN/1.73M2 (ref 60–?)
EOSINOPHIL # BLD AUTO: 0.1 X10(3) UL (ref 0–0.7)
EOSINOPHIL NFR BLD AUTO: 1.5 %
ERYTHROCYTE [DISTWIDTH] IN BLOOD BY AUTOMATED COUNT: 12.8 % (ref 11–15)
GLUCOSE BLD-MCNC: 117 MG/DL (ref 70–99)
GLUCOSE UR-MCNC: NORMAL MG/DL
HCT VFR BLD AUTO: 47.6 %
HGB BLD-MCNC: 15 G/DL
IMM GRANULOCYTES # BLD AUTO: 0.01 X10(3) UL (ref 0–1)
IMM GRANULOCYTES NFR BLD: 0.2 %
KETONES UR-MCNC: NEGATIVE MG/DL
LEUKOCYTE ESTERASE UR QL STRIP.AUTO: 500
LYMPHOCYTES # BLD AUTO: 1.64 X10(3) UL (ref 1–4)
LYMPHOCYTES NFR BLD AUTO: 25.1 %
MCH RBC QN AUTO: 28.1 PG (ref 26–34)
MCHC RBC AUTO-ENTMCNC: 31.5 G/DL (ref 31–37)
MCV RBC AUTO: 89.3 FL
MONOCYTES # BLD AUTO: 0.25 X10(3) UL (ref 0.1–1)
MONOCYTES NFR BLD AUTO: 3.8 %
NEUTROPHILS # BLD AUTO: 4.5 X10 (3) UL (ref 1.5–7.7)
NEUTROPHILS # BLD AUTO: 4.5 X10(3) UL (ref 1.5–7.7)
NEUTROPHILS NFR BLD AUTO: 68.9 %
NITRITE UR QL STRIP.AUTO: NEGATIVE
OSMOLALITY SERPL CALC.SUM OF ELEC: 298 MOSM/KG (ref 275–295)
PH UR: 6.5 [PH] (ref 5–8)
PLATELET # BLD AUTO: 205 10(3)UL (ref 150–450)
POTASSIUM SERPL-SCNC: 4.1 MMOL/L (ref 3.5–5.1)
RBC # BLD AUTO: 5.33 X10(6)UL
SODIUM SERPL-SCNC: 143 MMOL/L (ref 136–145)
SP GR UR STRIP: 1.01 (ref 1–1.03)
TROPONIN I SERPL HS-MCNC: 5 NG/L
UROBILINOGEN UR STRIP-ACNC: NORMAL
WBC # BLD AUTO: 6.5 X10(3) UL (ref 4–11)
WBC #/AREA URNS AUTO: >50 /HPF

## 2024-11-11 PROCEDURE — 71045 X-RAY EXAM CHEST 1 VIEW: CPT | Performed by: STUDENT IN AN ORGANIZED HEALTH CARE EDUCATION/TRAINING PROGRAM

## 2024-11-11 RX ORDER — ESCITALOPRAM OXALATE 20 MG/1
20 TABLET ORAL DAILY
Status: DISCONTINUED | OUTPATIENT
Start: 2024-11-11 | End: 2024-11-15

## 2024-11-11 RX ORDER — METHENAMINE HIPPURATE 1000 MG/1
1 TABLET ORAL 2 TIMES DAILY
Status: DISCONTINUED | OUTPATIENT
Start: 2024-11-11 | End: 2024-11-15

## 2024-11-11 RX ORDER — FLUTICASONE PROPIONATE AND SALMETEROL 100; 50 UG/1; UG/1
1 POWDER RESPIRATORY (INHALATION) 2 TIMES DAILY
Status: DISCONTINUED | OUTPATIENT
Start: 2024-11-11 | End: 2024-11-15

## 2024-11-11 RX ORDER — ACETAMINOPHEN 325 MG/1
650 TABLET ORAL EVERY 6 HOURS PRN
Status: DISCONTINUED | OUTPATIENT
Start: 2024-11-11 | End: 2024-11-15

## 2024-11-11 RX ORDER — METHENAMINE HIPPURATE 1000 MG/1
1 TABLET ORAL 2 TIMES DAILY
COMMUNITY
Start: 2024-08-08

## 2024-11-11 RX ORDER — PREGABALIN 50 MG/1
50 CAPSULE ORAL 2 TIMES DAILY
Status: DISCONTINUED | OUTPATIENT
Start: 2024-11-11 | End: 2024-11-15

## 2024-11-11 RX ORDER — SODIUM CHLORIDE 9 MG/ML
INJECTION, SOLUTION INTRAVENOUS CONTINUOUS
Status: DISCONTINUED | OUTPATIENT
Start: 2024-11-11 | End: 2024-11-13

## 2024-11-11 RX ORDER — FLUTICASONE FUROATE AND VILANTEROL 100; 25 UG/1; UG/1
1 POWDER RESPIRATORY (INHALATION) DAILY
Qty: 180 EACH | Refills: 3 | Status: ON HOLD | OUTPATIENT
Start: 2024-11-11

## 2024-11-11 NOTE — ED PROVIDER NOTES
Patient Seen in: [facility] Emergency Department      History     Chief Complaint   Patient presents with    Fatigue     Stated Complaint: weakness    Subjective:   HPI      89-year-old female with history of COPD GERD depression recurrent UTIs hyperlipidemia, presenting for evaluation of generalized weakness.  She is brought in by EMS from home.  She resides at home with her .  She states of the past 4 days she is felt increasingly weak and has not left bed.  Endorses some intermittent left arm pain which she relates to history of shingles.  Does also feel short of breath.  Recently started on cephalexin for presumed UTI - but cx grew contaminants    Objective:     Past Medical History:    Anemia    Anxiety    Basal cell carcinoma    Mouth    Calculus of kidney    Calculus, kidney    COPD (chronic obstructive pulmonary disease) (HCC)    Depression    Esophageal reflux    Hearing impaired person, bilateral    Hematuria    History of recurrent UTIs    Hypercholesterolemia    Kidney stone    Osteoarthritis    Osteopenia    Other and unspecified hyperlipidemia    Recurrent UTI    Scoliosis    Spinal stenosis    Squamous cell cancer of lip    Thyroid nodule    Urinary frequency    Vitamin D deficiency              Past Surgical History:   Procedure Laterality Date    Cataract Right 05/2016    Cysto/uretero w/lithotripsy Right 10/29/2015    Procedure: LITHOTRIPSY HOLMIUM LASER WITH CYSTOSCOPY;  Surgeon: Tommie Majano MD;  Location: [facility]    Cystoscopy,insert ureteral stent Right 09/24/2015    Procedure: LITHOTRIPSY WITH CYSTOSCOPY, STENT PLACEMENT;  Surgeon: Tommie Majano MD;  Location: [facility]    Fragmenting of kidney stone Right 09/24/2015    Procedure: LITHOTRIPSY WITH CYSTOSCOPY, STENT PLACEMENT;  Surgeon: Tommie Majano MD;  Location: [facility]    Other surgical history  1960    Surgery of Blockage in Urethral Tube    Other surgical history    and on 00    ESWL - left    Other surgical history  2000    Cystourethroscopy with Urethral Calibration and Dilation with Bilateral Retrograde Pyelogram and Left Ureteral Stone Manipulation with insertion of Double-J Stent    Other surgical history  2015    R eye surgery for bleed and then development of blood clot--resolved    Other surgical history  2016    Cystoscpy stent removal    Other surgical history      Other surgical history  2017    Mohs surgery    Patient documented not to have experienced any of the following events Right 2015    Procedure: LITHOTRIPSY WITH CYSTOSCOPY, STENT PLACEMENT;  Surgeon: Tommie Majano MD;  Location: [facility]    Patient documented not to have experienced any of the following events Right 10/29/2015    Procedure: CYSTOSCOPY/URETEROSCOPY/STONE EXTRACTION;  Surgeon: Tommie Majano MD;  Location: [facility]    Patient with preoperative order for iv antibiotic surgical site infect Right 2015    Procedure: LITHOTRIPSY WITH CYSTOSCOPY, STENT PLACEMENT;  Surgeon: Tommie Majano MD;  Location: [facility]    Patient with preoperative order for iv antibiotic surgical site infect Right 10/29/2015    Procedure: CYSTOSCOPY/URETEROSCOPY/STONE EXTRACTION;  Surgeon: Tommie Majano MD;  Location: [facility]    Skin surgery                  Social History     Socioeconomic History    Marital status:    Tobacco Use    Smoking status: Former     Current packs/day: 0.00     Average packs/day: 0.3 packs/day for 10.0 years (2.5 ttl pk-yrs)     Types: Cigarettes     Start date: 2006     Quit date: 2016     Years since quittin.2     Passive exposure: Past    Smokeless tobacco: Never   Vaping Use    Vaping status: Never Used   Substance and Sexual Activity    Alcohol use: No    Drug use: No   Other Topics Concern    Caffeine Concern No     Social Drivers of Health     Financial  Resource Strain: Low Risk  (3/19/2024)    Financial Resource Strain     Med Affordability: No   Food Insecurity: No Food Insecurity (11/11/2024)    Food Insecurity     Food Insecurity: Never true   Transportation Needs: No Transportation Needs (11/11/2024)    Transportation Needs     Lack of Transportation: No   Housing Stability: Low Risk  (11/11/2024)    Housing Stability     Housing Instability: No                  Physical Exam     ED Triage Vitals [11/11/24 1743]   BP (!) 147/93   Pulse 91   Resp 21   Temp 98.1 °F (36.7 °C)   Temp src Oral   SpO2 92 %   O2 Device None (Room air)       Current Vitals:   Vital Signs  BP: 149/82  Pulse: 72  Resp: 18  Temp: 98.1 °F (36.7 °C)  Temp src: Oral  MAP (mmHg): (!) 108    Oxygen Therapy  SpO2: 93 %  O2 Device: None (Room air)        Physical Exam  Constitutional: awake, alert, no sig distress  HENT: mmm, no lesions,  Neck: normal range of motion, no tenderness, supple.  Eyes: PERRL, EOMI, conjunctiva normal, no discharge. Sclera anicteric.  Cardiovascular: rr no murmur  Respiratory: Normal breath sounds, no respiratory distress, no wheezing, no chest tenderness.  GI: Bowel sounds normal, Soft, no tenderness, no masses, no pulsatile masses.  : No CVA tenderness.  Skin: Warm, dry, no erythema, no rash.  Musculoskeletal: Intact distal pulses, no edema, no tenderness, no cyanosis, no clubbing. Good range of motion in all major joints. No tenderness to palpation or major deformities noted. Back- No tenderness.  Neurologic: Alert & oriented x 3, normal motor function, normal sensory function, no focal deficits noted.  Psych: Calm, cooperative, nl affect        ED Course     Labs Reviewed   BASIC METABOLIC PANEL (8) - Abnormal; Notable for the following components:       Result Value    Glucose 117 (*)     Calcium, Total 10.6 (*)     Calculated Osmolality 298 (*)     All other components within normal limits   CBC WITH DIFFERENTIAL WITH PLATELET - Abnormal; Notable for the  following components:    RBC 5.33 (*)     All other components within normal limits   URINALYSIS WITH CULTURE REFLEX - Abnormal; Notable for the following components:    Clarity Urine Turbid (*)     Blood Urine Trace (*)     Protein Urine Trace (*)     Leukocyte Esterase Urine 500 (*)     WBC Urine >50 (*)     RBC Urine 6-10 (*)     Bacteria Urine 3+ (*)     All other components within normal limits   TROPONIN I HIGH SENSITIVITY - Normal   URINE CULTURE, ROUTINE     EKG    Rate, intervals and axes as noted on EKG Report.  Rate: 88  Rhythm: Sinus Rhythm  Reading: Right axis deviation normal intervals, no acute ST segment change, no STEMI.  QTc 423 MS   -when compared to previous ecg - no significant chage         ED Course as of 11/12/24 0053  ------------------------------------------------------------  Time: 11/11 1826  Comment: UA+. No sirs criteria, does not appear septic. Reviewed previous culture data, last grew E coli sensitive to cephalosporins.   ------------------------------------------------------------  Time: 11/11 1915  Comment: I have independently reviewed patient's chest x-ray do not appreciate any acute cardiopulmonary findings.              MDM      89F hx as above who presents with  fatigue. Possible associated LUE pain and dyspnea.   On arrival vss, reassuring  Ddx: metabolic derangement, dehydration, UTI, ACS      Admission disposition: 11/11/2024  7:29 PM       Workup suggestive of urinary tract infection.  Given age and ambulatory dysfunction, will will treat with IV antibiotics and admit.    Medical Decision Making      Disposition and Plan     Clinical Impression:  1. Acute cystitis without hematuria         Disposition:  Admit  11/11/2024  7:29 pm    Follow-up:  No follow-up provider specified.  We recommend that you schedule follow up care with a primary care provider within the next three months to obtain basic health screening including reassessment of your blood  pressure.      Medications Prescribed:  Current Discharge Medication List              Supplementary Documentation:         Hospital Problems       Present on Admission  Date Reviewed: 7/17/2024            ICD-10-CM Noted POA    * (Principal) Acute cystitis without hematuria N30.00 11/11/2024 Unknown    Acute cystitis N30.00 11/11/2024 Unknown

## 2024-11-11 NOTE — TELEPHONE ENCOUNTER
Refill request is for a maintenance medication and has met the criteria specified in the Ambulatory Medication Refill Standing Order for eligibility, visits, laboratory, alerts and was sent to the requested pharmacy.    Requested Prescriptions     Signed Prescriptions Disp Refills    fluticasone furoate-vilanterol 100-25 MCG/ACT Inhalation Aerosol Powder, Breath Activated 180 each 3     Sig: Inhale 1 puff into the lungs daily.     Authorizing Provider: DARRYN CONDE     Ordering User: PHAM CÁRDENAS

## 2024-11-11 NOTE — ED INITIAL ASSESSMENT (HPI)
Patient arrives via EMS from home for weakness for the past couple days. Patient normally ambulates with walker but has been unable to. Recent UTI per patient. A&Ox2-3.

## 2024-11-12 LAB
ANION GAP SERPL CALC-SCNC: 4 MMOL/L (ref 0–18)
ATRIAL RATE: 88 BPM
BUN BLD-MCNC: 12 MG/DL (ref 9–23)
BUN/CREAT SERPL: 16.9 (ref 10–20)
CALCIUM BLD-MCNC: 9.8 MG/DL (ref 8.7–10.4)
CHLORIDE SERPL-SCNC: 111 MMOL/L (ref 98–112)
CO2 SERPL-SCNC: 29 MMOL/L (ref 21–32)
CREAT BLD-MCNC: 0.71 MG/DL
EGFRCR SERPLBLD CKD-EPI 2021: 81 ML/MIN/1.73M2 (ref 60–?)
GLUCOSE BLD-MCNC: 109 MG/DL (ref 70–99)
OSMOLALITY SERPL CALC.SUM OF ELEC: 298 MOSM/KG (ref 275–295)
P AXIS: 75 DEGREES
P-R INTERVAL: 134 MS
POTASSIUM SERPL-SCNC: 4 MMOL/L (ref 3.5–5.1)
Q-T INTERVAL: 350 MS
QRS DURATION: 82 MS
QTC CALCULATION (BEZET): 423 MS
R AXIS: 127 DEGREES
SODIUM SERPL-SCNC: 144 MMOL/L (ref 136–145)
T AXIS: 59 DEGREES
VENTRICULAR RATE: 88 BPM

## 2024-11-12 PROCEDURE — 99222 1ST HOSP IP/OBS MODERATE 55: CPT | Performed by: INTERNAL MEDICINE

## 2024-11-12 RX ORDER — ENOXAPARIN SODIUM 100 MG/ML
40 INJECTION SUBCUTANEOUS EVERY 24 HOURS
Status: DISCONTINUED | OUTPATIENT
Start: 2024-11-12 | End: 2024-11-15

## 2024-11-12 NOTE — ED QUICK NOTES
Orders for admission, patient is aware of plan and ready to go upstairs. Any questions, please call ED RN Rebeka at extension [phone].     Patient Covid vaccination status: Fully vaccinated     COVID Test Ordered in ED: None    COVID Suspicion at Admission: N/A    Running Infusions:      Mental Status/LOC at time of transport: A&Ox2-3, confused, +UTI    Other pertinent information:   CIWA score: N/A   NIH score:  N/A

## 2024-11-12 NOTE — PLAN OF CARE
Patient admitted to the unit for UTI and weakness. Aox3 on RA. [place]. General diet. Voiding via the purewick. Plan to TBD. VSS.      Problem: Patient Centered Care  Goal: Patient preferences are identified and integrated in the patient's plan of care  Description: Interventions:  - What would you like us to know as we care for you?   - Provide timely, complete, and accurate information to patient/family  - Incorporate patient and family knowledge, values, beliefs, and cultural backgrounds into the planning and delivery of care  - Encourage patient/family to participate in care and decision-making at the level they choose  - Honor patient and family perspectives and choices  Outcome: Progressing     Problem: PAIN - ADULT  Goal: Verbalizes/displays adequate comfort level or patient's stated pain goal  Description: INTERVENTIONS:  - Encourage pt to monitor pain and request assistance  - Assess pain using appropriate pain scale  - Administer analgesics based on type and severity of pain and evaluate response  - Implement non-pharmacological measures as appropriate and evaluate response  - Consider cultural and social influences on pain and pain management  - Manage/alleviate anxiety  - Utilize distraction and/or relaxation techniques  - Monitor for opioid side effects  - Notify MD/LIP if interventions unsuccessful or patient reports new pain  - Anticipate increased pain with activity and pre-medicate as appropriate  Outcome: Progressing       Problem: RISK FOR INFECTION - ADULT  Goal: Absence of fever/infection during anticipated neutropenic period  Description: INTERVENTIONS  - Monitor WBC  - Administer growth factors as ordered  - Implement neutropenic guidelines  Outcome: Progressing     Problem: SAFETY ADULT - FALL  Goal: Free from fall injury  Description: INTERVENTIONS:  - Assess pt frequently for physical needs  - Identify cognitive and physical deficits and behaviors that affect risk of falls.  - [place]am fall  precautions as indicated by assessment.  - Educate pt/family on patient safety including physical limitations  - Instruct pt to call for assistance with activity based on assessment  - Modify environment to reduce risk of injury  - Provide assistive devices as appropriate  - Consider OT/PT consult to assist with strengthening/mobility  - Encourage toileting schedule  Outcome: Progressing     Problem: DISCHARGE PLANNING  Goal: Discharge to home or other facility with appropriate resources  Description: INTERVENTIONS:  - Identify barriers to discharge w/pt and caregiver  - Include patient/family/discharge partner in discharge planning  - Arrange for needed discharge resources and transportation as appropriate  - Identify discharge learning needs (meds, wound care, etc)  - Arrange for interpreters to assist at discharge as needed  - Consider post-discharge preferences of patient/family/discharge partner  - Complete POLST form as appropriate  - Assess patient's ability to be responsible for managing their own health  - Refer to Case Management Department for coordinating discharge planning if the patient needs post-hospital services based on physician/LIP order or complex needs related to functional status, cognitive ability or social support system  Outcome: Progressing

## 2024-11-12 NOTE — PAYOR COMM NOTE
--------------  ADMISSION REVIEW     Payor: [employer]  Subscriber #:  [id number]  Authorization Number: [id number]    Admit date: 11/11/24  Admit time:  9:30 PM       REVIEW DOCUMENTATION:     ED Provider Notes        ED Provider Notes signed by Prashant Guzman MD at 11/12/2024 12:53 AM       Author: Prashant Guzman MD Service: -- Author Type: Physician    Filed: 11/12/2024 12:53 AM Date of Service: 11/11/2024  5:52 PM Status: Signed    : Prashant Guzman MD (Physician)           Patient Seen in: [facility] Emergency Department      History     Chief Complaint   Patient presents with    Fatigue     Stated Complaint: weakness    Subjective:   HPI      89-year-old female with history of COPD GERD depression recurrent UTIs hyperlipidemia, presenting for evaluation of generalized weakness.  She is brought in by EMS from home.  She resides at home with her .  She states of the past 4 days she is felt increasingly weak and has not left bed.  Endorses some intermittent left arm pain which she relates to history of shingles.  Does also feel short of breath.  Recently started on cephalexin for presumed UTI - but cx grew contaminants    Objective:     Past Medical History:    Anemia    Anxiety    Basal cell carcinoma    Mouth    Calculus of kidney    Calculus, kidney    COPD (chronic obstructive pulmonary disease) (HCC)    Depression    Esophageal reflux    Hearing impaired person, bilateral    Hematuria    History of recurrent UTIs    Hypercholesterolemia    Kidney stone    Osteoarthritis    Osteopenia    Other and unspecified hyperlipidemia    Recurrent UTI    Scoliosis    Spinal stenosis    Squamous cell cancer of lip    Thyroid nodule    Urinary frequency    Vitamin D deficiency     Physical Exam     ED Triage Vitals [11/11/24 1743]   BP (!) 147/93   Pulse 91   Resp 21   Temp 98.1 °F (36.7 °C)   Temp src Oral   SpO2 92 %   O2 Device None (Room air)        Current Vitals:   Vital Signs  BP: 149/82  Pulse: 72  Resp: 18  Temp: 98.1 °F (36.7 °C)  Temp src: Oral  MAP (mmHg): (!) 108    Oxygen Therapy  SpO2: 93 %  O2 Device: None (Room air)        Physical Exam  Constitutional: awake, alert, no sig distress  HENT: mmm, no lesions,  Neck: normal range of motion, no tenderness, supple.  Eyes: PERRL, EOMI, conjunctiva normal, no discharge. Sclera anicteric.  Cardiovascular: rr no murmur  Respiratory: Normal breath sounds, no respiratory distress, no wheezing, no chest tenderness.  GI: Bowel sounds normal, Soft, no tenderness, no masses, no pulsatile masses.  : No CVA tenderness.  Skin: Warm, dry, no erythema, no rash.  Musculoskeletal: Intact distal pulses, no edema, no tenderness, no cyanosis, no clubbing. Good range of motion in all major joints. No tenderness to palpation or major deformities noted. Back- No tenderness.  Neurologic: Alert & oriented x 3, normal motor function, normal sensory function, no focal deficits noted.  Psych: Calm, cooperative, nl affect        ED Course     Labs Reviewed   BASIC METABOLIC PANEL (8) - Abnormal; Notable for the following components:       Result Value    Glucose 117 (*)     Calcium, Total 10.6 (*)     Calculated Osmolality 298 (*)     All other components within normal limits   CBC WITH DIFFERENTIAL WITH PLATELET - Abnormal; Notable for the following components:    RBC 5.33 (*)     All other components within normal limits   URINALYSIS WITH CULTURE REFLEX - Abnormal; Notable for the following components:    Clarity Urine Turbid (*)     Blood Urine Trace (*)     Protein Urine Trace (*)     Leukocyte Esterase Urine 500 (*)     WBC Urine >50 (*)     RBC Urine 6-10 (*)     Bacteria Urine 3+ (*)     All other components within normal limits   TROPONIN I HIGH SENSITIVITY - Normal   URINE CULTURE, ROUTINE     EKG    Rate, intervals and axes as noted on EKG Report.  Rate: 88  Rhythm: Sinus Rhythm  Reading: Right axis deviation  normal intervals, no acute ST segment change, no STEMI.  QTc 423 MS   -when compared to previous ecg - no significant chage         ED Course as of 11/12/24 0053  ------------------------------------------------------------  Time: 11/11 1826  Comment: UA+. No sirs criteria, does not appear septic. Reviewed previous culture data, last grew E coli sensitive to cephalosporins.   ------------------------------------------------------------  Time: 11/11 1915  Comment: I have independently reviewed patient's chest x-ray do not appreciate any acute cardiopulmonary findings.             MDM      89F hx as above who presents with  fatigue. Possible associated LUE pain and dyspnea.   On arrival vss, reassuring  Ddx: metabolic derangement, dehydration, UTI, ACS      Admission disposition: 11/11/2024  7:29 PM       Workup suggestive of urinary tract infection.  Given age and ambulatory dysfunction, will will treat with IV antibiotics and admit.    Medical Decision Making      Disposition and Plan     Clinical Impression:  1. Acute cystitis without hematuria         Disposition:  Admit  11/11/2024  7:29 pm    Follow-up:  No follow-up provider specified.  We recommend that you schedule follow up care with a primary care provider within the next three months to obtain basic health screening including reassessment of your blood pressure.    Supplementary Documentation:         Hospital Problems       Present on Admission  Date Reviewed: 7/17/2024            ICD-10-CM Noted POA    * (Principal) Acute cystitis without hematuria N30.00 11/11/2024 Unknown    Acute cystitis N30.00 11/11/2024 Unknown             Signed by Prashant Guzman MD on 11/12/2024 12:53 AM               History and Physical    H&P signed by Kathy Rao MD at 11/12/2024 10:16 AM    [facility]  part of [facility]     History and Physical        Date:  11/12/2024  Date of Admission:  11/11/2024     History provided by:patient,  daughter  HPI:          Chief Complaint   Patient presents with    Fatigue      HPI     The patient is an 89-year-old female with a past medical history of mild COPD, depression, mild age-related cognitive impairment, recurrent UTI admitted with UTI, altered mental status, and weakness.     History was obtained from the patient and daughter, Nelli.  The patient states that she normally ambulates with a walker but that for the past 4 days she has not gotten out of bed because she felt so tired.  She denied any symptoms of dysuria, frequency, fevers or chills.  Her  became concerned and brought her to the ED for evaluation.  In the ED, CBC, BMP, and troponin were normal.  Urinalysis revealed significant pyuria.  Urine culture was sent.  Patient was started empirically on IV ceftriaxone.  She was admitted for further evaluation and treatment.     Note, the patient has history of recurrent UTIs.  She takes prophylactic methenamine per her urologist Dr. Munir Cullen.  She had called the office on 10/31/2024 complaining of fatigue weakness and no appetite.  Her caregiver stated that when this happens, she usually has a UTI.  Urinalysis at that time revealed pyuria.  The patient was started empirically on oral cephalexin, though urine culture ultimately grew only a contaminant.     The patient states that she has a caregiver, Portia, who comes during the week from 10 AM to 6 PM 4x/week, while her  is at work.         Assessment/Plan:       Acute cystitis without hematuria  -hx of recurrent UTI  -most recently with pyuria on UA 10/31/24 - treated empirically with cephalexin but subsequent UA revealed contaminant  -UA 11/11/24 with signif pyuria  -urine cx pending  -on empiric ceftriaxone (day 2)     Altered mental status, delirium  -At baseline, the patient has mild age-related cognitive impairment, with some difficulty with word-finding, but she had otherwise been fairly sharp until the past few weeks.  -Today she  is able to recognize me and recall most of the events of the past several days though her sense of time is a little off.  She thought that she was started on oral antibiotics just this past weekend though it was actually almost 2 weeks ago. She stated this morning that she saw some animals outside of [place].  When asked if she meant an airplane or an animal, she said \"both.\"  -acute AMS possibly 2/2 UTI     Weakness, progressive deconditioning  -pt has had progressive deconditioning over the past several years, due in part to a very sedentary lifestyle, exacerbated by depression  -ambulates at home with a walker  -per pt, she had not gotten out of bed for the past 4 days PTA because she felt tired  -PT/OT eval  -has a caregiver at home but may benefit from ROSEMARIE prior to discharge     History of recurrent UTIs, nephrolithiasis   Seeing urologist, Dr. Munir Cullen -- restarted on methenamine on 8/28/24     Osteoporosis  DEXA in 7/2017- osteoporosis of left femoral neck (T -2.9).  Pt was briefly on fosamax in 2014. Declines restarting fosamax or other meds so would not check repeat DEXA.     Vitamin B12 deficiency    Level normal in 7/2024  cont B12 1000 mcg/day     Hx of iron deficiency anemia  Had EGD/C-scope in 2010 (Dr. Ferrari) -- negative. 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   Continue FeSO4 325mg/day.    Hgb normal      Lumbar spinal stenosis, chronic back pain    Has seen Dr. Salgado (ortho spine at [facility]).  Referred to pain specialist, Dr. Deirdre Elizabeth at [facility]. Had facet injections in 8/2018 which helped tremendously.  Saw Dr. Elizabeth again 9/25/18; did PT.  MRI in 4/2019 did not appear to show anything acute.  No longer taking Norco.     Bilateral knee OA  Sees ortho Dr. Booth -- has had b/l cortisone injections with transient improvement.  Has been advised for TKR's but pt reluctant.  Ambulates with a  walker.       COPD  Essentially quit smoking in 8/2016.    Cont Advair     Basal and squamous cell cancer, face  S/p  Mohs surgeries in 4 areas of her face in 2018 (Dr. Jonas).      Post-herpetic neuralgia (dx'ed with shingles left upper back 6/8/22)  -improved but still with chronic pain left upper arm and left upper back  -on Lyrica 50mg BID   -pt notes increased arm pain this admission; will add lidocaine patch     Depression and anxiety  -pt with long hx of depression (and anxiety)  -previously saw psychiatrist Dr. Friend (retired)  -no longer takes seroquel  -on lexapro 20mg/day      Cognitive decline, likely age-related, poss exacerbated by depression  -saw neurologist Dr. Lokesh Mcmullen in 9/2022. 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  Has been able to recall remote and recent events.   Pt was referred for neuropsych testing and MRI brain in 2022 by Dr. Mcmullen though did not schedule.    -now with acute delirium as above; possible progression to dementia?     Advance directives  -have previously had lengthy discussions with pt re her wishes -- she was clear in her desire to not want to prolong her life -- did not want CPR or intubation or any artificial means of prolonging her life.  -pt signed a POLST form in 7/2023 (scanned in chart) -- she is DNR/comfort     VTE proph  -cont subQ lovenox        Dispo  -Called pt's  and LM on VM  -Had a lengthy discussion with the patient's daughter, Nelli.  Nelli confirms that the patient has a caregiver, Portia, who comes 4 days a week (M,W,Th,F) from 10 AM to 6 PM.  The patient's  works 3 days a week and is home on Tuesdays and Thursdays along with the weekends.  Nelli states that she and her brothers have tried to convince their parents to hire an additional caregiver, Jaqueline, who is a friend of Portia to come at night and on the weekends but both the patient and her   are reluctant to do so.  Patient and her  live in a two-story home.  There is an electric chair lift that can bring her downstairs.  The patient is unable to use her walker to make it to the couch where she sits for most of the day and eats her meals.  She continues to have a good appetite.  Nelli is aware that the patient's depression and lack of motivation have contributed to her worsening mobility and deconditioning.  She states that a few days this past summer, they were able to get her out of the house and sit outside and that it did wonders for the patient's mental state.  She feels that a ramp leading out of the home would better allow her mom to get outside more often.  The patient has had increasing difficulty with urinary incontinence, and has to wear a diaper.  The patient is resistant to taking a shower and has not done so in years.  The caregiver some times able to help clean her up with wipes.  -Nelli is open to the possibility of subacute rehab to help her mom to gain some strength.  Nelli will discuss the above with her brothers.  They will have another conversation with her parents regarding next steps including increased caregiver assistance at home versus moving the patient to an assisted living type of situation.  -Request is for [facility] if possible     **Certification        PHYSICIAN Certification of Need for Inpatient Hospitalization - Initial Certification     Patient will require inpatient services that will reasonably be expected to span two midnight's based on the clinical documentation in H+P.   Based on patients current state of illness, I anticipate that, after discharge, patient will require TBD.           Kathy Rao MD  11/12/2024         Signed by Kathy Rao MD on 11/12/2024 10:16 AM                   MEDICATIONS ADMINISTERED IN LAST 1 DAY:  cefTRIAXone (Rocephin) 2 g in sodium chloride 0.9% 100 mL IVPB-ADDV       Date Action Dose Route User    11/11/2024  1915 New Bag 2 g Intravenous Celia Rankin RN          enoxaparin (Lovenox) 40 MG/0.4ML SUBQ injection 40 mg       Date Action Dose Route User    11/12/2024 1033 Given 40 mg Subcutaneous (Left Lower Abdomen) Kamila Cullen RN          escitalopram (Lexapro) tab 20 mg       Date Action Dose Route User    11/12/2024 1028 Given 20 mg Oral Kamila Cullen RN    11/12/2024 0022 Given 20 mg Oral Wendy Nick RN          fluticasone-salmeterol (Advair Diskus) 100-50 MCG/ACT inhaler 1 puff       Date Action Dose Route User    11/12/2024 0802 Given 1 puff Inhalation VasiChaz          lidocaine-menthol 4-1 % patch 1 patch       Date Action Dose Route User    11/12/2024 1031 Patch Applied 1 patch Transdermal (Left Upper Arm) Kamila Cullen RN          melatonin cap/tab 5 mg       Date Action Dose Route User    11/12/2024 0022 Given 5 mg Oral Wendy Nick RN          methenamine (Hiprex) tab 1 g       Date Action Dose Route User    11/12/2024 1028 Given 1 g Oral Kamila Cullen RN    11/12/2024 0022 Given 1 g Oral Wendy Nick RN          pregabalin (Lyrica) cap 50 mg       Date Action Dose Route User    11/12/2024 1028 Given 50 mg Oral Kamila Cullen RN    11/12/2024 0022 Given 50 mg Oral Wendy Nick RN          sodium chloride 0.9% infusion       Date Action Dose Route User    11/12/2024 0022 New Bag (none) Intravenous Wendy Nick RN          sodium chloride 0.9 % IV bolus 1,000 mL       Date Action Dose Route User    11/11/2024 1836 New Bag 1,000 mL Intravenous Evette Gray RN            Vitals (last day)       Date/Time Temp Pulse Resp BP SpO2 Weight O2 Device O2 Flow Rate (L/min) Who    11/12/24 1133 -- 116 -- -- -- -- -- -- GS    11/12/24 1027 -- -- -- -- 93 % -- Nasal cannula 1 L/min GP    11/12/24 0940 -- -- -- -- 91 % -- Nasal cannula 1.5 L/min     11/12/24 0931 97.5 °F (36.4 °C) 90 20 132/64 88 % -- None (Room air) --     11/12/24 0556 97.7 °F (36.5 °C) 78 20  145/78 91 % -- None (Room air) -- ZN    11/11/24 2139 -- -- -- -- -- 137 lb 9.6 oz (62.4 kg) -- -- PK    11/11/24 2132 98.1 °F (36.7 °C) 72 18 149/82 93 % -- None (Room air) -- PK    11/11/24 2100 -- 82 20 152/92 93 % -- None (Room air) -- DL    11/11/24 2015 -- 77 25 167/72 92 % -- None (Room air) -- DL    11/11/24 1915 -- 82 24 153/78 93 % -- None (Room air) -- DL    11/11/24 1900 -- 80 18 143/75 94 % -- None (Room air) -- DL    11/11/24 1756 -- -- -- -- -- -- None (Room air) -- DL    11/11/24 1745 -- 92 28 151/79 92 % -- None (Room air) -- DL    11/11/24 1743 98.1 °F (36.7 °C) 91 21 147/93 92 % 136 lb 14.5 oz (62.1 kg) None (Room air) -- DL

## 2024-11-12 NOTE — H&P
[facility]  part of [facility]    History and Physical    Mireya Wolfe Patient Status:  Inpatient    1935 MRN [id number]   Location [facility] 5SW/SE Attending Aide Vu,    Hosp Day # 1 PCP Kathy Rao MD     Date:  2024  Date of Admission:  2024    History provided by:patient, daughter  HPI:     Chief Complaint   Patient presents with    Fatigue     HPI    The patient is an 89-year-old female with a past medical history of mild COPD, depression, mild age-related cognitive impairment, recurrent UTI admitted with UTI, altered mental status, and weakness.    History was obtained from the patient and daughter, Nelli.  The patient states that she normally ambulates with a walker but that for the past 4 days she has not gotten out of bed because she felt so tired.  She denied any symptoms of dysuria, frequency, fevers or chills.  Her  became concerned and brought her to the ED for evaluation.  In the ED, CBC, BMP, and troponin were normal.  Urinalysis revealed significant pyuria.  Urine culture was sent.  Patient was started empirically on IV ceftriaxone.  She was admitted for further evaluation and treatment.    Note, the patient has history of recurrent UTIs.  She takes prophylactic methenamine per her urologist Dr. Munir Cullen.  She had called the office on 10/31/2024 complaining of fatigue weakness and no appetite.  Her caregiver stated that when this happens, she usually has a UTI.  Urinalysis at that time revealed pyuria.  The patient was started empirically on oral cephalexin, though urine culture ultimately grew only a contaminant.    The patient states that she has a caregiver, Portia, who comes during the week from 10 AM to 6 PM 4x/week, while her  is at work.       History     Past Medical History:    Anemia    Anxiety    Basal cell carcinoma    Mouth    Calculus of kidney    Calculus, kidney    COPD (chronic obstructive pulmonary disease)  (HCC)    Depression    Esophageal reflux    Hearing impaired person, bilateral    Hematuria    History of recurrent UTIs    Hypercholesterolemia    Kidney stone    Osteoarthritis    Osteopenia    Other and unspecified hyperlipidemia    Recurrent UTI    Scoliosis    Spinal stenosis    Squamous cell cancer of lip    Thyroid nodule    Urinary frequency    Vitamin D deficiency     Past Surgical History:   Procedure Laterality Date    Cataract Right 05/2016    Cysto/uretero w/lithotripsy Right 10/29/2015    Procedure: LITHOTRIPSY HOLMIUM LASER WITH CYSTOSCOPY;  Surgeon: Tommie Majano MD;  Location: [facility]    Cystoscopy,insert ureteral stent Right 09/24/2015    Procedure: LITHOTRIPSY WITH CYSTOSCOPY, STENT PLACEMENT;  Surgeon: Tommie Majano MD;  Location: [facility]    Fragmenting of kidney stone Right 09/24/2015    Procedure: LITHOTRIPSY WITH CYSTOSCOPY, STENT PLACEMENT;  Surgeon: Tommie Majano MD;  Location: [facility]    Other surgical history  1960    Surgery of Blockage in Urethral Tube    Other surgical history  1985 and on 08/24/00    ESWL - left    Other surgical history  07/19/2000    Cystourethroscopy with Urethral Calibration and Dilation with Bilateral Retrograde Pyelogram and Left Ureteral Stone Manipulation with insertion of Double-J Stent    Other surgical history  06/2015    R eye surgery for bleed and then development of blood clot--resolved    Other surgical history  02/08/2016    Cystoscpy stent removal    Other surgical history      Other surgical history  03/2017    Mohs surgery    Patient documented not to have experienced any of the following events Right 09/24/2015    Procedure: LITHOTRIPSY WITH CYSTOSCOPY, STENT PLACEMENT;  Surgeon: Tommie Majano MD;  Location: [facility]    Patient documented not to have experienced any of the following events Right 10/29/2015    Procedure: CYSTOSCOPY/URETEROSCOPY/STONE EXTRACTION;  Surgeon:  Tommie Majano MD;  Location: [facility]    Patient with preoperative order for iv antibiotic surgical site infect Right 2015    Procedure: LITHOTRIPSY WITH CYSTOSCOPY, STENT PLACEMENT;  Surgeon: Tommie Majano MD;  Location: [facility]    Patient with preoperative order for iv antibiotic surgical site infect Right 10/29/2015    Procedure: CYSTOSCOPY/URETEROSCOPY/STONE EXTRACTION;  Surgeon: Tommie Majano MD;  Location: [facility]    Skin surgery       Family History   Problem Relation Age of Onset    Breast Cancer Mother 67    Cancer Mother     Cancer Brother      Social History:  Social History     Socioeconomic History    Marital status:    Tobacco Use    Smoking status: Former     Current packs/day: 0.00     Average packs/day: 0.3 packs/day for 10.0 years (2.5 ttl pk-yrs)     Types: Cigarettes     Start date: 2006     Quit date: 2016     Years since quittin.2     Passive exposure: Past    Smokeless tobacco: Never   Vaping Use    Vaping status: Never Used   Substance and Sexual Activity    Alcohol use: No    Drug use: No   Other Topics Concern    Caffeine Concern No     Social Drivers of Health     Financial Resource Strain: Low Risk  (3/19/2024)    Financial Resource Strain     Med Affordability: No   Food Insecurity: No Food Insecurity (2024)    Food Insecurity     Food Insecurity: Never true   Transportation Needs: No Transportation Needs (2024)    Transportation Needs     Lack of Transportation: No   Housing Stability: Low Risk  (2024)    Housing Stability     Housing Instability: No     Allergies/Medications:   Allergies: Allergies[1]  Medications Prior to Admission   Medication Sig    fluticasone furoate-vilanterol 100-25 MCG/ACT Inhalation Aerosol Powder, Breath Activated Inhale 1 puff into the lungs daily.    methenamine 1 g Oral Tab Take 1 tablet (1 g total) by mouth 2 (two) times daily.    Melatonin 5 MG Oral Tab  Take 1 tablet (5 mg total) by mouth at bedtime.    PREGABALIN 50 MG Oral Cap TAKE 1 CAPSULE BY MOUTH TWICE DAILY    escitalopram 20 MG Oral Tab Take 1 tablet (20 mg total) by mouth daily.       Review of Systems:   Review of Systems    Physical Exam:   Vital Signs:  Blood pressure 145/78, pulse 78, temperature 97.7 °F (36.5 °C), temperature source Temporal, resp. rate 20, weight 137 lb 9.6 oz (62.4 kg), SpO2 91%.  Physical Exam  Cervical Papanicolaou contraindicated  Gen: A&O x 2, NAD  CV: RRR normal S1S2  Lungs: CTA b/l, no wheezing  Abd: soft, NT, ND, NABS  Ext: no LE edema, +2 DP pulses    Results:     Lab Results   Component Value Date    WBC 6.5 11/11/2024    HGB 15.0 11/11/2024    HCT 47.6 11/11/2024    .0 11/11/2024    CREATSERUM 0.85 11/11/2024    BUN 16 11/11/2024     11/11/2024    K 4.1 11/11/2024     11/11/2024    CO2 31.0 11/11/2024     (H) 11/11/2024    CA 10.6 (H) 11/11/2024    ALB 4.3 06/29/2024    ALKPHO 89 06/29/2024    BILT 0.7 06/29/2024    TP 7.3 06/29/2024    AST 23 06/29/2024    ALT 22 06/29/2024    PTT 31.1 01/17/2024    T4F 1.1 07/15/2023    TSH 0.349 (L) 07/15/2023    DDIMER 1.63 (H) 07/14/2023    MG 2.0 01/19/2024    PHOS 2.8 07/19/2023    TROPHS 5 11/11/2024    B12 611 07/17/2024     XR CHEST AP PORTABLE  (CPT=71045)    Result Date: 11/11/2024  CONCLUSION:   No evidence of acute cardiopulmonary disease.  Large hiatal hernia.    Dictated by (CST): Elliot Byers MD on 11/11/2024 at 6:55 PM     Finalized by (CST): Elliot Byers MD on 11/11/2024 at 6:57 PM         EKG    Result Date: 11/12/2024  Normal sinus rhythm Right axis deviation Abnormal ECG When compared with ECG of 15-ESCOBAR-2024 09:17, Nonspecific T wave abnormality no longer evident in Lateral leads Confirmed by Uriah Wadlen ([id number]) on 11/12/2024 8:41:01 AM     Assessment/Plan:       Acute cystitis without hematuria  -hx of recurrent UTI  -most recently with pyuria on UA 10/31/24 - treated empirically  with cephalexin but subsequent UA revealed contaminant  -UA 11/11/24 with signif pyuria  -urine cx pending  -on empiric ceftriaxone (day 2)    Altered mental status, delirium  -At baseline, the patient has mild age-related cognitive impairment, with some difficulty with word-finding, but she had otherwise been fairly sharp until the past few weeks.  -Today she is able to recognize me and recall most of the events of the past several days though her sense of time is a little off.  She thought that she was started on oral antibiotics just this past weekend though it was actually almost 2 weeks ago. She stated this morning that she saw some animals outside of [place].  When asked if she meant an airplane or an animal, she said \"both.\"  -acute AMS possibly 2/2 UTI    Weakness, progressive deconditioning  -pt has had progressive deconditioning over the past several years, due in part to a very sedentary lifestyle, exacerbated by depression  -ambulates at home with a walker  -per pt, she had not gotten out of bed for the past 4 days PTA because she felt tired  -PT/OT eval  -has a caregiver at home but may benefit from ROSEMARIE prior to discharge    History of recurrent UTIs, nephrolithiasis   Seeing urologist, Dr. Munir Cullen -- restarted on methenamine on 8/28/24     Osteoporosis  DEXA in 7/2017- osteoporosis of left femoral neck (T -2.9).  Pt was briefly on fosamax in 2014. Declines restarting fosamax or other meds so would not check repeat DEXA.    Vitamin B12 deficiency    Level normal in 7/2024  cont B12 1000 mcg/day    Hx of iron deficiency anemia  Had EGD/C-scope in 2010 (Dr. Ferrari) -- negative. 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   Continue FeSO4 325mg/day.    Hgb normal     Lumbar spinal stenosis, chronic back pain    Has seen Dr. Salgado (ortho spine at [facility]).  Referred to pain specialist, Dr. Deirdre Elizabeth at [facility]  [place]. Had facet injections in 8/2018 which helped tremendously.  Saw Dr. Elizabeth again 9/25/18; did PT.  MRI in 4/2019 did not appear to show anything acute.  No longer taking Norco.    Bilateral knee OA  Sees ortho Dr. Booth -- has had b/l cortisone injections with transient improvement.  Has been advised for TKR's but pt reluctant.  Ambulates with a walker.      COPD  Essentially quit smoking in 8/2016.    Cont Advair    Basal and squamous cell cancer, face  S/p  Mohs surgeries in 4 areas of her face in 2018 (Dr. Jonas).      Post-herpetic neuralgia (dx'ed with shingles left upper back 6/8/22)  -improved but still with chronic pain left upper arm and left upper back  -on Lyrica 50mg BID   -pt notes increased arm pain this admission; will add lidocaine patch     Depression and anxiety  -pt with long hx of depression (and anxiety)  -previously saw psychiatrist Dr. Friend (retired)  -no longer takes seroquel  -on lexapro 20mg/day      Cognitive decline, likely age-related, poss exacerbated by depression  -saw neurologist Dr. Lokesh Mcmullen in 9/2022. 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  Has been able to recall remote and recent events.   Pt was referred for neuropsych testing and MRI brain in 2022 by Dr. Mcmullen though did not schedule.    -now with acute delirium as above; possible progression to dementia?    Advance directives  -have previously had lengthy discussions with pt re her wishes -- she was clear in her desire to not want to prolong her life -- did not want CPR or intubation or any artificial means of prolonging her life.  -pt signed a POLST form in 7/2023 (scanned in chart) -- she is DNR/comfort    VTE proph  -cont subQ lovenox      Dispo  -Called pt's  and LM on VM  -Had a lengthy discussion with the patient's daughter, Nelli.  Nelli confirms that the patient has a caregiver, Portia, who comes 4  days a week (M,W,Th,F) from 10 AM to 6 PM.  The patient's  works 3 days a week and is home on Tuesdays and Thursdays along with the weekends.  Nelli states that she and her brothers have tried to convince their parents to hire an additional caregiver, Jaqueline, who is a friend of Portia to come at night and on the weekends but both the patient and her  are reluctant to do so.  Patient and her  live in a two-story home.  There is an electric chair lift that can bring her downstairs.  The patient is unable to use her walker to make it to the couch where she sits for most of the day and eats her meals.  She continues to have a good appetite.  Nelli is aware that the patient's depression and lack of motivation have contributed to her worsening mobility and deconditioning.  She states that a few days this past summer, they were able to get her out of the house and sit outside and that it did wonders for the patient's mental state.  She feels that a ramp leading out of the home would better allow her mom to get outside more often.  The patient has had increasing difficulty with urinary incontinence, and has to wear a diaper.  The patient is resistant to taking a shower and has not done so in years.  The caregiver some times able to help clean her up with wipes.  -Nelli is open to the possibility of subacute rehab to help her mom to gain some strength.  Nelli will discuss the above with her brothers.  They will have another conversation with her parents regarding next steps including increased caregiver assistance at home versus moving the patient to an assisted living type of situation.  -Request is for [facility] if possible    **Certification      PHYSICIAN Certification of Need for Inpatient Hospitalization - Initial Certification    Patient will require inpatient services that will reasonably be expected to span two midnight's based on the clinical documentation in H+P.   Based on patients current state of  illness, I anticipate that, after discharge, patient will require TBD.        Kathy Rao MD  11/12/2024         [1]   Allergies  Allergen Reactions    Shellfish-Derived Products NAUSEA AND VOMITING    Bactrim [Sulfamethoxazole W/Trimethoprim] OTHER (SEE COMMENTS)     Abdominal Pain    Erythromycin UNKNOWN    Iodine (Topical) OTHER (SEE COMMENTS)    Ioversol UNKNOWN    Other OTHER (SEE COMMENTS)    Radiology Contrast Iodinated Dyes DIZZINESS

## 2024-11-12 NOTE — OCCUPATIONAL THERAPY NOTE
OCCUPATIONAL THERAPY EVALUATION - INPATIENT     Room Number: 563/563-A  Evaluation Date: 11/12/2024  Type of Evaluation: Initial   Presenting Problem: altered mental status, weakness  Co-morbidities: mild COPD, mild age-related cognitive impairment, recurrent UTI    Physician Order: IP Consult to Occupational Therapy  Reason for Therapy: ADL/IADL Dysfunction and Discharge Planning    OCCUPATIONAL THERAPY ASSESSMENT   Patient is a 89 year old female admitted 11/11/2024 for altered mental status, weakness.  Prior to admission, patient's baseline is unclear due to pt's orientation and altered mental status. Pt reports having a caregiver 5/7 days a week. Unclear what the caregiver assists with.  Patient is currently functioning below baseline with toileting, bathing, lower body dressing, grooming, bed mobility, transfers, dynamic standing balance, and functional standing tolerance.  Patient is requiring stand-by assist, minimal assist, and moderate assist as a result of the following impairments: decreased functional strength, decreased endurance, decreased muscular endurance, cognitive deficits (hx of age-related cognitive impairment; A&Ox1), and medical status. Occupational Therapy will continue to follow for duration of hospitalization.    Patient will benefit from continued skilled OT Services to promote return to prior level of function and safety with continuous assistance and gradual rehabilitative therapy.    PLAN DURING HOSPITALIZATION  OT Device Recommendations: TBD  OT Treatment Plan: Balance activities;Energy conservation/work simplification techniques;ADL training;Functional transfer training;Endurance training;Patient/Family education;Patient/Family training;Equipment eval/education;Compensatory technique education     OCCUPATIONAL THERAPY MEDICAL/SOCIAL HISTORY   Problem List  Principal Problem:    Acute cystitis without hematuria  Active Problems:    Acute cystitis    HOME SITUATION  Type of Home:  House  Home Layout: Two level; Stairlift  Lives With: Spouse; Caregiver part-time  Drives: No  Patient Regularly Uses: Rollator      Prior Level of [place]: PLOF difficult to obtain from pt due to cognition and orientation impairment. Per pt report she has a caregiver who is with her 5/7 days a week while her  is at work. Per EMR pt reports being ambulatory with a RW prior to new weakness.    SUBJECTIVE  \"This is a Worship\"     OCCUPATIONAL THERAPY EXAMINATION      OBJECTIVE  Precautions: Bed/chair alarm  Fall Risk: High fall risk      PAIN ASSESSMENT  Ratin      ACTIVITY TOLERANCE  Pulse: 116  Heart Rate Source: Monitor                   O2 SATURATIONS  Oxygen Therapy  SPO2% on Room Air at Rest: 91  SPO2% Ambulation on Room Air: 91    COGNITION  Overall Cognitive Status:  Impaired  Attention Span:  attends with cues to redirect  Orientation Level:  oriented to person, disoriented to place, disoriented to time, and disoriented to situation   Following Commands:  follows one step commands with repetition  Awareness of Deficits:  decreased awareness of deficits    Communication: Pt able to communicate needs throughout session. Pt with tangential speech throughout session requiring redirection and reorientation to place/situation.     Behavioral/Emotional/Social: Pt pleasant and cooperative throughout session    RANGE OF MOTION   Upper extremity ROM is within functional limits     STRENGTH ASSESSMENT  Upper extremity strength is within functional limits     COORDINATION  Gross Motor: WFL   Fine Motor: WFL     ACTIVITIES OF DAILY LIVING ASSESSMENT  AM-PAC ‘6-Clicks’ Inpatient Daily Activity Short Form  How much help from another person does the patient currently need…  -   Putting on and taking off regular lower body clothing?: A Lot  -   Bathing (including washing, rinsing, drying)?: A Lot  -   Toileting, which includes using toilet, bedpan or urinal? : A Lot  -   Putting on and taking off regular upper  body clothing?: A Little  -   Taking care of personal grooming such as brushing teeth?: None  -   Eating meals?: None    AM-PAC Score:  Score: 17  Approx Degree of Impairment: 50.11%  Standardized Score (AM-PAC Scale): 37.26  CMS Modifier (G-Code): CK    FUNCTIONAL TRANSFER ASSESSMENT  Sit to Stand: Edge of Bed; Chair  Edge of Bed: Minimal Assist  Chair: Moderate Assist    BED MOBILITY  Supine to Sit : Maximum Assist    BALANCE ASSESSMENT  Static Sitting: Stand-by Assist    FUNCTIONAL ADL ASSESSMENT  LB Dressing Seated: Minimal Assist  Pt demonstrated appropriate BUE strength, coordination, and ROM to complete grooming, eating, and UB dressing tasks with set-up assist in supported sitting.      Skilled Therapy Provided:   RN cleared pt for participation. Session coordinated with PT. Pt ed on topics listed below. Pt demonstrated limited carry over due to impaired short term memory. Pt required mod assist to complete bed mobility requiring assist to manage BLE and trunk due to decreased strength. Pt with appropriate static/dynamic sitting balance at EOB. Pt benefited from frequent verbal/tactile cues as well as redirection to task throughout session. Pt required mod assist x2 to complete ~5 side step transfer from bed to recliner using RW with frequent verbal and tactile cues for hand placement/body mechanics. Pt able to return demo figure four to doff/don socks in supported sitting with frequent rest break during task due to weakness/deconditioning. Pt would continue to benefit from standing grooming tasks to challenge functional balance and increase endurance for ADLs at discharge.       EDUCATION PROVIDED  Patient Education : Role of Occupational Therapy; Plan of Care; Functional Transfer Techniques; Fall Prevention; Posture/Positioning; Proper Body Mechanics  Patient's Response to Education: Demonstrates Poor Carry Over to Information; Verbalized Understanding    The patient's Approx Degree of Impairment: 50.11%  has been calculated based on documentation in the [facility] '6 clicks' Inpatient Daily Activity Short Form.  Research supports that patients with this level of impairment may benefit from rehab.  Final disposition will be made by interdisciplinary medical team.     Patient End of Session: Up in chair;Needs met;Call light within reach;RN aware of session/findings;All patient questions and concerns addressed;Alarm set    OT Goals  Patients self stated goal is: none stated      Patient will complete functional transfer with CGA  Comment:     Patient will complete toileting with CGA  Comment:     Patient will tolerate standing for 2 minutes in prep for adls with SBA    Comment:    Patient will complete LB dressing with SUP  Comment:          Goals  on: 24  Frequency: 3x/week    Patient Evaluation Complexity Level:   Occupational Profile/Medical History MODERATE - Expanded review of history including review of medical or therapy record   Specific performance deficits impacting engagement in ADL/IADL MODERATE  3 - 5 performance deficits   Client Assessment/Performance Deficits MODERATE - Comorbidities and min to mod modifications of tasks    Clinical Decision Making MODERATE - Analysis of occupational profile, detailed assessments, several treatment options    Overall Complexity MODERATE     OT Session Time: 20 minutes  Self-Care Home Management: 20 minutes

## 2024-11-12 NOTE — PHYSICAL THERAPY NOTE
PHYSICAL THERAPY EVALUATION - INPATIENT     Room Number: 563/563-A  Evaluation Date: 11/12/2024  Type of Evaluation: Initial   Physician Order: PT Eval and Treat    Presenting Problem: generalized weakness  Co-Morbidities : mild COPD, depression, mild age-related cognitive impairment, recurrent UTI  Reason for Therapy: Mobility Dysfunction and Discharge Planning    PHYSICAL THERAPY ASSESSMENT   Patient is a 89 year old female admitted 11/11/2024 for generalized weakness.  Prior to admission, patient's baseline is ambulatory with a rolling walker.  Patient is currently functioning below baseline with bed mobility, transfers, gait, stair negotiation, maintaining seated position, standing prolonged periods, and performing household tasks.  Patient is requiring moderate assist as a result of the following impairments: decreased functional strength, decreased endurance/aerobic capacity, impaired sitting/standing balance, decreased muscular endurance, and medical status.  Physical Therapy will continue to follow for duration of hospitalization.    Patient will benefit from continued skilled PT Services to promote return to prior level of function and safety with continuous assistance and gradual rehabilitative therapy .    PLAN DURING HOSPITALIZATION  Nursing Mobility Recommendation : 1 Assist     PT Treatment Plan: Bed mobility;Body mechanics;Coordination;Energy conservation;Endurance;Gait training;Strengthening;Stair training;Transfer training;Balance training  Rehab Potential : Guarded  Frequency (Obs): 3-5x/week     PHYSICAL THERAPY MEDICAL/SOCIAL HISTORY   History related to current admission: The patient is an 89-year-old female with a past medical history of mild COPD, depression, mild age-related cognitive impairment, recurrent UTI admitted with UTI, altered mental status, and weakness.      Problem List  Principal Problem:    Acute cystitis without hematuria  Active Problems:    Acute cystitis    HOME  SITUATION  Type of Home: House  Home Layout: Two level;Stairlift  Lives With: Spouse;Caregiver part-time    Drives: No   Patient Regularly Uses: Rollator     Prior Level of [place]: per chart review patient was ambulatory with a rolling walker. Lived at home with part time caregiver assistance and family assistance.    SUBJECTIVE  \"Just a little contribution for your college\"    PHYSICAL THERAPY EXAMINATION   OBJECTIVE  Precautions: Bed/chair alarm  Fall Risk: High fall risk    PAIN ASSESSMENT  Ratin    COGNITION  Overall Cognitive Status:  Impaired  Arousal/Alertness:  delayed responses to stimuli  Orientation Level:  oriented to person, disoriented to place, disoriented to time, and disoriented to situation  Safety Judgement:  decreased awareness of need for assistance and decreased awareness of need for safety    RANGE OF MOTION AND STRENGTH ASSESSMENT  Upper extremity ROM and strength are within functional limits BUE.  Lower extremity ROM is within functional limits BLE.  Lower extremity strength is within functional limits BLE.    BALANCE  Static Sitting: Fair  Dynamic Sitting: Poor  Static Standing: Fair  Dynamic Standing: Poor    AM-PAC '6-Clicks' INPATIENT SHORT FORM - BASIC MOBILITY  How much difficulty does the patient currently have...  Patient Difficulty: Turning over in bed (including adjusting bedclothes, sheets and blankets)?: A Lot   Patient Difficulty: Sitting down on and standing up from a chair with arms (e.g., wheelchair, bedside commode, etc.): A Lot   Patient Difficulty: Moving from lying on back to sitting on the side of the bed?: A Lot   How much help from another person does the patient currently need...   Help from Another: Moving to and from a bed to a chair (including a wheelchair)?: A Lot   Help from Another: Need to walk in hospital room?: A Lot   Help from Another: Climbing 3-5 steps with a railing?: Total     AM-PAC Score:  Raw Score: 11   Approx Degree of Impairment: 72.57%    Standardized Score (AM-PAC Scale): 33.86   CMS Modifier (G-Code): CL    FUNCTIONAL ABILITY STATUS  Functional Mobility/Gait Assessment  Gait Assistance: Moderate assistance  Distance (ft): 2-3 steps to bedside chair  Assistive Device: Rolling walker  Pattern:  (decreased tran, decreased step length, forward flexed posture, narrow base of support, verbal cues for sequencing and rolling walker management.)  Rolling: moderate assist  Supine to Sit: maximum assist  Sit to Supine:  not tested  Sit to Stand: minimal assist  Bed to chair: Moderate assist    Exercise/Education Provided:  Education Provided To: Patient Patient Education: Discharge Recommendations;Plan of Care;Role of Physical Therapy;DME Recommendations;Functional Transfer Techniques;Gait Training;Fall Prevention;Posture/Positioning;Energy Conservation Patient's Response to Education: Does Not Demonstrate Skills Needed for Learning     Skilled Therapy Provided: Patient received supine in bed at initiation of session agreeable to participation in PT, pleasantly confused throughout. Patient tolerates treatment fairly, able to perform bed to chair transfer with moderate assistance and BUE support on rolling walker. Patient left in bed, lines intact, needs in reach and handoff to RN.    The patient's Approx Degree of Impairment: 72.57% has been calculated based on documentation in the [facility] '6 clicks' Inpatient Basic Mobility Short Form.  Research supports that patients with this level of impairment may benefit from gradual rehab.  Final disposition will be made by interdisciplinary medical team.    Patient End of Session: Up in chair;Needs met;Call light within reach;RN aware of session/findings;All patient questions and concerns addressed;Alarm set    CURRENT GOALS  Goals to be met by: 11/26/24  Patient Goal Patient's self-stated goal is: not formally stated   Goal #1 Patient is able to demonstrate supine - sit EOB @ level: supervision     Goal #1    Current Status    Goal #2 Patient is able to demonstrate transfers EOB to/from Chair/Wheelchair at assistance level: supervision with  least restrictive device     Goal #2  Current Status    Goal #3 Patient is able to ambulate >50 feet with assist device:  least restrictive device  at assistance level: supervision   Goal #3   Current Status    Goal #4    Goal #4   Current Status    Goal #5 Patient to demonstrate independence with home activity/exercise instructions provided to patient in preparation for discharge.   Goal #5   Current Status    Goal #6    Goal #6  Current Status      Patient Evaluation Complexity Level:  History High - 3 or more personal factors and/or co-morbidities   Examination of body systems Moderate - addressing a total of 3 or more elements   Clinical Presentation  Moderate - Evolving   Clinical Decision Making  Moderate Complexity     Therapeutic Activity:  10 minutes    Thi Casiano PT, DPT

## 2024-11-13 LAB
ANION GAP SERPL CALC-SCNC: 7 MMOL/L (ref 0–18)
BASOPHILS # BLD AUTO: 0.05 X10(3) UL (ref 0–0.2)
BASOPHILS NFR BLD AUTO: 0.8 %
BUN BLD-MCNC: 9 MG/DL (ref 9–23)
BUN/CREAT SERPL: 13.4 (ref 10–20)
CALCIUM BLD-MCNC: 10.1 MG/DL (ref 8.7–10.4)
CHLORIDE SERPL-SCNC: 108 MMOL/L (ref 98–112)
CO2 SERPL-SCNC: 28 MMOL/L (ref 21–32)
CREAT BLD-MCNC: 0.67 MG/DL
DEPRECATED RDW RBC AUTO: 41.9 FL (ref 35.1–46.3)
EGFRCR SERPLBLD CKD-EPI 2021: 83 ML/MIN/1.73M2 (ref 60–?)
EOSINOPHIL # BLD AUTO: 0.14 X10(3) UL (ref 0–0.7)
EOSINOPHIL NFR BLD AUTO: 2.2 %
ERYTHROCYTE [DISTWIDTH] IN BLOOD BY AUTOMATED COUNT: 12.7 % (ref 11–15)
GLUCOSE BLD-MCNC: 96 MG/DL (ref 70–99)
HCT VFR BLD AUTO: 40.5 %
HGB BLD-MCNC: 13.5 G/DL
IMM GRANULOCYTES # BLD AUTO: 0.01 X10(3) UL (ref 0–1)
IMM GRANULOCYTES NFR BLD: 0.2 %
LYMPHOCYTES # BLD AUTO: 1.93 X10(3) UL (ref 1–4)
LYMPHOCYTES NFR BLD AUTO: 30.9 %
MCH RBC QN AUTO: 29.8 PG (ref 26–34)
MCHC RBC AUTO-ENTMCNC: 33.3 G/DL (ref 31–37)
MCV RBC AUTO: 89.4 FL
MONOCYTES # BLD AUTO: 0.39 X10(3) UL (ref 0.1–1)
MONOCYTES NFR BLD AUTO: 6.3 %
NEUTROPHILS # BLD AUTO: 3.72 X10 (3) UL (ref 1.5–7.7)
NEUTROPHILS # BLD AUTO: 3.72 X10(3) UL (ref 1.5–7.7)
NEUTROPHILS NFR BLD AUTO: 59.6 %
OSMOLALITY SERPL CALC.SUM OF ELEC: 295 MOSM/KG (ref 275–295)
PLATELET # BLD AUTO: 173 10(3)UL (ref 150–450)
POTASSIUM SERPL-SCNC: 3.8 MMOL/L (ref 3.5–5.1)
RBC # BLD AUTO: 4.53 X10(6)UL
SODIUM SERPL-SCNC: 143 MMOL/L (ref 136–145)
WBC # BLD AUTO: 6.2 X10(3) UL (ref 4–11)

## 2024-11-13 PROCEDURE — 99232 SBSQ HOSP IP/OBS MODERATE 35: CPT | Performed by: INTERNAL MEDICINE

## 2024-11-13 RX ORDER — AMLODIPINE BESYLATE 2.5 MG/1
2.5 TABLET ORAL EVERY EVENING
Status: DISCONTINUED | OUTPATIENT
Start: 2024-11-13 | End: 2024-11-15

## 2024-11-13 NOTE — PAYOR COMM NOTE
--------------  CONTINUED STAY REVIEW----REQUESTING ADDITIONAL DAY 11/13      Payor: [employer]  Subscriber #:  [id number]  Authorization Number: [id number]    Admit date: 11/11/24  Admit time:  9:30 PM             Date of Service: 11/13/2024  9:12 AM     Signed            [facility]  part of [facility]     Progress Note           SUBJECTIVE:  Pt states she was sick yesterday.  Talking about her  eating something last night in the middle of the night.     OBJECTIVE:  Vital signs in last 24 hours:  /82 (BP Location: Right arm)   Pulse 81   Temp 97.6 °F (36.4 °C) (Axillary)   Resp 18   Wt 137 lb 9.6 oz (62.4 kg)   SpO2 92%   BMI 22.90 kg/m²      Intake/Output:     Intake/Output Summary (Last 24 hours) at 11/13/2024 0912  Last data filed at 11/13/2024 0735      Gross per 24 hour   Intake 1334.16 ml   Output 2200 ml   Net -865.84 ml             Wt Readings from Last 3 Encounters:   11/11/24 137 lb 9.6 oz (62.4 kg)   07/17/24 137 lb (62.1 kg)   06/29/24 125 lb (56.7 kg)         EXAM:  GENERAL: well developed, well nourished, in no apparent distress; confused/foggy  LUNGS: clear to auscultation, no wheezing  CARDIO: RRR, normal S1S2, without murmur or gallop  GI: soft, NT, ND, NABS  EXTREMITIES: no cyanosis, clubbing or edema     Data Review:      Labs:         Lab Results   Component Value Date     WBC 6.2 11/13/2024     HGB 13.5 11/13/2024     HCT 40.5 11/13/2024     .0 11/13/2024     CREATSERUM 0.67 11/13/2024     BUN 9 11/13/2024      11/13/2024     K 3.8 11/13/2024      11/13/2024     CO2 28.0 11/13/2024     GLU 96 11/13/2024     CA 10.1 11/13/2024            Imaging:  XR CHEST AP PORTABLE  (CPT=71045)     Result Date: 11/11/2024  CONCLUSION:          No evidence of acute cardiopulmonary disease.  Large hiatal hernia.    Dictated by (CST): Elliot Byers MD on 11/11/2024 at 6:55 PM     Finalized by (CST): Elliot Byers MD on 11/11/2024  at 6:57 PM                     Meds:          Current Facility-Administered Medications   Medication Dose Route Frequency    amLODIPine (Norvasc) tab 2.5 mg  2.5 mg Oral QPM    cefTRIAXone (Rocephin) 1 g in sodium chloride 0.9% 100 mL IVPB-ADDV  1 g Intravenous Q24H    enoxaparin (Lovenox) 40 MG/0.4ML SUBQ injection 40 mg  40 mg Subcutaneous Q24H    lidocaine-menthol 4-1 % patch 1 patch  1 patch Transdermal Daily    escitalopram (Lexapro) tab 20 mg  20 mg Oral Daily    fluticasone-salmeterol (Advair Diskus) 100-50 MCG/ACT inhaler 1 puff  1 puff Inhalation BID    melatonin cap/tab 5 mg  5 mg Oral Nightly    methenamine (Hiprex) tab 1 g  1 g Oral BID    pregabalin (Lyrica) cap 50 mg  50 mg Oral BID    acetaminophen (Tylenol) tab 650 mg  650 mg Oral Q6H PRN         Assessment & Plan     Acute cystitis without hematuria  -hx of recurrent UTI  -most recently with pyuria on UA 10/31/24 - treated empirically with cephalexin but subsequent urine cx revealed contaminant  -UA 11/11/24 with signif pyuria  -urine cx (11/11/24)-- >100K E.coli (R-amp, cipro/levo; S-ancef, bactrim, macrobid)  -cont ceftriaxone (day 3)  -afeb, normal WBC  -stop IVF's     Altered mental status, delirium  -At baseline, the patient has mild age-related cognitive impairment, with some difficulty with word-finding, but she had otherwise been fairly sharp until the past few weeks.  -acute AMS possibly 2/2 UTI  -still somewhat confused (oriented to person, not place)     Weakness, progressive deconditioning  -pt has had progressive deconditioning over the past several years, due in part to a very sedentary lifestyle, exacerbated by depression  -ambulates at home with a walker  -per pt, she had not gotten out of bed for the past 4 days PTA because she felt tired  -PT/OT eval appreciated -- rec ROSEMARIE     Elevated blood pressure  -BP consistently elevated this admission (sl low this am)  -start low dose amlodipine 2.5mg qpm     History of recurrent UTIs,  nephrolithiasis   Seeing urologist, Dr. Munir Cullen -- restarted on methenamine on 8/28/24     Osteoporosis  DEXA in 7/2017- osteoporosis of left femoral neck (T -2.9).  Pt was briefly on fosamax in 2014. Declines restarting fosamax or other meds so would not check repeat DEXA.     Vitamin B12 deficiency    Level normal in 7/2024  cont B12 1000 mcg/day     Hx of iron deficiency anemia  Had EGD/C-scope in 2010 (Dr. Ferrari) -- negative. 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   Continue FeSO4 325mg/day.    Hgb normal      Lumbar spinal stenosis, chronic back pain    Has seen Dr. Salgado (ortho spine at [facility]).  Referred to pain specialist, Dr. Deirdre Elizabeth at [facility]. Had facet injections in 8/2018 which helped tremendously.  Saw Dr. Elizabeth again 9/25/18; did PT.  MRI in 4/2019 did not appear to show anything acute.  No longer taking Norco.     Bilateral knee OA  Sees ortho Dr. Booth -- has had b/l cortisone injections with transient improvement.  Has been advised for TKR's but pt reluctant.  Ambulates with a walker.       COPD  Essentially quit smoking in 8/2016.    Cont Advair     Basal and squamous cell cancer, face  S/p  Mohs surgeries in 4 areas of her face in 2018 (Dr. Jonas).      Post-herpetic neuralgia (dx'ed with shingles left upper back 6/8/22)  -improved but still with chronic pain left upper arm and left upper back  -on Lyrica 50mg BID   -pt notes increased arm pain this admission; will add lidocaine patch     Depression and anxiety  -pt with long hx of depression (and anxiety)  -previously saw psychiatrist Dr. Friend (retired)  -no longer takes seroquel  -on lexapro 20mg/day      Cognitive decline, likely age-related, poss exacerbated by depression  -saw neurologist Dr. Lokesh Mcmullen in 9/2022. Was thought to have late onset Alzheimer's dementia (started on aricept), though pt had not exhibited any significant sx  of dementia.  Has been able to recall remote and recent events.   Pt was referred for neuropsych testing and MRI brain in 2022 by Dr. Mcmullen though did not schedule.    -now with acute delirium as above; possible progression to dementia?     Advance directives  -have previously had lengthy discussions with pt re her wishes -- she was clear in her desire to not want to prolong her life -- did not want CPR or intubation or any artificial means of prolonging her life.  -pt signed a POLST form in 7/2023 (scanned in chart) -- she is DNR/comfort     VTE proph  -cont subQ lovenox        Dispo  -Called pt's  and LM on VM  -Had a lengthy discussion with the patient's daughter, Nelli.  Nelli confirms that the patient has a caregiver, Portia, who comes 4 days a week (M,W,Th,F) from 10 AM to 6 PM.  The patient's  works 3 days a week and is home on Tuesdays and Thursdays along with the weekends.  Nelli states that she and her brothers have tried to convince their parents to hire an additional caregiver, Jaqueline, who is a friend of Portia to come at night and on the weekends but both the patient and her  are reluctant to do so.  Patient and her  live in a two-story home.  There is an electric chair lift that can bring her downstairs.  The patient is unable to use her walker to make it to the couch where she sits for most of the day and eats her meals.  She continues to have a good appetite.  Nelli is aware that the patient's depression and lack of motivation have contributed to her worsening mobility and deconditioning.  She states that a few days this past summer, they were able to get her out of the house and sit outside and that it did wonders for the patient's mental state.  She feels that a ramp leading out of the home would better allow her mom to get outside more often.  The patient has had increasing difficulty with urinary incontinence, and has to wear a diaper.  The patient is resistant to taking a shower  and has not done so in years.  The caregiver sometimes able to help clean her up with wipes.  -PT/OT recommending ROSEMARIE -- daughter Nelli requesting [facility] or [facility], as pt and  have experience with both.  JAMES assisting.  D/w RN and dtr Nelli Rao MD  11/13/2024  9:12 AM                     MEDICATIONS ADMINISTERED IN LAST 1 DAY:  acetaminophen (Tylenol) tab 650 mg       Date Action Dose Route User    11/12/2024 2011 Given 650 mg Oral Annie George RN          cefTRIAXone (Rocephin) 1 g in sodium chloride 0.9% 100 mL IVPB-ADDV       Date Action Dose Route User    11/12/2024 1835 New Bag 1 g Intravenous Kamila Cullen RN          enoxaparin (Lovenox) 40 MG/0.4ML SUBQ injection 40 mg       Date Action Dose Route User    11/13/2024 0939 Given 40 mg Subcutaneous (Right Lower Abdomen) Rosanne Cummings RN          escitalopram (Lexapro) tab 20 mg       Date Action Dose Route User    11/13/2024 0933 Given 20 mg Oral Rosanne Cummings RN          fluticasone-salmeterol (Advair Diskus) 100-50 MCG/ACT inhaler 1 puff       Date Action Dose Route User    11/13/2024 0933 Given 1 puff Inhalation Rosanne Cummings RN    11/12/2024 2018 Given 1 puff Inhalation Annie George RN          melatonin cap/tab 5 mg       Date Action Dose Route User    11/12/2024 2009 Given 5 mg Oral Annie George RN          methenamine (Hiprex) tab 1 g       Date Action Dose Route User    11/13/2024 0933 Given 1 g Oral Rosanne Cummings RN    11/12/2024 2009 Given 1 g Oral Annie George RN          pregabalin (Lyrica) cap 50 mg       Date Action Dose Route User    11/13/2024 0933 Given 50 mg Oral Rosanne Cummings RN    11/12/2024 2009 Given 50 mg Oral Annie George RN          sodium chloride 0.9% infusion       Date Action Dose Route User    11/12/2024 2217 New Bag (none) Intravenous Gabriel Cai RN            Vitals (last day)       Date/Time  Temp Pulse Resp BP SpO2 Weight O2 Device O2 Flow Rate (L/min) [facility]    11/13/24 1326 99 °F (37.2 °C) 92 20 152/97 95 % -- Nasal cannula 1 L/min     11/13/24 0929 97.6 °F (36.4 °C) 85 20 153/75 94 % -- Nasal cannula 1 L/min     11/13/24 0521 97.6 °F (36.4 °C) 81 18 102/82 92 % -- Nasal cannula 1 L/min     11/12/24 2004 97.8 °F (36.6 °C) 98 18 174/82 92 % -- -- 1 L/min     11/12/24 1629 98.6 °F (37 °C) 97 20 141/86 93 % -- Nasal cannula 0.5 L/min     11/12/24 1133 -- 116 -- -- -- -- -- --     11/12/24 1027 -- -- -- -- 93 % -- Nasal cannula 1 L/min     11/12/24 0940 -- -- -- -- 91 % -- Nasal cannula 1.5 L/min     11/12/24 0931 97.5 °F (36.4 °C) 90 20 132/64 88 % -- None (Room air) --     11/12/24 0556 97.7 °F (36.5 °C) 78 20 145/78 91 % -- None (Room air) -- ZN          CIWA Scores (since admission)       None

## 2024-11-13 NOTE — PLAN OF CARE
Problem: Patient Centered Care  Goal: Patient preferences are identified and integrated in the patient's plan of care  Description: Interventions:  - What would you like us to know as we care for you? I have dementia and am anxious and confused.   - Provide timely, complete, and accurate information to patient/family  - Incorporate patient and family knowledge, values, beliefs, and cultural backgrounds into the planning and delivery of care  - Encourage patient/family to participate in care and decision-making at the level they choose  - Honor patient and family perspectives and choices  Outcome: Progressing     Problem: Patient/Family Goals  Goal: Patient/Family Long Term Goal  Description: Patient's Long Term Goal: discharge healthy     Interventions:  - vital signs  -monitor labs   -intravenous fluids   - See additional Care Plan goals for specific interventions  Outcome: Progressing     Problem: Patient/Family Goals  Goal: Patient/Family Short Term Goal  Description: Patient's Short Term Goal: sleep     Interventions:   - decrease stimuli   -reorient patient when awake and confused   -pain medication   -extra blankets if needed   - See additional Care Plan goals for specific interventions  Outcome: Progressing     Problem: PAIN - ADULT  Goal: Verbalizes/displays adequate comfort level or patient's stated pain goal  Description: INTERVENTIONS:  - Encourage pt to monitor pain and request assistance  - Assess pain using appropriate pain scale  - Administer analgesics based on type and severity of pain and evaluate response  - Implement non-pharmacological measures as appropriate and evaluate response  - Consider cultural and social influences on pain and pain management  - Manage/alleviate anxiety  - Utilize distraction and/or relaxation techniques  - Monitor for opioid side effects  - Notify MD/LIP if interventions unsuccessful or patient reports new pain  - Anticipate increased pain with activity and  pre-medicate as appropriate  Outcome: Progressing     Problem: RISK FOR INFECTION - ADULT  Goal: Absence of fever/infection during anticipated neutropenic period  Description: INTERVENTIONS  - Monitor WBC  - Administer growth factors as ordered  - Implement neutropenic guidelines  Outcome: Progressing     Problem: SAFETY ADULT - FALL  Goal: Free from fall injury  Description: INTERVENTIONS:  - Assess pt frequently for physical needs  - Identify cognitive and physical deficits and behaviors that affect risk of falls.  - Romeo fall precautions as indicated by assessment.  - Educate pt/family on patient safety including physical limitations  - Instruct pt to call for assistance with activity based on assessment  - Modify environment to reduce risk of injury  - Provide assistive devices as appropriate  - Consider OT/PT consult to assist with strengthening/mobility  - Encourage toileting schedule  Outcome: Progressing     Problem: DISCHARGE PLANNING  Goal: Discharge to home or other facility with appropriate resources  Description: INTERVENTIONS:  - Identify barriers to discharge w/pt and caregiver  - Include patient/family/discharge partner in discharge planning  - Arrange for needed discharge resources and transportation as appropriate  - Identify discharge learning needs (meds, wound care, etc)  - Arrange for interpreters to assist at discharge as needed  - Consider post-discharge preferences of patient/family/discharge partner  - Complete POLST form as appropriate  - Assess patient's ability to be responsible for managing their own health  - Refer to Case Management Department for coordinating discharge planning if the patient needs post-hospital services based on physician/LIP order or complex needs related to functional status, cognitive ability or social support system  Outcome: Progressing

## 2024-11-13 NOTE — PROGRESS NOTES
[facility]    Progress Note    Mireya Wolfe Patient Status:  Inpatient    1935 MRN [id number]   Location Health system 5SW/SE Attending Aide Vu,    Hosp Day # 2 PCP Kathy Rao MD       SUBJECTIVE:  Pt states she was sick yesterday.  Talking about her  eating something last night in the middle of the night.    OBJECTIVE:  Vital signs in last 24 hours:  /82 (BP Location: Right arm)   Pulse 81   Temp 97.6 °F (36.4 °C) (Axillary)   Resp 18   Wt 137 lb 9.6 oz (62.4 kg)   SpO2 92%   BMI 22.90 kg/m²     Intake/Output:    Intake/Output Summary (Last 24 hours) at 2024 09  Last data filed at 2024 0735  Gross per 24 hour   Intake 1334.16 ml   Output 2200 ml   Net -865.84 ml       Wt Readings from Last 3 Encounters:   24 137 lb 9.6 oz (62.4 kg)   24 137 lb (62.1 kg)   24 125 lb (56.7 kg)       EXAM:  GENERAL: well developed, well nourished, in no apparent distress; confused/foggy  LUNGS: clear to auscultation, no wheezing  CARDIO: RRR, normal S1S2, without murmur or gallop  GI: soft, NT, ND, NABS  EXTREMITIES: no cyanosis, clubbing or edema    Data Review:     Labs:   Lab Results   Component Value Date    WBC 6.2 2024    HGB 13.5 2024    HCT 40.5 2024    .0 2024    CREATSERUM 0.67 2024    BUN 9 2024     2024    K 3.8 2024     2024    CO2 28.0 2024    GLU 96 2024    CA 10.1 2024         Imaging:  XR CHEST AP PORTABLE  (CPT=71045)    Result Date: 2024  CONCLUSION:   No evidence of acute cardiopulmonary disease.  Large hiatal hernia.    Dictated by (CST): Elliot Byers MD on 2024 at 6:55 PM     Finalized by (CST): Elliot Byers MD on 2024 at 6:57 PM                   Meds:   Current Facility-Administered Medications   Medication Dose Route Frequency    amLODIPine (Norvasc) tab 2.5 mg  2.5 mg Oral  QPM    cefTRIAXone (Rocephin) 1 g in sodium chloride 0.9% 100 mL IVPB-ADDV  1 g Intravenous Q24H    enoxaparin (Lovenox) 40 MG/0.4ML SUBQ injection 40 mg  40 mg Subcutaneous Q24H    lidocaine-menthol 4-1 % patch 1 patch  1 patch Transdermal Daily    escitalopram (Lexapro) tab 20 mg  20 mg Oral Daily    fluticasone-salmeterol (Advair Diskus) 100-50 MCG/ACT inhaler 1 puff  1 puff Inhalation BID    melatonin cap/tab 5 mg  5 mg Oral Nightly    methenamine (Hiprex) tab 1 g  1 g Oral BID    pregabalin (Lyrica) cap 50 mg  50 mg Oral BID    acetaminophen (Tylenol) tab 650 mg  650 mg Oral Q6H PRN       Assessment & Plan    Acute cystitis without hematuria  -hx of recurrent UTI  -most recently with pyuria on UA 10/31/24 - treated empirically with cephalexin but subsequent urine cx revealed contaminant  -UA 11/11/24 with signif pyuria  -urine cx (11/11/24)-- >100K E.coli (R-amp, cipro/levo; S-ancef, bactrim, macrobid)  -cont ceftriaxone (day 3)  -afeb, normal WBC  -stop IVF's     Altered mental status, delirium  -At baseline, the patient has mild age-related cognitive impairment, with some difficulty with word-finding, but she had otherwise been fairly sharp until the past few weeks.  -acute AMS possibly 2/2 UTI  -still somewhat confused (oriented to person, not place)     Weakness, progressive deconditioning  -pt has had progressive deconditioning over the past several years, due in part to a very sedentary lifestyle, exacerbated by depression  -ambulates at home with a walker  -per pt, she had not gotten out of bed for the past 4 days PTA because she felt tired  -PT/OT eval appreciated -- rec [facility]    Elevated blood pressure  -BP consistently elevated this admission (sl low this am)  -start low dose amlodipine 2.5mg qpm     History of recurrent UTIs, nephrolithiasis   Seeing urologist, Dr. Munir Cullen -- restarted on methenamine on 8/28/24     Osteoporosis  DEXA in 7/2017- osteoporosis of left femoral neck (T -2.9).  Pt was  briefly on fosamax in 2014. Declines restarting fosamax or other meds so would not check repeat DEXA.     Vitamin B12 deficiency    Level normal in 7/2024  cont B12 1000 mcg/day     Hx of iron deficiency anemia  Had EGD/C-scope in 2010 (Dr. Ferrari) -- negative. 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   Continue FeSO4 325mg/day.    Hgb normal      Lumbar spinal stenosis, chronic back pain    Has seen Dr. Salgado (ortho spine at [facility]).  Referred to pain specialist, Dr. Deirdre Elizabeth at [facility]. Had facet injections in 8/2018 which helped tremendously.  Saw Dr. Elizabeth again 9/25/18; did PT.  MRI in 4/2019 did not appear to show anything acute.  No longer taking Norco.     Bilateral knee OA  Sees ortho Dr. Booth -- has had b/l cortisone injections with transient improvement.  Has been advised for TKR's but pt reluctant.  Ambulates with a walker.       COPD  Essentially quit smoking in 8/2016.    Cont Advair     Basal and squamous cell cancer, face  S/p  Mohs surgeries in 4 areas of her face in 2018 (Dr. Jonas).      Post-herpetic neuralgia (dx'ed with shingles left upper back 6/8/22)  -improved but still with chronic pain left upper arm and left upper back  -on Lyrica 50mg BID   -pt notes increased arm pain this admission; will add lidocaine patch     Depression and anxiety  -pt with long hx of depression (and anxiety)  -previously saw psychiatrist Dr. Friend (retired)  -no longer takes seroquel  -on lexapro 20mg/day      Cognitive decline, likely age-related, poss exacerbated by depression  -saw neurologist Dr. Lokesh Mcmullen in 9/2022. 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  Has been able to recall remote and recent events.   Pt was referred for neuropsych testing and MRI brain in 2022 by Dr. Mcmullen though did not schedule.    -now  with acute delirium as above; possible progression to dementia?     Advance directives  -have previously had lengthy discussions with pt re her wishes -- she was clear in her desire to not want to prolong her life -- did not want CPR or intubation or any artificial means of prolonging her life.  -pt signed a POLST form in 7/2023 (scanned in chart) -- she is DNR/comfort     VTE proph  -cont subQ lovenox        Dispo  -Called pt's  and LM on VM  -Had a lengthy discussion with the patient's daughter, Nelli.  Nelli confirms that the patient has a caregiver, Portia, who comes 4 days a week (M,W,Th,F) from 10 AM to 6 PM.  The patient's  works 3 days a week and is home on Tuesdays and Thursdays along with the weekends.  Nelli states that she and her brothers have tried to convince their parents to hire an additional caregiver, Jaqueline, who is a friend of Portia to come at night and on the weekends but both the patient and her  are reluctant to do so.  Patient and her  live in a two-story home.  There is an electric chair lift that can bring her downstairs.  The patient is unable to use her walker to make it to the couch where she sits for most of the day and eats her meals.  She continues to have a good appetite.  Nelli is aware that the patient's depression and lack of motivation have contributed to her worsening mobility and deconditioning.  She states that a few days this past summer, they were able to get her out of the house and sit outside and that it did wonders for the patient's mental state.  She feels that a ramp leading out of the home would better allow her mom to get outside more often.  The patient has had increasing difficulty with urinary incontinence, and has to wear a diaper.  The patient is resistant to taking a shower and has not done so in years.  The caregiver sometimes able to help clean her up with wipes.  -PT/OT recommending [facility] -- daughter Nelli requesting [facility] or [facility], as pt and  have experience with both.  SW assisting.  D/w RN and dtr Nelli Rao MD  11/13/2024  9:12 AM

## 2024-11-13 NOTE — PLAN OF CARE
Problem: Patient Centered Care  Goal: Patient preferences are identified and integrated in the patient's plan of care  Description: Interventions:  - What would you like us to know as we care for you?   - Provide timely, complete, and accurate information to patient/family  - Incorporate patient and family knowledge, values, beliefs, and cultural backgrounds into the planning and delivery of care  - Encourage patient/family to participate in care and decision-making at the level they choose  - Honor patient and family perspectives and choices  Outcome: Progressing      Interventions:    - See additional Care Plan goals for specific interventions      Interventions:   - See additional Care Plan goals for specific interventions  Outcome: Progressing     Problem: PAIN - ADULT  Goal: Verbalizes/displays adequate comfort level or patient's stated pain goal  Description: INTERVENTIONS:  - Encourage pt to monitor pain and request assistance  - Assess pain using appropriate pain scale  - Administer analgesics based on type and severity of pain and evaluate response  - Implement non-pharmacological measures as appropriate and evaluate response  - Consider cultural and social influences on pain and pain management  - Manage/alleviate anxiety  - Utilize distraction and/or relaxation techniques  - Monitor for opioid side effects  - Notify MD/LIP if interventions unsuccessful or patient reports new pain  - Anticipate increased pain with activity and pre-medicate as appropriate  Outcome: Progressing     Problem: RISK FOR INFECTION - ADULT  Goal: Absence of fever/infection during anticipated neutropenic period  Description: INTERVENTIONS  - Monitor WBC  - Administer growth factors as ordered  - Implement neutropenic guidelines  Outcome: Progressing     Problem: SAFETY ADULT - FALL  Goal: Free from fall injury  Description: INTERVENTIONS:  - Assess pt frequently for physical needs  - Identify cognitive and physical deficits and  behaviors that affect risk of falls.  - [place] fall precautions as indicated by assessment.  - Educate pt/family on patient safety including physical limitations  - Instruct pt to call for assistance with activity based on assessment  - Modify environment to reduce risk of injury  - Provide assistive devices as appropriate  - Consider OT/PT consult to assist with strengthening/mobility  - Encourage toileting schedule  Outcome: Progressing     Problem: DISCHARGE PLANNING  Goal: Discharge to home or other facility with appropriate resources  Description: INTERVENTIONS:  - Identify barriers to discharge w/pt and caregiver  - Include patient/family/discharge partner in discharge planning  - Arrange for needed discharge resources and transportation as appropriate  - Identify discharge learning needs (meds, wound care, etc)  - Arrange for interpreters to assist at discharge as needed  - Consider post-discharge preferences of patient/family/discharge partner  - Complete POLST form as appropriate  - Assess patient's ability to be responsible for managing their own health  - Refer to Case Management Department for coordinating discharge planning if the patient needs post-hospital services based on physician/LIP order or complex needs related to functional status, cognitive ability or social support system  Outcome: Progressing

## 2024-11-13 NOTE — CM/SW NOTE
11/13/24 1400   CM/ Referral Data   Referral Source Physician   Reason for Referral Discharge planning   Informant Other  (Caregiver)   Medical Hx   Does patient have an established PCP? Yes  (Kathy Lorenaantonio)   Patient Info   Patient's Current Mental Status at Time of Assessment Confused or unable to complete assessment   Patient's Home Environment House   Number of Levels in Home 2   Number of Stair in Home 14   Patient lives with Spouse/Significant other   Patient Status Prior to Admission   Services in place prior to admission DME/Supplies at home  (PT CG services)   DME Provider/Supplier [facility] for home O2   Type of DME/Supplies Standard Walker;Wheelchair;Commode;Stair Lift;Home Oxygen   Discharge Needs   Anticipated D/C needs Subacute rehab   Services Requested   Submitted to [facility] Yes   PASRR Level 1 Submitted Yes     Pt discussed during nursing rounds. Dx acute cystitis. Home w/spouse, independent w/walker at baseline though pt's CG states she has not been able to ambulate of late. Pt has part time CG Monday, Wednesday, Thursday, and Friday from 10am to 6pm. Pt has all required DME in place at home. Anticipated therapy need: Gradual Rehabilitative Therapy. [facility] review pending. [facility] referrals sent in [facility]. List of accepting facilities will be needed for choice. Ins auth will be needed prior to dc. CM requested that DSC Jennifer submit for ins auth via [employer] portal.    PASRR level 1 screen completed and uploaded to aidin referral.    Plan: [facility] pending [facility] review, facility choice, ins auth, and medical clearance.    / to remain available for support and/or discharge planning.     CHELSI Varma    [phone]

## 2024-11-13 NOTE — CM/SW NOTE
Submitted clinical via [employer] portal.  [employer] Case/Auth ID is [id number].  Final insurance authorization is pending at this time.      SW/CM assigned to the case will continue to follow auth status.      Jennifer Connelly, DSC

## 2024-11-13 NOTE — PLAN OF CARE
Safety precautions in place. Call light within reach. Patient care needs addressed.    Problem: Patient Centered Care  Goal: Patient preferences are identified and integrated in the patient's plan of care  Description: Interventions:  - Provide timely, complete, and accurate information to patient/family  - Incorporate patient and family knowledge, values, beliefs, and cultural backgrounds into the planning and delivery of care  - Encourage patient/family to participate in care and decision-making at the level they choose  - Honor patient and family perspectives and choices  Outcome: Progressing      Problem: PAIN - ADULT  Goal: Verbalizes/displays adequate comfort level or patient's stated pain goal  Description: INTERVENTIONS:  - Encourage pt to monitor pain and request assistance  - Assess pain using appropriate pain scale  - Administer analgesics based on type and severity of pain and evaluate response  - Implement non-pharmacological measures as appropriate and evaluate response  - Consider cultural and social influences on pain and pain management  - Manage/alleviate anxiety  - Utilize distraction and/or relaxation techniques  - Monitor for opioid side effects  - Notify MD/LIP if interventions unsuccessful or patient reports new pain  - Anticipate increased pain with activity and pre-medicate as appropriate  Outcome: Progressing     Problem: SAFETY ADULT - FALL  Goal: Free from fall injury  Description: INTERVENTIONS:  - Assess pt frequently for physical needs  - Identify cognitive and physical deficits and behaviors that affect risk of falls.  - [place] fall precautions as indicated by assessment.  - Educate pt/family on patient safety including physical limitations  - Instruct pt to call for assistance with activity based on assessment  - Modify environment to reduce risk of injury  - Provide assistive devices as appropriate  - Consider OT/PT consult to assist with strengthening/mobility  - Encourage  toileting schedule  Outcome: Progressing     Problem: DISCHARGE PLANNING  Goal: Discharge to home or other facility with appropriate resources  Description: INTERVENTIONS:  - Identify barriers to discharge w/pt and caregiver  - Include patient/family/discharge partner in discharge planning  - Arrange for needed discharge resources and transportation as appropriate  - Identify discharge learning needs (meds, wound care, etc)  - Arrange for interpreters to assist at discharge as needed  - Consider post-discharge preferences of patient/family/discharge partner  - Complete POLST form as appropriate  - Assess patient's ability to be responsible for managing their own health  - Refer to Case Management Department for coordinating discharge planning if the patient needs post-hospital services based on physician/LIP order or complex needs related to functional status, cognitive ability or social support system  Outcome: Progressing

## 2024-11-14 ENCOUNTER — TELEPHONE (OUTPATIENT)
Dept: INTERNAL MEDICINE CLINIC | Facility: CLINIC | Age: 89
End: 2024-11-14

## 2024-11-14 PROCEDURE — 99232 SBSQ HOSP IP/OBS MODERATE 35: CPT | Performed by: INTERNAL MEDICINE

## 2024-11-14 NOTE — PLAN OF CARE
Problem: Patient Centered Care  Goal: Patient preferences are identified and integrated in the patient's plan of care  Description: Interventions:  - What would you like us to know as we care for you?   - Provide timely, complete, and accurate information to patient/family  - Incorporate patient and family knowledge, values, beliefs, and cultural backgrounds into the planning and delivery of care  - Encourage patient/family to participate in care and decision-making at the level they choose  - Honor patient and family perspectives and choices  Outcome: Progressing     Problem: PAIN - ADULT  Goal: Verbalizes/displays adequate comfort level or patient's stated pain goal  Description: INTERVENTIONS:  - Encourage pt to monitor pain and request assistance  - Assess pain using appropriate pain scale  - Administer analgesics based on type and severity of pain and evaluate response  - Implement non-pharmacological measures as appropriate and evaluate response  - Consider cultural and social influences on pain and pain management  - Manage/alleviate anxiety  - Utilize distraction and/or relaxation techniques  - Monitor for opioid side effects  - Notify MD/LIP if interventions unsuccessful or patient reports new pain  - Anticipate increased pain with activity and pre-medicate as appropriate  Outcome: Progressing     Problem: RISK FOR INFECTION - ADULT  Goal: Absence of fever/infection during anticipated neutropenic period  Description: INTERVENTIONS  - Monitor WBC  - Administer growth factors as ordered  - Implement neutropenic guidelines  Outcome: Progressing     Problem: SAFETY ADULT - FALL  Goal: Free from fall injury  Description: INTERVENTIONS:  - Assess pt frequently for physical needs  - Identify cognitive and physical deficits and behaviors that affect risk of falls.  - [place] fall precautions as indicated by assessment.  - Educate pt/family on patient safety including physical limitations  - Instruct pt to call  for assistance with activity based on assessment  - Modify environment to reduce risk of injury  - Provide assistive devices as appropriate  - Consider OT/PT consult to assist with strengthening/mobility  - Encourage toileting schedule  Outcome: Progressing     Problem: DISCHARGE PLANNING  Goal: Discharge to home or other facility with appropriate resources  Description: INTERVENTIONS:  - Identify barriers to discharge w/pt and caregiver  - Include patient/family/discharge partner in discharge planning  - Arrange for needed discharge resources and transportation as appropriate  - Identify discharge learning needs (meds, wound care, etc)  - Arrange for interpreters to assist at discharge as needed  - Consider post-discharge preferences of patient/family/discharge partner  - Complete POLST form as appropriate  - Assess patient's ability to be responsible for managing their own health  - Refer to Case Management Department for coordinating discharge planning if the patient needs post-hospital services based on physician/LIP order or complex needs related to functional status, cognitive ability or social support system  Outcome: Progressing

## 2024-11-14 NOTE — PLAN OF CARE
Problem: Patient Centered Care  Goal: Patient preferences are identified and integrated in the patient's plan of care  Description: Interventions:  - What would you like us to know as we care for you?   - Provide timely, complete, and accurate information to patient/family  - Incorporate patient and family knowledge, values, beliefs, and cultural backgrounds into the planning and delivery of care  - Encourage patient/family to participate in care and decision-making at the level they choose  - Honor patient and family perspectives and choices  Outcome: Progressing     Problem: PAIN - ADULT  Goal: Verbalizes/displays adequate comfort level or patient's stated pain goal  Description: INTERVENTIONS:  - Encourage pt to monitor pain and request assistance  - Assess pain using appropriate pain scale  - Administer analgesics based on type and severity of pain and evaluate response  - Implement non-pharmacological measures as appropriate and evaluate response  - Consider cultural and social influences on pain and pain management  - Manage/alleviate anxiety  - Utilize distraction and/or relaxation techniques  - Monitor for opioid side effects  - Notify MD/LIP if interventions unsuccessful or patient reports new pain  - Anticipate increased pain with activity and pre-medicate as appropriate  Outcome: Progressing     Problem: RISK FOR INFECTION - ADULT  Goal: Absence of fever/infection during anticipated neutropenic period  Description: INTERVENTIONS  - Monitor WBC  - Administer growth factors as ordered  - Implement neutropenic guidelines  Outcome: Progressing     Problem: SAFETY ADULT - FALL  Goal: Free from fall injury  Description: INTERVENTIONS:  - Assess pt frequently for physical needs  - Identify cognitive and physical deficits and behaviors that affect risk of falls.  - [place] fall precautions as indicated by assessment.  - Educate pt/family on patient safety including physical limitations  - Instruct pt to call  for assistance with activity based on assessment  - Modify environment to reduce risk of injury  - Provide assistive devices as appropriate  - Consider OT/PT consult to assist with strengthening/mobility  - Encourage toileting schedule  Outcome: Progressing     Problem: DISCHARGE PLANNING  Goal: Discharge to home or other facility with appropriate resources  Description: INTERVENTIONS:  - Identify barriers to discharge w/pt and caregiver  - Include patient/family/discharge partner in discharge planning  - Arrange for needed discharge resources and transportation as appropriate  - Identify discharge learning needs (meds, wound care, etc)  - Arrange for interpreters to assist at discharge as needed  - Consider post-discharge preferences of patient/family/discharge partner  - Complete POLST form as appropriate  - Assess patient's ability to be responsible for managing their own health  - Refer to Case Management Department for coordinating discharge planning if the patient needs post-hospital services based on physician/LIP order or complex needs related to functional status, cognitive ability or social support system  Outcome: Progressing

## 2024-11-14 NOTE — PROGRESS NOTES
Patient called on call, has a history of advanced dementia, 10 minutes into the conversation her  got on the phone, was unsure of why she called, she was confused as normally she is, but is able enough to still call the office and page to the doctor after all these years, he apologized, claims she is in her usual state of health and they have follow-up with their primary care physician in the near future.

## 2024-11-14 NOTE — PROGRESS NOTES
[facility]  part of [facility]    Progress Note    Mireya Wolfe Patient Status:  Inpatient    1935 MRN [id number]   Location [facility] 5SW/SE Attending Aide Vu, DO   Hosp Day # 3 PCP Kathy Rao MD       SUBJECTIVE:  Pt confused this am -- recognizes me, but started talking about how people are the level above her and pointed to the ceiling.  Said there was a party going on last night and she was dressed like a clown.    OBJECTIVE:  Vital signs in last 24 hours:  /85 (BP Location: Right arm)   Pulse 86   Temp 98 °F (36.7 °C) (Axillary)   Resp 20   Wt 137 lb 9.6 oz (62.4 kg)   SpO2 94%   BMI 22.90 kg/m²     Intake/Output:    Intake/Output Summary (Last 24 hours) at 2024 1051  Last data filed at 2024 0654  Gross per 24 hour   Intake 100 ml   Output 500 ml   Net -400 ml       Wt Readings from Last 3 Encounters:   24 137 lb 9.6 oz (62.4 kg)   24 137 lb (62.1 kg)   24 125 lb (56.7 kg)       EXAM:  GENERAL: well developed, well nourished, in no apparent distress  LUNGS: clear to auscultation  CARDIO: RRR, normal S1S2, without murmur or gallop  GI: soft, NT, ND, NABS, no HSM  EXTREMITIES: no cyanosis, clubbing or edema    Data Review:     Labs:          Imaging:  No results found.            Meds:   Current Facility-Administered Medications   Medication Dose Route Frequency    amLODIPine (Norvasc) tab 2.5 mg  2.5 mg Oral QPM    cefTRIAXone (Rocephin) 1 g in sodium chloride 0.9% 100 mL IVPB-ADDV  1 g Intravenous Q24H    enoxaparin (Lovenox) 40 MG/0.4ML SUBQ injection 40 mg  40 mg Subcutaneous Q24H    lidocaine-menthol 4-1 % patch 1 patch  1 patch Transdermal Daily    escitalopram (Lexapro) tab 20 mg  20 mg Oral Daily    fluticasone-salmeterol (Advair Diskus) 100-50 MCG/ACT inhaler 1 puff  1 puff Inhalation BID    melatonin cap/tab 5 mg  5 mg Oral Nightly    methenamine (Hiprex) tab 1 g  1 g Oral BID    pregabalin (Lyrica) cap 50 mg   50 mg Oral BID    acetaminophen (Tylenol) tab 650 mg  650 mg Oral Q6H PRN       Assessment & Plan    Acute cystitis without hematuria  -hx of recurrent UTI  -most recently with pyuria on UA 10/31/24 - treated empirically with cephalexin but subsequent urine cx revealed contaminant  -UA 11/11/24 with signif pyuria  -urine cx (11/11/24)-- >100K E.coli (R-amp, cipro/levo; S-ancef, bactrim, macrobid)  -cont ceftriaxone (day 4)  -afeb, normal WBC  -can stop abx at discharge     Altered mental status, delirium  -At baseline, the patient has mild age-related cognitive impairment, with some difficulty with word-finding, but she had otherwise been fairly sharp until the past few weeks.  -acute AMS possibly 2/2 UTI  -still with intermittent confusion; now likely element of hospital psychosis  -pt likely with progression of dementia     Weakness, progressive deconditioning  -pt has had progressive deconditioning over the past several years, due in part to a very sedentary lifestyle, exacerbated by depression  -ambulates at home with a walker  -per pt, she had not gotten out of bed for the past 4 days PTA because she felt tired  -PT/OT eval appreciated -- rec ROSEMARIE     Elevated blood pressure  -BP consistently elevated this admission (sl low this am)  -started low dose amlodipine 2.5mg qpm     History of recurrent UTIs, nephrolithiasis   Seeing urologist, Dr. Munir Cullen -- restarted on methenamine on 8/28/24     Osteoporosis  DEXA in 7/2017- osteoporosis of left femoral neck (T -2.9).  Pt was briefly on fosamax in 2014. Declines restarting fosamax or other meds so would not check repeat DEXA.     Vitamin B12 deficiency    Level normal in 7/2024  cont B12 1000 mcg/day     Hx of iron deficiency anemia  Had EGD/C-scope in 2010 (Dr. Ferrari) -- negative. 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   Continue FeSO4 325mg/day.    Hgb normal      Lumbar spinal stenosis, chronic back pain    Has seen  Dr. Salgado (ortho spine at [facility]).  Referred to pain specialist, Dr. Deirdre Elizabeth at [facility]. Had facet injections in 8/2018 which helped tremendously.  Saw Dr. Elizabeth again 9/25/18; did PT.  MRI in 4/2019 did not appear to show anything acute.  No longer taking Norco.     Bilateral knee OA  Sees ortho Dr. Booth -- has had b/l cortisone injections with transient improvement.  Has been advised for TKR's but pt reluctant.  Ambulates with a walker.       COPD  Essentially quit smoking in 8/2016.    Cont Advair  Still on O2 1L NC -- will likely need at rehab     Basal and squamous cell cancer, face  S/p  Mohs surgeries in 4 areas of her face in 2018 (Dr. Jonas).      Post-herpetic neuralgia (dx'ed with shingles left upper back 6/8/22)  -improved but still with chronic pain left upper arm and left upper back  -on Lyrica 50mg BID   -pt notes increased arm pain this admission; will add lidocaine patch     Depression and anxiety  -pt with long hx of depression (and anxiety)  -previously saw psychiatrist Dr. Friend (retired)  -no longer takes seroquel  -on lexapro 20mg/day      Cognitive decline, likely age-related, poss exacerbated by depression  -saw neurologist Dr. Lokesh Mcmullen in 9/2022. 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  Has been able to recall remote and recent events.   Pt was referred for neuropsych testing and MRI brain in 2022 by Dr. Mcmullen though did not schedule.    -now with acute delirium as above; possible progression to dementia     Advance directives  -have previously had lengthy discussions with pt re her wishes -- she was clear in her desire to not want to prolong her life -- did not want CPR or intubation or any artificial means of prolonging her life.  -pt signed a POLST form in 7/2023 (scanned in chart) -- she is DNR/comfort     VTE proph  -cont subQ  lovenox        Dispo  -patient has a caregiver, Portia, who comes 4 days a week (M,W,Th,F) from 10 AM to 6 PM.  The patient's  works 3 days a week and is home on Tuesdays and Thursdays along with the weekends.  Pt's kids have tried to convince pt and her  to hire a nighttime caregiver, but they have not wanted to spend the money.  -PT/OT recommending [facility] -- daughter Nelli requesting [facility] or [facility], as pt and  have experience with both.  SW assisting.                                      Kathy Rao MD  11/14/2024  10:51 AM

## 2024-11-14 NOTE — CM/SW NOTE
Department  notified care team of Stephen approval.    Assigned SW/CM to follow up with patient/family on discharge plan.       11/14 - [facility] ID [id number], approved 11/14 to 11/18.     Jennifer Connelly DSC

## 2024-11-14 NOTE — CONGREGATE LIVING REVIEW
[facility] Authorization    The [facility] Review Committee has reviewed this case and the patient IS APPROVED for discharge to a facility for Short Term Skilled once the following procedure is followed:     - The physician discharge instructions (contained within the JOSHUA note for SNF) must inlcude the below appropriate and approved COVID instructions to the facility    For questions regarding CLRC approval process, please contact the CM assigned to the case.  For questions regarding RN discharge workflow, please contact the unit Clinical Leader.

## 2024-11-14 NOTE — CM/SW NOTE
Pt discussed in RN DC rounds. JAMES received a call from daughter Nelli. Nelli wishes for [facility] in [place] or [facility]. JAMES will extend ROSEMARIE referral to [facility] via aidin. JAMES informed Mireya that [facility] does not have beds avail at this time. Nelli was informed that Insurance auth is approved. Nelli wishes to be main point of  at this time.     1131am- JAMES called Kristal at [facility] to see if they can accept patient. JAMES updated Kristal that the family wishes for [facility] as first choice, insurance auth approved, and anticipated DC is likely tomorrow or weekend. Kristal informed JAMES that beds are very tight, but will review referral and go from there. JAMES updated Nelli, and informed her of above. If [facility] is unable to accept, [facility] could be a possibility.     324pm- JAMES f/u with Kristal from [facility], who confirmed that there is no beds avail for today/tomorrow. JAMES updated Nelli, who informed that she wishes to try [facility]. JAMES spoke with [facility] who confirmed that they have beds avail but no private rooms. James updated Nelli, who will discuss with family. Nelli informed JAMES that if patient says no to [facility] then family will likely take her home.  JAMES sent out [facility] referrals via aidin, f2f placed. JAMES informed DSC to update PAC for insurance to [facility].     Plan: Pending medical clearance, DC to [facility], auth approved vs Home with HH, f2f placed, *list/choice     SW/TANI to remain available for support and/or discharge planning.     Cherie Purdy, MSW, LSW   x [phone]

## 2024-11-15 VITALS
HEART RATE: 90 BPM | RESPIRATION RATE: 18 BRPM | TEMPERATURE: 99 F | OXYGEN SATURATION: 93 % | WEIGHT: 137.63 LBS | SYSTOLIC BLOOD PRESSURE: 118 MMHG | BODY MASS INDEX: 23 KG/M2 | DIASTOLIC BLOOD PRESSURE: 48 MMHG

## 2024-11-15 RX ORDER — AMLODIPINE BESYLATE 2.5 MG/1
2.5 TABLET ORAL EVERY EVENING
Qty: 30 TABLET | Refills: 0 | Status: SHIPPED | OUTPATIENT
Start: 2024-11-15

## 2024-11-15 NOTE — CM/SW NOTE
Pt discussed in RN DC rounds. JAMES received a call from Nelli sudheer, in regards to [facility]. Nelli asked if SW can reach out to [facility] to see if they have any beds avail. James reached out to Danna at [facility], and was informed that there is no beds avail. SW update daughter Nelli, who asked for SW to reach out to [facility] again to see if they have a bed avail. JAMES reached out to [facility] who informed that they have a semi private today only, no privates. JAMES updated daughter, who will speak with pt and pt's spouse. SW will need to have a final ROSEMARIE choice today, to update insurance.     940am- JAMES received a call from daughter who informed SW that patient and family has said no to semi private options which is [facility] and [facility]. SW offered HH, Daughter is agreeable HH services. Per Care everywhere, [facility] was the last HH agency used as on 7/2024    1058am- James reached out to [facility], spoke with Katlyn, who informed that patient is currently on 2L continuous oxygen at home with concentrator and portable tank      229pm- [facility] reserved via aidin, and HH information placed in AVS.        11/15/24 1538   Discharge disposition   Expected discharge disposition Home or Self   Post Acute Care Provider Home   Additional Home Care/Hospice Provider   ([facility])   DME/Infusion Providers [facility]   Discharge transportation [facility]     SW was informed that patient is medically stable for discharge, and would need an ambulance for home. Pt requires an ambulance according to the RN due to being a max assist, on 1L of 02. JAMES called and ordered an Ambulance from [facility] to transport pt to Home  at 5:30 PM.  PCS flow sheet completed. RN to attached to AVS and print out.  Sudheer Aiken is informed of above and is aware of the time frame of discharge. Nelli is aware of [facility] arranged and confirmed. James was informed that [facility] is confirmed for start of care on 11/17.        Plan:  DC to Home with [facility] with [facility] oxygen    SW/CM to remain available for support and/or discharge planning.     Cherie Purdy, MSW, LSW   x [phone]

## 2024-11-15 NOTE — DISCHARGE SUMMARY
Discharge Summary     Mireya Wolfe Patient Status:  Inpatient    1935 MRN [id number]   Location [facility] 5SW/SE Attending Aide Vu, DO   Hosp Day # 4 PCP Kathy Rao MD     Date of Admission: 2024  Date of Discharge: ***  Discharge Disposition: Home or Self Care    Discharge Diagnosis: ***    History of Present Illness: ***  {H&P Notes :8307}          Brief Synopsis: ***    Lace+ Score: 72  59-90 High Risk  29-58 Medium Risk  0-28   Low Risk       TCM Follow-Up Recommendation:  {Care Managers will evaluate the need for follow-up for all patients ages 50+, and high/moderate risk patients ages 25-49. Low risk patients (LACE < 29) will only be evaluated if the \"Still recommend for TCM follow-up\" option is selected from this list.:7396}    Procedures during hospitalization:   ***    Incidental or significant findings and recommendations (brief descriptions):  ***    Lab/Test results pending at Discharge:   ***    Consultants:  ***    Discharge Medication List:     Discharge Medications        START taking these medications        Instructions Prescription details   amLODIPine 2.5 MG Tabs  Commonly known as: Norvasc      Take 1 tablet (2.5 mg total) by mouth every evening.   Quantity: 30 tablet  Refills: 0     lidocaine-menthol 4-1 % Ptch  Start taking on: 2024      Place 1 patch onto the skin daily.   Quantity: 30 patch  Refills: 0            CONTINUE taking these medications        Instructions Prescription details   escitalopram 20 MG Tabs  Commonly known as: Lexapro      Take 1 tablet (20 mg total) by mouth daily.   Quantity: 90 tablet  Refills: 3     fluticasone furoate-vilanterol 100-25 MCG/ACT Aepb  Commonly known as: Breo Ellipta      Inhale 1 puff into the lungs daily.   Quantity: 180 each  Refills: 3     Melatonin 5 MG Tabs      Take 1 tablet (5 mg total) by mouth at bedtime.   Refills: 0     methenamine 1 g Tabs  Commonly known as: Hiprex      Take 1 tablet (1 g  total) by mouth 2 (two) times daily.   Refills: 0     pregabalin 50 MG Caps  Commonly known as: Lyrica      TAKE 1 CAPSULE BY MOUTH TWICE DAILY   Quantity: 180 capsule  Refills: 1               Where to Get Your Medications        These medications were sent to [id number] - [place], [place] - [address] AT [facility] [address], [phone], [phone]  [address], [facility] [address]      Phone: [phone]   amLODIPine 2.5 MG Tabs  lidocaine-menthol 4-1 % Ptch         Follow-up appointment:   Kathy Rao MD  [address] [place] [id number] [phone]    Call      Appointments for Next 30 Days 11/15/2024 - 12/15/2024        Date Arrival Time Visit Type Length Department Provider     11/21/2024  3:00 PM  EXAM - ESTABLISHED [2844] 30 min [facility], [facility] Kathy Rao MD    Patient Instructions:         Location Instructions:     Masks are optional for all patients and visitors, unless otherwise indicated.                      Supplementary Documentation:   [place] reviewed: ***    Vital signs:  Temp:  [97.9 °F (36.6 °C)-98.4 °F (36.9 °C)] 98.4 °F (36.9 °C)  Pulse:  [82-87] 82  Resp:  [18] 18  BP: (131-138)/(71-80) 131/80  SpO2:  [90 %-93 %] 90 %    Physical Exam:    General:  NAD  Cardiovascular:  S1, S2    -----------------------------------------------------------------------------------------------  PATIENT DISCHARGE INSTRUCTIONS: See electronic chart    Tip: Documentation requirements: For split shared discharge, BOTH providers need to document specific floor, unit, and time spent on the discharge.  The note needs to be signed by the provider with > 50% of time and bill under their NPI.   Time spent:  ***         Aide Vu DO         taking Norco.     Bilateral knee OA  Sees ortho Dr. Booth -- has had b/l cortisone injections with transient improvement.  Has been advised for TKR's but pt reluctant.  Ambulates with a walker.       COPD  Essentially quit smoking in 8/2016.    Cont Advair  Still on O2 1L NC -- will likely need at rehab     Basal and squamous cell cancer, face  S/p  Mohs surgeries in 4 areas of her face in 2018 (Dr. Jonas).      Post-herpetic neuralgia (dx'ed with shingles left upper back 6/8/22)  -improved but still with chronic pain left upper arm and left upper back  -on Lyrica 50mg BID   -pt notes increased arm pain this admission; will add lidocaine patch     Depression and anxiety  -pt with long hx of depression (and anxiety)  -previously saw psychiatrist Dr. Friend (retired)  -no longer takes seroquel  -on lexapro 20mg/day      Cognitive decline, likely age-related, poss exacerbated by depression  -saw neurologist Dr. Lokseh Mcmullen in 9/2022. 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  Has been able to recall remote and recent events.   Pt was referred for neuropsych testing and MRI brain in 2022 by Dr. Mcmullen though did not schedule.    -now with acute delirium as above; possible progression to dementia     Advance directives  -have previously had lengthy discussions with pt re her wishes -- she was clear in her desire to not want to prolong her life -- did not want CPR or intubation or any artificial means of prolonging her life.  -pt signed a POLST form in 7/2023 (scanned in chart) -- she is DNR/comfort     VTE proph  -cont subQ lovenox        Dispo  -patient has a caregiver, Portia, who comes 4 days a week (M,W,Th,F) from 10 AM to 6 PM.  The patient's  works 3 days a week and is home on Tuesdays and Thursdays along with the weekends.  Pt's kids have tried to convince pt and her  to hire a nighttime caregiver, but  they have not wanted to spend the money.  -PT/OT recommending ROSEMARIE -- daughter Nelli requesting [facility] or [facility], as pt and  have experience with both.  SW assisting.                                           Lace+ Score: 72  59-90 High Risk  29-58 Medium Risk  0-28   Low Risk       TCM Follow-Up Recommendation:  LACE > 58: High Risk of readmission after discharge from the hospital.    Procedures during hospitalization:   none    Incidental or significant findings and recommendations (brief descriptions):  None    Lab/Test results pending at Discharge:   none    Consultants:  none  Discharge Medication List:     Discharge Medications        START taking these medications        Instructions Prescription details   amLODIPine 2.5 MG Tabs  Commonly known as: Norvasc      Take 1 tablet (2.5 mg total) by mouth every evening.   Quantity: 30 tablet  Refills: 0     lidocaine-menthol 4-1 % Ptch  Start taking on: November 16, 2024      Place 1 patch onto the skin daily.   Quantity: 30 patch  Refills: 0            CONTINUE taking these medications        Instructions Prescription details   escitalopram 20 MG Tabs  Commonly known as: Lexapro      Take 1 tablet (20 mg total) by mouth daily.   Quantity: 90 tablet  Refills: 3     fluticasone furoate-vilanterol 100-25 MCG/ACT Aepb  Commonly known as: Breo Ellipta      Inhale 1 puff into the lungs daily.   Quantity: 180 each  Refills: 3     Melatonin 5 MG Tabs      Take 1 tablet (5 mg total) by mouth at bedtime.   Refills: 0     methenamine 1 g Tabs  Commonly known as: Hiprex      Take 1 tablet (1 g total) by mouth 2 (two) times daily.   Refills: 0     pregabalin 50 MG Caps  Commonly known as: Lyrica      TAKE 1 CAPSULE BY MOUTH TWICE DAILY   Quantity: 180 capsule  Refills: 1               Where to Get Your Medications        These medications were sent to [id number] [place], [place] - [address] AT [facility] [address], [phone], [phone]   [address] [facility] [address]      Phone: [phone]   amLODIPine 2.5 MG Tabs  lidocaine-menthol 4-1 % Ptch         Follow-up appointment:   Kathy Rao MD  [address] [place] [id number]  [phone]    Call      Appointments for Next 30 Days 11/15/2024 - 12/15/2024        Date Arrival Time Visit Type Length Department Provider     11/21/2024  3:00 PM  EXAM - ESTABLISHED [2844] 30 min [facility] Kathy Rao MD    Patient Instructions:         Location Instructions:     Masks are optional for all patients and visitors, unless otherwise indicated.                      Supplementary Documentation:   [facility] reviewed: na    Vital signs:  Temp:  [97.9 °F (36.6 °C)-98.4 °F (36.9 °C)] 98.4 °F (36.9 °C)  Pulse:  [82-87] 82  Resp:  [18] 18  BP: (131-138)/(71-80) 131/80  SpO2:  [90 %-93 %] 90 %    Physical Exam:    General:  NAD  Cardiovascular:  S1, S2    -----------------------------------------------------------------------------------------------  PATIENT DISCHARGE INSTRUCTIONS: See electronic chart    Tip: Documentation requirements: For split shared discharge, BOTH providers need to document specific floor, unit, and time spent on the discharge.  The note needs to be signed by the provider with > 50% of time and bill under their NPI.   Time spent:  >30 min         Aide Vu DO

## 2024-11-15 NOTE — PLAN OF CARE
Problem: Patient Centered Care  Goal: Patient preferences are identified and integrated in the patient's plan of care  Description: Interventions:  - What would you like us to know as we care for you?   - Provide timely, complete, and accurate information to patient/family  - Incorporate patient and family knowledge, values, beliefs, and cultural backgrounds into the planning and delivery of care  - Encourage patient/family to participate in care and decision-making at the level they choose  - Honor patient and family perspectives and choices  Outcome: Progressing     Problem: PAIN - ADULT  Goal: Verbalizes/displays adequate comfort level or patient's stated pain goal  Description: INTERVENTIONS:  - Encourage pt to monitor pain and request assistance  - Assess pain using appropriate pain scale  - Administer analgesics based on type and severity of pain and evaluate response  - Implement non-pharmacological measures as appropriate and evaluate response  - Consider cultural and social influences on pain and pain management  - Manage/alleviate anxiety  - Utilize distraction and/or relaxation techniques  - Monitor for opioid side effects  - Notify MD/LIP if interventions unsuccessful or patient reports new pain  - Anticipate increased pain with activity and pre-medicate as appropriate  Outcome: Progressing     Problem: RISK FOR INFECTION - ADULT  Goal: Absence of fever/infection during anticipated neutropenic period  Description: INTERVENTIONS  - Monitor WBC  - Administer growth factors as ordered  - Implement neutropenic guidelines  Outcome: Progressing     Problem: SAFETY ADULT - FALL  Goal: Free from fall injury  Description: INTERVENTIONS:  - Assess pt frequently for physical needs  - Identify cognitive and physical deficits and behaviors that affect risk of falls.  - [place] fall precautions as indicated by assessment.  - Educate pt/family on patient safety including physical limitations  - Instruct pt to call  for assistance with activity based on assessment  - Modify environment to reduce risk of injury  - Provide assistive devices as appropriate  - Consider OT/PT consult to assist with strengthening/mobility  - Encourage toileting schedule  Outcome: Progressing     Problem: DISCHARGE PLANNING  Goal: Discharge to home or other facility with appropriate resources  Description: INTERVENTIONS:  - Identify barriers to discharge w/pt and caregiver  - Include patient/family/discharge partner in discharge planning  - Arrange for needed discharge resources and transportation as appropriate  - Identify discharge learning needs (meds, wound care, etc)  - Arrange for interpreters to assist at discharge as needed  - Consider post-discharge preferences of patient/family/discharge partner  - Complete POLST form as appropriate  - Assess patient's ability to be responsible for managing their own health  - Refer to Case Management Department for coordinating discharge planning if the patient needs post-hospital services based on physician/LIP order or complex needs related to functional status, cognitive ability or social support system  Outcome: Progressing

## 2024-11-15 NOTE — PHYSICAL THERAPY NOTE
Pt ok to be seen per JANA Rondon. Pt declined participating in OOB to chair, or sitting up in bed repeatedly. Pt educ in role of PT. 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 Between stories, pt consistently declining participating in mobility. Will reattempt as able. RN aware.

## 2024-11-15 NOTE — PLAN OF CARE
Problem: Patient Centered Care  Goal: Patient preferences are identified and integrated in the patient's plan of care  Description: Interventions:  - What would you like us to know as we care for you?   - Provide timely, complete, and accurate information to patient/family  - Incorporate patient and family knowledge, values, beliefs, and cultural backgrounds into the planning and delivery of care  - Encourage patient/family to participate in care and decision-making at the level they choose  - Honor patient and family perspectives and choices  Outcome: Progressing     Problem: PAIN - ADULT  Goal: Verbalizes/displays adequate comfort level or patient's stated pain goal  Description: INTERVENTIONS:  - Encourage pt to monitor pain and request assistance  - Assess pain using appropriate pain scale  - Administer analgesics based on type and severity of pain and evaluate response  - Implement non-pharmacological measures as appropriate and evaluate response  - Consider cultural and social influences on pain and pain management  - Manage/alleviate anxiety  - Utilize distraction and/or relaxation techniques  - Monitor for opioid side effects  - Notify MD/LIP if interventions unsuccessful or patient reports new pain  - Anticipate increased pain with activity and pre-medicate as appropriate  Outcome: Progressing     Problem: RISK FOR INFECTION - ADULT  Goal: Absence of fever/infection during anticipated neutropenic period  Description: INTERVENTIONS  - Monitor WBC  - Administer growth factors as ordered  - Implement neutropenic guidelines  Outcome: Progressing     Problem: SAFETY ADULT - FALL  Goal: Free from fall injury  Description: INTERVENTIONS:  - Assess pt frequently for physical needs  - Identify cognitive and physical deficits and behaviors that affect risk of falls.  - [place] fall precautions as indicated by assessment.  - Educate pt/family on patient safety including physical limitations  - Instruct pt to call  for assistance with activity based on assessment  - Modify environment to reduce risk of injury  - Provide assistive devices as appropriate  - Consider OT/PT consult to assist with strengthening/mobility  - Encourage toileting schedule  Outcome: Progressing     Problem: DISCHARGE PLANNING  Goal: Discharge to home or other facility with appropriate resources  Description: INTERVENTIONS:  - Identify barriers to discharge w/pt and caregiver  - Include patient/family/discharge partner in discharge planning  - Arrange for needed discharge resources and transportation as appropriate  - Identify discharge learning needs (meds, wound care, etc)  - Arrange for interpreters to assist at discharge as needed  - Consider post-discharge preferences of patient/family/discharge partner  - Complete POLST form as appropriate  - Assess patient's ability to be responsible for managing their own health  - Refer to Case Management Department for coordinating discharge planning if the patient needs post-hospital services based on physician/LIP order or complex needs related to functional status, cognitive ability or social support system  Outcome: Progressing

## 2024-11-16 NOTE — PLAN OF CARE
Pt discharged home via [facility]. Education provided to family over the phone. Paperwork provided to home caregiver. IV access removed. Belongings provided to caregiver at bedside.     Problem: Patient Centered Care  Goal: Patient preferences are identified and integrated in the patient's plan of care  Description: Interventions:  - What would you like us to know as we care for you?   - Provide timely, complete, and accurate information to patient/family  - Incorporate patient and family knowledge, values, beliefs, and cultural backgrounds into the planning and delivery of care  - Encourage patient/family to participate in care and decision-making at the level they choose  - Honor patient and family perspectives and choices  11/15/2024 1847 by Nela Shoemaker, RN  Outcome: Completed  11/15/2024 1121 by Nela Shoemaker RN  Outcome: Progressing     Problem: PAIN - ADULT  Goal: Verbalizes/displays adequate comfort level or patient's stated pain goal  Description: INTERVENTIONS:  - Encourage pt to monitor pain and request assistance  - Assess pain using appropriate pain scale  - Administer analgesics based on type and severity of pain and evaluate response  - Implement non-pharmacological measures as appropriate and evaluate response  - Consider cultural and social influences on pain and pain management  - Manage/alleviate anxiety  - Utilize distraction and/or relaxation techniques  - Monitor for opioid side effects  - Notify MD/LIP if interventions unsuccessful or patient reports new pain  - Anticipate increased pain with activity and pre-medicate as appropriate  11/15/2024 1847 by Nela Shoemaker, RN  Outcome: Completed  11/15/2024 1121 by Nela Shoemaker, RN  Outcome: Progressing     Problem: RISK FOR INFECTION - ADULT  Goal: Absence of fever/infection during anticipated neutropenic period  Description: INTERVENTIONS  - Monitor WBC  - Administer growth factors as ordered  - Implement neutropenic  guidelines  11/15/2024 1847 by Nela Shoemaker RN  Outcome: Completed  11/15/2024 1121 by Nela Shoemaker RN  Outcome: Progressing     Problem: SAFETY ADULT - FALL  Goal: Free from fall injury  Description: INTERVENTIONS:  - Assess pt frequently for physical needs  - Identify cognitive and physical deficits and behaviors that affect risk of falls.  - [place] fall precautions as indicated by assessment.  - Educate pt/family on patient safety including physical limitations  - Instruct pt to call for assistance with activity based on assessment  - Modify environment to reduce risk of injury  - Provide assistive devices as appropriate  - Consider OT/PT consult to assist with strengthening/mobility  - Encourage toileting schedule  11/15/2024 1847 by Nela Shoemaker RN  Outcome: Completed  11/15/2024 1121 by Nela Shoemaker RN  Outcome: Progressing     Problem: DISCHARGE PLANNING  Goal: Discharge to home or other facility with appropriate resources  Description: INTERVENTIONS:  - Identify barriers to discharge w/pt and caregiver  - Include patient/family/discharge partner in discharge planning  - Arrange for needed discharge resources and transportation as appropriate  - Identify discharge learning needs (meds, wound care, etc)  - Arrange for interpreters to assist at discharge as needed  - Consider post-discharge preferences of patient/family/discharge partner  - Complete POLST form as appropriate  - Assess patient's ability to be responsible for managing their own health  - Refer to Case Management Department for coordinating discharge planning if the patient needs post-hospital services based on physician/LIP order or complex needs related to functional status, cognitive ability or social support system  11/15/2024 1847 by Nela Shoemaker, RN  Outcome: Completed  11/15/2024 1121 by Nela Shoemaker, RN  Outcome: Progressing

## 2024-11-18 ENCOUNTER — PATIENT OUTREACH (OUTPATIENT)
Dept: CASE MANAGEMENT | Age: 89
End: 2024-11-18

## 2024-11-18 DIAGNOSIS — N30.00 ACUTE CYSTITIS WITHOUT HEMATURIA: ICD-10-CM

## 2024-11-18 DIAGNOSIS — Z02.9 ENCOUNTERS FOR ADMINISTRATIVE PURPOSE: Primary | ICD-10-CM

## 2024-11-18 PROCEDURE — 1159F MED LIST DOCD IN RCRD: CPT

## 2024-11-18 PROCEDURE — 1111F DSCHRG MED/CURRENT MED MERGE: CPT

## 2024-11-18 RX ORDER — AMLODIPINE BESYLATE 2.5 MG/1
2.5 TABLET ORAL EVERY EVENING
Qty: 90 TABLET | Refills: 0 | OUTPATIENT
Start: 2024-11-18

## 2024-11-18 NOTE — PROGRESS NOTES
Transitional Care Management   Discharge Date: 11/15/24  Contact Date: 11/18/2024    Assessment:    TCM Initial Assessment    General:  Assessment completed with: Family  Patient Subjective: AMISHA received a voicemail from patient's daughter asking for a call back. NCM returned call. Nelli states the patient is much more coherent and was able to tolerate a full meal. Nelli states she was also able to give her mother her medications and she tolerated them. Nelli denies the patient having any fever of shortness of breath. Nelli states right now she does not want to take her mother back to the hospital. I advised Nelli if there are any changes in her condition or anything worse to bring her back to the ER or call 911 and she verbalized agreement with this. Nurse Care Manager discussed with Nelli to work on turning her every 2 hours to promote blood flow and reduce the risk of her developing pressure sores. Nelli verbalized agreement with this. Nelli states the patient has a caregiver Mon, Wd, Th, Fr. Also has a caregiver that has come the last 3 nights to assist but wont be able to do this long term. She is asking for overnight care. AMISHA did discuss with Nelli there is [facility] to help with overnight care and I provided the contact information to the [place] location and advised to call. Nelli is aware this is an out of pocket cost. Nelli confirms [facility] has come yesterday for SOC.  Nelli states right now her short term goal is to keep the patient home for as long as possible before placing her in a facility. Nelli states they also want a hospital bed. I informed Nelli there are various DME companies and we can get an order sent over for one. Nelli states she wants to hold off on that for now. I advised Nelli to contact Dr. Rao's office once they are ready to order the hospital bed and need to find an accepting DME company. She verbalized agreement with this.  Chief Complaint: Acute cystitis without hematuria  Verify  patient name and  with patient/ caregiver: Yes    Hospital Stay/Discharge:  Tell me what you understand of why you were in the hospital or emergency department: weakness and an infection in the urine  Prior to leaving the hospital were your Discharge Instructions reviewed with you?: Yes  Did you receive a copy of your written Discharge Instructions?: Yes  What questions do you have about your Discharge Instructions?: patient's daughter denies at this time  Do you feel better or worse since you left the hospital or emergency department?: Same    Follow - Up Appointment:  Do you have a follow-up appointment?: Yes  Date: 24  Physician: Dr. Rao  Are there any barriers to getting to your follow-up appointment?: No    Home Health/DME:  Prior to leaving the hospital was Home Health (HH) arranged for you?: Yes  Has HH been out or set up a visit to see you?: Been out     Prior to leaving the hospital or emergency department was Durable Medical Equipment (DME), medical supplies, or infusions arranged for you?: Yes  Have you received your DME/supplies/infusions?: Yes  Do you have questions about using your DME/supplies/infusions?: No     Medications/Diet:  Did any of your medications change, during or after your hospital stay or ED visit?: Yes  Do you have your new or updated medications?: Yes  Do you understand what your medications are for and possible side effects?: Yes  Are there any reasons that keep you from taking your medication as prescribed?: No  Any concerns about medication refills?: No    Were you given a different diet per your Discharge Instructions?: No     Questions/Concerns:  Do you have any questions or concerns that have not been discussed?: No       Nursing Interventions:   Nurse Care Manager discussed with patient's Nelli to work on turning her every 2 hours to promote blood flow and reduce the risk of her developing pressure sores.     NCM did discuss with Nelli there is [facility]  to help with overnight care and I provided the contact information to the [place] location and advised to call. Nelli is aware this is an out of pocket cost.    [facility] and spoke with Lulu. [facility] is no longer providing hospital beds, they stopped doing this after COVID.     [facility] and I was advised they need the order first before they can discuss pricing. Patient's daughter is aware once ready to order the hospital bed to contact PCP.     TCM/HFU appointment confirmed for 11/21/2024.     All d/c instructions reviewed with pt.  Reviewed when to call MD vs when to go to ER/call 911.  Educated pt on the importance of taking all meds as prescribed as well as close f/u with PCP/specialists.  Pt verbalized understanding and will contact office with any further questions or concerns.       Medication Review:   Current Outpatient Medications   Medication Sig Dispense Refill    lidocaine-menthol 4-1 % External Patch Place 1 patch onto the skin daily. 30 patch 0    amLODIPine 2.5 MG Oral Tab Take 1 tablet (2.5 mg total) by mouth every evening. 30 tablet 0    fluticasone furoate-vilanterol 100-25 MCG/ACT Inhalation Aerosol Powder, Breath Activated Inhale 1 puff into the lungs daily. 180 each 3    methenamine 1 g Oral Tab Take 1 tablet (1 g total) by mouth 2 (two) times daily.      Melatonin 5 MG Oral Tab Take 1 tablet (5 mg total) by mouth at bedtime.      PREGABALIN 50 MG Oral Cap TAKE 1 CAPSULE BY MOUTH TWICE DAILY 180 capsule 1    escitalopram 20 MG Oral Tab Take 1 tablet (20 mg total) by mouth daily. 90 tablet 3     Did patient review medications using current pill bottles and not just a medication list?  Yes  Discharge medications reviewed/discussed/and reconciled against outpatient medications with patient.  Any changes or updates to medications sent to primary care provider.  Patient Acknowledged      Follow-up Appointments:  Your  appointments       Date & Time Appointment Department ([place])    Nov 21, 2024 3:00 PM CST Hospital Follow Up with Kathy Rao MD [facility]        Jan 16, 2025 4:00 PM CST Exam - Established with Kathy Rao MD [facility]              [facility]  [address] [address]  [phone]            Transitional Care Clinic  Was TCC Ordered: No      Primary Care Provider (If no TCC appointment)  Does patient already have a PCP appointment scheduled? Yes  Nurse Care Manager Confirmed PCP office TCM appointment with patient      Specialist  Does the patient have any other follow-up appointment(s) that need to be scheduled? No   -If yes: Nurse Care Manager reviewed upcoming specialist appointments with patient: No   -Does the patient need assistance scheduling appointment(s): No    CCM referral placed:  No

## 2024-11-18 NOTE — TELEPHONE ENCOUNTER
Current refill request refused due to refill is either a duplicate request or has active refills at the pharmacy.  Check previous templates.    Requested Prescriptions     Refused Prescriptions Disp Refills    AMLODIPINE 2.5 MG Oral Tab [Pharmacy Med Name: AMLODIPINE BESYLATE 2.5MG TABLETS] 90 tablet 0     Sig: TAKE 1 TABLET(2.5 MG) BY MOUTH EVERY EVENING     Refused By: CORKY COPELAND     Reason for Refusal: Request already responded to by other means (e.g. phone or fax)

## 2024-11-18 NOTE — PROGRESS NOTES
Schedule Procedure:   Please Schedule Patient preference  Procedure: Colonoscopy (33841) with NuLytely and EGD ([id number])  Diagnosis: Iron Def Anemia D50.9  Is patient:    Diabetic? No   On Coumadin, heparin, lovenox? No   On ASA/NSAIDS? Platelet Modifying (examples - Plavix, aspirin, nsaids, Aggrenox)? No  Prophylactic Antibiotics? No  Location: [facility] Operating Room - Based on health history  Special Instructions:   MAC Anesthesia   Transitional Care Management   Discharge Date: 11/15/24  Contact Date: 11/18/2024    Assessment:    Spoke with patient's daughter Nelli who states her mother is not doing well since her discharge home from the hospital. Nelli states that her mother has continued to feel very weak and has a poor appetite. Nelli states her mother has not wanted to get up out of the bed and has been refusing to take her medications. Also has been refusing to eat. Nelli states that the patient was going to go into a rehab facility at discharge but the decision ultimately was not to because she would not be provided a private bed and Nelli states she felt that she would be able to take care of her at home with other support. The patient also has a caregiver that comes to the home four days a week. Nelli states she is looking for 24/7 care for her mother. Nelli is requesting for her mother to be placed either at [facility] or [facility] and Nelli states she will be making these calls today to have her placed. Nelli had to disconnect the phone because she wanted to go upstairs to try to get her up and to eat something. Nurse Care Manger advised patient's daughter Nelli because of the worsening weakness to bring her back to the ER for evaluation and Nelli verbalized agreement with this and phone call ended.

## 2024-11-19 ENCOUNTER — TELEPHONE (OUTPATIENT)
Dept: INTERNAL MEDICINE CLINIC | Facility: CLINIC | Age: 89
End: 2024-11-19

## 2024-11-19 NOTE — TELEPHONE ENCOUNTER
To Dr. Alex--per 11/15 CM/SW note, patient on 2L NC, oxygen concentrator and portable tank.    Order form in your inbox.

## 2024-11-19 NOTE — TELEPHONE ENCOUNTER
Received request via fax from [facility] for Oxygen Therapy orders..    Placed in Dr Rao mail box

## 2024-11-19 NOTE — TELEPHONE ENCOUNTER
Patient canceled her appointment for 11/21/2024 by my chart, and sent this message:     Still too weak to get out of house but working with PT and she is taking all of her meds and eating some. We are adding Ensure to her diet. Less confused and more amicable but key now is secure 24-hour care, improve her hygeine and get her active.

## 2024-11-20 NOTE — TELEPHONE ENCOUNTER
Faxed form as requested to Melissa WATTS# [phone] with fax confirmation received.    Original placed in Apple bin and copy to scan

## 2024-11-21 ENCOUNTER — TELEPHONE (OUTPATIENT)
Dept: INTERNAL MEDICINE CLINIC | Facility: CLINIC | Age: 89
End: 2024-11-21

## 2024-11-21 NOTE — TELEPHONE ENCOUNTER
[facility] contacted and provided (per protocol) with verbal order for OT evaluation for the patient.

## 2024-11-21 NOTE — TELEPHONE ENCOUNTER
[facility] physical therapist with [facility] called    Requests order for occupational therapy evaluation    Call back # [phone]

## 2024-11-26 NOTE — Clinical Note
Chief Complaint   Patient presents with    Finger Pain     Patient is here for finger pain on right hand      he is a 57 y.o. year old male who presents for evaluation of right hand finger pain   Pain Assessment Encounter      Jean Paul Ross  11/26/2024  Onset of Symptoms: Patient states he has had pain for weeks   ________________________________________________________________________  Description:Patient states no injures     Frequency: more than 5 times a day  Pain Scale:(1-10): 5  Trauma Hx: none  Hx of similar symptoms: No:   Radiation: Yes, none  Duration:  continuous      Progression: has worsened  What makes it better?: OTC meds and rest  What makes it worse?:strecthing  Medications tried: acetaminophen, ibuprofen    Reviewed and agree with Nurse Note and duplicated in this note.  Reviewed PmHx, RxHx, FmHx, SocHx, AllgHx and updated and dated in the chart.    Family History   Problem Relation Age of Onset    Hypertension Mother     Hypertension Father     Elevated Lipids Father     Diabetes Father     Stroke Father        Past Medical History:   Diagnosis Date    Diabetes (HCC)     H/O colonoscopy with polypectomy 08/31/2017    Dr Rip Cuenca    High cholesterol     Hypertension       Social History     Socioeconomic History    Marital status: Single   Tobacco Use    Smoking status: Every Day    Smokeless tobacco: Never   Substance and Sexual Activity    Alcohol use: No   Social History Narrative    Family from [place]     Social Determinants of Health     Financial Resource Strain: Low Risk  (1/25/2024)    Overall Financial Resource Strain (CARDIA)     Difficulty of Paying Living Expenses: Not hard at all   Food Insecurity: No Food Insecurity (1/25/2024)    Hunger Vital Sign     Worried About Running Out of Food in the Last Year: Never true     Ran Out of Food in the Last Year: Never true   Transportation Needs: Unknown (1/25/2024)    PRAPARE - Transportation     Lack of Transportation (Non-Medical):  TCM assessment completed.  The patient is scheduled for a TCM/HFU with you on 11/21/2024.  Thank you.

## 2024-12-23 ENCOUNTER — TELEPHONE (OUTPATIENT)
Dept: INTERNAL MEDICINE CLINIC | Facility: CLINIC | Age: 89
End: 2024-12-23

## 2024-12-23 DIAGNOSIS — B02.29 POSTHERPETIC NEURALGIA: ICD-10-CM

## 2024-12-23 NOTE — TELEPHONE ENCOUNTER
To MD:  The above refill request is for a controlled substance.  Please review pended medication order.   Print and sign for staff to fax to pharmacy or prescribe electronically.    Last office visit: 7/17/2024  Last time refill sent and quantity/refills: 7/1/2024 #180 with 1 refill

## 2024-12-24 ENCOUNTER — TELEPHONE (OUTPATIENT)
Dept: INTERNAL MEDICINE CLINIC | Facility: CLINIC | Age: 89
End: 2024-12-24

## 2024-12-24 RX ORDER — PREGABALIN 50 MG/1
50 CAPSULE ORAL 2 TIMES DAILY
Qty: 180 CAPSULE | Refills: 1 | Status: SHIPPED | OUTPATIENT
Start: 2024-12-24

## 2024-12-24 NOTE — TELEPHONE ENCOUNTER
I mean, may as well finish the levaquin,    Re the panic attacks, could consider lorazepam or xanax, but would need to be administered by  or family member, as pt cannot take on her own.  Also she is at high risk of falling, so would have to be watched closely.  Recommend a phone visit with family member to discuss options.  Thursday or next week

## 2024-12-24 NOTE — TELEPHONE ENCOUNTER
Spoke with patient. Reports intermittent panic attacks throughout last night. 1 episode this morning.     I had patient place spouse, Alphonse, on the phone.  Alphonse was not aware that pt called our office. He reports history of dementia, anxiety & panic attacks.  Per spouse, it is not unusual for pt to be anxious. She wakes up every morning feeling anxious.   Confirmed that pt takes Lexapro 20 mg daily.   Pt does not see a psychiatrist.     Spouse mentions that patient has one more dose of Levofloxacin 500 mg every 24 hrs x 5 days for urinary tract infection. I inquired abou prescriber.  Spouse read Rx bottle-- MD Armando Rivera. MD Lucio works for [employer]. Since last hospital discharge in November, a nurse has been coming to there house from that company. Nurse suspected a urinary tract infection, so patients urine was tested and came back positive.  Spouse thought that [employer] was referred by our office. Are you aware of this? Should pt take last dose today?

## 2024-12-24 NOTE — TELEPHONE ENCOUNTER
Dr Rao patient wants to speak with nursing re: panic attacks she had last night and again his morning   [phone]

## 2024-12-24 NOTE — TELEPHONE ENCOUNTER
Spoke with spouse and relayed MD's message. Verbalized understanding and agreement with plan.   He will have daughter, Katlyn, call the office to schedule a phone visit.  Advised to contact the office with new/worsening symptoms or concerns.

## 2025-01-08 ENCOUNTER — TELEPHONE (OUTPATIENT)
Dept: INTERNAL MEDICINE CLINIC | Facility: CLINIC | Age: OVER 89
End: 2025-01-08

## 2025-01-08 NOTE — TELEPHONE ENCOUNTER
Pt. Called stating she is taking a mediation for UTI'S for about 6 months.  She said she is having night terrors for months.  Wants to speak with Dr. Rao about this.

## 2025-01-08 NOTE — TELEPHONE ENCOUNTER
To  called patient  spoke with caregiver who states patient is on Macrobid from DR. Cullen- has no urinary tract infection symptoms but has night terrors for months -

## 2025-01-09 NOTE — TELEPHONE ENCOUNTER
Spoke with patient and relayed Dr. Rao' message. Patient verbalized an understanding and agreement to schedule a phone visit. Appointment scheduled for 1/14/25 at 5:00pm.

## 2025-01-14 ENCOUNTER — VIRTUAL PHONE E/M (OUTPATIENT)
Dept: INTERNAL MEDICINE CLINIC | Facility: CLINIC | Age: OVER 89
End: 2025-01-14

## 2025-01-14 DIAGNOSIS — J44.9 CHRONIC OBSTRUCTIVE PULMONARY DISEASE, UNSPECIFIED COPD TYPE (HCC): ICD-10-CM

## 2025-01-14 DIAGNOSIS — Z00.00 MEDICARE ANNUAL WELLNESS VISIT, SUBSEQUENT: Primary | ICD-10-CM

## 2025-01-14 DIAGNOSIS — F03.B4 MODERATE DEMENTIA WITH ANXIETY, UNSPECIFIED DEMENTIA TYPE (HCC): ICD-10-CM

## 2025-01-14 DIAGNOSIS — F41.9 ANXIETY AND DEPRESSION: ICD-10-CM

## 2025-01-14 DIAGNOSIS — F32.A ANXIETY AND DEPRESSION: ICD-10-CM

## 2025-01-14 PROBLEM — F41.0 GENERALIZED ANXIETY DISORDER WITH PANIC ATTACKS: Status: RESOLVED | Noted: 2022-08-05 | Resolved: 2025-01-14

## 2025-01-14 PROBLEM — F41.1 GENERALIZED ANXIETY DISORDER WITH PANIC ATTACKS: Status: RESOLVED | Noted: 2022-08-05 | Resolved: 2025-01-14

## 2025-01-14 PROBLEM — N30.00 ACUTE CYSTITIS: Status: RESOLVED | Noted: 2024-11-11 | Resolved: 2025-01-14

## 2025-01-14 PROBLEM — N30.00 ACUTE CYSTITIS WITHOUT HEMATURIA: Status: RESOLVED | Noted: 2024-11-11 | Resolved: 2025-01-14

## 2025-01-14 PROBLEM — J44.1 COPD EXACERBATION (HCC): Status: RESOLVED | Noted: 2023-07-16 | Resolved: 2025-01-14

## 2025-01-14 NOTE — PROGRESS NOTES
MAW audio telehealth visit      Mireya Wolfe verbally consents to a Virtual/Telephone Check-In visit on 01/14/25.  Patient has been referred to the [facility] website at [email or web address] to review the yearly Consent to Treat document.    Patient understands and accepts financial responsibility for any deductible, co-insurance and/or co-pays associated with this service.    Duration of the service: 32 minutes      Subjective:   Mireya Wolfe is a 89 year old female who presents for a Medicare Subsequent Annual Wellness visit (Pt already had Initial Annual Wellness) and scheduled follow up of multiple significant but stable problems.       Pt states her shingles pain is improved though still with chronic left upper arm pain.  Ambulates with a walker. She gets up and walks when her caregivers help her.  States \"the one gal has been encouraging me.\"  \"I can't get back in the car because I can't get back in the house\"     Has various caregivers (Portia is the main caregiver).  There is always someone at home with her when Valente is at work.     Still taking methenamine twice a day (has to cut them b/c they are large)     Seems she is worse some days.  Hasn't \"seen a change.\"  Feels depressed because she sits and watches TV and feels that is how she is living the rest of her life.  States her son's best friend has \"been dying for 10 years, but is at the end now.\"  Says she is frequently irritated.  She sleeps until about 1pm.  Says her  \"does his thing.\"    She thinks she is getting  from her  (??)        History/Other:   Fall Risk Assessment:   She has been screened for Falls and is High Risk. Fall Prevention information provided to patient in After Visit Summary.    Do you feel unsteady when standing or walking?: No  Do you worry about falling?: Yes  Have you fallen in the past year?: No     Cognitive Assessment:   She had a completely normal cognitive assessment - see flowsheet entries      Functional Ability/Status:   Mireya Wolfe has some abnormal functions as listed below:  She has Dressing and/or Bathing issues based on screening of functional status.  Difficulty dressing or bathing?: Yes  Bathing or Showering: Need some help  Dressing: Able without help  She has Toileting difficulties based on screening of functional status.She has Driving difficulties based on screening of functional status. She has Meal Preparation difficulties based on screening of functional status.She has difficulties Managing Money/Bills based on screening of functional status.She has difficulties Shopping for Groceries based on screening of functional status. She has difficulties Taking Meds as Rx'd based on screening of functional status. She has problems with Daily Activities based on screening of functional status. She has problems with Memory based on screening of functional status.       Depression Screening (PHQ):  PHQ-2 SCORE: 2  , done 1/14/2025   Little interest or pleasure in doing things: 1    Feeling down, depressed, or hopeless: 1    If you checked off any problems, how difficult have these problems made it for you to do your work, take care of things at home, or get along with other people?: Not difficult at all              Advanced Directives:   She does have a Living Will but we do NOT have it on file in Epic.    She does have a POA but we do NOT have it on file in Epic.    Discussed Advance Care Planning with patient (and family/surrogate if present). Standard forms made available to patient in After Visit Summary.      Patient Active Problem List   Diagnosis    Vitamin B12 deficiency    Vitamin D deficiency    Iron deficiency anemia    Osteoporosis    Lumbar spinal stenosis    Shellfish allergy    Hiatal hernia    Chronic obstructive pulmonary disease (HCC)    Anxiety and depression    Generalized anxiety disorder with panic attacks    COPD exacerbation (HCC)    Severe episode of recurrent major  depressive disorder, without psychotic features (HCC)    Acute cystitis    Acute cystitis without hematuria     Allergies:  She is allergic to shellfish-derived products, bactrim [sulfamethoxazole w/trimethoprim], erythromycin, iodine (topical), ioversol, other, and radiology contrast iodinated dyes.    Current Medications:  No outpatient medications have been marked as taking for the 1/14/25 encounter (Virtual Phone E/M) with Kathy Rao MD.       Medical History:  She  has a past medical history of Anemia, Anxiety, Basal cell carcinoma, Calculus of kidney, Calculus, kidney, COPD (chronic obstructive pulmonary disease) (HCC), Depression, Esophageal reflux, Hearing impaired person, bilateral, Hematuria, History of recurrent UTIs (02/26/2015), Hypercholesterolemia, Kidney stone, Osteoarthritis, Osteopenia, Other and unspecified hyperlipidemia, Recurrent UTI, Scoliosis, Spinal stenosis, Squamous cell cancer of lip, Thyroid nodule, Urinary frequency, and Vitamin D deficiency.  Surgical History:  She  has a past surgical history that includes fragmenting of kidney stone (Right, 09/24/2015); cystoscopy,insert ureteral stent (Right, 09/24/2015); patient with preoperative order for iv antibiotic surgical site infect (Right, 09/24/2015); patient documented not to have experienced any of the following events (Right, 09/24/2015); cysto/uretero w/lithotripsy (Right, 10/29/2015); patient with preoperative order for iv antibiotic surgical site infect (Right, 10/29/2015); patient documented not to have experienced any of the following events (Right, 10/29/2015); cataract (Right, 05/2016); other surgical history (1960); other surgical history (1985 and on 08/24/00); other surgical history (07/19/2000); other surgical history (06/2015); other surgical history (02/08/2016); other surgical history; other surgical history (03/2017); and skin surgery.   Family History:  Her family history includes Breast Cancer (age of onset: 67)  in her mother; Cancer in her brother and mother.  Social History:  She  reports that she quit smoking about 8 years ago. Her smoking use included cigarettes. She started smoking about 18 years ago. She has a 2.5 pack-year smoking history. She has been exposed to tobacco smoke. She has never used smokeless tobacco. She reports that she does not drink alcohol and does not use drugs.    Tobacco:  She smoked tobacco in the past but quit greater than 12 months ago.  Social History     Tobacco Use   Smoking Status Former    Current packs/day: 0.00    Average packs/day: 0.3 packs/day for 10.0 years (2.5 ttl pk-yrs)    Types: Cigarettes    Start date: 2006    Quit date: 2016    Years since quittin.4    Passive exposure: Past   Smokeless Tobacco Never        CAGE Alcohol Screen:   CAGE screening score of 0 on 2025, showing low risk of alcohol abuse.      Patient Care Team:  Kathy Rao MD as PCP - General (Internal Medicine)      Objective:       There were no vitals taken for this visit. Estimated body mass index is 22.9 kg/m² as calculated from the following:    Height as of 24: 5' 5\" (1.651 m).    Weight as of 24: 137 lb 9.6 oz (62.4 kg).        Assessment & Plan:   Mireya Wolfe is a 89 year old female who presents for an audio [employer] Medicare Assessment.        Hypertension  -started low dose amlodipine 2.5mg qpm in 2024  -pt is not sure if she is taking it (though she does not know her meds)     History of recurrent UTIs, nephrolithiasis   -Seeing urologist, Dr. Munir Cullen -- restarted on methenamine on 24  -Most recent UTI 24 (100K E.coli (R-amp, cipro/levo; S-ancef, bactrim, macrobid) - treated with ceftriaxone)     Osteoporosis  DEXA in 2017- osteoporosis of left femoral neck (T -2.9).  Pt was briefly on fosamax in . Declines restarting fosamax or other meds so would not check repeat DEXA.     Vitamin B12 deficiency    -Level normal in 2024  -cont B12  1000 mcg/day     Hx of iron deficiency anemia  -Had EGD/C-scope in 2010 (Dr. Ferrari) -- negative. 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   -Continue FeSO4 325mg/day.    -Hgb normal 13.5 on 11/13/24     Lumbar spinal stenosis, chronic back pain    Has seen Dr. Salgado (ortho spine at [facility]).  Referred to pain specialist, Dr. Deirdre Elizabeth at [facility]. Had facet injections in 8/2018 which helped tremendously.  Saw Dr. Elizabeth again 9/25/18; did PT.  MRI in 4/2019 did not appear to show anything acute.  No longer taking Norco.     Bilateral knee OA  Sees ortho Dr. Booth -- has had b/l cortisone injections with transient improvement.  Has been advised for TKR's but pt reluctant.  Ambulates with a walker.       COPD  Essentially quit smoking in 8/2016.    Cont Advair       Basal and squamous cell cancer, face  S/p  Mohs surgeries in 4 areas of her face in 2018 (Dr. Jonas).      Post-herpetic neuralgia (dx'ed with shingles left upper back 6/8/22)  -improved but still with chronic pain left upper arm and left upper back  -on Lyrica 50mg BID      Depression and anxiety  -pt with long hx of depression (and anxiety)  -previously saw psychiatrist Dr. Friend (retired)  -no longer takes seroquel  -cont lexapro 20mg/day      Cognitive decline, likely age-related, poss exacerbated by depression  -saw neurologist Dr. Lokesh Mcmullen in 9/2022. 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  Had been able to recall remote and recent events.   -Pt was referred for neuropsych testing and MRI brain in 2022 by Dr. Mcmullen though did not schedule.    -pt with progression of her symptoms.  She has significant short-term memory loss.  She is repeating herself during the conversation.  She sometimes is not making sense.  States she is going thru a divorce, then later  states that she is going to be  (not true)  -has several caregivers who provide 24hr care and are with her when her  is at work      Advance directives  -have previously had lengthy discussions with pt re her wishes -- she was clear in her desire to not want to prolong her life -- did not want CPR or intubation or any artificial means of prolonging her life.  -pt signed a POLST form in 7/2023 (scanned in chart) -- she is DNR/comfort    32 min spent w/pt    RTC 6 mos       Kathy Rao MD, 1/14/2025     Supplementary Documentation:   General Health:  In the past six months, have you lost more than 10 pounds without trying?: 2 - No  Has your appetite been poor?: No  Type of Diet: Balanced  How does the patient maintain a good energy level?: Other  How would you describe your daily physical activity?: None  How would you describe your current health state?: Fair  How do you maintain positive mental well-being?: Visiting Friends;Visiting Family;Social Interaction  On a scale of 0 to 10, with 0 being no pain and 10 being severe pain, what is your pain level?: 0 - (None)  In the past six months, have you experienced urine leakage?: 1-Yes  At any time do you feel concerned for the safety/well-being of yourself and/or your children, in your home or elsewhere?: No  Have you had any immunizations at another office such as Influenza, Hepatitis B, Tetanus, or Pneumococcal?: Yes    Health Maintenance   Topic Date Due    Zoster Vaccines (2 of 2) 01/15/2019    COVID-19 Vaccine (6 - 2024-25 season) 09/01/2024    Influenza Vaccine (1) 10/01/2024    Annual Well Visit  01/01/2025    Annual Depression Screening  Completed    Fall Risk Screening (Annual)  Completed    Pneumococcal Vaccine: 50+ Years  Completed    Meningococcal B Vaccine  Aged Out

## 2025-01-21 ENCOUNTER — TELEPHONE (OUTPATIENT)
Dept: INTERNAL MEDICINE CLINIC | Facility: CLINIC | Age: OVER 89
End: 2025-01-21

## 2025-01-21 DIAGNOSIS — N30.00 ACUTE CYSTITIS WITHOUT HEMATURIA: Primary | ICD-10-CM

## 2025-01-21 NOTE — TELEPHONE ENCOUNTER
Patient took medication for UTI  from home health nurse. Nitrfluran(patient spelled name of medication over the phone). It helped while she was on it. Now she feels like the UTI is coming back. What should she do. Can she get  a prescription?

## 2025-01-21 NOTE — TELEPHONE ENCOUNTER
Called patient who has no urinary tract infection - finished medication - explained that she should call   Spoke to spouse per HIPAA - DR Cullen gave Methenamine 1 gm BID-taking it for 4 month   Have [facility] -an RN told them to stop the medication prescribed by DR. Cullen  .They cannot talk to DR. Cullen until February . Patient finished Macrobid for 5 days -finished Sunday morning   Can you please advise - to     Patient is very confusing - very hard of hearing - unsure what she should do

## 2025-01-21 NOTE — TELEPHONE ENCOUNTER
Busy try again later, tried spouse cell - no answer    Called son per HIPAA and asked if he can reach patient so she can call our office

## 2025-01-22 ENCOUNTER — LAB ENCOUNTER (OUTPATIENT)
Dept: LAB | Facility: HOSPITAL | Age: OVER 89
End: 2025-01-22
Attending: INTERNAL MEDICINE
Payer: MEDICARE

## 2025-01-22 LAB
BILIRUB UR QL: NEGATIVE
GLUCOSE UR-MCNC: NORMAL MG/DL
KETONES UR-MCNC: NEGATIVE MG/DL
LEUKOCYTE ESTERASE UR QL STRIP.AUTO: 500
PH UR: 7 [PH] (ref 5–8)
PROT UR-MCNC: NEGATIVE MG/DL
SP GR UR STRIP: 1.01 (ref 1–1.03)
UROBILINOGEN UR STRIP-ACNC: NORMAL
WBC #/AREA URNS AUTO: >50 /HPF

## 2025-01-22 PROCEDURE — 87186 SC STD MICRODIL/AGAR DIL: CPT | Performed by: INTERNAL MEDICINE

## 2025-01-22 PROCEDURE — 81001 URINALYSIS AUTO W/SCOPE: CPT | Performed by: INTERNAL MEDICINE

## 2025-01-22 PROCEDURE — 87077 CULTURE AEROBIC IDENTIFY: CPT | Performed by: INTERNAL MEDICINE

## 2025-01-22 PROCEDURE — 87086 URINE CULTURE/COLONY COUNT: CPT | Performed by: INTERNAL MEDICINE

## 2025-01-24 ENCOUNTER — TELEPHONE (OUTPATIENT)
Dept: INTERNAL MEDICINE CLINIC | Facility: CLINIC | Age: OVER 89
End: 2025-01-24

## 2025-01-24 RX ORDER — CEPHALEXIN 500 MG/1
500 CAPSULE ORAL 2 TIMES DAILY
Qty: 10 CAPSULE | Refills: 0 | Status: SHIPPED | OUTPATIENT
Start: 2025-01-24 | End: 2025-01-29

## 2025-01-24 NOTE — TELEPHONE ENCOUNTER
URINE CULTURE >100,000 CFU/ML Escherichia coli Abnormal         Resulting Agency: [facility] ([facility])     Susceptibility       Escherichia coli     Not Specified    Ampicillin >=32 Resistant    Ampicillin + Sulbactam 16 Intermediate    Cefazolin <=4 Sensitive    Ciprofloxacin >=4 Resistant    Gentamicin <=1 Sensitive    Levofloxacin >=8 Resistant    Meropenem <=0.25 Sensitive    Nitrofurantoin 64 Intermediate    Piperacillin + Tazobactam <=4 Sensitive       To DR.N for DR.S

## 2025-01-30 ENCOUNTER — TELEPHONE (OUTPATIENT)
Dept: INTERNAL MEDICINE CLINIC | Facility: CLINIC | Age: OVER 89
End: 2025-01-30

## 2025-01-30 DIAGNOSIS — R39.9 UTI SYMPTOMS: Primary | ICD-10-CM

## 2025-01-30 NOTE — TELEPHONE ENCOUNTER
Patient's daughter Nelli is calling last night she noticed that patient's urine has an order    Requesting an UA to be ordered  Patient's caregiver will pick the cup up when the order has been placed    Please call [phone]

## 2025-01-30 NOTE — TELEPHONE ENCOUNTER
To Dr QUESADA ( patient of Dr Trevino),    Please advise in Dr Trevino absence. Thank you    UA specimen will be obtained and dropped off tomorrow at the lab    Spoke with daughter/Nelli. HIPAA verified. Patient just took last dose of Cephalexin yesterday per daughter for recent urinary tract infection  .  [id number] [place], [place] - [address] AT [facility] [address], [phone], [phone] [address] [facility] [place] [address]   Phone: [phone] Fax: [phone]     []Frequency  []Urgency  []Pain/burning  []Blood in urine, If yes: [ ]use of anticoagulants  []Low back pain  []Flank pain  []Pelvic/bladder pressure  []Fevers  []chills  [x]Odor  []Cloudy Urine  []Confusion  []Incomplete bladder emptying    []Incontinent bladder    []OTC medication    Amendable to presenting to the lab to provide a urine specimen: [x]Yes [[]No      NOTES:    Date of symptom onset:  today

## 2025-01-31 ENCOUNTER — LAB ENCOUNTER (OUTPATIENT)
Dept: LAB | Age: OVER 89
End: 2025-01-31
Attending: INTERNAL MEDICINE
Payer: MEDICARE

## 2025-01-31 DIAGNOSIS — R39.9 UTI SYMPTOMS: ICD-10-CM

## 2025-01-31 LAB
BILIRUB UR QL: NEGATIVE
BILIRUB UR QL: NEGATIVE
CLARITY UR: CLEAR
GLUCOSE UR-MCNC: NORMAL MG/DL
GLUCOSE UR-MCNC: NORMAL MG/DL
HGB UR QL STRIP.AUTO: NEGATIVE
HGB UR QL STRIP.AUTO: NEGATIVE
KETONES UR-MCNC: NEGATIVE MG/DL
KETONES UR-MCNC: NEGATIVE MG/DL
LEUKOCYTE ESTERASE UR QL STRIP.AUTO: 250
LEUKOCYTE ESTERASE UR QL STRIP.AUTO: 250
PH UR: 7 [PH] (ref 5–8)
PH UR: 7 [PH] (ref 5–8)
PROT UR-MCNC: NEGATIVE MG/DL
PROT UR-MCNC: NEGATIVE MG/DL
SP GR UR STRIP: 1.01 (ref 1–1.03)
SP GR UR STRIP: 1.01 (ref 1–1.03)
UROBILINOGEN UR STRIP-ACNC: NORMAL
UROBILINOGEN UR STRIP-ACNC: NORMAL

## 2025-01-31 PROCEDURE — 81001 URINALYSIS AUTO W/SCOPE: CPT | Performed by: INTERNAL MEDICINE

## 2025-01-31 PROCEDURE — 87086 URINE CULTURE/COLONY COUNT: CPT | Performed by: INTERNAL MEDICINE

## 2025-01-31 PROCEDURE — 81001 URINALYSIS AUTO W/SCOPE: CPT

## 2025-02-03 NOTE — TELEPHONE ENCOUNTER
UA and culture do NOT show a UTI - just multiple species due to colonization, not infection.  No treatment indicated.

## 2025-02-03 NOTE — TELEPHONE ENCOUNTER
Verified HIPAA form and called daughter/ Nelli who is listed. Relatyed Dr Trevino message. She understands no treatment is warranted

## 2025-02-07 ENCOUNTER — PATIENT MESSAGE (OUTPATIENT)
Dept: INTERNAL MEDICINE CLINIC | Facility: CLINIC | Age: OVER 89
End: 2025-02-07

## 2025-02-07 ENCOUNTER — TELEPHONE (OUTPATIENT)
Dept: INTERNAL MEDICINE CLINIC | Facility: CLINIC | Age: OVER 89
End: 2025-02-07

## 2025-02-07 NOTE — TELEPHONE ENCOUNTER
anisa Wolfe  P [facility] Clinical Staff (supporting Kathy Rao MD)6 minutes ago (3:16 PM)       Hi Dr. Rao,     Mom is very lethargic and her urine continues to be darker and smelly.     I know your team called us with results from her most recent UTI test and said that she did not have one.     The results read: >100,000 cfu/ml Three or more species of Gram negative bacilli; none predominant.     I looked that up and, unless I read it wrong, it says that indicates a UTI.      It's great news that mom doesn't have the e-coli in her urine now, as her caretakers are doing such a great job of cleaning her.     But, if she still has some form of a UTI, we want to get her on the antibiotic again.     Can you please let me know and thank you.     Nelli Strange  [phone]       Sah[facility] Wolfe's

## 2025-02-28 ENCOUNTER — TELEPHONE (OUTPATIENT)
Dept: INTERNAL MEDICINE CLINIC | Facility: CLINIC | Age: OVER 89
End: 2025-02-28

## 2025-03-03 ENCOUNTER — TELEPHONE (OUTPATIENT)
Dept: INTERNAL MEDICINE CLINIC | Facility: CLINIC | Age: OVER 89
End: 2025-03-03

## 2025-03-03 ENCOUNTER — HOSPITAL ENCOUNTER (OUTPATIENT)
Facility: HOSPITAL | Age: OVER 89
Setting detail: OBSERVATION
LOS: 1 days | Discharge: HOME | End: 2025-03-06
Attending: EMERGENCY MEDICINE | Admitting: INTERNAL MEDICINE
Payer: MEDICARE

## 2025-03-03 ENCOUNTER — LAB ENCOUNTER (OUTPATIENT)
Dept: LAB | Age: OVER 89
End: 2025-03-03
Attending: INTERNAL MEDICINE
Payer: MEDICARE

## 2025-03-03 ENCOUNTER — HOSPITAL ENCOUNTER (OUTPATIENT)
Facility: HOSPITAL | Age: OVER 89
Setting detail: OBSERVATION
LOS: 1 days | Discharge: HOME HEALTH | End: 2025-03-06
Attending: EMERGENCY MEDICINE | Admitting: INTERNAL MEDICINE
Payer: MEDICARE

## 2025-03-03 DIAGNOSIS — R39.9 UTI SYMPTOMS: ICD-10-CM

## 2025-03-03 DIAGNOSIS — N10 ACUTE PYELONEPHRITIS: Primary | ICD-10-CM

## 2025-03-03 DIAGNOSIS — R39.9 UTI SYMPTOMS: Primary | ICD-10-CM

## 2025-03-03 LAB
ANION GAP SERPL CALC-SCNC: 4 MMOL/L (ref 0–18)
BASOPHILS # BLD AUTO: 0.04 X10(3) UL (ref 0–0.2)
BASOPHILS NFR BLD AUTO: 0.4 %
BUN BLD-MCNC: 13 MG/DL (ref 9–23)
BUN/CREAT SERPL: 15.7 (ref 10–20)
CALCIUM BLD-MCNC: 9.5 MG/DL (ref 8.7–10.4)
CHLORIDE SERPL-SCNC: 104 MMOL/L (ref 98–112)
CO2 SERPL-SCNC: 30 MMOL/L (ref 21–32)
CREAT BLD-MCNC: 0.83 MG/DL
DEPRECATED RDW RBC AUTO: 41.5 FL (ref 35.1–46.3)
EGFRCR SERPLBLD CKD-EPI 2021: 67 ML/MIN/1.73M2 (ref 60–?)
EOSINOPHIL # BLD AUTO: 0.02 X10(3) UL (ref 0–0.7)
EOSINOPHIL NFR BLD AUTO: 0.2 %
ERYTHROCYTE [DISTWIDTH] IN BLOOD BY AUTOMATED COUNT: 13 % (ref 11–15)
GLUCOSE BLD-MCNC: 125 MG/DL (ref 70–99)
HCT VFR BLD AUTO: 36.6 %
HGB BLD-MCNC: 11.9 G/DL
IMM GRANULOCYTES # BLD AUTO: 0.03 X10(3) UL (ref 0–1)
IMM GRANULOCYTES NFR BLD: 0.3 %
LYMPHOCYTES # BLD AUTO: 0.66 X10(3) UL (ref 1–4)
LYMPHOCYTES NFR BLD AUTO: 6 %
MCH RBC QN AUTO: 28.5 PG (ref 26–34)
MCHC RBC AUTO-ENTMCNC: 32.5 G/DL (ref 31–37)
MCV RBC AUTO: 87.6 FL
MONOCYTES # BLD AUTO: 0.5 X10(3) UL (ref 0.1–1)
MONOCYTES NFR BLD AUTO: 4.5 %
NEUTROPHILS # BLD AUTO: 9.84 X10 (3) UL (ref 1.5–7.7)
NEUTROPHILS # BLD AUTO: 9.84 X10(3) UL (ref 1.5–7.7)
NEUTROPHILS NFR BLD AUTO: 88.6 %
OSMOLALITY SERPL CALC.SUM OF ELEC: 288 MOSM/KG (ref 275–295)
PLATELET # BLD AUTO: 110 10(3)UL (ref 150–450)
PLATELETS.RETICULATED NFR BLD AUTO: 7.7 % (ref 0–7)
POTASSIUM SERPL-SCNC: 4.2 MMOL/L (ref 3.5–5.1)
RBC # BLD AUTO: 4.18 X10(6)UL
RBC #/AREA URNS AUTO: >10 /HPF
SODIUM SERPL-SCNC: 138 MMOL/L (ref 136–145)
SP GR UR REFRACTOMETRY: 1.01 (ref 1–1.03)
WBC # BLD AUTO: 11.1 X10(3) UL (ref 4–11)
WBC #/AREA URNS AUTO: >50 /HPF
WBC CLUMPS UR QL AUTO: PRESENT /HPF

## 2025-03-03 PROCEDURE — 87086 URINE CULTURE/COLONY COUNT: CPT

## 2025-03-03 PROCEDURE — 81001 URINALYSIS AUTO W/SCOPE: CPT

## 2025-03-03 PROCEDURE — 87186 SC STD MICRODIL/AGAR DIL: CPT

## 2025-03-03 PROCEDURE — 87077 CULTURE AEROBIC IDENTIFY: CPT

## 2025-03-03 RX ORDER — AMLODIPINE BESYLATE 2.5 MG/1
2.5 TABLET ORAL EVERY EVENING
Qty: 90 TABLET | Refills: 0 | Status: ON HOLD | OUTPATIENT
Start: 2025-03-03

## 2025-03-03 NOTE — TELEPHONE ENCOUNTER
Patient's daughter, Nelli (Verified HIPPA) called to as if they can  a Urinalysis test kit to be done at home and dropped back off at Lab.   Patient has below symptoms and daughter wants to rule out UTI:  Fever 99.3 last night and it was 100 this morning  Urine is dark  Strong Odor    Please advise  Nelli's #[phone]

## 2025-03-03 NOTE — TELEPHONE ENCOUNTER
Spoke to -- they are requesting amlodipine refill;  \"she has been taking daily\" according to ;   one refill sent  Of note:   pt had a \"bad weekend\" and  has decided to take pt to ER;   confusion, fever 100.4 today (99 yesterday), malaise, sleeping;   HORTENSIA [place]

## 2025-03-03 NOTE — TELEPHONE ENCOUNTER
To  - called patient spoke with daughter per HIPAA who states patient had fever last night 99.3 this morning 100  Urine is dark with strong odor- patient is never drinking enough and is incontinent    UA and culture ordered per protocol    Daughter will call back with other symptoms -flank /pain / blood

## 2025-03-04 LAB
ANION GAP SERPL CALC-SCNC: 9 MMOL/L (ref 0–18)
BASOPHILS # BLD AUTO: 0.03 X10(3) UL (ref 0–0.2)
BASOPHILS NFR BLD AUTO: 0.3 %
BUN BLD-MCNC: 11 MG/DL (ref 9–23)
BUN/CREAT SERPL: 22.4 (ref 10–20)
CALCIUM BLD-MCNC: 6.8 MG/DL (ref 8.7–10.4)
CHLORIDE SERPL-SCNC: 112 MMOL/L (ref 98–112)
CO2 SERPL-SCNC: 24 MMOL/L (ref 21–32)
CREAT BLD-MCNC: 0.49 MG/DL
DEPRECATED RDW RBC AUTO: 42.1 FL (ref 35.1–46.3)
EGFRCR SERPLBLD CKD-EPI 2021: 89 ML/MIN/1.73M2 (ref 60–?)
EOSINOPHIL # BLD AUTO: 0.01 X10(3) UL (ref 0–0.7)
EOSINOPHIL NFR BLD AUTO: 0.1 %
ERYTHROCYTE [DISTWIDTH] IN BLOOD BY AUTOMATED COUNT: 12.8 % (ref 11–15)
GLUCOSE BLD-MCNC: 82 MG/DL (ref 70–99)
HCT VFR BLD AUTO: 32.3 %
HGB BLD-MCNC: 10.3 G/DL
IMM GRANULOCYTES # BLD AUTO: 0.02 X10(3) UL (ref 0–1)
IMM GRANULOCYTES NFR BLD: 0.2 %
LYMPHOCYTES # BLD AUTO: 0.5 X10(3) UL (ref 1–4)
LYMPHOCYTES NFR BLD AUTO: 5.7 %
MCH RBC QN AUTO: 28.3 PG (ref 26–34)
MCHC RBC AUTO-ENTMCNC: 31.9 G/DL (ref 31–37)
MCV RBC AUTO: 88.7 FL
MONOCYTES # BLD AUTO: 0.45 X10(3) UL (ref 0.1–1)
MONOCYTES NFR BLD AUTO: 5.2 %
NEUTROPHILS # BLD AUTO: 7.71 X10 (3) UL (ref 1.5–7.7)
NEUTROPHILS # BLD AUTO: 7.71 X10(3) UL (ref 1.5–7.7)
NEUTROPHILS NFR BLD AUTO: 88.5 %
OSMOLALITY SERPL CALC.SUM OF ELEC: 298 MOSM/KG (ref 275–295)
PLATELET # BLD AUTO: 88 10(3)UL (ref 150–450)
PLATELETS.RETICULATED NFR BLD AUTO: 6.5 % (ref 0–7)
POTASSIUM SERPL-SCNC: 2.7 MMOL/L (ref 3.5–5.1)
POTASSIUM SERPL-SCNC: 4.3 MMOL/L (ref 3.5–5.1)
RBC # BLD AUTO: 3.64 X10(6)UL
SODIUM SERPL-SCNC: 145 MMOL/L (ref 136–145)
WBC # BLD AUTO: 8.7 X10(3) UL (ref 4–11)

## 2025-03-04 PROCEDURE — 99222 1ST HOSP IP/OBS MODERATE 55: CPT | Performed by: INTERNAL MEDICINE

## 2025-03-04 RX ORDER — SODIUM CHLORIDE 9 MG/ML
INJECTION, SOLUTION INTRAVENOUS CONTINUOUS
Status: DISCONTINUED | OUTPATIENT
Start: 2025-03-04 | End: 2025-03-05

## 2025-03-04 RX ORDER — HEPARIN SODIUM 5000 [USP'U]/ML
5000 INJECTION, SOLUTION INTRAVENOUS; SUBCUTANEOUS EVERY 12 HOURS SCHEDULED
Status: DISCONTINUED | OUTPATIENT
Start: 2025-03-04 | End: 2025-03-06

## 2025-03-04 RX ORDER — BUDESONIDE 0.5 MG/2ML
0.5 INHALANT ORAL
Status: DISCONTINUED | OUTPATIENT
Start: 2025-03-04 | End: 2025-03-06

## 2025-03-04 RX ORDER — FLUTICASONE PROPIONATE AND SALMETEROL 250; 50 UG/1; UG/1
1 POWDER RESPIRATORY (INHALATION) 2 TIMES DAILY
Status: DISCONTINUED | OUTPATIENT
Start: 2025-03-04 | End: 2025-03-04

## 2025-03-04 RX ORDER — ESTRADIOL 0.1 MG/G
1 CREAM VAGINAL DAILY
Status: DISCONTINUED | OUTPATIENT
Start: 2025-03-04 | End: 2025-03-06

## 2025-03-04 RX ORDER — ARFORMOTEROL TARTRATE 15 UG/2ML
15 SOLUTION RESPIRATORY (INHALATION)
Status: DISCONTINUED | OUTPATIENT
Start: 2025-03-04 | End: 2025-03-06

## 2025-03-04 RX ORDER — POTASSIUM CHLORIDE 14.9 MG/ML
20 INJECTION INTRAVENOUS ONCE
Status: COMPLETED | OUTPATIENT
Start: 2025-03-04 | End: 2025-03-06

## 2025-03-04 RX ORDER — ESCITALOPRAM OXALATE 10 MG/1
20 TABLET ORAL DAILY
Status: DISCONTINUED | OUTPATIENT
Start: 2025-03-04 | End: 2025-03-06

## 2025-03-04 RX ORDER — AMLODIPINE BESYLATE 2.5 MG/1
2.5 TABLET ORAL EVERY EVENING
Status: DISCONTINUED | OUTPATIENT
Start: 2025-03-04 | End: 2025-03-06

## 2025-03-04 RX ORDER — PREGABALIN 50 MG/1
50 CAPSULE ORAL 2 TIMES DAILY
Status: DISCONTINUED | OUTPATIENT
Start: 2025-03-04 | End: 2025-03-06

## 2025-03-04 RX ORDER — ACETAMINOPHEN 325 MG/1
650 TABLET ORAL EVERY 6 HOURS PRN
Status: DISCONTINUED | OUTPATIENT
Start: 2025-03-04 | End: 2025-03-06

## 2025-03-04 NOTE — ED PROVIDER NOTES
Patient Seen in: [facility] Emergency Department    History     Chief Complaint   Patient presents with    Fever       HPI    Patient presents to the ED with her  and daughter for fever for the last 1 day.  History of UTIs and had a urinalysis done earlier today that showed evidence for infection.  Sent to the ED for evaluation.  Patient is on oxygen chronically for COPD.  She denies any shortness of breath or cough.    History reviewed.   Past Medical History:    Anemia    Anxiety    Basal cell carcinoma    Mouth    Calculus of kidney    Calculus, kidney    COPD (chronic obstructive pulmonary disease) (HCC)    Depression    Esophageal reflux    Hearing impaired person, bilateral    Hematuria    History of recurrent UTIs    Hypercholesterolemia    Kidney stone    Osteoarthritis    Osteopenia    Other and unspecified hyperlipidemia    Recurrent UTI    Scoliosis    Spinal stenosis    Squamous cell cancer of lip    Thyroid nodule    Urinary frequency    Vitamin D deficiency       History reviewed.   Past Surgical History:   Procedure Laterality Date    Cataract Right 05/2016    Cysto/uretero w/lithotripsy Right 10/29/2015    Procedure: LITHOTRIPSY HOLMIUM LASER WITH CYSTOSCOPY;  Surgeon: Tommie Majano MD;  Location: [facility]    Cystoscopy,insert ureteral stent Right 09/24/2015    Procedure: LITHOTRIPSY WITH CYSTOSCOPY, STENT PLACEMENT;  Surgeon: Tommie Majano MD;  Location: [facility]    Fragmenting of kidney stone Right 09/24/2015    Procedure: LITHOTRIPSY WITH CYSTOSCOPY, STENT PLACEMENT;  Surgeon: Tommie Majano MD;  Location: [facility]    Other surgical history  1960    Surgery of Blockage in Urethral Tube    Other surgical history  1985 and on 08/24/00    ESWL - left    Other surgical history  07/19/2000    Cystourethroscopy with Urethral Calibration and Dilation with Bilateral Retrograde Pyelogram and Left Ureteral Stone Manipulation with insertion  of Double-J Stent    Other surgical history  2015    R eye surgery for bleed and then development of blood clot--resolved    Other surgical history  2016    Cystoscpy stent removal    Other surgical history      Other surgical history  2017    Mohs surgery    Patient documented not to have experienced any of the following events Right 2015    Procedure: LITHOTRIPSY WITH CYSTOSCOPY, STENT PLACEMENT;  Surgeon: Tommie Majano MD;  Location: [facility]    Patient documented not to have experienced any of the following events Right 10/29/2015    Procedure: CYSTOSCOPY/URETEROSCOPY/STONE EXTRACTION;  Surgeon: Tommie Majano MD;  Location: [facility]    Patient with preoperative order for iv antibiotic surgical site infect Right 2015    Procedure: LITHOTRIPSY WITH CYSTOSCOPY, STENT PLACEMENT;  Surgeon: Tommie Majano MD;  Location: [facility]    Patient with preoperative order for iv antibiotic surgical site infect Right 10/29/2015    Procedure: CYSTOSCOPY/URETEROSCOPY/STONE EXTRACTION;  Surgeon: Tommie Majano MD;  Location: [facility]    Skin surgery           Medications :  Prescriptions Prior to Admission[1]     Family History   Problem Relation Age of Onset    Breast Cancer Mother 67    Cancer Mother     Cancer Brother        Smoking Status:   Social History     Socioeconomic History    Marital status:    Tobacco Use    Smoking status: Former     Current packs/day: 0.00     Average packs/day: 0.3 packs/day for 10.0 years (2.5 ttl pk-yrs)     Types: Cigarettes     Start date: 2006     Quit date: 2016     Years since quittin.5     Passive exposure: Past    Smokeless tobacco: Never   Vaping Use    Vaping status: Never Used   Substance and Sexual Activity    Alcohol use: No    Drug use: No   Other Topics Concern    Caffeine Concern No       Constitutional and vital signs reviewed.      Social History and Family History  elements reviewed from today, pertinent positives to the presenting problem noted.    Physical Exam     ED Triage Vitals [03/03/25 1902]   /67   Pulse 99   Resp (!) 30   Temp 98.4 °F (36.9 °C)   Temp src Oral   SpO2 (!) 88 %   O2 Device None (Room air)       All measures to prevent infection transmission during my interaction with the patient were taken. Handwashing was performed prior to and after the exam.  Stethoscope and any equipment used during my examination was cleaned with super sani-cloth germicidal wipes following the exam.     Physical Exam  Vitals and nursing note reviewed.   Constitutional:       General: She is not in acute distress.     Appearance: She is well-developed. She is not ill-appearing or toxic-appearing.   HENT:      Head: Normocephalic and atraumatic.   Eyes:      General:         Right eye: No discharge.         Left eye: No discharge.      Conjunctiva/sclera: Conjunctivae normal.   Neck:      Trachea: No tracheal deviation.   Cardiovascular:      Rate and Rhythm: Normal rate.   Pulmonary:      Effort: Pulmonary effort is normal. No respiratory distress.      Breath sounds: No stridor.   Abdominal:      General: There is no distension.      Palpations: Abdomen is soft.      Tenderness: There is no abdominal tenderness.   Musculoskeletal:         General: No deformity.   Skin:     General: Skin is warm and dry.   Neurological:      Mental Status: She is alert and oriented to person, place, and time.   Psychiatric:         Mood and Affect: Mood normal.         Behavior: Behavior normal.         ED Course        Labs Reviewed   CBC WITH DIFFERENTIAL WITH PLATELET - Abnormal; Notable for the following components:       Result Value    WBC 11.1 (*)     HGB 11.9 (*)     .0 (*)     Immature Platelet Fraction 7.7 (*)     Neutrophil Absolute Prelim 9.84 (*)     Neutrophil Absolute 9.84 (*)     Lymphocyte Absolute 0.66 (*)     All other components within normal limits   BASIC  METABOLIC PANEL (8) - Abnormal; Notable for the following components:    Glucose 125 (*)     All other components within normal limits   RAINBOW DRAW LAVENDER   RAINBOW DRAW LIGHT GREEN       As Interpreted by me    Imaging Results Available and Reviewed while in ED: No results found.  ED Medications Administered:   Medications   amLODIPine (Norvasc) tab 2.5 mg (has no administration in time range)   Melatonin TABS 5 mg (has no administration in time range)   pregabalin (Lyrica) cap 50 mg (has no administration in time range)   cefTRIAXone (Rocephin) 1 g in sodium chloride 0.9% 100 mL IVPB-ADDV (0 g Intravenous Stopped 3/3/25 2326)         MDM     Vitals:    03/03/25 2302 03/03/25 2317 03/03/25 2353 03/03/25 2354   BP: 116/55 124/53 118/50    BP Location:   Right arm    Pulse: 90 86 89    Resp:   16    Temp:   99.3 °F (37.4 °C)    TempSrc:   Temporal    SpO2: 100% 100% 93%    Weight:    60.3 kg   Height:         *I personally reviewed and interpreted all ED vitals.    Pulse Ox: 93%, Room air, Normal     Monitor Interpretation:   normal sinus rhythm as interpreted by me.  The cardiac monitor was ordered to monitor heart rate.    Differential Diagnosis/ Diagnostic Considerations: UTI, pyelonephritis, other    Complicating Factors: The patient already has does not have any pertinent problems on file. to contribute to the complexity of this ED evaluation.    Medical Decision Making  Patient presents to the ED with fevers x 1 day however none in the ED and evidence for urinary tract infection based on urinalysis done earlier today.  Family prefers the patient stay in the hospital for treatment.  Started on Rocephin in the ED based on past cultures and will admit.  Azithromycin back    Problems Addressed:  Acute pyelonephritis: acute illness or injury that poses a threat to life or bodily functions    Amount and/or Complexity of Data Reviewed  Independent Historian:      Details:  provides history details  Labs:  ordered. Decision-making details documented in ED Course.  Discussion of management or test interpretation with external provider(s):  I discussed with Dr. Vu for admission.          Condition upon leaving the department: Stable    Disposition and Plan     Clinical Impression:  1. Acute pyelonephritis        Disposition:  Admit    Follow-up:  No follow-up provider specified.    Medications Prescribed:  Current Discharge Medication List          Hospital Problems       Present on Admission  Date Reviewed: 2025            ICD-10-CM Noted POA    * (Principal) Acute pyelonephritis N10 3/3/2025 Unknown                       [1]   Medications Prior to Admission   Medication Sig Dispense Refill Last Dose/Taking    AMLODIPINE 2.5 MG Oral Tab TAKE 1 TABLET(2.5 MG) BY MOUTH EVERY EVENING 90 tablet 0 3/2/2025    PREGABALIN 50 MG Oral Cap TAKE 1 CAPSULE BY MOUTH TWICE DAILY 180 capsule 1 3/2/2025    Melatonin 5 MG Oral Tab Take 1 tablet (5 mg total) by mouth at bedtime.   3/2/2025    escitalopram 20 MG Oral Tab Take 1 tablet (20 mg total) by mouth daily. 90 tablet 3 3/2/2025    [] cephALEXin 500 MG Oral Cap Take 1 capsule (500 mg total) by mouth 2 (two) times daily for 5 days. 10 capsule 0     lidocaine-menthol 4-1 % External Patch Place 1 patch onto the skin daily. 30 patch 0     fluticasone furoate-vilanterol 100-25 MCG/ACT Inhalation Aerosol Powder, Breath Activated Inhale 1 puff into the lungs daily. 180 each 3     methenamine 1 g Oral Tab Take 1 tablet (1 g total) by mouth 2 (two) times daily.

## 2025-03-04 NOTE — ED QUICK NOTES
Orders for admission, patient is aware of plan and ready to go upstairs. Any questions, please call ED RN Jacqui  at extension [phone].     Patient Covid vaccination status: Fully vaccinated     COVID Test Ordered in ED: None    COVID Suspicion at Admission: N/A    Running Infusions:  None    Mental Status/LOC at time of transport: A+O x 4

## 2025-03-04 NOTE — PAYOR COMM NOTE
--------------  ADMISSION REVIEW     Payor: [employer]  Subscriber #:  [id number]  Authorization Number: [id number]    Admit date: 3/3/25  Admit time: 11:36 PM     Patient Seen in: [facility] Emergency Department    History     Chief Complaint   Patient presents with    Fever     Patient presents to the ED with her  and daughter for fever for the last 1 day.  History of UTIs and had a urinalysis done earlier today that showed evidence for infection.  Sent to the ED for evaluation.  Patient is on oxygen chronically for COPD.  She denies any shortness of breath or cough.    History reviewed.   Past Medical History:    Anemia    Anxiety    Basal cell carcinoma    Mouth    Calculus of kidney    Calculus, kidney    COPD (chronic obstructive pulmonary disease) (HCC)    Depression    Esophageal reflux    Hearing impaired person, bilateral    Hematuria    History of recurrent UTIs    Hypercholesterolemia    Kidney stone    Osteoarthritis    Osteopenia    Other and unspecified hyperlipidemia    Recurrent UTI    Scoliosis    Spinal stenosis    Squamous cell cancer of lip    Thyroid nodule    Urinary frequency    Vitamin D deficiency     History reviewed.   Past Surgical History:   Procedure Laterality Date    Cataract Right 05/2016    Cysto/uretero w/lithotripsy Right 10/29/2015    Procedure: LITHOTRIPSY HOLMIUM LASER WITH CYSTOSCOPY;  Surgeon: Tommie Majano MD;  Location: [facility]    Cystoscopy,insert ureteral stent Right 09/24/2015    Procedure: LITHOTRIPSY WITH CYSTOSCOPY, STENT PLACEMENT;  Surgeon: Tommie Majano MD;  Location: [facility]    Fragmenting of kidney stone Right 09/24/2015    Procedure: LITHOTRIPSY WITH CYSTOSCOPY, STENT PLACEMENT;  Surgeon: Tommie Majano MD;  Location: [facility]    Other surgical history  1960    Surgery of Blockage in Urethral Tube    Other surgical history  1985 and on 08/24/00    ESWL - left    Other surgical  history  07/19/2000    Cystourethroscopy with Urethral Calibration and Dilation with Bilateral Retrograde Pyelogram and Left Ureteral Stone Manipulation with insertion of Double-J Stent    Other surgical history  06/2015    R eye surgery for bleed and then development of blood clot--resolved    Other surgical history  02/08/2016    Cystoscpy stent removal    Other surgical history      Other surgical history  03/2017    Mohs surgery    Patient documented not to have experienced any of the following events Right 09/24/2015    Procedure: LITHOTRIPSY WITH CYSTOSCOPY, STENT PLACEMENT;  Surgeon: Tommie Majano MD;  Location: [facility]    Patient documented not to have experienced any of the following events Right 10/29/2015    Procedure: CYSTOSCOPY/URETEROSCOPY/STONE EXTRACTION;  Surgeon: Tommie Majano MD;  Location: [facility]    Patient with preoperative order for iv antibiotic surgical site infect Right 09/24/2015    Procedure: LITHOTRIPSY WITH CYSTOSCOPY, STENT PLACEMENT;  Surgeon: Tommie Majano MD;  Location: [facility]    Patient with preoperative order for iv antibiotic surgical site infect Right 10/29/2015    Procedure: CYSTOSCOPY/URETEROSCOPY/STONE EXTRACTION;  Surgeon: Tommie Majano MD;  Location: [facility]    Skin surgery       Physical Exam     ED Triage Vitals [03/03/25 1902]   /67   Pulse 99   Resp (!) 30   Temp 98.4 °F (36.9 °C)   Temp src Oral   SpO2 (!) 88 %   O2 Device None (Room air)     Physical Exam  Vitals and nursing note reviewed.   Constitutional:       General: She is not in acute distress.     Appearance: She is well-developed. She is not ill-appearing or toxic-appearing.   HENT:      Head: Normocephalic and atraumatic.   Eyes:      General:         Right eye: No discharge.         Left eye: No discharge.      Conjunctiva/sclera: Conjunctivae normal.   Neck:      Trachea: No tracheal deviation.   Cardiovascular:      Rate  and Rhythm: Normal rate.   Pulmonary:      Effort: Pulmonary effort is normal. No respiratory distress.      Breath sounds: No stridor.   Abdominal:      General: There is no distension.      Palpations: Abdomen is soft.      Tenderness: There is no abdominal tenderness.   Musculoskeletal:         General: No deformity.   Skin:     General: Skin is warm and dry.   Neurological:      Mental Status: She is alert and oriented to person, place, and time.   Psychiatric:         Mood and Affect: Mood normal.         Behavior: Behavior normal.     Labs Reviewed   CBC WITH DIFFERENTIAL WITH PLATELET - Abnormal; Notable for the following components:       Result Value    WBC 11.1 (*)     HGB 11.9 (*)     .0 (*)     Immature Platelet Fraction 7.7 (*)     Neutrophil Absolute Prelim 9.84 (*)     Neutrophil Absolute 9.84 (*)     Lymphocyte Absolute 0.66 (*)     All other components within normal limits   BASIC METABOLIC PANEL (8) - Abnormal; Notable for the following components:    Glucose 125 (*)      ED Medications Administered:   Medications   cefTRIAXone (Rocephin) 1 g in sodium chloride 0.9% 100 mL IVPB-ADDV (0 g Intravenous Stopped 3/3/25 3153)         Differential Diagnosis/ Diagnostic Considerations: UTI, pyelonephritis, other    Medical Decision Making  Patient presents to the ED with fevers x 1 day however none in the ED and evidence for urinary tract infection based on urinalysis done earlier today.  Started on Rocephin in the ED based on past cultures and will admit.  Azithromycin back    Problems Addressed:  Acute pyelonephritis: acute illness or injury that poses a threat to life or bodily functions    Disposition and Plan     Clinical Impression:  1. Acute pyelonephritis        3/4:        History and Physical         Patient is a 89-year-old female with a past medical history of recurrent UTIs, COPD on home O2, anxiety and depression admitted with UTI.     The patient lives at home with her  and  also has 24-hour care by 4 caregivers.  On the evening of Sunday, 3/2/2025, the patient was noted to have a fever of 99.7; she was weaker than usual and was hardly opening her eyes and unable to get out of bed.  The following morning, the patient's temperature was up to 100.  The patient's daughter called the office regarding concern for another UTI.  UA and culture were ordered and sent.  By 5:30prm, the pt's temperature increased to 101.7, so the patient was brought to the ED for evaluation.     ED, the patient's WBC was elevated at 11.1.  Urinalysis earlier that day showed significant pyuria consistent with UTI.  The patient was started empirically on ceftriaxone.     The patient has a long history of recurrent UTIs.  She has previously been on prophylactic cephalexin and most recently methenamine.  The patient continued to get UTIs despite prophylaxis.  The patient did not like taking the large pills of methenamine, so this was recently discontinued.  According to the daughter, the patient does not drink much fluid.  She is continually incontinent of urine and her diaper has to be changed frequently by the caregivers.     At baseline, the patient has mild dementia.  She is able to ambulate with a walker, with supervision.     The patient denies abdominal or back pain.  She just states that she is \"sick. \"    Vital Signs:  Blood pressure 118/50, pulse 89, temperature 99.3 °F (37.4 °C), temperature source Temporal, resp. rate 16, height 5' 5\" (1.651 m), weight 133 lb (60.3 kg), SpO2 93%.  Physical Exam  Cervical Papanicolaou contraindicated  General: The patient appears weak and frail.  She is easily arousable and can answer simple questions.  CV: Regular rate and rhythm, normal S1-S2, no murmurs  Lungs: Clear to auscultation bilaterally  Abdomen: Soft, nontender, nondistended, normal bowel sounds  Extremities: No lower extremity edema, +1 DP pulses bilaterally    Assessment/Plan:      Recurrent UTI  -Previously  on prophylactic cephalexin then methenamine, which was recently stopped as she continued to get UTI's and pt did not like taking the large pills  -Has been followed by urologist Dr. Munir Cullen but was recently referred to one of his female urology partners as he specializes more in urologic cancers.  Patient has not yet been able to see the new urologist due to difficulty getting out of the house.  -cause of recurrent UTI's likely due to poor po fluid intake and urinary incontinence  -Urinalysis revealed significant pyuria and microhematuria  -Urine culture pending  -On empiric ceftriaxone  -Continue IV fluids  -Given recurrent UTIs and no recent imaging, will check a renal ultrasound to rule out obstruction  -Trial of vaginal estrogen also applied to urethral area-  -discussed with the daughter a trial of OTC cranberry pills as an outpatient     Deconditioning, weakness  -Ambulates with a walker at home, although with supervision.  Patient is largely sedentary  -Will ask PT to evaluate     Hypertension  -on amlodipine 2.5mg qpm      Osteoporosis  DEXA in 7/2017- osteoporosis of left femoral neck (T -2.9).  Pt was briefly on fosamax in 2014. Declines restarting fosamax or other meds so would not check repeat DEXA.     Vitamin B12 deficiency    -Level normal in 7/2024  -not currently taking B12     Hx of iron deficiency anemia  -Had EGD/C-scope in 2010 (Dr. Ferrari) -- negative. 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   -was previoudly on FeSO4 325mg/day, though not taking now  -Hgb slightly decreased this admission -likely dilutional and due to acute illness  -check B12, iron     Lumbar spinal stenosis, chronic back pain    Has seen Dr. Salgado (ortho spine at [facility]).  Referred to pain specialist, Dr. Deirdre Elizabeth at [facility]. Had facet injections in 8/2018 which helped tremendously.  Saw Dr. Elizabeth again 9/25/18; did PT.  MRI in  4/2019 did not appear to show anything acute.  No longer taking Norco.     Bilateral knee OA  Sees ortho Dr. Booth -- has had b/l cortisone injections with transient improvement.  Has been advised for TKR's but pt reluctant.  Ambulates with a walker.       COPD  Essentially quit smoking in 8/2016.    On Advair at home  On home O2 2-3LNC     Basal and squamous cell cancer, face  S/p  Mohs surgeries in 4 areas of her face in 2018 (Dr. Jonas).      Post-herpetic neuralgia (dx'ed with shingles left upper back 6/8/22)  -improved but still with chronic pain left upper arm and left upper back  -on Lyrica 50mg BID      Depression and anxiety  -pt with long hx of depression (and anxiety)  -previously saw psychiatrist Dr. Friend (retired)  -no longer takes seroquel  -cont lexapro 20mg/day      Cognitive decline, likely age-related, poss exacerbated by depression  -saw neurologist Dr. Lokesh Mcmullen in 9/2022. 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  Had been able to recall remote and recent events.   -Pt was referred for neuropsych testing and MRI brain in 2022 by Dr. Mcmullen though did not schedule.    -pt with progression of her symptoms.  She has significant short-term memory loss.  She is repeating herself during the conversation.  She sometimes is not making sense.  States she is going thru a divorce, then later states that she is going to be  (not true)  -has several caregivers who provide 24hr care and are with her when her  is at work      Advance directives  -have previously had lengthy discussions with pt re her wishes -- she was clear in her desire to not want to prolong her life -- did not want CPR or intubation or any artificial means of prolonging her life.  -pt signed a POLST form in 7/2023 (scanned in chart) -- she is DNR/comfort     VTE proph  SQH     Dispo  Discussed with the patient's daughter.   The goal is for her to return home with her caregivers, hopefully in the next couple of days      Procedure Component Value Units Date/Time   Urine Culture, Routine [E] [[id number]] (Abnormal) Collected: 03/03/25 1326   Order Status: Completed Lab Status: Preliminary result Updated: 03/04/25 0947   Specimen: Urine, clean catch     Urine Culture >100,000 CFU/ML Gram Negative Jakob Abnormal                      MEDICATIONS ADMINISTERED IN LAST 1 DAY:  arformoterol (Brovana) 15 MCG/2ML nebulizer solution 15 mcg       Date Action Dose Route User    3/4/2025 1011 Given 15 mcg Nebulization Mary Ann Lopez RCP          budesonide (Pulmicort) 0.5 MG/2ML nebulizer suspension 0.5 mg       Date Action Dose Route User    3/4/2025 1006 Given 0.5 mg Nebulization Mary Ann Lopez RCP          cefTRIAXone (Rocephin) 1 g in sodium chloride 0.9% 100 mL IVPB-ADDV       Date Action Dose Route User    3/3/2025 2249 New Bag 1 g Intravenous Jacqui Guerrier RN          escitalopram (Lexapro) tab 20 mg       Date Action Dose Route User    3/4/2025 0934 Given 20 mg Oral Chiqui Leo RN          melatonin cap/tab 5 mg       Date Action Dose Route User    3/4/2025 0229 Given 5 mg Oral Edith Calvo RN          pregabalin (Lyrica) cap 50 mg       Date Action Dose Route User    3/4/2025 0934 Given 50 mg Oral Chiqui Leo RN    3/4/2025 0229 Given 50 mg Oral Edith Calvo RN          sodium chloride 0.9% infusion       Date Action Dose Route User    3/4/2025 0627 New Bag (none) Intravenous Edith Calvo RN            Vitals (last day)       Date/Time Temp Pulse Resp BP SpO2 Weight O2 Device O2 Flow Rate (L/min) [facility]    03/03/25 2354 -- -- -- -- -- 133 lb (60.3 kg) -- -- IG    03/03/25 2353 99.3 °F (37.4 °C) 89 16 118/50 93 % -- Nasal cannula 2 L/min     03/03/25 2317 -- 86 -- 124/53 100 % -- -- -- PK    03/03/25 2302 -- 90 -- 116/55 100 % -- -- -- PK    03/03/25 2247 -- 95 -- 134/70 98 % -- -- -- PK    03/03/25 2242 -- 96 -- -- 93 % -- -- --  PK    03/03/25 2232 -- 87 -- 135/53 93 % -- Nasal cannula 2 L/min PK    03/03/25 2217 -- 90 -- 120/79 93 % -- -- -- PK    03/03/25 2202 -- 88 -- 136/69 92 % -- -- -- PK    03/03/25 2147 -- 87 -- -- 94 % -- -- -- PK    03/03/25 2132 -- 90 -- -- 92 % -- -- -- PK    03/03/25 1906 -- -- -- -- 93 % -- Nasal cannula 3 L/min     03/03/25 1902 98.4 °F (36.9 °C) 99 30 133/67 88 % 150 lb (68 kg) None (Room air) -- LH

## 2025-03-04 NOTE — H&P
[facility]  part of [facility]    History and Physical    Mireya Wolfe Patient Status:  Inpatient    1935 MRN [id number]   Location [facility] 4W/SW/SE Attending Aide Vu,    Hosp Day # 1 PCP Kathy Rao MD     Date:  3/4/2025  Date of Admission:  3/3/2025    History provided by:daughter  HPI:     Chief Complaint   Patient presents with    Fever     HPI    Patient is a 89-year-old female with a past medical history of recurrent UTIs, COPD on home O2, anxiety and depression admitted with UTI.    The patient lives at home with her  and also has 24-hour care by 4 caregivers.  On the evening of , 3/2/2025, the patient was noted to have a fever of 99.7; she was weaker than usual and was hardly opening her eyes and unable to get out of bed.  The following morning, the patient's temperature was up to 100.  The patient's daughter called the office regarding concern for another UTI.  UA and culture were ordered and sent.  By 5:30prm, the pt's temperature increased to 101.7, so the patient was brought to the ED for evaluation.    ED, the patient's WBC was elevated at 11.1.  Urinalysis earlier that day showed significant pyuria consistent with UTI.  The patient was started empirically on ceftriaxone.    The patient has a long history of recurrent UTIs.  She has previously been on prophylactic cephalexin and most recently methenamine.  The patient continued to get UTIs despite prophylaxis.  The patient did not like taking the large pills of methenamine, so this was recently discontinued.  According to the daughter, the patient does not drink much fluid.  She is continually incontinent of urine and her diaper has to be changed frequently by the caregivers.    At baseline, the patient has mild dementia.  She is able to ambulate with a walker, with supervision.    The patient denies abdominal or back pain.  She just states that she is \"sick. \"        History     Past  Medical History:    Anemia    Anxiety    Basal cell carcinoma    Mouth    Calculus of kidney    Calculus, kidney    COPD (chronic obstructive pulmonary disease) (HCC)    Depression    Esophageal reflux    Hearing impaired person, bilateral    Hematuria    History of recurrent UTIs    Hypercholesterolemia    Kidney stone    Osteoarthritis    Osteopenia    Other and unspecified hyperlipidemia    Recurrent UTI    Scoliosis    Spinal stenosis    Squamous cell cancer of lip    Thyroid nodule    Urinary frequency    Vitamin D deficiency     Past Surgical History:   Procedure Laterality Date    Cataract Right 05/2016    Cysto/uretero w/lithotripsy Right 10/29/2015    Procedure: LITHOTRIPSY HOLMIUM LASER WITH CYSTOSCOPY;  Surgeon: Tommie Majano MD;  Location: [facility]    Cystoscopy,insert ureteral stent Right 09/24/2015    Procedure: LITHOTRIPSY WITH CYSTOSCOPY, STENT PLACEMENT;  Surgeon: Tommie Majano MD;  Location: [facility]    Fragmenting of kidney stone Right 09/24/2015    Procedure: LITHOTRIPSY WITH CYSTOSCOPY, STENT PLACEMENT;  Surgeon: Tommie Majano MD;  Location: [facility]    Other surgical history  1960    Surgery of Blockage in Urethral Tube    Other surgical history  1985 and on 08/24/00    ESWL - left    Other surgical history  07/19/2000    Cystourethroscopy with Urethral Calibration and Dilation with Bilateral Retrograde Pyelogram and Left Ureteral Stone Manipulation with insertion of Double-J Stent    Other surgical history  06/2015    R eye surgery for bleed and then development of blood clot--resolved    Other surgical history  02/08/2016    Cystoscpy stent removal    Other surgical history      Other surgical history  03/2017    Mohs surgery    Patient documented not to have experienced any of the following events Right 09/24/2015    Procedure: LITHOTRIPSY WITH CYSTOSCOPY, STENT PLACEMENT;  Surgeon: Tommie Majano MD;  Location: [facility]     Patient documented not to have experienced any of the following events Right 10/29/2015    Procedure: CYSTOSCOPY/URETEROSCOPY/STONE EXTRACTION;  Surgeon: Tommie Majano MD;  Location: [facility]    Patient with preoperative order for iv antibiotic surgical site infect Right 2015    Procedure: LITHOTRIPSY WITH CYSTOSCOPY, STENT PLACEMENT;  Surgeon: Tommie Majano MD;  Location: [facility]    Patient with preoperative order for iv antibiotic surgical site infect Right 10/29/2015    Procedure: CYSTOSCOPY/URETEROSCOPY/STONE EXTRACTION;  Surgeon: Tommei Majano MD;  Location: [facility]    Skin surgery       Family History   Problem Relation Age of Onset    Breast Cancer Mother 67    Cancer Mother     Cancer Brother      Social History:  Social History     Socioeconomic History    Marital status:    Tobacco Use    Smoking status: Former     Current packs/day: 0.00     Average packs/day: 0.3 packs/day for 10.0 years (2.5 ttl pk-yrs)     Types: Cigarettes     Start date: 2006     Quit date: 2016     Years since quittin.5     Passive exposure: Past    Smokeless tobacco: Never   Vaping Use    Vaping status: Never Used   Substance and Sexual Activity    Alcohol use: No    Drug use: No   Other Topics Concern    Caffeine Concern No     Social Drivers of Health     Food Insecurity: No Food Insecurity (3/4/2025)    NCSS - Food Insecurity     Worried About Running Out of Food in the Last Year: No     Ran Out of Food in the Last Year: No   Transportation Needs: No Transportation Needs (3/4/2025)    NCSS - Transportation     Lack of Transportation: No   Housing Stability: Not At Risk (3/4/2025)    NCSS - Housing/Utilities     Has Housing: Yes     Worried About Losing Housing: No     Unable to Get Utilities: No     Allergies/Medications:   Allergies: Allergies[1]  Medications Prior to Admission   Medication Sig    AMLODIPINE 2.5 MG Oral Tab TAKE 1 TABLET(2.5  MG) BY MOUTH EVERY EVENING    PREGABALIN 50 MG Oral Cap TAKE 1 CAPSULE BY MOUTH TWICE DAILY    Melatonin 5 MG Oral Tab Take 1 tablet (5 mg total) by mouth at bedtime.    escitalopram 20 MG Oral Tab Take 1 tablet (20 mg total) by mouth daily.    [] cephALEXin 500 MG Oral Cap Take 1 capsule (500 mg total) by mouth 2 (two) times daily for 5 days.    lidocaine-menthol 4-1 % External Patch Place 1 patch onto the skin daily.    fluticasone furoate-vilanterol 100-25 MCG/ACT Inhalation Aerosol Powder, Breath Activated Inhale 1 puff into the lungs daily.    methenamine 1 g Oral Tab Take 1 tablet (1 g total) by mouth 2 (two) times daily.       Review of Systems:   Review of Systems    Physical Exam:   Vital Signs:  Blood pressure 118/50, pulse 89, temperature 99.3 °F (37.4 °C), temperature source Temporal, resp. rate 16, height 5' 5\" (1.651 m), weight 133 lb (60.3 kg), SpO2 93%.  Physical Exam  Cervical Papanicolaou contraindicated  General: The patient appears weak and frail.  She is easily arousable and can answer simple questions.  CV: Regular rate and rhythm, normal S1-S2, no murmurs  Lungs: Clear to auscultation bilaterally  Abdomen: Soft, nontender, nondistended, normal bowel sounds  Extremities: No lower extremity edema, +1 DP pulses bilaterally    Results:     Lab Results   Component Value Date    WBC 11.1 (H) 2025    HGB 11.9 (L) 2025    HCT 36.6 2025    .0 (L) 2025    CREATSERUM 0.83 2025    BUN 13 2025     2025    K 4.2 2025     2025    CO2 30.0 2025     (H) 2025    CA 9.5 2025    ALB 4.3 2024    ALKPHO 89 2024    BILT 0.7 2024    TP 7.3 2024    AST 23 2024    ALT 22 2024    PTT 31.1 2024    T4F 1.1 07/15/2023    TSH 0.349 (L) 07/15/2023    DDIMER 1.63 (H) 2023    MG 2.0 2024    PHOS 2.8 2023    TROPHS 5 2024    B12 611 2024     No results  found.        Assessment/Plan:      Recurrent UTI  -Previously on prophylactic cephalexin then methenamine, which was recently stopped as she continued to get UTI's and pt did not like taking the large pills  -Has been followed by urologist Dr. Munir Cullen but was recently referred to one of his female urology partners as he specializes more in urologic cancers.  Patient has not yet been able to see the new urologist due to difficulty getting out of the house.  -cause of recurrent UTI's likely due to poor po fluid intake and urinary incontinence  -Urinalysis revealed significant pyuria and microhematuria  -Urine culture pending  -On empiric ceftriaxone  -Continue IV fluids  -Given recurrent UTIs and no recent imaging, will check a renal ultrasound to rule out obstruction  -Trial of vaginal estrogen also applied to urethral area-  -discussed with the daughter a trial of OTC cranberry pills as an outpatient    Deconditioning, weakness  -Ambulates with a walker at home, although with supervision.  Patient is largely sedentary  -Will ask PT to evaluate    Hypertension  -on amlodipine 2.5mg qpm      Osteoporosis  DEXA in 7/2017- osteoporosis of left femoral neck (T -2.9).  Pt was briefly on fosamax in 2014. Declines restarting fosamax or other meds so would not check repeat DEXA.     Vitamin B12 deficiency    -Level normal in 7/2024  -not currently taking B12     Hx of iron deficiency anemia  -Had EGD/C-scope in 2010 (Dr. Ferrari) -- negative. 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   -was previoudly on FeSO4 325mg/day, though not taking now  -Hgb slightly decreased this admission -likely dilutional and due to acute illness  -check B12, iron     Lumbar spinal stenosis, chronic back pain    Has seen Dr. Salgado (ortho spine at [facility]).  Referred to pain specialist, Dr. Deirdre Elizabeth at [facility]. Had facet injections in 8/2018 which helped  tremendously.  Saw Dr. Elizabeth again 9/25/18; did PT.  MRI in 4/2019 did not appear to show anything acute.  No longer taking Norco.     Bilateral knee OA  Sees ortho Dr. Booth -- has had b/l cortisone injections with transient improvement.  Has been advised for TKR's but pt reluctant.  Ambulates with a walker.       COPD  Essentially quit smoking in 8/2016.    On Advair at home  On home O2 2-3LNC     Basal and squamous cell cancer, face  S/p  Mohs surgeries in 4 areas of her face in 2018 (Dr. Jonas).      Post-herpetic neuralgia (dx'ed with shingles left upper back 6/8/22)  -improved but still with chronic pain left upper arm and left upper back  -on Lyrica 50mg BID      Depression and anxiety  -pt with long hx of depression (and anxiety)  -previously saw psychiatrist Dr. Friend (retired)  -no longer takes seroquel  -cont lexapro 20mg/day      Cognitive decline, likely age-related, poss exacerbated by depression  -saw neurologist Dr. Lokesh Mcmullen in 9/2022. 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  Had been able to recall remote and recent events.   -Pt was referred for neuropsych testing and MRI brain in 2022 by Dr. Mcmullen though did not schedule.    -pt with progression of her symptoms.  She has significant short-term memory loss.  She is repeating herself during the conversation.  She sometimes is not making sense.  States she is going thru a divorce, then later states that she is going to be  (not true)  -has several caregivers who provide 24hr care and are with her when her  is at work      Advance directives  -have previously had lengthy discussions with pt re her wishes -- she was clear in her desire to not want to prolong her life -- did not want CPR or intubation or any artificial means of prolonging her life.  -pt signed a POLST form in 7/2023 (scanned in chart) -- she is DNR/comfort    VTE  proph  [facility]    Dispo  Discussed with the patient's daughter.  The goal is for her to return home with her caregivers, hopefully in the next couple of days    **Certification      PHYSICIAN Certification of Need for Inpatient Hospitalization - Initial Certification    Patient will require inpatient services that will reasonably be expected to span two midnight's based on the clinical documentation in H+P.   Based on patients current state of illness, I anticipate that, after discharge, patient will require TBD.        Kathy Rao MD  3/4/2025         [1]   Allergies  Allergen Reactions    Shellfish-Derived Products NAUSEA AND VOMITING    Bactrim [Sulfamethoxazole W/Trimethoprim] OTHER (SEE COMMENTS)     Abdominal Pain    Erythromycin UNKNOWN    Iodine (Topical) OTHER (SEE COMMENTS)    Ioversol UNKNOWN    Other OTHER (SEE COMMENTS)    Radiology Contrast Iodinated Dyes DIZZINESS

## 2025-03-04 NOTE — PLAN OF CARE
ED admit. Home with . Has daytime and overnight caregiver. Alert and oriented x3, forgetful at times w/ hx of dementia. Takes pills whole w/ water. IVF infusing. Per caregiver has not been up since Saturday but uses walker baseline. Voiding via purewick. Call light within reach.    Problem: Patient Centered Care  Goal: Patient preferences are identified and integrated in the patient's plan of care  Description: Interventions:  - What would you like us to know as we care for you?   - Provide timely, complete, and accurate information to patient/family  - Incorporate patient and family knowledge, values, beliefs, and cultural backgrounds into the planning and delivery of care  - Encourage patient/family to participate in care and decision-making at the level they choose  - Honor patient and family perspectives and choices  Outcome: Progressing     Problem: Patient/Family Goals  Goal: Patient/Family Long Term Goal  Description: Patient's Long Term Goal:     Interventions:  -   - See additional Care Plan goals for specific interventions  Outcome: Progressing  Goal: Patient/Family Short Term Goal  Description: Patient's Short Term Goal:    Interventions:   -   - See additional Care Plan goals for specific interventions  Outcome: Progressing     Problem: SAFETY ADULT - FALL  Goal: Free from fall injury  Description: INTERVENTIONS:  - Assess pt frequently for physical needs  - Identify cognitive and physical deficits and behaviors that affect risk of falls.  - [place] fall precautions as indicated by assessment.  - Educate pt/family on patient safety including physical limitations  - Instruct pt to call for assistance with activity based on assessment  - Modify environment to reduce risk of injury  - Provide assistive devices as appropriate  - Consider OT/PT consult to assist with strengthening/mobility  - Encourage toileting schedule  Outcome: Progressing     Problem: GENITOURINARY - ADULT  Goal: Absence of  urinary retention  Description: INTERVENTIONS:  - Assess patient’s ability to void and empty bladder  - Monitor intake/output and perform bladder scan as needed  - Follow urinary retention protocol/standard of care  - Consider collaborating with pharmacy to review patient's medication profile  - Implement strategies to promote bladder emptying  Outcome: Progressing

## 2025-03-04 NOTE — ED INITIAL ASSESSMENT (HPI)
Fever x1 day, tylenol last taken at 10am. Pt reports hx of UTIs.  pt denies cough/congestion/cp/sob. Noted tachypnea, and low O2, pt reports she has of copd. 3L applied, o2 sat now at 93%.

## 2025-03-05 ENCOUNTER — APPOINTMENT (OUTPATIENT)
Dept: ULTRASOUND IMAGING | Facility: HOSPITAL | Age: OVER 89
End: 2025-03-05
Attending: INTERNAL MEDICINE
Payer: MEDICARE

## 2025-03-05 LAB
ANION GAP SERPL CALC-SCNC: 9 MMOL/L (ref 0–18)
BASOPHILS # BLD AUTO: 0.04 X10(3) UL (ref 0–0.2)
BASOPHILS NFR BLD AUTO: 0.4 %
BUN BLD-MCNC: 11 MG/DL (ref 9–23)
BUN/CREAT SERPL: 15.1 (ref 10–20)
CALCIUM BLD-MCNC: 9.1 MG/DL (ref 8.7–10.4)
CHLORIDE SERPL-SCNC: 105 MMOL/L (ref 98–112)
CO2 SERPL-SCNC: 26 MMOL/L (ref 21–32)
CREAT BLD-MCNC: 0.73 MG/DL
DEPRECATED HBV CORE AB SER IA-ACNC: 128 NG/ML
DEPRECATED RDW RBC AUTO: 41.7 FL (ref 35.1–46.3)
EGFRCR SERPLBLD CKD-EPI 2021: 78 ML/MIN/1.73M2 (ref 60–?)
EOSINOPHIL # BLD AUTO: 0.01 X10(3) UL (ref 0–0.7)
EOSINOPHIL NFR BLD AUTO: 0.1 %
ERYTHROCYTE [DISTWIDTH] IN BLOOD BY AUTOMATED COUNT: 12.8 % (ref 11–15)
GLUCOSE BLD-MCNC: 111 MG/DL (ref 70–99)
HCT VFR BLD AUTO: 35 %
HGB BLD-MCNC: 11.3 G/DL
IMM GRANULOCYTES # BLD AUTO: 0.03 X10(3) UL (ref 0–1)
IMM GRANULOCYTES NFR BLD: 0.3 %
IRON SATN MFR SERPL: 4 %
IRON SERPL-MCNC: 11 UG/DL
LYMPHOCYTES # BLD AUTO: 0.6 X10(3) UL (ref 1–4)
LYMPHOCYTES NFR BLD AUTO: 6.4 %
MCH RBC QN AUTO: 28.5 PG (ref 26–34)
MCHC RBC AUTO-ENTMCNC: 32.3 G/DL (ref 31–37)
MCV RBC AUTO: 88.2 FL
MONOCYTES # BLD AUTO: 0.57 X10(3) UL (ref 0.1–1)
MONOCYTES NFR BLD AUTO: 6.1 %
NEUTROPHILS # BLD AUTO: 8.11 X10 (3) UL (ref 1.5–7.7)
NEUTROPHILS # BLD AUTO: 8.11 X10(3) UL (ref 1.5–7.7)
NEUTROPHILS NFR BLD AUTO: 86.7 %
OSMOLALITY SERPL CALC.SUM OF ELEC: 290 MOSM/KG (ref 275–295)
PLATELET # BLD AUTO: 92 10(3)UL (ref 150–450)
PLATELETS.RETICULATED NFR BLD AUTO: 9.1 % (ref 0–7)
POTASSIUM SERPL-SCNC: 4.3 MMOL/L (ref 3.5–5.1)
RBC # BLD AUTO: 3.97 X10(6)UL
SODIUM SERPL-SCNC: 140 MMOL/L (ref 136–145)
TOTAL IRON BINDING CAPACITY: 245 UG/DL (ref 250–425)
TRANSFERRIN SERPL-MCNC: 180 MG/DL (ref 250–380)
VIT B12 SERPL-MCNC: 430 PG/ML (ref 211–911)
WBC # BLD AUTO: 9.4 X10(3) UL (ref 4–11)

## 2025-03-05 PROCEDURE — 99232 SBSQ HOSP IP/OBS MODERATE 35: CPT | Performed by: INTERNAL MEDICINE

## 2025-03-05 PROCEDURE — 76770 US EXAM ABDO BACK WALL COMP: CPT | Performed by: INTERNAL MEDICINE

## 2025-03-05 RX ORDER — MULTIVITAMIN WITH IRON
250 TABLET ORAL DAILY
Status: DISCONTINUED | OUTPATIENT
Start: 2025-03-05 | End: 2025-03-06

## 2025-03-05 RX ORDER — QUETIAPINE FUMARATE 25 MG/1
25 TABLET, FILM COATED ORAL EVERY 8 HOURS PRN
Status: DISCONTINUED | OUTPATIENT
Start: 2025-03-05 | End: 2025-03-06

## 2025-03-05 NOTE — CM/SW NOTE
Department  notified of request for HHC, aidin referrals started. Assigned CM/SW to follow up with pt/family on further discharge planning.     Jennifer Connelly, DSC

## 2025-03-05 NOTE — PROGRESS NOTES
[facility]  part of [facility]    Progress Note    Mireya Wolfe Patient Status:  Observation    1935 MRN [id number]   Location [facility] 4W/SW/SE Attending Aide Vu,    Hosp Day # 1 PCP Kathy Rao MD       SUBJECTIVE:  Pt awake, alert; feels better    OBJECTIVE:  Vital signs in last 24 hours:  /49 (BP Location: Right arm)   Pulse 84   Temp 98.7 °F (37.1 °C) (Temporal)   Resp 23   Ht 5' 5\" (1.651 m)   Wt 133 lb (60.3 kg)   SpO2 92%   BMI 22.13 kg/m²     Intake/Output:    Intake/Output Summary (Last 24 hours) at 3/5/2025 0824  Last data filed at 3/5/2025 0243  Gross per 24 hour   Intake 360 ml   Output 710 ml   Net -350 ml       Wt Readings from Last 3 Encounters:   25 133 lb (60.3 kg)   24 137 lb 9.6 oz (62.4 kg)   24 137 lb (62.1 kg)       EXAM:  GENERAL: well developed, well nourished, in no apparent distress  LUNGS: clear to auscultation  CARDIO: RRR, normal S1S2, without murmur   GI: soft, NT, ND, NABS  EXTREMITIES: no LE edema    Data Review:     Labs:   Lab Results   Component Value Date    WBC 9.4 2025    HGB 11.3 2025    HCT 35.0 2025    PLT 92.0 2025    CREATSERUM 0.73 2025    BUN 11 2025     2025    K 4.3 2025     2025    CO2 26.0 2025     2025    CA 9.1 2025    B12 430 2025         Imaging:  No results found.            Meds:   Current Facility-Administered Medications   Medication Dose Route Frequency    amLODIPine (Norvasc) tab 2.5 mg  2.5 mg Oral QPM    melatonin cap/tab 5 mg  5 mg Oral Nightly    pregabalin (Lyrica) cap 50 mg  50 mg Oral BID    escitalopram (Lexapro) tab 20 mg  20 mg Oral Daily    cefTRIAXone (Rocephin) 1 g in sodium chloride 0.9% 100 mL IVPB-ADDV  1 g Intravenous Q24H    sodium chloride 0.9% infusion   Intravenous Continuous    budesonide (Pulmicort) 0.5 MG/2ML nebulizer suspension 0.5 mg  0.5 mg Nebulization  2 times daily    arformoterol (Brovana) 15 MCG/2ML nebulizer solution 15 mcg  15 mcg Nebulization 2 times daily    heparin (Porcine) 5000 UNIT/ML injection 5,000 Units  5,000 Units Subcutaneous 2 times per day    estradiol (Estrace) 0.1 MG/GM vaginal cream 1 g  1 g Vaginal Daily    acetaminophen (Tylenol) tab 650 mg  650 mg Oral Q6H PRN       Assessment & Plan    Recurrent UTI  -Previously on prophylactic cephalexin then methenamine, which was recently stopped as she continued to get UTI's and pt did not like taking the large pills  -Has been followed by urologist Dr. Munir Cullen but was recently referred to one of his female urology partners as he specializes more in urologic cancers.  Patient has not yet been able to see the new urologist due to difficulty getting out of the house.  -cause of recurrent UTI's likely due to poor po fluid intake and urinary incontinence  -Urine culture - >100K E.coli (R-amp, levo/cipro; S-bactrim, ancef, macrobid)  -On empiric ceftriaxone (day 3) - will change to po keflex at discharge  -on IVF's at 83cc/hr - will stop  -Given recurrent UTIs and no recent imaging, renal/bladder ultrasound ordered to rule out obstruction  -Trial of vaginal estrogen also applied to urethral area-  -discussed with the daughter a trial of OTC cranberry pills as an outpatient  -T to 101 this am (~2:30am); cont to trend fever curve  -otherwise clinically improved; WBC normalized; much more alert today - back to baseline     Deconditioning, weakness  -Ambulates with a walker at home, although with supervision.  Patient is largely sedentary  -PT to evaluate     Hypertension  -on amlodipine 2.5mg qpm  -BP's good       Osteoporosis  DEXA in 7/2017- osteoporosis of left femoral neck (T -2.9).  Pt was briefly on fosamax in 2014. Declines restarting fosamax or other meds so would not check repeat DEXA.     Vitamin B12 deficiency    -not currently taking B12  -Vit B12 level okay/low-normal 430  -start OTC Vit B12  250mcg/day     Hx of iron deficiency anemia  -Had EGD/C-scope in 2010 (Dr. Ferrari) -- negative. 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   -was previoudly on FeSO4 325mg/day, though not taking now  -Hgb 11.9>10.3>11.3  -suspect dilutional component as well as acute illness  -iron and TIBC low; suspect anemia of chronic disease; will however give a dose of IV ferrlecit     Lumbar spinal stenosis, chronic back pain    Has seen Dr. Salgado (ortho spine at [facility]).  Referred to pain specialist, Dr. Deirdre Elizabeth at [facility]. Had facet injections in 8/2018 which helped tremendously.  Saw Dr. Elizabeth again 9/25/18; did PT.  MRI in 4/2019 did not appear to show anything acute.  No longer taking Norco.     Bilateral knee OA  Sees ortho Dr. Booth -- has had b/l cortisone injections with transient improvement.  Has been advised for TKR's but pt reluctant.  Ambulates with a walker.       COPD  Essentially quit smoking in 8/2016.    On Advair at home  On home O2 2-3LNC     Basal and squamous cell cancer, face  S/p  Mohs surgeries in 4 areas of her face in 2018 (Dr. Jonas).      Post-herpetic neuralgia (dx'ed with shingles left upper back 6/8/22)  -improved but still with chronic pain left upper arm and left upper back  -on Lyrica 50mg BID      Depression and anxiety  -pt with long hx of depression (and anxiety)  -previously saw psychiatrist Dr. Friend (retired)  -no longer takes seroquel  -cont lexapro 20mg/day      Cognitive decline, likely age-related, poss exacerbated by depression  -saw neurologist Dr. Lokesh Mcmullen in 9/2022. 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  Had been able to recall remote and recent events.   -Pt was referred for neuropsych testing and MRI brain in 2022 by Dr. Mcmullen though did not schedule.    -pt with progression of  her symptoms.  She has significant short-term memory loss.  She is repeating herself during the conversation.  She sometimes is not making sense.  States she is going thru a divorce, then later states that she is going to be  (not true)  -has several caregivers who provide 24hr care and are with her when her  is at work      Advance directives  -have previously had lengthy discussions with pt re her wishes -- she was clear in her desire to not want to prolong her life -- did not want CPR or intubation or any artificial means of prolonging her life.  -pt signed a POLST form in 7/2023 (scanned in chart) -- she is DNR/comfort     VTE proph  [facility] - pt refused  Will place SCD's     Dispo  Given fever, will keep another day.  Poss home tomorrow    Discussed with JANA Maldonado and caregiver, Magdalena Rao MD  3/5/2025  8:24 AM

## 2025-03-05 NOTE — CM/SW NOTE
03/05/25 1400   CM/SW Screening   Referral Source    Information Source [facility] staff;Chart review   Patient's Home Environment House   Number of Levels in Home 2   Number of Stair in Home chair lift   Patient lives with Spouse/Significant other   Patient Status Prior to Admission   Independent with ADLs and Mobility   (dependant)   Services in place prior to admission DME/Supplies at home;[facility]   DME Provider/Supplier [facility] 02   Type of DME/Supplies Stair Lift;Home Oxygen   [facility] hours per day 24/7   Discharge Needs   Anticipated D/C needs Home health care     Cm was asked to assist w/ dc planning.    CM called room spoke w/ cg who sugggested calling spouse.    CM spoke with spouse who provided all of the above info.    Spouse sts pt has had HHC the last few tmes she was dc from the hospital and he really thinks \"it will be a complete waste of time, she won't get much benefit from it at all\" however he is agreeable to try 1 more time. He requested the same agency if possible. Per chart reviw pt was dc [facility]/ [facility] in 11/2024.    CM req DSC to send ref to [facility] plus others.    At baseline pt is dependant for care, she has 24/7 cg in place who meet all of her ADL needs.    Spouse confirmed home 02 in place with [facility]    Spouse sts pt is barely able to walk and he uses ambulance for transport, agreed to amb at time of dc.    Plan  Home with HHC ([facility] if avail)    / to remain available for support and/or discharge planning.     Layla Santillan, RN    Ext [phone]

## 2025-03-05 NOTE — OCCUPATIONAL THERAPY NOTE
OCCUPATIONAL THERAPY EVALUATION - INPATIENT     Room Number: 440/440-A  Evaluation Date: 3/5/2025  Type of Evaluation: Initial  Presenting Problem: acute pyelonephritis    Physician Order: IP Consult to Occupational Therapy  Reason for Therapy: ADL/IADL Dysfunction and Discharge Planning    OCCUPATIONAL THERAPY ASSESSMENT   Patient is a 89 year old female admitted 3/3/2025 for acute pyelonephritis.  Prior to admission, patient's baseline is assisted for functional xfers/mobility, and self care. Pt has 24 hour CGs. CG report pt was managing short in room mobility using R/W with CG support until ~1 week ago and since managing bed to BSC to chair using R/W with CG assist. CG assist pt with all ADLs.  Patient is currently functioning near baseline with BADLs and functional mobility.  Patient is requiring moderate assist and maximum assist as a result of the following impairments: decreased functional strength, decreased functional reach, decreased endurance, pain, decreased muscular endurance, cognitive deficits ( ), and decreased safety awareness. Occupational Therapy will continue to follow for duration of hospitalization.    Patient will benefit from continued skilled OT Services at discharge to promote prior level of function and safety with additional support and return home with home health OT.    PLAN DURING HOSPITALIZATION     OT Treatment Plan: Balance activities, Functional transfer training, UE strengthening/ROM, Endurance training, Cognitive reorientation, Patient/Family training, Patient/Family education     OCCUPATIONAL THERAPY MEDICAL/SOCIAL HISTORY   Problem List  Principal Problem:    Acute pyelonephritis    HOME SITUATION  Type of Home: House  Home Layout: Stairlift  Lives With: Caregiver 24 hours  Toilet and Equipment: 3-in-1 commode  Drives: No  Patient Regularly Uses: Home O2; Rolling walker; Gait belt      Use of Assistive Device(s): R/W    Prior Level of [place]: Pt lives with 24 hr CG and  typically manages household distances using R/W with SBA, A for BADLs. Over the past week pt has been limited to functional xfers only d/t weakness.     SUBJECTIVE  \"This is too much for me. There are too many people in and out of here\"    OCCUPATIONAL THERAPY EXAMINATION      OBJECTIVE  Fall Risk: High fall risk      PAIN ASSESSMENT  Rating: Unable to rate  Location: generalized    ACTIVITY TOLERANCE  Fair limited by c/o generalized weakness,fatigue    O2 SATURATIONS    COGNITION  Pt is somewhat lethargic but improves as session progresses.   Pt is O to person and place grossly  Able to id 1 out of 2 CG at her bed side  Pt is able to follow simple commands benefiting from repeated cues    Communication: able to express her needs      RANGE OF MOTION   Upper extremity ROM is within functional limits     STRENGTH ASSESSMENT  Upper extremity strength is within functional limits     ACTIVITIES OF DAILY LIVING ASSESSMENT  AM-PAC ‘6-Clicks’ Inpatient Daily Activity Short Form  How much help from another person does the patient currently need…  -   Putting on and taking off regular lower body clothing?: A Lot  -   Bathing (including washing, rinsing, drying)?: A Lot  -   Toileting, which includes using toilet, bedpan or urinal? : Total  -   Putting on and taking off regular upper body clothing?: A Lot  -   Taking care of personal grooming such as brushing teeth?: A Little  -   Eating meals?: A Little    AM-PAC Score:  Score: 13  Approx Degree of Impairment: 63.03%  Standardized Score (AM-PAC Scale): 32.03  CMS Modifier (G-Code): CL    FUNCTIONAL TRANSFER ASSESSMENT  Bed to chair xfer with Mod A flexed posture poor stand to sit control    BED MOBILITY  Rolling: Moderate Assist  Supine to Sit : Maximum Assist    BALANCE ASSESSMENT  Static Sitting: Moderate Assist (with Mod A of 1-2 for functional xfer using R/W)    FUNCTIONAL ADL ASSESSMENT  Feeding: set up  Grooming: set up in sitting  LE dressing: Max A    Skilled Therapy  Provided: Pt received restingi n bed with CG at bed side.   Provide Mod A for functional mobility including supine to sit and bed to chair xfer using R/W.  Provide Max A for self care though anticipate pt is capable of improved performance- demo limited effort    EDUCATION PROVIDED  Patient Education : Role of Occupational Therapy; Plan of Care  Patient's Response to Education: Verbalized Understanding  Family/Caregiver's Response to Education: Verbalized Understanding    The patient's Approx Degree of Impairment: 63.03% has been calculated based on documentation in the [facility] '6 clicks' Inpatient Daily Activity Short Form.  Research supports that patients with this level of impairment may benefit from [facility] however pt is from home with 24 hour CG and anticipate return home is appropriate.Pt may benefit from HHPT/PT services .  Final disposition will be made by interdisciplinary medical team.     Patient End of Session: Up in chair, Needs met, Call light within reach, RN aware of session/findings, All patient questions and concerns addressed    OT Goals  Patients self stated goal is: go home     Patient will complete functional transfer with CGA  Comment:     Patient will complete toileting with Min A  Comment:     Patient will tolerate standing for 2 minutes in prep for adls with CGA   Comment:    Patient will complete bed mobility with Min A  Comment:          Goals  on: 3/19/25  Frequency: 3x/week    Patient Evaluation Complexity Level:   Occupational Profile/Medical History MODERATE - Expanded review of history including review of medical or therapy record   Specific performance deficits impacting engagement in ADL/IADL MODERATE  3 - 5 performance deficits   Client Assessment/Performance Deficits MODERATE - Comorbidities and min to mod modifications of tasks    Clinical Decision Making MODERATE - Analysis of occupational profile, detailed assessments, several treatment options    Overall Complexity MODERATE      Self-Care Home Management: 15 minutes

## 2025-03-05 NOTE — PLAN OF CARE
Problem: SAFETY ADULT - FALL  Goal: Free from fall injury  Description: INTERVENTIONS:  - Assess pt frequently for physical needs  - Identify cognitive and physical deficits and behaviors that affect risk of falls.  - [place] fall precautions as indicated by assessment.  - Educate pt/family on patient safety including physical limitations  - Instruct pt to call for assistance with activity based on assessment  - Modify environment to reduce risk of injury  - Provide assistive devices as appropriate  - Consider OT/PT consult to assist with strengthening/mobility  - Encourage toileting schedule  Outcome: Progressing     Problem: GENITOURINARY - ADULT  Goal: Absence of urinary retention  Description: INTERVENTIONS:  - Assess patient’s ability to void and empty bladder  - Monitor intake/output and perform bladder scan as needed  - Follow urinary retention protocol/standard of care  - Consider collaborating with pharmacy to review patient's medication profile  - Implement strategies to promote bladder emptying  Outcome: Progressing     No acute changes today. A&O x 2-3, easily forgetful. 2L baseline o2. Receiving scheduled nebs. Electrolytes replaced per protocol. IVF infusing, rocephin as abx coverage. Incontinent, purewick in place. Plans for US kidney/bladder. PT/OT eval tomorrow. Caregivers and  at bedside participating in patient care. Call light within reach, frequent rounding.

## 2025-03-05 NOTE — CM/SW NOTE
Anticipated therapy need: Home with Home Healthcare    CM requested department  ([facility]) to initiate [facility] referral for HHC.  F2F entered.  SW/CM will continue to follow.     Rosemary Lei RN, BSN  Nurse   [phone]

## 2025-03-05 NOTE — PHYSICAL THERAPY NOTE
PHYSICAL THERAPY EVALUATION - INPATIENT     Room Number: 440/440-A  Evaluation Date: 3/5/2025  Type of Evaluation: Initial   Physician Order: PT Eval and Treat    Presenting Problem: acute pyelonephritis, weakness  Co-Morbidities : depression/anxiety, COPD, OP, SS, dementia, RYAN knee OA  Reason for Therapy: Mobility Dysfunction and Discharge Planning    PHYSICAL THERAPY ASSESSMENT   Patient is a 89 year old female admitted 3/3/2025 for acute pyelonephritis, weakness.  Prior to admission, patient's baseline is assist from 24 hour caregiver for ADL's and IADL's. Recent decline in function over the last week and primarily requiring assist x 1 for transfers bed<>chair. Prior to this, pt ambulating short distances with RW and assist x 1.  Patient is currently functioning below baseline with bed mobility, transfers, gait, maintaining seated position, and standing prolonged periods.  Patient is requiring moderate assist and assist x 1-2  as a result of the following impairments: decreased functional strength, decreased endurance/aerobic capacity, pain, impaired sitting and standing balance, decreased muscular endurance, cognitive deficits (A&O x 1 to self only. Follows simple commands with increased time), and medical status.  Physical Therapy will continue to follow for duration of hospitalization.    Patient will benefit from continued skilled PT Services at discharge to promote prior level of function and safety with additional support and return home with home health PT- continued 24 hour caregiver services    PLAN DURING HOSPITALIZATION  Nursing Mobility Recommendation : Lift Equipment  PT Device Recommendation: Rolling walker, Gait belt  PT Treatment Plan: Bed mobility, Body mechanics, Endurance, Energy conservation, Patient education, Family education, Gait training, Range of motion, Strengthening, Balance training, Transfer training  Rehab Potential : Fair  Frequency (Obs): 3-5x/week     PHYSICAL THERAPY  MEDICAL/SOCIAL HISTORY   Problem List  Principal Problem:    Acute pyelonephritis      HOME SITUATION  Type of Home: House  Home Layout: Stairlift    Lives With: Caregiver 24 hours    Drives: No   Patient Regularly Uses: Home O2, Rolling walker, Gait belt     SUBJECTIVE  \"It's too loud. So many people\"    PHYSICAL THERAPY EXAMINATION   OBJECTIVE  Precautions: Bed/chair alarm, Hard of hearing  Fall Risk: High fall risk    PAIN ASSESSMENT  Rating: Unable to rate  Location: BLE's  Management Techniques: Activity promotion, Body mechanics, Repositioning    COGNITION  Overall Cognitive Status:  Impaired  Following Commands:  follows multi-step commands inconsistently  Safety Judgement:  decreased awareness of need for assistance and decreased awareness of need for safety  Awareness of Errors:  decreased awareness of errors   Awareness of Deficits:  decreased awareness of deficits    RANGE OF MOTION AND STRENGTH ASSESSMENT  Upper extremity ROM and strength are within functional limits   Lower extremity ROM is within functional limits   Lower extremity strength is impaired bilateral LE's: 3+/5    BALANCE  Static Sitting: Fair -  Dynamic Sitting: Poor +  Static Standing: Poor  Dynamic Standing: Poor -      ACTIVITY TOLERANCE  Pulse: 85  Heart Rate Source: Monitor     BP: 105/67  BP Location: Right arm  BP Method: Automatic  Patient Position: Sitting    O2 WALK  Oxygen Therapy  SPO2% on Oxygen at Rest: 90  At rest oxygen flow (liters per minute): 2  SPO2% Ambulation on Oxygen: 93  Ambulation oxygen flow (liters per minute): 2    AM-PAC '6-Clicks' INPATIENT SHORT FORM - BASIC MOBILITY  How much difficulty does the patient currently have...  Patient Difficulty: Turning over in bed (including adjusting bedclothes, sheets and blankets)?: A Lot   Patient Difficulty: Sitting down on and standing up from a chair with arms (e.g., wheelchair, bedside commode, etc.): A Lot   Patient Difficulty: Moving from lying on back to sitting on  the side of the bed?: A Lot   How much help from another person does the patient currently need...   Help from Another: Moving to and from a bed to a chair (including a wheelchair)?: A Lot   Help from Another: Need to walk in hospital room?: A Lot   Help from Another: Climbing 3-5 steps with a railing?: Total     AM-PAC Score:  Raw Score: 11   Approx Degree of Impairment: 72.57%   Standardized Score (AM-PAC Scale): 33.86   CMS Modifier (G-Code): CL    FUNCTIONAL ABILITY STATUS  Functional Mobility/Gait Assessment  Gait Assistance: Moderate assistance (assist x 2)  Distance (ft): 3ft bed>chair  Assistive Device: Rolling walker  Pattern: Shuffle (unsteady, narrow base of support, abbreviated step length. Cues for upright posture and safe management of RW with turns. Distance ambulated limited by fatigue)  Supine to Sit: moderate assist, assist with LE's and trunk. Mod A to scoot pt toward EOB. Tolerated sitting EOB for 9 minutes requiring CG/Min A to maintain sitting balance.   Sit to Stand: moderate assist. Cues for appropriate UE positioning with transitional movements. Requiring Mod A x 1 to maintain static standing balance with bilateral UE support on RW. Cues for upright posture.     Exercise/Education Provided:  Bed mobility  Body mechanics  Energy conservation  Functional activity tolerated  Gait training  Posture  ROM  Strengthening  Lower therapeutic exercise:  LAQ  Transfer training    Skilled Therapy Provided: Patient received supine in bed with caregiver present. RN approved activity. Coordinated session with OT. Therapist educated pt on POC and physiological benefits of mobilization. Patient agreeable to participate with encouragement- overwhelmed and c/o fatigue and LE pain which she does not quantify. Follows commands with increased time. In sitting, pt c/o ligheadedness, BP: 105/67mmHg    The patient's Approx Degree of Impairment: 72.57% has been calculated based on documentation in the [facility] '6  clicks' Inpatient Basic Mobility Short Form.  Research supports that patients with this level of impairment may benefit from rehab, however, pt near baseline and would benefit from return to familiar environment with continued caregiver 24 support/assist.  Final disposition will be made by interdisciplinary medical team.    Patient End of Session: Up in chair, Needs met, Call light within reach, RN aware of session/findings, Alarm set (caregiver present)    CURRENT GOALS  Goals to be met by: 3/19/25  Patient Goal Patient's self-stated goal is: return to PLOF   Goal #1 Patient is able to demonstrate supine - sit EOB @ level: minimum assistance     Goal #1   Current Status    Goal #2 Patient is able to demonstrate transfers EOB to/from BSC at assistance level: minimum assistance with walker - rolling     Goal #2  Current Status    Goal #3 Patient is able to ambulate 15 feet with assist device: walker - rolling at assistance level: minimum assistance   Goal #3   Current Status    Goal #4 Patient to demonstrate independence with home activity/exercise instructions provided to patient in preparation for discharge.   Goal #4   Current Status      Patient Evaluation Complexity Level:  History Moderate - 1 or 2 personal factors and/or co-morbidities   Examination of body systems Moderate - addressing a total of 3 or more elements   Clinical Presentation  Moderate - Evolving   Clinical Decision Making  Moderate Complexity     Therapeutic Activity:  23 minutes

## 2025-03-05 NOTE — CM/SW NOTE
Patient failed inpatient criteria. Second level of review completed and supports observation.  UR committee in agreement. Inpatient UR discussed with Dr. Rao  who approves observation status.  Observation status and MOON  notice explained to the patient . Signed copy placed in chart      Rosemary GAGNON, Utilization Review   Ext [phone]

## 2025-03-06 ENCOUNTER — APPOINTMENT (OUTPATIENT)
Dept: CT IMAGING | Facility: HOSPITAL | Age: OVER 89
End: 2025-03-06
Attending: INTERNAL MEDICINE
Payer: MEDICARE

## 2025-03-06 VITALS
DIASTOLIC BLOOD PRESSURE: 47 MMHG | OXYGEN SATURATION: 91 % | WEIGHT: 133 LBS | SYSTOLIC BLOOD PRESSURE: 127 MMHG | RESPIRATION RATE: 18 BRPM | TEMPERATURE: 98 F | HEIGHT: 65 IN | BODY MASS INDEX: 22.16 KG/M2 | HEART RATE: 88 BPM

## 2025-03-06 LAB
ANION GAP SERPL CALC-SCNC: 8 MMOL/L (ref 0–18)
BASOPHILS # BLD AUTO: 0.04 X10(3) UL (ref 0–0.2)
BASOPHILS NFR BLD AUTO: 0.7 %
BUN BLD-MCNC: 10 MG/DL (ref 9–23)
BUN/CREAT SERPL: 13.9 (ref 10–20)
CALCIUM BLD-MCNC: 9.5 MG/DL (ref 8.7–10.4)
CHLORIDE SERPL-SCNC: 106 MMOL/L (ref 98–112)
CO2 SERPL-SCNC: 31 MMOL/L (ref 21–32)
CREAT BLD-MCNC: 0.72 MG/DL
DEPRECATED RDW RBC AUTO: 42.5 FL (ref 35.1–46.3)
EGFRCR SERPLBLD CKD-EPI 2021: 79 ML/MIN/1.73M2 (ref 60–?)
EOSINOPHIL # BLD AUTO: 0.08 X10(3) UL (ref 0–0.7)
EOSINOPHIL NFR BLD AUTO: 1.3 %
ERYTHROCYTE [DISTWIDTH] IN BLOOD BY AUTOMATED COUNT: 13.1 % (ref 11–15)
GLUCOSE BLD-MCNC: 104 MG/DL (ref 70–99)
HCT VFR BLD AUTO: 35.6 %
HGB BLD-MCNC: 11.4 G/DL
IMM GRANULOCYTES # BLD AUTO: 0.03 X10(3) UL (ref 0–1)
IMM GRANULOCYTES NFR BLD: 0.5 %
LYMPHOCYTES # BLD AUTO: 0.94 X10(3) UL (ref 1–4)
LYMPHOCYTES NFR BLD AUTO: 15.6 %
MCH RBC QN AUTO: 28.3 PG (ref 26–34)
MCHC RBC AUTO-ENTMCNC: 32 G/DL (ref 31–37)
MCV RBC AUTO: 88.3 FL
MONOCYTES # BLD AUTO: 0.6 X10(3) UL (ref 0.1–1)
MONOCYTES NFR BLD AUTO: 9.9 %
NEUTROPHILS # BLD AUTO: 4.35 X10 (3) UL (ref 1.5–7.7)
NEUTROPHILS # BLD AUTO: 4.35 X10(3) UL (ref 1.5–7.7)
NEUTROPHILS NFR BLD AUTO: 72 %
OSMOLALITY SERPL CALC.SUM OF ELEC: 299 MOSM/KG (ref 275–295)
PLATELET # BLD AUTO: 110 10(3)UL (ref 150–450)
PLATELETS.RETICULATED NFR BLD AUTO: 9.1 % (ref 0–7)
POTASSIUM SERPL-SCNC: 3.9 MMOL/L (ref 3.5–5.1)
RBC # BLD AUTO: 4.03 X10(6)UL
SODIUM SERPL-SCNC: 145 MMOL/L (ref 136–145)
WBC # BLD AUTO: 6 X10(3) UL (ref 4–11)

## 2025-03-06 PROCEDURE — 99232 SBSQ HOSP IP/OBS MODERATE 35: CPT | Performed by: INTERNAL MEDICINE

## 2025-03-06 PROCEDURE — 74176 CT ABD & PELVIS W/O CONTRAST: CPT | Performed by: INTERNAL MEDICINE

## 2025-03-06 RX ORDER — ESTRADIOL 0.1 MG/G
CREAM VAGINAL
Qty: 42 G | Refills: 5 | Status: SHIPPED | OUTPATIENT
Start: 2025-03-07

## 2025-03-06 RX ORDER — CEPHALEXIN 500 MG/1
500 CAPSULE ORAL 3 TIMES DAILY
Qty: 12 CAPSULE | Refills: 0 | Status: SHIPPED | OUTPATIENT
Start: 2025-03-06

## 2025-03-06 RX ORDER — VANCOMYCIN HYDROCHLORIDE 125 MG/1
125 CAPSULE ORAL DAILY
Qty: 7 CAPSULE | Refills: 0 | Status: SHIPPED | OUTPATIENT
Start: 2025-03-06

## 2025-03-06 RX ORDER — ADHESIVE TAPE 3"X 2.3 YD
1 TAPE, NON-MEDICATED TOPICAL DAILY
Qty: 90 CAPSULE | Refills: 0 | Status: SHIPPED | COMMUNITY
Start: 2025-03-06

## 2025-03-06 NOTE — PLAN OF CARE
No acute changes over night. Pt is alert and oriented x 2-3. On 2.5L nasal canula. Purewick in place. Pt remained afebrile over night. Pt denies pain. Pt is saline locked. Plan is possible discharge today.    Problem: SAFETY ADULT - FALL  Goal: Free from fall injury  Description: INTERVENTIONS:  - Assess pt frequently for physical needs  - Identify cognitive and physical deficits and behaviors that affect risk of falls.  - [place] fall precautions as indicated by assessment.  - Educate pt/family on patient safety including physical limitations  - Instruct pt to call for assistance with activity based on assessment  - Modify environment to reduce risk of injury  - Provide assistive devices as appropriate  - Consider OT/PT consult to assist with strengthening/mobility  - Encourage toileting schedule  Outcome: Progressing     Problem: GENITOURINARY - ADULT  Goal: Absence of urinary retention  Description: INTERVENTIONS:  - Assess patient’s ability to void and empty bladder  - Monitor intake/output and perform bladder scan as needed  - Follow urinary retention protocol/standard of care  - Consider collaborating with pharmacy to review patient's medication profile  - Implement strategies to promote bladder emptying  Outcome: Progressing

## 2025-03-06 NOTE — PROGRESS NOTES
[facility]  part of [facility]    Progress Note    Mireya Wolfe Patient Status:  Observation    1935 MRN [id number]   Location [facility] 4W/SW/SE Attending Aide Vu,    Hosp Day # 1 PCP Kathy Rao MD       SUBJECTIVE:  Pt sleepy but arousable; pt was agitated last pm.  Received seroquel then melatonin and finally slept    OBJECTIVE:  Vital signs in last 24 hours:  /58 (BP Location: Right arm)   Pulse 88   Temp 98.3 °F (36.8 °C) (Oral)   Resp 18   Ht 5' 5\" (1.651 m)   Wt 133 lb (60.3 kg)   SpO2 90%   BMI 22.13 kg/m²     Intake/Output:  No intake or output data in the 24 hours ending 25 1042    Wt Readings from Last 3 Encounters:   25 133 lb (60.3 kg)   24 137 lb 9.6 oz (62.4 kg)   24 137 lb (62.1 kg)       EXAM:  GENERAL: well developed, well nourished, in no apparent distress  LUNGS: clear to auscultation  CARDIO: RRR, normal S1S2, without murmur   GI: soft, NT, ND, NABS  EXTREMITIES: no LE edema    Data Review:     Labs:   Lab Results   Component Value Date    WBC 6.0 2025    HGB 11.4 2025    HCT 35.6 2025    .0 2025    CREATSERUM 0.72 2025    BUN 10 2025     2025    K 3.9 2025     2025    CO2 31.0 2025     2025    CA 9.5 2025         Imaging:  No results found.            Meds:   Current Facility-Administered Medications   Medication Dose Route Frequency    cyanocobalamin (Vitamin B12) tab 250 mcg  250 mcg Oral Daily    QUEtiapine (SEROquel) tab 25 mg  25 mg Oral Q8H PRN    amLODIPine (Norvasc) tab 2.5 mg  2.5 mg Oral QPM    melatonin cap/tab 5 mg  5 mg Oral Nightly    pregabalin (Lyrica) cap 50 mg  50 mg Oral BID    escitalopram (Lexapro) tab 20 mg  20 mg Oral Daily    cefTRIAXone (Rocephin) 1 g in sodium chloride 0.9% 100 mL IVPB-ADDV  1 g Intravenous Q24H    budesonide (Pulmicort) 0.5 MG/2ML nebulizer suspension 0.5 mg  0.5 mg  Nebulization 2 times daily    arformoterol (Brovana) 15 MCG/2ML nebulizer solution 15 mcg  15 mcg Nebulization 2 times daily    heparin (Porcine) 5000 UNIT/ML injection 5,000 Units  5,000 Units Subcutaneous 2 times per day    estradiol (Estrace) 0.1 MG/GM vaginal cream 1 g  1 g Vaginal Daily    acetaminophen (Tylenol) tab 650 mg  650 mg Oral Q6H PRN       Assessment & Plan    Recurrent UTI  -Previously on prophylactic cephalexin then methenamine, which was recently stopped as she continued to get UTI's and pt did not like taking the large pills  -Has been followed by urologist Dr. Munir Cullen but was recently referred to one of his female urology partners as he specializes more in urologic cancers.  Patient has not yet been able to see the new urologist due to difficulty getting out of the house.  -cause of recurrent UTI's likely due to poor po fluid intake and urinary incontinence  -Urine culture - >100K E.coli (R-amp, levo/cipro; S-bactrim, ancef, macrobid)  -On empiric ceftriaxone (day 4) - will change to po keflex at discharge  -WBC normalized; no fever for >24 hrs  -vaginal estrogen started 3/4  -trial of OTC cranberry pills as an outpatient  --renal/bladder ultrasound 3/5/35 -- mild left hydro; no definite obstructing stones  -discussed with urology Dr. Lassiter -- will check noncontrast CT a/p to definitively r/o stones (pt with IV dye allergy)     Deconditioning, weakness  -Ambulates with a walker at home, although with supervision.  Patient is largely sedentary  -PT/OT eval appreciated; rec home with      Hypertension  -on amlodipine 2.5mg qpm  -BP's good       Osteoporosis  DEXA in 7/2017- osteoporosis of left femoral neck (T -2.9).  Pt was briefly on fosamax in 2014. Declines restarting fosamax or other meds so would not check repeat DEXA.     Vitamin B12 deficiency    -not currently taking B12  -Vit B12 level okay/low-normal 430  -start OTC Vit B12 250mcg/day     Hx of iron deficiency anemia  -Had  EGD/C-scope in 2010 (Dr. Ferrari) -- negative. 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   -was previoudly on FeSO4 325mg/day, though not taking now  -Hgb 11.9>10.3>11.3  -suspect dilutional component as well as acute illness  -iron and TIBC low; suspect anemia of chronic disease; will however give a dose of IV ferrlecit     Lumbar spinal stenosis, chronic back pain    Has seen Dr. Salgado (ortho spine at [facility]).  Referred to pain specialist, Dr. Deirdre Elizabeth at [facility]. Had facet injections in 8/2018 which helped tremendously.  Saw Dr. Elizabeth again 9/25/18; did PT.  MRI in 4/2019 did not appear to show anything acute.  No longer taking Norco.     Bilateral knee OA  Sees ortho Dr. Booth -- has had b/l cortisone injections with transient improvement.  Has been advised for TKR's but pt reluctant.  Ambulates with a walker.       COPD  Essentially quit smoking in 8/2016.    On Advair at home  On home O2 2-3LNC     Basal and squamous cell cancer, face  S/p  Mohs surgeries in 4 areas of her face in 2018 (Dr. Jonas).      Post-herpetic neuralgia (dx'ed with shingles left upper back 6/8/22)  -improved but still with chronic pain left upper arm and left upper back  -on Lyrica 50mg BID      Depression and anxiety  -pt with long hx of depression (and anxiety)  -previously saw psychiatrist Dr. Friend (retired)  -no longer takes seroquel  -cont lexapro 20mg/day      Cognitive decline, likely age-related, poss exacerbated by depression  -saw neurologist Dr. Lokesh Mcmullen in 9/2022. 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  Had been able to recall remote and recent events.   -Pt was referred for neuropsych testing and MRI brain in 2022 by Dr. Mcmullen though did not schedule.    -pt with progression of her symptoms.  She has significant short-term memory  loss.  She is repeating herself during the conversation.  She sometimes is not making sense.  States she is going thru a divorce, then later states that she is going to be  (not true)  -has several caregivers who provide 24hr care and are with her when her  is at work      Advance directives  -have previously had lengthy discussions with pt re her wishes -- she was clear in her desire to not want to prolong her life -- did not want CPR or intubation or any artificial means of prolonging her life.  -pt signed a POLST form in 7/2023 (scanned in chart) -- she is DNR/comfort     VTE proph  SQH - pt refused  SCD's     Dispo  F/u CT - if okay will discharge later this afternoon/evening     Discussed with RN and dtr Nelli Rao MD  3/6/2025  10:42 AM

## 2025-03-06 NOTE — CM/SW NOTE
03/06/25 1403   Discharge disposition   Expected discharge disposition Home or Self   Post Acute Care Provider Home   Additional Home Care/Hospice Provider   ([facility])   DME/Infusion Providers [facility]   Discharge transportation [facility]     SW was informed that patient has a DC order. SW followed up with daughter with HH choice Daughter was verbally reviewed list over phone. Daughter wishes for [facility]. Pt requires an ambulance according to the RN due to being a max assist. SW called and ordered an Ambulance from [facility] to transport pt to Home   at 4:00 PM.  PCS flow sheet completed. RN to attached to AVS and print out. Daughter Nelli is aware of DC today.     SW/CM to remain available for support and/or discharge planning.     Cherie Purdy, MSW, LSW   x [phone]

## 2025-03-06 NOTE — PLAN OF CARE
[facility], 2.5LO2, discharge plan reviewed with caregiver.

## 2025-03-06 NOTE — PROGRESS NOTES
PT attempted in PM PT spoke with RN pt has orders for D/C at 1600 with ambulance and [facility] PT with family assist. Pt is currently requesting rest prior to D/C.

## 2025-03-06 NOTE — PLAN OF CARE
Plan of care reviewed with patient and caregivers. Patient alert to self, forgetful and confused at times. Requires frequent orientation. Purewick in place. Kidney/ bladder ultrasound completed. Patient became agitated this afternoon/ evening. PRN Seroquel ordered and administered. IV antibiotics continued. Afebrile. Safety measures in place.

## 2025-03-06 NOTE — PAYOR COMM NOTE
--------------  OBSERVATION STATUS    ADMISSION REVIEW     Payor: [employer]  Subscriber #:  [id number]  Authorization Number: [id number]    Admit date: 3/3/25  Admit time: 11:36 PM       REVIEW DOCUMENTATION:     ED Provider Notes        ED Provider Notes signed by Guevara Rich MD at 3/4/2025  2:09 AM       Author: Guevara Rich MD Service: Emergency Medicine Author Type: Physician    Filed: 3/4/2025  2:09 AM Date of Service: 3/4/2025  2:05 AM Status: Signed    : Guevara Rich MD (Physician)           Patient Seen in: [facility] Emergency Department    History     Chief Complaint   Patient presents with    Fever       HPI    Patient presents to the ED with her  and daughter for fever for the last 1 day.  History of UTIs and had a urinalysis done earlier today that showed evidence for infection.  Sent to the ED for evaluation.  Patient is on oxygen chronically for COPD.  She denies any shortness of breath or cough.    History reviewed.   Past Medical History:    Anemia    Anxiety    Basal cell carcinoma    Mouth    Calculus of kidney    Calculus, kidney    COPD (chronic obstructive pulmonary disease) (HCC)    Depression    Esophageal reflux    Hearing impaired person, bilateral    Hematuria    History of recurrent UTIs    Hypercholesterolemia    Kidney stone    Osteoarthritis    Osteopenia    Other and unspecified hyperlipidemia    Recurrent UTI    Scoliosis    Spinal stenosis    Squamous cell cancer of lip    Thyroid nodule    Urinary frequency    Vitamin D deficiency       History reviewed.   Past Surgical History:   Procedure Laterality Date    Cataract Right 05/2016    Cysto/uretero w/lithotripsy Right 10/29/2015    Procedure: LITHOTRIPSY HOLMIUM LASER WITH CYSTOSCOPY;  Surgeon: Tommie Majano MD;  Location: [facility]    Cystoscopy,insert ureteral stent Right 09/24/2015    Procedure: LITHOTRIPSY WITH CYSTOSCOPY, STENT PLACEMENT;  Surgeon:  Tommie Majano MD;  Location: [facility]    Fragmenting of kidney stone Right 09/24/2015    Procedure: LITHOTRIPSY WITH CYSTOSCOPY, STENT PLACEMENT;  Surgeon: Tommie Majano MD;  Location: [facility]    Other surgical history  1960    Surgery of Blockage in Urethral Tube    Other surgical history  1985 and on 08/24/00    ESWL - left    Other surgical history  07/19/2000    Cystourethroscopy with Urethral Calibration and Dilation with Bilateral Retrograde Pyelogram and Left Ureteral Stone Manipulation with insertion of Double-J Stent    Other surgical history  06/2015    R eye surgery for bleed and then development of blood clot--resolved    Other surgical history  02/08/2016    Cystoscpy stent removal    Other surgical history      Other surgical history  03/2017    Mohs surgery    Patient documented not to have experienced any of the following events Right 09/24/2015    Procedure: LITHOTRIPSY WITH CYSTOSCOPY, STENT PLACEMENT;  Surgeon: Tommie Majano MD;  Location: [facility]    Patient documented not to have experienced any of the following events Right 10/29/2015    Procedure: CYSTOSCOPY/URETEROSCOPY/STONE EXTRACTION;  Surgeon: Tommie Majano MD;  Location: [facility]    Patient with preoperative order for iv antibiotic surgical site infect Right 09/24/2015    Procedure: LITHOTRIPSY WITH CYSTOSCOPY, STENT PLACEMENT;  Surgeon: Tommie Majano MD;  Location: [facility]    Patient with preoperative order for iv antibiotic surgical site infect Right 10/29/2015    Procedure: CYSTOSCOPY/URETEROSCOPY/STONE EXTRACTION;  Surgeon: Tommie Majano MD;  Location: [facility]    Skin surgery           Medications :  Prescriptions Prior to Admission[1]     Family History   Problem Relation Age of Onset    Breast Cancer Mother 67    Cancer Mother     Cancer Brother        Smoking Status:   Social History     Socioeconomic History    Marital  status:    Tobacco Use    Smoking status: Former     Current packs/day: 0.00     Average packs/day: 0.3 packs/day for 10.0 years (2.5 ttl pk-yrs)     Types: Cigarettes     Start date: 2006     Quit date: 2016     Years since quittin.5     Passive exposure: Past    Smokeless tobacco: Never   Vaping Use    Vaping status: Never Used   Substance and Sexual Activity    Alcohol use: No    Drug use: No   Other Topics Concern    Caffeine Concern No       Constitutional and vital signs reviewed.      Social History and Family History elements reviewed from today, pertinent positives to the presenting problem noted.    Physical Exam     ED Triage Vitals [25 1902]   /67   Pulse 99   Resp (!) 30   Temp 98.4 °F (36.9 °C)   Temp src Oral   SpO2 (!) 88 %   O2 Device None (Room air)       All measures to prevent infection transmission during my interaction with the patient were taken. Handwashing was performed prior to and after the exam.  Stethoscope and any equipment used during my examination was cleaned with super sani-cloth germicidal wipes following the exam.     Physical Exam  Vitals and nursing note reviewed.   Constitutional:       General: She is not in acute distress.     Appearance: She is well-developed. She is not ill-appearing or toxic-appearing.   HENT:      Head: Normocephalic and atraumatic.   Eyes:      General:         Right eye: No discharge.         Left eye: No discharge.      Conjunctiva/sclera: Conjunctivae normal.   Neck:      Trachea: No tracheal deviation.   Cardiovascular:      Rate and Rhythm: Normal rate.   Pulmonary:      Effort: Pulmonary effort is normal. No respiratory distress.      Breath sounds: No stridor.   Abdominal:      General: There is no distension.      Palpations: Abdomen is soft.      Tenderness: There is no abdominal tenderness.   Musculoskeletal:         General: No deformity.   Skin:     General: Skin is warm and dry.   Neurological:      Mental  Status: She is alert and oriented to person, place, and time.   Psychiatric:         Mood and Affect: Mood normal.         Behavior: Behavior normal.         ED Course        Labs Reviewed   CBC WITH DIFFERENTIAL WITH PLATELET - Abnormal; Notable for the following components:       Result Value    WBC 11.1 (*)     HGB 11.9 (*)     .0 (*)     Immature Platelet Fraction 7.7 (*)     Neutrophil Absolute Prelim 9.84 (*)     Neutrophil Absolute 9.84 (*)     Lymphocyte Absolute 0.66 (*)     All other components within normal limits   BASIC METABOLIC PANEL (8) - Abnormal; Notable for the following components:    Glucose 125 (*)     All other components within normal limits   RAINBOW DRAW LAVENDER   RAINBOW DRAW LIGHT GREEN       As Interpreted by me    Imaging Results Available and Reviewed while in ED: No results found.  ED Medications Administered:   Medications   amLODIPine (Norvasc) tab 2.5 mg (has no administration in time range)   Melatonin TABS 5 mg (has no administration in time range)   pregabalin (Lyrica) cap 50 mg (has no administration in time range)   cefTRIAXone (Rocephin) 1 g in sodium chloride 0.9% 100 mL IVPB-ADDV (0 g Intravenous Stopped 3/3/25 2326)         MDM     Vitals:    03/03/25 2302 03/03/25 2317 03/03/25 2353 03/03/25 2354   BP: 116/55 124/53 118/50    BP Location:   Right arm    Pulse: 90 86 89    Resp:   16    Temp:   99.3 °F (37.4 °C)    TempSrc:   Temporal    SpO2: 100% 100% 93%    Weight:    60.3 kg   Height:         *I personally reviewed and interpreted all ED vitals.    Pulse Ox: 93%, Room air, Normal     Monitor Interpretation:   normal sinus rhythm as interpreted by me.  The cardiac monitor was ordered to monitor heart rate.    Differential Diagnosis/ Diagnostic Considerations: UTI, pyelonephritis, other    Complicating Factors: The patient already has does not have any pertinent problems on file. to contribute to the complexity of this ED evaluation.    Medical Decision  Making  Patient presents to the ED with fevers x 1 day however none in the ED and evidence for urinary tract infection based on urinalysis done earlier today.  Family prefers the patient stay in the hospital for treatment.  Started on Rocephin in the ED based on past cultures and will admit.  Azithromycin back    Problems Addressed:  Acute pyelonephritis: acute illness or injury that poses a threat to life or bodily functions    Amount and/or Complexity of Data Reviewed  Independent Historian:      Details:  provides history details  Labs: ordered. Decision-making details documented in ED Course.  Discussion of management or test interpretation with external provider(s):  I discussed with Dr. Vu for admission.          Condition upon leaving the department: Stable    Disposition and Plan     Clinical Impression:  1. Acute pyelonephritis        Disposition:  Admit    Follow-up:  No follow-up provider specified.    Medications Prescribed:  Current Discharge Medication List          Hospital Problems       Present on Admission  Date Reviewed: 1/14/2025            ICD-10-CM Noted POA    * (Principal) Acute pyelonephritis N10 3/3/2025 Unknown                    Signed by Guevara Rich MD on 3/4/2025  2:09 AM         MEDICATIONS ADMINISTERED IN LAST 1 DAY:  amLODIPine (Norvasc) tab 2.5 mg       Date Action Dose Route User    3/5/2025 1834 Given 2.5 mg Oral Erica Bedolla, JANA          arformoterol (Brovana) 15 MCG/2ML nebulizer solution 15 mcg       Date Action Dose Route User    3/6/2025 0840 Given 15 mcg Nebulization Edita Cullen RCP    3/5/2025 1757 Given 15 mcg Nebulization Lilly Vazquez RCP          budesonide (Pulmicort) 0.5 MG/2ML nebulizer suspension 0.5 mg       Date Action Dose Route User    3/6/2025 0850 Given 0.5 mg Nebulization Edita Cullen RCP    3/5/2025 1805 Given 0.5 mg Nebulization Lilly Vazquez RCP          cefTRIAXone (Rocephin) 1 g in sodium chloride 0.9% 100 mL  IVPB-ADDV       Date Action Dose Route User    3/5/2025 2215 New Bag 1 g Intravenous Halima Raymundo RN          escitalopram (Lexapro) tab 20 mg       Date Action Dose Route User    3/6/2025 1200 Given 20 mg Oral Anh Hunt RN          estradiol (Estrace) 0.1 MG/GM vaginal cream 1 g       Date Action Dose Route User    3/6/2025 1200 Given 1 g Vaginal Anh Hunt RN          heparin (Porcine) 5000 UNIT/ML injection 5,000 Units       Date Action Dose Route User    3/6/2025 1159 Given 5,000 Units Subcutaneous (Right Lower Abdomen) Anh Hunt RN          melatonin cap/tab 5 mg       Date Action Dose Route User    3/5/2025 2215 Given 5 mg Oral Halima Raymundo RN          pregabalin (Lyrica) cap 50 mg       Date Action Dose Route User    3/6/2025 1200 Given 50 mg Oral Anh Hunt RN    3/5/2025 2100 Given 50 mg Oral Halima Raymundo RN          QUEtiapine (SEROquel) tab 25 mg       Date Action Dose Route User    3/5/2025 1834 Given 25 mg Oral Erica Bedolla RN          cyanocobalamin (Vitamin B12) tab 250 mcg       Date Action Dose Route User    3/6/2025 1221 Given 250 mcg Oral Anh Hunt RN            Vitals (last day)       Date/Time Temp Pulse Resp BP SpO2 Weight O2 Device O2 Flow Rate (L/min) Who    03/06/25 1157 98.4 °F (36.9 °C) 88 18 127/47 91 % -- Nasal cannula 2.5 L/min     03/06/25 0624 98.3 °F (36.8 °C) 88 18 136/58 90 % -- None (Room air) 2.5 L/min AS    03/05/25 2054 98.7 °F (37.1 °C) 103 18 141/54 91 % -- Nasal cannula 2.5 L/min AS    03/05/25 1209 97.8 °F (36.6 °C) 97 18 135/76 94 % -- Nasal cannula 2 L/min BS    03/05/25 0936 -- 85 -- 105/67 -- -- -- -- TL    03/05/25 0501 98.7 °F (37.1 °C) 84 23 112/49 92 % -- Nasal cannula 2 L/min MM    03/05/25 0227 101 °F (38.3 °C) -- -- -- -- -- -- -- DD          CIWA Scores (since admission)       None                     [1]   Medications Prior to Admission   Medication Sig Dispense Refill Last Dose/Taking    AMLODIPINE 2.5 MG Oral Tab  TAKE 1 TABLET(2.5 MG) BY MOUTH EVERY EVENING 90 tablet 0 3/2/2025    PREGABALIN 50 MG Oral Cap TAKE 1 CAPSULE BY MOUTH TWICE DAILY 180 capsule 1 3/2/2025    Melatonin 5 MG Oral Tab Take 1 tablet (5 mg total) by mouth at bedtime.   3/2/2025    escitalopram 20 MG Oral Tab Take 1 tablet (20 mg total) by mouth daily. 90 tablet 3 3/2/2025    [] cephALEXin 500 MG Oral Cap Take 1 capsule (500 mg total) by mouth 2 (two) times daily for 5 days. 10 capsule 0     lidocaine-menthol 4-1 % External Patch Place 1 patch onto the skin daily. 30 patch 0     fluticasone furoate-vilanterol 100-25 MCG/ACT Inhalation Aerosol Powder, Breath Activated Inhale 1 puff into the lungs daily. 180 each 3     methenamine 1 g Oral Tab Take 1 tablet (1 g total) by mouth 2 (two) times daily.

## 2025-03-07 ENCOUNTER — PATIENT OUTREACH (OUTPATIENT)
Dept: CASE MANAGEMENT | Age: OVER 89
End: 2025-03-07

## 2025-03-07 NOTE — PROGRESS NOTES
TCM Request   Hospital Follow up for PCP (Discharge 3/6 elm)      PCP  Kathy Rao   [facility]  [address] [place] [id number]  [phone]  Pt's caretaker declined to make follow up appt     Confirmed  Closing encounter

## 2025-03-08 ENCOUNTER — TELEPHONE (OUTPATIENT)
Dept: INTERNAL MEDICINE CLINIC | Facility: CLINIC | Age: OVER 89
End: 2025-03-08

## 2025-03-08 NOTE — TELEPHONE ENCOUNTER
Spoke with daughter, Nelli at ~9:30am today  Pt was up all night with the caregiver.  Was very agitated today.  Thought she was at a movie theater with the sign taken down.  Discussed starting seroquel 25mg.  Also discussed need to see a psychiatrist.  Nelli has a name of one but they had not yet scheduled.  She was encouraged to do so.    Then spoke with  nurse, Shalini, just now.  Shalini stated that the patient was more calm by the time she got to the house.    She educated the family on dealing with pt when she is sundowning or agitated.  Instructed family on prn use of seroquel.

## 2025-03-14 NOTE — DISCHARGE SUMMARY
[facility]  part of [facility]    Discharge Summary    Mireya Wolfe Patient Status:  Observation    1935 MRN [id number]   Location [facility] 4W/SW/SE Attending No att. providers found   Hosp Day # 1 PCP Kathy Rao MD     Date of Admission: 3/3/2025   Date of Discharge: 3/6/2025     Admitting Diagnosis: Acute pyelonephritis [N10]    Disposition: Home with Home Health Care    Discharge Diagnosis: .Principal Problem:    Acute pyelonephritis      Hospital Course:   Reason for Admission:   Recurrent UTI, weakness    Discharge Physical Exam:  Vital Signs:  Blood pressure 127/47, pulse 88, temperature 98.4 °F (36.9 °C), temperature source Oral, resp. rate 18, height 5' 5\" (1.651 m), weight 133 lb (60.3 kg), SpO2 91%.     See prog note    Hospital Course:     Recurrent UTI  -Previously on prophylactic cephalexin then methenamine, which was recently stopped as she continued to get UTI's and pt did not like taking the large pills  -Has been followed by urologist Dr. Munir Cullen but was recently referred to one of his female urology partners as he specializes more in urologic cancers.  Patient has not yet been able to see the new urologist due to difficulty getting out of the house.  -cause of recurrent UTI's likely due to poor po fluid intake and poor hygiene  -Urine culture - >100K E.coli (R-amp, levo/cipro; S-bactrim, ancef, macrobid)  -s/p ceftriaxone x 4 d - change to po keflex  -vaginal estrogen started 3/4  -trial of OTC cranberry pills as an outpatient  --renal/bladder ultrasound 3/5/35 -- mild left hydro; no definite obstructing stones  -discussed with urology Dr. Lassiter -- CT a/p unrevealing -- f/u with Dr. Lassiter as outpt     Deconditioning, weakness  -Ambulates with a walker at home, although with supervision.  Patient is largely sedentary  -PT/OT eval appreciated; rec home with      Hypertension  -on amlodipine 2.5mg qpm  -BP's good       Osteoporosis  DEXA in 2017-  osteoporosis of left femoral neck (T -2.9).  Pt was briefly on fosamax in 2014. Declines restarting fosamax or other meds so would not check repeat DEXA.     Vitamin B12 deficiency    -not currently taking B12  -Vit B12 level okay/low-normal 430  -start OTC Vit B12 250mcg/day     Hx of iron deficiency anemia  -Had EGD/C-scope in 2010 (Dr. Ferrari) -- negative. 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   -was previoudly on FeSO4 325mg/day, though not taking now  -Hgb 11.9>10.3>11.3  -suspect dilutional component as well as acute illness  -iron and TIBC low; suspect anemia of chronic disease; will however give a dose of IV ferrlecit     Lumbar spinal stenosis, chronic back pain    Has seen Dr. Salgado (ortho spine at [facility]).  Referred to pain specialist, Dr. Deirdre Elizabeth at [facility]. Had facet injections in 8/2018 which helped tremendously.  Saw Dr. Elizabeth again 9/25/18; did PT.  MRI in 4/2019 did not appear to show anything acute.  No longer taking Norco.     Bilateral knee OA  Sees ortho Dr. Booth -- has had b/l cortisone injections with transient improvement.  Has been advised for TKR's but pt reluctant.  Ambulates with a walker.       COPD  Essentially quit smoking in 8/2016.    On Advair at home  On home O2 2-3LNC     Basal and squamous cell cancer, face  S/p  Mohs surgeries in 4 areas of her face in 2018 (Dr. Jonas).      Post-herpetic neuralgia (dx'ed with shingles left upper back 6/8/22)  -improved but still with chronic pain left upper arm and left upper back  -on Lyrica 50mg BID      Depression and anxiety  -pt with long hx of depression (and anxiety)  -previously saw psychiatrist Dr. Friend (retired)  -no longer takes seroquel  -cont lexapro 20mg/day      Cognitive decline, likely age-related, poss exacerbated by depression  -saw neurologist Dr. Lokesh Mcmullen in 9/2022. Was thought to have late onset Alzheimer's  dementia (started on aricept), though pt had not exhibited any significant sx of dementia.  Had been able to recall remote and recent events.   -Pt was referred for neuropsych testing and MRI brain in 2022 by Dr. Mcmullen though did not schedule.    -pt with progression of her symptoms.  She has significant short-term memory loss.  She is repeating herself during the conversation.  She sometimes is not making sense.  States she is going thru a divorce, then later states that she is going to be  (not true)  -has several caregivers who provide 24hr care and are with her when her  is at work      Advance directives  -have previously had lengthy discussions with pt re her wishes -- she was clear in her desire to not want to prolong her life -- did not want CPR or intubation or any artificial means of prolonging her life.  -pt signed a POLST form in 7/2023 (scanned in chart) -- she is DNR/comfort       Consultants         Provider   Role Specialty     Yolanda Jackson MD      Consulting Physician UROLOGY                Discharge Plan:   Discharge Condition: Stable    Discharge Medication List as of 3/6/2025  3:46 PM        New Orders    Details   cyanocobalamin 250 MCG Oral Tab Take 1 tablet (250 mcg total) by mouth daily., OTC, Disp-30 tablet, R-0      estradiol 0.1 MG/GM Vaginal Cream Apply vaginally and externally daily x 1 week, then twice a week thereafter, Normal, Disp-42 g, R-5      vancomycin (VANCOCIN) 125 MG Oral Cap Take 1 capsule (125 mg total) by mouth daily., Normal, Disp-7 capsule, R-0      Cranberry 500 MG Oral Cap Take 1 capsule by mouth daily., OTC, Disp-90 capsule, R-0           Home Meds - Modified    Details   cephALEXin 500 MG Oral Cap Take 1 capsule (500 mg total) by mouth 3 (three) times daily., Normal, Disp-12 capsule, R-0           Home Meds - Unchanged    Details   AMLODIPINE 2.5 MG Oral Tab TAKE 1 TABLET(2.5 MG) BY MOUTH EVERY EVENING, Normal, Disp-90 tablet, R-0**Patient  requests 90 days supply**      PREGABALIN 50 MG Oral Cap TAKE 1 CAPSULE BY MOUTH TWICE DAILY, Normal, Disp-180 capsule, R-1      lidocaine-menthol 4-1 % External Patch Place 1 patch onto the skin daily., Normal, Disp-30 patch, R-0      Melatonin 5 MG Oral Tab Take 1 tablet (5 mg total) by mouth at bedtime., Historical      escitalopram 20 MG Oral Tab Take 1 tablet (20 mg total) by mouth daily., Normal, Disp-90 tablet, R-3                 Discharge Diet: As tolerated    Discharge Activity: As tolerated    Follow up:      Follow-up Information       Kathy Rao MD Follow up.    Specialty: Internal Medicine  Why: As needed  Contact information:  [address] [place] [id number] [phone]                                   >30 min were spent counseling patient and coordinating care    Kathy Rao MD  3/13/2025

## 2025-03-25 ENCOUNTER — TELEPHONE (OUTPATIENT)
Dept: INTERNAL MEDICINE CLINIC | Facility: CLINIC | Age: OVER 89
End: 2025-03-25

## 2025-04-08 ENCOUNTER — TELEPHONE (OUTPATIENT)
Dept: INTERNAL MEDICINE CLINIC | Facility: CLINIC | Age: OVER 89
End: 2025-04-08

## 2025-04-08 NOTE — TELEPHONE ENCOUNTER
Home health certification and plan of care signed by MD and faxed to [facility] at # [phone]. Confirmations received. To [employer].

## 2025-04-08 NOTE — TELEPHONE ENCOUNTER
Orders signed by MD and faxed to [facility] at # [phone]. Confirmation received. To scanning.

## 2025-05-21 ENCOUNTER — TELEPHONE (OUTPATIENT)
Dept: INTERNAL MEDICINE CLINIC | Facility: CLINIC | Age: OVER 89
End: 2025-05-21

## 2025-05-21 DIAGNOSIS — R41.0 CONFUSION: Primary | ICD-10-CM

## 2025-05-21 NOTE — TELEPHONE ENCOUNTER
Urine test ordered.  Please ask daughter to call to let us know when the urine sample has been brought to the lab so we can look out for the results, as I will not be here on Friday

## 2025-05-21 NOTE — TELEPHONE ENCOUNTER
Patient's daughter Nelli (Verified HIPAA) called to ask if appointment could be a phone appointment if Dr. Rao did need to see her.     Nelli is also asking for an order for a UTI test kit that patient's caregiver could  and drop back off as patient is very confused lately, urine has an odor and her personality has changed.     Nelli' #[phone]

## 2025-05-21 NOTE — TELEPHONE ENCOUNTER
Pt.called stating she has an appt. Next Thursday.  She is asking if  Really needs to see her or can she cancel it?

## 2025-05-22 ENCOUNTER — LAB ENCOUNTER (OUTPATIENT)
Dept: LAB | Age: OVER 89
End: 2025-05-22
Attending: INTERNAL MEDICINE
Payer: MEDICARE

## 2025-05-22 DIAGNOSIS — R41.0 CONFUSION: ICD-10-CM

## 2025-05-22 LAB
BILIRUB UR QL: NEGATIVE
GLUCOSE UR-MCNC: NORMAL MG/DL
HGB UR QL STRIP.AUTO: NEGATIVE
KETONES UR-MCNC: NEGATIVE MG/DL
LEUKOCYTE ESTERASE UR QL STRIP.AUTO: 500
PH UR: 7.5 [PH] (ref 5–8)
PROT UR-MCNC: NEGATIVE MG/DL
SP GR UR STRIP: 1.01 (ref 1–1.03)
UROBILINOGEN UR STRIP-ACNC: NORMAL

## 2025-05-22 PROCEDURE — 81001 URINALYSIS AUTO W/SCOPE: CPT

## 2025-05-22 PROCEDURE — 87077 CULTURE AEROBIC IDENTIFY: CPT

## 2025-05-22 PROCEDURE — 87186 SC STD MICRODIL/AGAR DIL: CPT

## 2025-05-22 PROCEDURE — 87086 URINE CULTURE/COLONY COUNT: CPT

## 2025-05-23 NOTE — TELEPHONE ENCOUNTER
Please notify pt's dtr that pt does have UTI- I sent in antbiotics for her and would encourage fluid intake; if no improvement or has worsening confusion/mental status, needs to go to ER

## 2025-05-24 RX ORDER — CEPHALEXIN 500 MG/1
500 CAPSULE ORAL 2 TIMES DAILY
Qty: 10 CAPSULE | Refills: 0 | Status: SHIPPED | OUTPATIENT
Start: 2025-05-24

## 2025-05-29 ENCOUNTER — TELEPHONE (OUTPATIENT)
Dept: INTERNAL MEDICINE CLINIC | Facility: CLINIC | Age: OVER 89
End: 2025-05-29

## 2025-05-29 NOTE — TELEPHONE ENCOUNTER
Patient called today to cancel or change her appointment to a telephone visit because she felt that she was too weak to come into the office.   Today's appointment is scheduled as a checkup.    Spoke with patient and caregiver (per patient's verbal request) and she states that she was diagnosed with a UTI last week. She is currently taking Cephalexin as prescribed. Patient states that she is feeling much better overall, but she is still feeling very tired and weak. Patient does not feel that she has enough energy to make it to today's appointment.   Patient denies any urinary symptoms, fevers, or pain. She is eating and drinking well and denies any other symptoms.      Patient advised to cancel today's appointment and reschedule for when she is able to come in. Patient confirmed that she is not having any other symptoms- therefore phone visit is not necessary. Patient verbalized an understanding and agreement to the plan. She will call back later to reschedule or will call back sooner with new/worsening symptoms.

## 2025-06-02 ENCOUNTER — PATIENT MESSAGE (OUTPATIENT)
Dept: INTERNAL MEDICINE CLINIC | Facility: CLINIC | Age: OVER 89
End: 2025-06-02

## 2025-06-04 NOTE — TELEPHONE ENCOUNTER
See MyChart message from daughter    Is she talking about methenamine?  Pt had apparently stopped it because she did not like taking the large pills.  If she is willing to restart it, I have pended a Rx.  Also please be sure that she has scheduled an appt with urology (Dr. Lassiter - who is Dr. Cullen's partner)

## 2025-06-05 RX ORDER — AMLODIPINE BESYLATE 2.5 MG/1
2.5 TABLET ORAL EVERY EVENING
Qty: 90 TABLET | Refills: 3 | Status: SHIPPED | OUTPATIENT
Start: 2025-06-05

## 2025-06-05 NOTE — TELEPHONE ENCOUNTER
Refill request is for a maintenance medication and has met the criteria specified in the Ambulatory Medication Refill Standing Order for eligibility, visits, laboratory, alerts and was sent to the requested pharmacy.    Requested Prescriptions     Signed Prescriptions Disp Refills    amLODIPine 2.5 MG Oral Tab 90 tablet 3     Sig: TAKE 1 TABLET(2.5 MG) BY MOUTH EVERY EVENING     Authorizing Provider: DARRYN CONDE     Ordering User: MIKKI KAMARA

## 2025-06-06 ENCOUNTER — HOSPITAL ENCOUNTER (EMERGENCY)
Facility: HOSPITAL | Age: OVER 89
Discharge: HOME | End: 2025-06-06
Attending: EMERGENCY MEDICINE
Payer: MEDICARE

## 2025-06-06 VITALS
BODY MASS INDEX: 22 KG/M2 | SYSTOLIC BLOOD PRESSURE: 154 MMHG | WEIGHT: 132.25 LBS | RESPIRATION RATE: 16 BRPM | HEART RATE: 82 BPM | OXYGEN SATURATION: 92 % | TEMPERATURE: 98 F | DIASTOLIC BLOOD PRESSURE: 69 MMHG

## 2025-06-06 DIAGNOSIS — N30.00 ACUTE CYSTITIS WITHOUT HEMATURIA: Primary | ICD-10-CM

## 2025-06-06 LAB
ANION GAP SERPL CALC-SCNC: 9 MMOL/L (ref 0–18)
BASOPHILS # BLD AUTO: 0.03 X10(3) UL (ref 0–0.2)
BASOPHILS NFR BLD AUTO: 0.4 %
BILIRUB UR QL: NEGATIVE
BUN BLD-MCNC: 13 MG/DL (ref 9–23)
BUN/CREAT SERPL: 16 (ref 10–20)
CALCIUM BLD-MCNC: 9.8 MG/DL (ref 8.7–10.4)
CHLORIDE SERPL-SCNC: 103 MMOL/L (ref 98–112)
CO2 SERPL-SCNC: 31 MMOL/L (ref 21–32)
CREAT BLD-MCNC: 0.81 MG/DL (ref 0.55–1.02)
DEPRECATED RDW RBC AUTO: 43.8 FL (ref 35.1–46.3)
EGFRCR SERPLBLD CKD-EPI 2021: 69 ML/MIN/1.73M2 (ref 60–?)
EOSINOPHIL # BLD AUTO: 0.08 X10(3) UL (ref 0–0.7)
EOSINOPHIL NFR BLD AUTO: 1.1 %
ERYTHROCYTE [DISTWIDTH] IN BLOOD BY AUTOMATED COUNT: 13.4 % (ref 11–15)
GLUCOSE BLD-MCNC: 102 MG/DL (ref 70–99)
GLUCOSE UR-MCNC: NORMAL MG/DL
HCT VFR BLD AUTO: 38.5 % (ref 35–48)
HGB BLD-MCNC: 12.3 G/DL (ref 12–16)
IMM GRANULOCYTES # BLD AUTO: 0.03 X10(3) UL (ref 0–1)
IMM GRANULOCYTES NFR BLD: 0.4 %
KETONES UR-MCNC: NEGATIVE MG/DL
LEUKOCYTE ESTERASE UR QL STRIP.AUTO: 500
LYMPHOCYTES # BLD AUTO: 0.96 X10(3) UL (ref 1–4)
LYMPHOCYTES NFR BLD AUTO: 12.6 %
MAGNESIUM SERPL-MCNC: 2 MG/DL (ref 1.6–2.6)
MCH RBC QN AUTO: 28.4 PG (ref 26–34)
MCHC RBC AUTO-ENTMCNC: 31.9 G/DL (ref 31–37)
MCV RBC AUTO: 88.9 FL (ref 80–100)
MONOCYTES # BLD AUTO: 0.29 X10(3) UL (ref 0.1–1)
MONOCYTES NFR BLD AUTO: 3.8 %
NEUTROPHILS # BLD AUTO: 6.22 X10 (3) UL (ref 1.5–7.7)
NEUTROPHILS # BLD AUTO: 6.22 X10(3) UL (ref 1.5–7.7)
NEUTROPHILS NFR BLD AUTO: 81.7 %
OSMOLALITY SERPL CALC.SUM OF ELEC: 296 MOSM/KG (ref 275–295)
PH UR: 7 [PH] (ref 5–8)
PLATELET # BLD AUTO: 177 10(3)UL (ref 150–450)
POTASSIUM SERPL-SCNC: 3.9 MMOL/L (ref 3.5–5.1)
PROT UR-MCNC: NEGATIVE MG/DL
RBC # BLD AUTO: 4.33 X10(6)UL (ref 3.8–5.3)
RBC #/AREA URNS AUTO: >10 /HPF
SODIUM SERPL-SCNC: 143 MMOL/L (ref 136–145)
SP GR UR STRIP: 1.01 (ref 1–1.03)
UROBILINOGEN UR STRIP-ACNC: NORMAL
WBC # BLD AUTO: 7.6 X10(3) UL (ref 4–11)
WBC #/AREA URNS AUTO: >50 /HPF
WBC CLUMPS UR QL AUTO: PRESENT /HPF

## 2025-06-06 PROCEDURE — 80048 BASIC METABOLIC PNL TOTAL CA: CPT | Performed by: EMERGENCY MEDICINE

## 2025-06-06 PROCEDURE — 83735 ASSAY OF MAGNESIUM: CPT | Performed by: EMERGENCY MEDICINE

## 2025-06-06 PROCEDURE — 85025 COMPLETE CBC W/AUTO DIFF WBC: CPT | Performed by: EMERGENCY MEDICINE

## 2025-06-06 PROCEDURE — 81001 URINALYSIS AUTO W/SCOPE: CPT | Performed by: EMERGENCY MEDICINE

## 2025-06-06 PROCEDURE — 96365 THER/PROPH/DIAG IV INF INIT: CPT

## 2025-06-06 PROCEDURE — 99284 EMERGENCY DEPT VISIT MOD MDM: CPT

## 2025-06-06 PROCEDURE — 87186 SC STD MICRODIL/AGAR DIL: CPT | Performed by: EMERGENCY MEDICINE

## 2025-06-06 PROCEDURE — 87077 CULTURE AEROBIC IDENTIFY: CPT | Performed by: EMERGENCY MEDICINE

## 2025-06-06 PROCEDURE — 87086 URINE CULTURE/COLONY COUNT: CPT | Performed by: EMERGENCY MEDICINE

## 2025-06-06 RX ORDER — METHENAMINE HIPPURATE 1000 MG/1
1 TABLET ORAL 2 TIMES DAILY
Qty: 180 TABLET | Refills: 1 | Status: SHIPPED | OUTPATIENT
Start: 2025-06-06

## 2025-06-06 NOTE — TELEPHONE ENCOUNTER
Please call patient daughterNelli  Do not want to used Methenamine, patient unable to take, too large  Requesting refill on Cephalexin   Is another urine sample needed?  [facility] uses [facility]  Tasked to nursing

## 2025-06-06 NOTE — ED PROVIDER NOTES
Patient Seen in: [facility] Emergency Department    History     Chief Complaint   Patient presents with    Urinary Symptoms       HPI    89-year-old female with a history of recurring UTIs, COPD, renal stones who was sent by home health nurse due to increased urinary frequency and concern for UTI.  Patient has a history of dementia so all history from EMS for the home health nurse along with previous chart review    History reviewed. Past Medical History[1]    History reviewed. Past Surgical History[2]      Medications :  Prescriptions Prior to Admission[3]     Family History[4]    Smoking Status: Social Hx on file[5]    Constitutional and vital signs reviewed.      Social History and Family History elements reviewed from today, pertinent positives to the presenting problem noted.    Physical Exam     ED Triage Vitals [06/06/25 1339]   /78   Pulse 92   Resp 18   Temp 98.2 °F (36.8 °C)   Temp src Oral   SpO2    O2 Device        All measures to prevent infection transmission during my interaction with the patient were taken. Handwashing was performed prior to and after the exam.  Stethoscope and any equipment used during my examination was cleaned with super sani-cloth germicidal wipes following the exam.     Physical Exam  Vitals and nursing note reviewed.   Cardiovascular:      Rate and Rhythm: Normal rate.   Pulmonary:      Effort: Pulmonary effort is normal.   Abdominal:      Palpations: Abdomen is soft.   Neurological:      Mental Status: She is alert. Mental status is at baseline.         ED Course        Labs Reviewed   URINALYSIS WITH CULTURE REFLEX - Abnormal; Notable for the following components:       Result Value    Clarity Urine Turbid (*)     Blood Urine Trace (*)     Nitrite Urine 2+ (*)     Leukocyte Esterase Urine 500 (*)     WBC Urine >50 (*)     RBC Urine >10 (*)     Bacteria Urine 2+ (*)     Squamous Epi. Cells Few (*)     Transitional Cells Few (*)     WBC Clump Present (*)     All  other components within normal limits   CBC WITH DIFFERENTIAL WITH PLATELET - Abnormal; Notable for the following components:    Lymphocyte Absolute 0.96 (*)     All other components within normal limits   BASIC METABOLIC PANEL (8) - Abnormal; Notable for the following components:    Glucose 102 (*)     Calculated Osmolality 296 (*)     All other components within normal limits   MAGNESIUM - Normal   URINE CULTURE, ROUTINE       As Interpreted by me    Imaging Results Available and Reviewed while in ED: No results found.  ED Medications Administered:   Medications   gentamicin (Garamycin) 280 mg in sodium chloride 0.9% 100 mL IVPB (has no administration in time range)         MDM     Vitals:    06/06/25 1339   BP: 130/78   Pulse: 92   Resp: 18   Temp: 98.2 °F (36.8 °C)   TempSrc: Oral   Weight: 60 kg     *I personally reviewed and interpreted all ED vitals.    Pulse Ox:  , Room air, Normal     Monitor Interpretation:   normal sinus rhythm as interpreted by me.  The cardiac monitor was ordered monitor cardiac rate and rhythm.    Differential Diagnosis/ Diagnostic Considerations: UTI, sepsis, pyelo-    Complicating Factors: The patient already has does not have any pertinent problems on file. to contribute to the complexity of this ED evaluation.    Medical Decision Making  Amount and/or Complexity of Data Reviewed  Independent Historian: EMS     Details: Caretaker at bedside  External Data Reviewed: labs and notes.     Details: I reviewed notes from patient's primary care provider Dr. Brittney bray to help contribute to patient's medical history along with previous to compare today look for anything new and acute urine culture results and sensitivities to finding antibiotics at work for the UTI today  Labs: ordered. Decision-making details documented in ED Course.    Risk  OTC drugs.  Prescription drug management.    I discussed the results with ED pharmacist chest.  Patient just finished a course of Keflex however  reviewing her previous culture sensitivities was asking when another medication I can give did not admit her.  Patient is allergic to sulfa the only other oral medication.  She recommended trying a one-time dose of gentamicin 5 mg/kg.    I reviewed results with the patient and her home health care worker at the bedside.  I explained the blood work did not show anything acute.  Patient does still have a UTI.  I explained to them my discussion with the ED pharmacist and that we will give her a one-time dose of IV gentamicin and she can be discharged home.  Patient should follow-up with her primary care provider 1 to 2 days for further evaluation.  Return to the ER if some continue, get worse, unable to follow    Condition upon leaving the department: Stable    Disposition and Plan     Clinical Impression:  1. Acute cystitis without hematuria        Disposition:  Discharge    Follow-up:  Kathy Rao MD  [address] [place] [id number]  [phone]    Follow up        Medications Prescribed:  Current Discharge Medication List                           [1]   Past Medical History:   Anemia    Anxiety    Basal cell carcinoma    Mouth    Calculus of kidney    Calculus, kidney    COPD (chronic obstructive pulmonary disease) (HCC)    Depression    Esophageal reflux    Hearing impaired person, bilateral    Hematuria    History of recurrent UTIs    Hypercholesterolemia    Kidney stone    Osteoarthritis    Osteopenia    Other and unspecified hyperlipidemia    Recurrent UTI    Scoliosis    Spinal stenosis    Squamous cell cancer of lip    Thyroid nodule    Urinary frequency    Vitamin D deficiency   [2]   Past Surgical History:  Procedure Laterality Date    Cataract Right 05/2016    Cysto/uretero w/lithotripsy Right 10/29/2015    Procedure: LITHOTRIPSY HOLMIUM LASER WITH CYSTOSCOPY;  Surgeon: Tommie Majano MD;  Location: [facility]    Cystoscopy,insert ureteral stent Right 09/24/2015    Procedure:  LITHOTRIPSY WITH CYSTOSCOPY, STENT PLACEMENT;  Surgeon: Tommie Majano MD;  Location: [facility]    Fragmenting of kidney stone Right 09/24/2015    Procedure: LITHOTRIPSY WITH CYSTOSCOPY, STENT PLACEMENT;  Surgeon: Tommie Mjaano MD;  Location: [facility]    Other surgical history  1960    Surgery of Blockage in Urethral Tube    Other surgical history  1985 and on 08/24/00    ESWL - left    Other surgical history  07/19/2000    Cystourethroscopy with Urethral Calibration and Dilation with Bilateral Retrograde Pyelogram and Left Ureteral Stone Manipulation with insertion of Double-J Stent    Other surgical history  06/2015    R eye surgery for bleed and then development of blood clot--resolved    Other surgical history  02/08/2016    Cystoscpy stent removal    Other surgical history      Other surgical history  03/2017    Mohs surgery    Patient documented not to have experienced any of the following events Right 09/24/2015    Procedure: LITHOTRIPSY WITH CYSTOSCOPY, STENT PLACEMENT;  Surgeon: Tommie Majano MD;  Location: [facility]    Patient documented not to have experienced any of the following events Right 10/29/2015    Procedure: CYSTOSCOPY/URETEROSCOPY/STONE EXTRACTION;  Surgeon: Tommie Majano MD;  Location: [facility]    Patient with preoperative order for iv antibiotic surgical site infect Right 09/24/2015    Procedure: LITHOTRIPSY WITH CYSTOSCOPY, STENT PLACEMENT;  Surgeon: Tommie Majano MD;  Location: [facility]    Patient with preoperative order for iv antibiotic surgical site infect Right 10/29/2015    Procedure: CYSTOSCOPY/URETEROSCOPY/STONE EXTRACTION;  Surgeon: Tommie Majano MD;  Location: [facility]    Skin surgery     [3] (Not in a hospital admission)   [4]   Family History  Problem Relation Age of Onset    Breast Cancer Mother 67    Cancer Mother     Cancer Brother    [5]   Social History  Socioeconomic  History    Marital status:    Tobacco Use    Smoking status: Former     Current packs/day: 0.00     Average packs/day: 0.3 packs/day for 10.0 years (2.5 ttl pk-yrs)     Types: Cigarettes     Start date: 2006     Quit date: 2016     Years since quittin.7     Passive exposure: Past    Smokeless tobacco: Never   Vaping Use    Vaping status: Never Used   Substance and Sexual Activity    Alcohol use: No    Drug use: No   Other Topics Concern    Caffeine Concern No

## 2025-06-06 NOTE — DISCHARGE INSTRUCTIONS
Follow-up with her primary care provider 1 to 2 days.  Return to the ER if some continue, get worse, unable to follow-up

## 2025-06-06 NOTE — TELEPHONE ENCOUNTER
I spoke to daughter Nelli and relayed Dr Rao's message to her. She verbalized understanding.  They would like to restart the methenamine. Pended Methenamine eRX sent to [facility].  She will make sure that patient has an appointment scheduled with urology.

## 2025-06-06 NOTE — ED QUICK NOTES
[facility] contacted for [facility] transport back home. ETA: 1830. [facility] contacted and offered a earlier ETA of 1645. [facility] cancelled. Patient and care giver updated with ETA

## 2025-06-06 NOTE — ED QUICK NOTES
Patient presents to ED 54 from triage with c/o increased weakness and decreased urine output over the past few days per caregiver. Patient denies any symptoms and states she feels fine. Recent treatment for a UTI last week. Patient is A&O x 4. No distress noted. Respirations regular and unlabored. Call light at reach.    Caregiver at bedside.

## 2025-06-06 NOTE — ED QUICK NOTES
Rounding Completed    Plan of Care reviewed. Waiting for abx to complete and will be discharged after.  Elimination needs assessed, patient is wearing a depends and states she has not voided.  Patient respirations regular and unlabored.  Provided bed repositioning for comfort.    Bed is locked and in lowest position. Call light within reach.    Caregiver at bedside.

## 2025-06-06 NOTE — ED QUICK NOTES
Rounding Completed    Plan of Care reviewed. Waiting for [facility]  transport.  Elimination needs assessed.  Patient respirations regular and unlabored.  Provided updates and warm blanket.  Caregiver at bedside.    Bed is locked and in lowest position. Call light within reach.

## 2025-06-09 ENCOUNTER — TELEPHONE (OUTPATIENT)
Dept: INTERNAL MEDICINE CLINIC | Facility: CLINIC | Age: OVER 89
End: 2025-06-09

## 2025-06-09 DIAGNOSIS — R41.0 CONFUSION: ICD-10-CM

## 2025-06-09 DIAGNOSIS — R39.9 UTI SYMPTOMS: Primary | ICD-10-CM

## 2025-06-09 NOTE — TELEPHONE ENCOUNTER
To Dr. ERAZO     Please advise       Patient has been having ongoing AMS - paranoia/ hallucinations. Was taken to the ED, daughter stated she received IV antibiotics and was discharged but she is still having AMS. UA ordered per protocol. Daughter asking if Keflex can be ordered - called Dr. Angeli Lassiter to schedule, next opening is in July and they will not order anything for patient until  establishes care with them

## 2025-06-09 NOTE — TELEPHONE ENCOUNTER
Patient is calling to speak to Dr. Rao.   Would like to update Dr. Rao regarding her ED visit.   Patient is stating people are calling our office on her behalf, without her knowledge.  States  took her phone from her.     Patient would like to only speak to Dr. Rao.     Best call back number [phone]   She is aware Dr. Rao is out of office, can wait until tomorrow for call back.

## 2025-06-09 NOTE — TELEPHONE ENCOUNTER
Patient's daughter, Nelli Strange (Not listed on 2025 HIPAA only on 2024) called for referral for 7/3/25 appt with Dr. Maikol Aiken would also like to discuss Potential D[place] medicines   Dr. Lassiter is unable to provide any direction until patient establishes care with her on 7/3/25.   Nelli states patient is confused due to UTI   Nelli is requesting Cephalexin for 5 days to help UTI    Nelli is asking if patient needs to go to ER again   Nelli's #[phone]

## 2025-06-10 RX ORDER — CEPHALEXIN 500 MG/1
500 CAPSULE ORAL 3 TIMES DAILY
Qty: 21 CAPSULE | Refills: 0 | Status: SHIPPED | OUTPATIENT
Start: 2025-06-10 | End: 2025-06-17

## 2025-06-10 NOTE — TELEPHONE ENCOUNTER
Patient's daughter, Nelli (Not listed on 2025 HIPAA) called to request clinical team or  please call patient and advise her that she needs to complete UA.     Nelli stated that per patient, she won't do the test unless she hears from physician's office.       Patient's #[phone]

## 2025-06-10 NOTE — TELEPHONE ENCOUNTER
Urine culture on 6/8 positive for e coli. Patient received one dose of IV Gentamicin in ER on 6/6.    Called and spoke with patient. Asked patient if she could give another urine sample (ordered yesterday). Patient could not comprehend what I was asking.     To Dr. Rao to please advise--

## 2025-06-10 NOTE — TELEPHONE ENCOUNTER
Spoke with Nelli.    Finished Keflex about 5 days ago.    The [facility] people recommended prophylactic nitrofurantoin, but then canceled it (prob because urine cx on 6/8 grew E.coli that was resistant to nitrofurantoin).    Last week she was in the ED on 6/6 w/worsening AMS.  Had UA -- UTI  Was given a dose of gentamicin IV but they did not send her with a Rx for oral abx.  Nelli feels her MS is still not back to normal.   Reviewed results of urine cx -- E.coli sensitive to Keflex    Recommend a 7 day course of Keflex 500mg TID x 7 days.    Pt has apparently refused to continue the methenamine prophylactically b/c the pills are so big.    Has upcoming appt with  on 7/3/25

## 2025-06-10 NOTE — TELEPHONE ENCOUNTER
Please call pt and let her know that I reviewed her urine cx from the ER last week.  I think we should give her another course of cephalexin 500mg TID x 7 days (sent to [facility]).    I also would really like her to try taking the methenamine (1g BID) after completing the cephalexin, as that will help to prevent UTI's in the future.    She can hold off on doing another UA for now

## 2025-06-10 NOTE — TELEPHONE ENCOUNTER
Patient called to check on status of call back from Dr. Rao.     Patient was advised that message was relayed to Dr. Rao.

## 2025-06-24 ENCOUNTER — TELEPHONE (OUTPATIENT)
Dept: INTERNAL MEDICINE CLINIC | Facility: CLINIC | Age: OVER 89
End: 2025-06-24

## 2025-06-24 NOTE — TELEPHONE ENCOUNTER
Patient called, re: Methenamine   Patient cuts the pill in half, it is chalky and it makes her sick  Patient advises she has not been taking the medication     Requests call back from Dr Rao   [phone]

## 2025-06-25 NOTE — TELEPHONE ENCOUNTER
Patient called back after receiving message from Nurse regarding Dr. Angeli Lassiter.     Patient states she does not know this provider and would like further clarification from Dr. Rao.     Patient #[phone]

## 2025-06-25 NOTE — TELEPHONE ENCOUNTER
Called patient and caregiver and explained that she needs to discuss medicine issue with urologist

## 2025-07-21 ENCOUNTER — MED REC SCAN ONLY (OUTPATIENT)
Dept: INTERNAL MEDICINE CLINIC | Facility: CLINIC | Age: OVER 89
End: 2025-07-21

## (undated) NOTE — IP AVS SNAPSHOT
Patient Demographics     Address  [address] [place] [address] Phone  [phone] (Home)  [phone] (Mobile) E-mail Address  [email or web address]      Patient Contacts     Name Relation Home Work Mobile    Alphonse Wolfe Spouse [phone]    Alphonse Wolfe Jr Son   [phone]    Nelli Strange Daughter   [phone]    Joaquim Wolfe   [phone]    MARITZA WOLFE Son   [phone]      Allergies as of 1/21/2024  Review status set to In Progress on 1/15/2024       Noted Reaction Type Reactions    Shellfish-derived Products 04/18/2019    NAUSEA AND VOMITING    Erythromycin 07/30/2014    UNKNOWN    Iodine (topical) 11/15/2019    OTHER (SEE COMMENTS)    Ioversol 07/30/2014    UNKNOWN    Other 11/15/2019    OTHER (SEE COMMENTS)    Radiology Contrast Iodinated Dyes 02/26/2015    DIZZINESS      Code Status Information     Code Status    DNAR/Selective Treatment      Patient Instructions    None      Follow-up Information     Kate Calloway NP Follow up in 2 week(s).    Specialty: Nurse Practitioner  Contact information:  [address]   [address]  [facility] [id number] [phone]             Tommie Felix MD Follow up in 1 month(s).    Specialties: Interventional, Cardiology, CARDIOLOGY  Contact information:  [address]  [address]  [facility] [id number] [phone]             Kathy Rao MD Follow up in 3 week(s).    Specialty: Internal Medicine  Why: see Dr. Rao after discharge from [facility]  Contact information:  [address] [facility] [address] [phone]                        Your Home Meds List      TAKE these medications       Instructions Authorizing Provider Morning Afternoon Evening As Needed   ALPRAZolam 0.25 MG Tabs  Commonly known as: Xanax      Take 1 tablet (0.25 mg total) by mouth nightly as needed for Anxiety.   Kathy Rao         amLODIPine 5 MG Tabs  Commonly known as: Norvasc  Next dose due: Tomorrow morning      TAKE 1  TABLET BY MOUTH EVERY DAY. HOLD FOR SYSTOLIC BLOOD PRESSURE< 110   Kathy Spirakes         ARIPiprazole 2 MG Tabs  Commonly known as: Abilify  Next dose due: Tomorrow morning      Take 0.5 tablets (1 mg total) by mouth daily.   Kathy Spirakes         atorvastatin 40 MG Tabs  Commonly known as: Lipitor  Next dose due: tonight      Take 1 tablet (40 mg total) by mouth nightly.   Kathy Spirakes         cephalexin 500 MG Caps  Commonly known as: Keflex  Next dose due: tonight      Take 1 capsule (500 mg total) by mouth 4 (four) times daily.   Kathy Spirakes         cyanocobalamin 1000 MCG Tabs  Commonly known as: Vitamin B12  Next dose due: Tomorrow morning      Take 1 tablet (1,000 mcg total) by mouth daily.          escitalopram 5 MG Tabs  Commonly known as: Lexapro  Next dose due: Tomorrow morning      Take 3 tablets (15 mg total) by mouth every morning.   Kathy Spirakes         ferrous sulfate 325 (65 FE) MG Tbec  Next dose due: Tomorrow morning      Take 1 tablet (325 mg total) by mouth daily with breakfast.          fluticasone furoate-vilanterol 100-25 MCG/ACT Aepb  Commonly known as: Breo Ellipta  Next dose due: Tomorrow morning      INHALE 1 PUFF INTO THE LUNGS DAILY   Kathy Spirakes         ipratropium-albuterol 0.5-2.5 (3) MG/3ML Soln  Commonly known as: Duoneb  Next dose due: tonight      Take 3 mL by nebulization every 6 (six) hours while awake.   Kathy Spirakes         Polyethylene Glycol 3350 17 g Pack  Commonly known as: MIRALAX      Take 17 g by mouth daily as needed.   Kathy Spirakes         predniSONE 20 MG Tabs  Commonly known as: Deltasone  Start taking on: January 22, 2024  Next dose due: Tomorrow morning      Take 2 tablets (40 mg total) by mouth daily, THEN 1 tablet (20 mg total) daily.   Kathy Spirakes         pregabalin 50 MG Caps  Commonly known as: Lyrica  Next dose due: tonight      TAKE 1 CAPSULE BY MOUTH TWICE DAILY   Kathy Spirakes         triamcinolone 0.1 % Crea  Commonly known  as: Kenalog  Next dose due: tonight      APPLY TOPICALLY TO THE AFFECTED AREA TWICE DAILY FOR RASH   Kathy Rao         vancomycin 125 MG Caps  Commonly known as: Vancocin  Start taking on: January 22, 2024  Next dose due: Tomorrow morning      Take 1 capsule (125 mg total) by mouth daily.   Kathy Rao         Vitamin D 125 MCG (5000 UT) Caps  Next dose due: Tomorrow morning      Take 1 capsule (5,000 Units total) by mouth daily.   Kathy Rao                  [phone] - MAR ACTION REPORT  (last 48 hrs)    ** SITE UNKNOWN **     Order ID Medication Name Action Time Action Reason Comments    [id number] ARIPiprazole (Abilify) tab 0.5 mg 01/20/24 0910 Given      [id number] ARIPiprazole (Abilify) tab 0.5 mg 01/21/24 0937 Given      [id number] amLODIPine (Norvasc) tab 5 mg 01/20/24 1039 Given      [id number] amLODIPine (Norvasc) tab 5 mg 01/21/24 0936 Given      [id number] atorvastatin (Lipitor) tab 40 mg 01/19/24 2055 Given      [id number] atorvastatin (Lipitor) tab 40 mg 01/20/24 2302 Given      [id number] ceFAZolin (Ancef) 2 g in 20mL IV syringe premix 01/20/24 0109 Given      [id number] ceFAZolin (Ancef) 2 g in 20mL IV syringe premix 01/20/24 0910 Given      [id number] ceFAZolin (Ancef) 2 g in 20mL IV syringe premix 01/20/24 1709 Given      [id number] ceFAZolin (Ancef) 2 g in 20mL IV syringe premix 01/21/24 0122 Given      [id number] ceFAZolin (Ancef) 2 g in 20mL IV syringe premix 01/21/24 0945 Given      [id number] escitalopram (Lexapro) tab 15 mg 01/20/24 0903 Given      [id number] escitalopram (Lexapro) tab 15 mg 01/21/24 0935 Given      [id number] ferrous sulfate DR tab 325 mg 01/20/24 0904 Given      [id number] ferrous sulfate DR tab 325 mg 01/21/24 0936 Given      [id number] ipratropium-albuterol (Duoneb) 0.5-2.5 (3) MG/3ML inhalation solution 3 mL 01/20/24 1020 Given      [id number] ipratropium-albuterol (Duoneb) 0.5-2.5 (3) MG/3ML inhalation solution 3 mL 01/20/24 1530 Given      [id number] ipratropium-albuterol  (Duoneb) 0.5-2.5 (3) MG/3ML inhalation solution 3 mL 01/20/24 1904 Given      [id number] ipratropium-albuterol (Duoneb) 0.5-2.5 (3) MG/3ML inhalation solution 3 mL 01/21/24 0721 Given      [id number] ipratropium-albuterol (Duoneb) 0.5-2.5 (3) MG/3ML inhalation solution 3 mL 01/21/24 1312 Given      [id number] methylPREDNISolone sodium succinate (Solu-MEDROL) injection 40 mg 01/20/24 1039 Given      [id number] methylPREDNISolone sodium succinate (Solu-MEDROL) injection 40 mg 01/21/24 0945 Given      [id number] potassium chloride (K-Dur) tab 40 mEq 01/21/24 0944 Given      [id number] pregabalin (Lyrica) cap 50 mg 01/19/24 2055 Given      [id number] pregabalin (Lyrica) cap 50 mg 01/20/24 0904 Given      [id number] pregabalin (Lyrica) cap 50 mg 01/20/24 2302 Given      [id number] pregabalin (Lyrica) cap 50 mg 01/21/24 0936 Given      [id number] vancomycin (Vancocin) cap 125 mg 01/20/24 1039 Given      [id number] vancomycin (Vancocin) cap 125 mg 01/21/24 0936 Given              Recent Vital Signs    Flowsheet Row Most Recent Value   /54 Filed at 01/21/2024 1352   Pulse 98 Filed at 01/21/2024 1621   Resp 20 Filed at 01/21/2024 1352   Temp 97.8 °F (36.6 °C) Filed at 01/21/2024 1352   SpO2 92 % Filed at 01/21/2024 1352      Patient's Most Recent Weight    Flowsheet Row Most Recent Value   Patient Weight 56.8 kg (125 lb 4.8 oz)      CPAP Settings (Inpatient)    Flowsheet Row Most Recent Value   Mode Spontaneous/Timed   Interface Full face mask   Mask size Medium   Set rate 12 breaths/min   Set IPAP 12   Set EPAP 5   O2% 60 %   I time 1         Lab Results Last 24 Hours      RAINBOW DRAW LAVENDER [[id number]]  Resulted: 01/21/24 0900, Result status: Final result   Ordering provider: Aide Vu DO  01/21/24 0745 Resulting lab: [facility] ([facility])    Specimen Information    Type Source Collected On   Blood — 01/21/24 0724          Components    Component Value Reference Range Flag Lab   Hold Lavender Auto  Resulted — — [facility])            Basic Metabolic Panel (8) [[id number]] (Abnormal)  Resulted: 01/21/24 0823, Result status: Final result   Ordering provider: Kathy Rao MD  01/20/24 2300 Resulting lab: [facility] ([facility])    Specimen Information    Type Source Collected On   Blood — 01/21/24 0724          Components    Component Value Reference Range Flag Lab   Glucose 107 70 - 99 mg/dL H [place] [facility])   Sodium 141 136 - 145 mmol/L — [facility])   Potassium 3.1 3.5 - 5.1 mmol/L L [facility])   Chloride 103 98 - 112 mmol/L — [facility])   CO2 33.0 21.0 - 32.0 mmol/L H [facility])   Anion Gap 5 0 - 18 mmol/L — [facility])   BUN 10 9 - 23 mg/dL — [facility])   Creatinine 0.63 0.55 - 1.02 mg/dL — [facility])   BUN/CREA Ratio 15.9 10.0 - 20.0 — [facility])   Calcium, Total 9.3 8.7 - 10.4 mg/dL — [facility])   Calculated Osmolality 292 275 - 295 mOsm/kg — [facility])   eGFR-Cr 85 >=60 mL/min/1.73m2 — [facility])            CBC With Differential With Platelet [[id number]] (Abnormal)  Resulted: 01/21/24 0810, Result status: Final result   Ordering provider: Kathy Rao MD  01/20/24 2300 Resulting lab: [facility] ([facility])   Narrative:  The following orders were created for panel order CBC With Differential With Platelet.  Procedure                               Abnormality         Status                     ---------                               -----------         ------                     CBC W/ DIFFERENTIAL[[id number]]          Abnormal            Final result                 Please view results for these tests on the individual orders.    Specimen Information    Type Source Collected On   Blood — 01/21/24 0724            Testing Performed By     Lab - Abbreviation Name Director Address Valid Date Range    162 - [place] [facility] ([facility]) [facility]  ([facility]) Panda Corbin M.D. [address] [facility] [address] 03/19/20 1442 - Present            Microbiology Results (All)     Procedure Component Value Units Date/Time    Blood Culture [[id number]] Collected: 01/18/24 1035    Order Status: Completed Lab Status: Preliminary result Updated: 01/21/24 1201    Specimen: Blood,peripheral      Blood Culture Result No Growth 3 Days    Blood Culture [[id number]] Collected: 01/18/24 1043    Order Status: Completed Lab Status: Preliminary result Updated: 01/21/24 1201    Specimen: Blood,peripheral      Blood Culture Result No Growth 3 Days    Urine Culture, Routine [[id number]]  (Abnormal)  (Susceptibility) Collected: 01/15/24 1545    Order Status: Completed Lab Status: Final result Updated: 01/17/24 0826    Specimen: Urine, clean catch      Urine Culture 50,000-99,000 CFU/ML Escherichia coli    Susceptibility      Escherichia coli      Not Specified     Ampicillin >=32  Resistant     Ampicillin + Sulbactam 16  Intermediate     Cefazolin <=4  Sensitive     Ciprofloxacin >=4  Resistant     Gentamicin <=1  Sensitive     Levofloxacin >=8  Resistant     Meropenem <=0.25  Sensitive     Nitrofurantoin <=16  Sensitive     Piperacillin + Tazobactam <=4  Sensitive     Trimethoprim/Sulfa <=20  Sensitive                          Blood Culture [[id number]]  (Abnormal) Collected: 01/15/24 0545    Order Status: Completed Lab Status: Final result Updated: 01/17/24 0646    Specimen: Blood,peripheral      Blood Culture Result Escherichia coli     Blood Smear Gram Negative Rods    Narrative:       Anaerobic Bottle Volume - 11 mL    Anaerobic Bottle Time to Detection - 9 hr  24 min   Aerobic Bottle Volume - 12 mL    Aerobic Bottle Time to Detection - 11 hr  30 min     Blood Culture [[id number]]  (Abnormal)  (Susceptibility) Collected: 01/15/24 0543    Order Status: Completed Lab Status: Final result Updated: 01/17/24 0646    Specimen: Blood,peripheral      Blood Culture  Result Escherichia coli     Blood Smear Gram Negative Rods    Narrative:       Anaerobic Bottle Volume - 4 mL    Anaerobic Bottle Time to Detection - 8 hr  24 min   Aerobic Bottle Volume - 9 mL    Aerobic Bottle Time to Detection - 9 hr  54 min     Susceptibility      Escherichia coli      Not Specified     Ampicillin >=32  Resistant     Ampicillin + Sulbactam 16  Intermediate     Cefazolin <=4  Sensitive     Ciprofloxacin >=4  Resistant     Gentamicin <=1  Sensitive     Levofloxacin >=8  Resistant     Meropenem <=0.25  Sensitive     Piperacillin + Tazobactam <=4  Sensitive     Trimethoprim/Sulfa <=20  Sensitive                          Blood Culture PCR [[id number]]  (Abnormal) Collected: 01/15/24 0543    Order Status: Completed Lab Status: Final result Updated: 01/17/24 0646    Specimen: Blood,peripheral      Escherichia coli by PCR Detected     CTX-M (ESBL gene) by PCR   Not Detected     PCR Comment --    $$$$Respiratory Flu Expanded Panel + Covid-19$$$$ [[id number]]  (Normal) Collected: 01/15/24 0740    Order Status: Completed Lab Status: Final result Updated: 01/15/24 0840    Specimen: Other from Nasopharyngeal swab      SARS-CoV-2 PCR: Not Detected     Adenovirus PCR: Not Detected     Coronavirus 229E PCR: Not Detected     Coronavirus Hku1 PCR: Not Detected     Coronavirus Nl63 PCR: Not Detected     Coronavirus Oc43 PCR: Not Detected     Metapneumovirus PCR: Not Detected     Rhinovirus/Entero PCR: Not Detected     Influenza A PCR: Not Detected     Influenza B PCR: Not Detected     Parainfluenza 1 PCR: Not Detected     Parainfluenza 2 PCR Not Detected     Parainfluenza 3 PCR Not Detected     Parainfluenza 4 PCR Not Detected     Resp Syncytial Virus PCR Not Detected     Bordetella Pertussis PCR Not Detected     Bordetella Parapertussis PCR Not Detected     Chlamydia pneumonia PCR: Not Detected     Mycoplasma pneumonia PCR: Not Detected    Narrative:      This test is intended for the simultaneous qualitative  detection and differentiation of nucleic acids from multiple viral and bacterial respiratory organisms, including nucleic acid from Severe Acute Respiratory Syndrome Coronavirus 2 (SARS-CoV-2) in nasopharyngeal swab from individuals suspected of respiratory viral infection consistent with COVID-19 by their healthcare provider.    Test performed using the [employer]view Respiratory Panel 2.1 (RP2.1) assay on the Source4Style 2.0 System, [employer] [address], [place] [address].    This test is being used under the Food and Drug Administration's Emergency Use Authorization.    The authorized Fact Sheet for Healthcare Providers for this assay is available upon request from the laboratory.    SARS and MERS coronaviruses are not tested on this assay.    SARS-CoV-2/Flu A and B/RSV by PCR ([employer]) [[id number]]  (Normal) Collected: 01/15/24 0530    Order Status: Completed Lab Status: Final result Updated: 01/15/24 0626    Specimen: Other from Nares      SARS-CoV-2 (COVID-19) - ([employer]) Not Detected     Influenza A by PCR Negative     Influenza B by PCR Negative     RSV by PCR Negative    Narrative:      This test is intended for the qualitative detection and differentiation of SARS-CoV-2, influenza A, influenza B, and respiratory syncytial virus (RSV) viral RNA in nasopharyngeal or nares swabs from individuals suspected of respiratory viral infection consistent with COVID-19 by their healthcare provider. Signs and symptoms of respiratory viral infection due to SARS-CoV-2, influenza, and RSV can be similar.    Test performed using the Xpert Xpress SARS-CoV-2/FLU/RSV (real time RT-PCR)  assay on the GeneXpert instrument, [employer], [place] [address].   This test is being used under the Food and Drug Administration's Emergency Use Authorization.    The authorized Fact Sheet for Healthcare Providers for this assay is available upon request from the laboratory.      Pending Labs     Order Current Status    Blood Culture  Preliminary result    Blood Culture Preliminary result         H&P - H&P Note      H&P signed by Aide Vu DO at 1/15/2024  2:53 PM  Version 1 of 1    Author: Aide Vu DO Service: Internal Medicine Author Type: Physician    Filed: 1/15/2024  2:53 PM Date of Service: 1/15/2024  1:57 PM Status: Signed    : Aide Vu DO (Physician)       [facility]    History and Physical    Mireya Wolfe Patient Status:  Inpatient    1935 MRN [id number]   Location [facility] 2W/SW Attending Aide Vu DO   Hosp Day # 0 PCP Kathy Rao MD     Date:  1/15/2024  Date of Admission:  1/15/2024    History provided by:spouse  HPI:     Chief Complaint   Patient presents with    Difficulty Breathing    Weakness     Mrs. Wolfe is an 88 year old female with past medical history of COPD, dyslipidemia, recurrent UTIs presenting slightly unresponsive from home. Her  is bedside providing history as pt is on bipap. Reports that the day prior to admission, pt was having slightly more mid to lower back pain and had a lower activity day and went to sleep. Her  heard some moaning and came to check on her and found her less responsive. He called EMS and pt was brought to the ER. She was noted to be hypoxic 83-84% on arrival. Placed on bipap and now is more comfortable. Also noted to have fever of 104F.     Additional workup demonstrates lactic acidosis, elevated troponins, negative viral panel (including flu/covid/rsv), normal bnp, procalcitonin 0.45.      She denies recent cough, sick contacts, diarrhea, dysuria.       Of note, she was admitted in 2023 for weakness and pneumonia. Spouse states she has not been feeling well ever since.   Also reports that the past few weeks, she has not been consistently taking her medications.     History     Past Medical History:   Diagnosis Date    Anemia     Basal cell carcinoma     Mouth    Calculus, kidney     COPD (chronic obstructive pulmonary  disease) (HCC)     Esophageal reflux     Hematuria     History of recurrent UTIs 02/26/2015    Hypercholesterolemia     Kidney stone     Osteopenia     Other and unspecified hyperlipidemia     Recurrent UTI     Scoliosis     Spinal stenosis     Squamous cell cancer of lip     Thyroid nodule     Urinary frequency     Vitamin D deficiency      Past Surgical History:   Procedure Laterality Date    CATARACT Right 5/2016    CYSTO/URETERO W/LITHOTRIPSY Right 10/29/2015    Procedure: LITHOTRIPSY HOLMIUM LASER WITH CYSTOSCOPY;  Surgeon: Tommie Majano MD;  Location: [facility]    CYSTOSCOPY,INSERT URETERAL STENT Right 9/24/2015    Procedure: LITHOTRIPSY WITH CYSTOSCOPY, STENT PLACEMENT;  Surgeon: Tommie Majano MD;  Location: [facility]    FRAGMENTING OF KIDNEY STONE Right 9/24/2015    Procedure: LITHOTRIPSY WITH CYSTOSCOPY, STENT PLACEMENT;  Surgeon: Tommie Majano MD;  Location: [facility]    OTHER SURGICAL HISTORY  1960    Surgery of Blockage in Urethral Tube    OTHER SURGICAL HISTORY  1985 and on 08/24/00    ESWL - left    OTHER SURGICAL HISTORY  07/19/00    Cystourethroscopy with Urethral Calibration and Dilation with Bilateral Retrograde Pyelogram and Left Ureteral Stone Manipulation with insertion of Double-J Stent    OTHER SURGICAL HISTORY  June 2015    R eye surgery for bleed and then development of blood clot--resolved    OTHER SURGICAL HISTORY  2/8/16    Cystoscpy stent removal    OTHER SURGICAL HISTORY      OTHER SURGICAL HISTORY  03/2017    Mohs surgery    PATIENT DOCUMENTED NOT TO HAVE EXPERIENCED ANY OF THE FOLLOWING EVENTS Right 9/24/2015    Procedure: LITHOTRIPSY WITH CYSTOSCOPY, STENT PLACEMENT;  Surgeon: Tommie Majano MD;  Location: [facility]    PATIENT DOCUMENTED NOT TO HAVE EXPERIENCED ANY OF THE FOLLOWING EVENTS Right 10/29/2015    Procedure: CYSTOSCOPY/URETEROSCOPY/STONE EXTRACTION;  Surgeon: Tommie Majano MD;  Location: [facility]    PATIENT WITH PREOPERATIVE ORDER FOR IV ANTIBIOTIC SURGICAL SITE INFECT Right 2015    Procedure: LITHOTRIPSY WITH CYSTOSCOPY, STENT PLACEMENT;  Surgeon: Tommie Majano MD;  Location: [facility]    PATIENT WITH PREOPERATIVE ORDER FOR IV ANTIBIOTIC SURGICAL SITE INFECT Right 10/29/2015    Procedure: CYSTOSCOPY/URETEROSCOPY/STONE EXTRACTION;  Surgeon: Tommie Majano MD;  Location: [facility]     Family History   Problem Relation Age of Onset    Breast Cancer Mother 67    Cancer Brother      Social History:  Social History     Socioeconomic History    Marital status:    Tobacco Use    Smoking status: Former     Packs/day: 0.25     Years: 10.00     Additional pack years: 0.00     Total pack years: 2.50     Types: Cigarettes     Quit date: 2016     Years since quittin.4     Passive exposure: Past    Smokeless tobacco: Never   Vaping Use    Vaping Use: Never used   Substance and Sexual Activity    Alcohol use: No     Alcohol/week: 0.0 standard drinks of alcohol    Drug use: No   Other Topics Concern    Caffeine Concern No     Social Determinants of Health     Food Insecurity: No Food Insecurity (1/15/2024)    Food Insecurity     Food Insecurity: Never true   Transportation Needs: No Transportation Needs (1/15/2024)    Transportation Needs     Lack of Transportation: No   Housing Stability: Low Risk  (1/15/2024)    Housing Stability     Housing Instability: No     Allergies/Medications:   Allergies:   Allergies   Allergen Reactions    Shellfish-Derived Products NAUSEA AND VOMITING    Erythromycin UNKNOWN    Iodine (Topical) OTHER (SEE COMMENTS)    Ioversol UNKNOWN    Other OTHER (SEE COMMENTS)    Radiology Contrast Iodinated Dyes DIZZINESS     Medications Prior to Admission   Medication Sig    PREGABALIN 50 MG Oral Cap TAKE 1 CAPSULE BY MOUTH TWICE DAILY    AMLODIPINE 5 MG Oral Tab TAKE 1 TABLET BY MOUTH EVERY DAY. HOLD FOR SYSTOLIC BLOOD PRESSURE< 110     escitalopram 5 MG Oral Tab Take 3 tablets (15 mg total) by mouth every morning.    ALPRAZolam 0.25 MG Oral Tab Take 1 tablet (0.25 mg total) by mouth nightly as needed for Anxiety.    FLUTICASONE FUROATE-VILANTEROL 100-25 MCG/ACT Inhalation Aerosol Powder, Breath Activated INHALE 1 PUFF INTO THE LUNGS DAILY    Cholecalciferol (VITAMIN D) 125 MCG (5000 UT) Oral Cap Take 1 capsule (5,000 Units total) by mouth daily.    ferrous sulfate 325 (65 FE) MG Oral Tab EC Take 1 tablet (325 mg total) by mouth daily with breakfast.    Vitamin B-12 1000 MCG Oral Tab Take 1 tablet (1,000 mcg total) by mouth daily.    QUETIAPINE 25 MG Oral Tab TAKE 1 TABLET BY MOUTH EVERY NIGHT AT BEDTIME FOR HALLUCINATIONS    TRIAMCINOLONE 0.1 % External Cream APPLY TOPICALLY TO THE AFFECTED AREA TWICE DAILY FOR RASH    ARIPiprazole 2 MG Oral Tab Take 0.5 tablets (1 mg total) by mouth daily.    polyethylene glycol, PEG 3350, 17 g Oral Powd Pack Take 17 g by mouth daily as needed.       Review of Systems:     Constitutional:  Positive for chills and fever.   Respiratory:  Positive for chest tightness and shortness of breath.    Cardiovascular:  Negative for chest pain, palpitations and leg swelling.   Gastrointestinal:  Negative for abdominal pain.   Genitourinary:  Negative for dysuria.   Musculoskeletal:  Positive for back pain.       Physical Exam:   Vital Signs:  Blood pressure 90/47, pulse 100, temperature 99.8 °F (37.7 °C), temperature source Temporal, resp. rate (!) 27, weight 125 lb 3.5 oz (56.8 kg), SpO2 94%.  Physical Exam  HENT:      Mouth/Throat:      Mouth: Mucous membranes are dry.   Eyes:      Extraocular Movements: Extraocular movements intact.      Conjunctiva/sclera: Conjunctivae normal.   Neck:      Vascular: No carotid bruit.   Cardiovascular:      Rate and Rhythm: Tachycardia present.      Heart sounds: No murmur heard.  Pulmonary:      Breath sounds: Normal breath sounds. No wheezing or rhonchi.   Abdominal:      General: Bowel  sounds are normal. There is no distension.      Palpations: Abdomen is soft.      Tenderness: There is no abdominal tenderness. There is no guarding or rebound.   Musculoskeletal:         General: No swelling.   Lymphadenopathy:      Cervical: No cervical adenopathy.   Skin:     General: Skin is dry.      Capillary Refill: Capillary refill takes less than 2 seconds.   Neurological:      General: No focal deficit present.      Mental Status: She is alert.           Results:     Lab Results   Component Value Date    WBC 10.5 01/15/2024    HGB 14.5 01/15/2024    HCT 45.2 01/15/2024    PLT 79.0 (L) 01/15/2024    CREATSERUM 1.04 (H) 01/15/2024    BUN 20 01/15/2024     01/15/2024    K 3.4 (L) 01/15/2024     01/15/2024    CO2 26.0 01/15/2024     (H) 01/15/2024    CA 8.6 (L) 01/15/2024    ALB 4.2 01/15/2024    ALKPHO 90 01/15/2024    BILT 1.3 (H) 01/15/2024    TP 7.1 01/15/2024    AST 41 (H) 01/15/2024    ALT 34 01/15/2024    PTT 26.4 01/15/2024    T4F 1.1 07/15/2023    TSH 0.349 (L) 07/15/2023    DDIMER 1.63 (H) 07/14/2023    MG 2.4 07/19/2023    PHOS 2.8 07/19/2023    TROPHS 2,249 (HH) 01/15/2024    B12 1,241 (H) 11/09/2023     XR CHEST AP PORTABLE  (CPT=71045)    Result Date: 1/15/2024  CONCLUSION:   No significant change in the elevation of the left hemidiaphragm with left greater than right basilar airspace opacities, which are favored to reflect atelectasis/scarring.  Large hiatal hernia.  Lesser incidental findings described above.    Dictated by (CST): Yasir Thomas MD on 1/15/2024 at 6:46 AM     Finalized by (CST): Yasir Thomas MD on 1/15/2024 at 6:50 AM         EKG 12 Lead    Result Date: 1/15/2024  Sinus tachycardia Rightward axis Low voltage QRS, consider pulmonary disease, pericardial effusion, or normal variant Borderline ECG When compared with ECG of 15-ESCOBAR-2024 05:22, Vent. rate has decreased BY  55 BPM Confirmed by GREER TAI JORDAN ([id number]) on 1/15/2024 12:32:25  PM    EKG    Result Date: 1/15/2024  Sinus tachycardia Right axis deviation Abnormal ECG No previous ECGs found in [facility] Confirmed by GREER TAI JORDAN ([id number]) on 1/15/2024 9:58:15 AM     Assessment/Plan:     Acute respiratory failure with hypoxia (HCC)  Pt hospitalized 7/2023--weakness, bibasilar pneumonia, COPD exacerbation. Treated with zosyn then cefepime. Initially discharged home with oxygen, but was weaned off oxygen.   Now on bipap--appreciate pulmonology consultation  CXR reviewed--not significantly changed from prior  Extensive viral panel, RSV, covid, Flu neg  Lactic acidosis 4.6>2.2  Procal 0.45  Bcx pending  UA-not done yet  Weaned to 5L NC now  Given solumedrol, started on zosyn IV      Febrile illness  Unclear source--await bcx, ucx  Empiric zosyn  Ivf    Elevated Troponins  EKG-sinus tachycardia  Appreciate cardiology consultation  Heparin drip--held d/t thrombocytopenia  Echo pending       Thrombocytopenia  Platelet 79; monitor closely    Depression and anxiety  -pt with long hx of depression (and anxiety), exacerbated by marital strife/stress  -previously saw psychiatrist Dr. Friend who retired  -Lexapro increased to 15mg/day  -on prn xanax     Hypertension  Amlodipine 5mg daily     UTI   On methenamine; sees urology Dr. 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  Pt was briefly on fosamax in 2014. Declines restarting fosamax or other meds so would not check repeat DEXA.  Vitamin B12 deficiency    Level normal (1307) in 8/2022; cont B12 1000 mcg/day  Hx of iron deficiency anemia  Had EGD/C-scope in 2010 (Dr. Ferrari) -- negative. 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 Continue FeSO4 325mg/day.    Lumbar spinal stenosis, chronic back pain     Has seen Dr. Salgado (ortho spine at [facility]).  Referred to pain specialist, Dr. Deirdre Elizabeth at [facility]. Had facet injections in 8/2018 which helped tremendously.  Saw Dr. Elizabeth again 9/25/18; did PT.  MRI in 4/2019 did not appear to show anything acute.  No longer taking Norco.  Bilateral knee OA  Sees ortho Dr. Booth -- has had b/l cortisone injections with transient improvement.  Has been advised for TKR's but trying to avoid. Ambulates with a walker.    COPD  Essentially quit smoking in 8/2016.  Has been using the Breo.  Basal and squamous cell cancer, face  S/p Mohs surgeries in 4 areas of her face in 2018 (Dr. Jonas).      Post-herpetic neuralgia (dx'ed with shingles left upper back 6/8/22)  -still with pain but significantly improved  -on Lyrica 50mg BID      Cognitive decline, likely age-related, poss exacerbated by depression  -pt saw neurologist Dr. Lokesh Mcmullen in 9/2022. Was thought to have late onset Alzheimer's dementia (started on aricept), though pt had not shown obvious sx of dementia prior to this admission  Pt was referred for neuropsych testing and MRI brain in 2022 by Dr. Mcmullen though did not schedule.    -had delirium 7/2023 while hospitalized--zyprexa caused sedation; seroquel 25mg at bedtime, and abilify 0.5mg daily                          Advance directives  -Discussed with her ; pt has POLST in chart, confirms DNI/DNR.         **Certification      PHYSICIAN Certification of Need for Inpatient Hospitalization - Initial Certification    Patient will require inpatient services that will reasonably be expected to span two midnight's based on the clinical documentation in H+P.   Based on patients current state of illness, I anticipate that, after discharge, patient will require TBD.        Aide Vu DO  1/15/2024      Electronically signed by Aide Vu DO on 1/15/2024  2:53 PM              Consults - MD Consult Notes       Consults filed by Tommie Felix MD at 1/15/2024 10:30 AM / Draft: Not Electronically Signed     Author: Tommie Felix MD Service: Cardiology Author Type: Physician    Filed: 1/15/2024 10:30 AM Status: Unsigned Transcription    : Tommie Felix MD (Physician)       [facility]    PATIENT'S NAME: RADHA DALLAS   ATTENDING PHYSICIAN: Georges Rand MD   CONSULTING PHYSICIAN: Tommie Felix MD   PATIENT ACCOUNT#:   [id number]    LOCATION:  [address]  MEDICAL RECORD #:   [id number]       YOB: 1935  ADMISSION DATE:       01/15/2024      CONSULT DATE:  01/15/2024    REPORT OF CONSULTATION      HISTORY OF PRESENT ILLNESS:  The patient is an 88-year-old female brought to the hospital today from home after her  noted her to be poorly responsive and in respiratory distress.  Paramedics found her to be hypoxic and with a temperature of 104.  The patient's initial troponin was elevated, and we were called to consult.  The patient denies having any chest pain, dizziness, or palpitations and no prior cardiac history.    PAST MEDICAL HISTORY:  Positive for COPD, large hiatal hernia, gastroesophageal reflux, scoliosis, a thyroid nodule, and anxiety and depression.  The patient was admitted to the hospital in July and according to the patient's , there was a cardiac issue and she was prescribed a \"heart pill.\"  They cannot recall the name or the condition for which it is intended.    MEDICATIONS:  Home medications:  Lyrica, quetiapine, amlodipine, escitalopram, alprazolam, aripoprazole, vitamin D, iron, and vitamin B12.      SOCIAL HISTORY:  Former smoker.  Does not ingest alcohol.  The patient is , and her  is with her here today.    REVIEW OF SYSTEMS:  Otherwise negative.      PHYSICAL EXAMINATION:    GENERAL:  Well-developed, well-nourished female.  No acute distress.  Awake and alert, answers questions appropriately.  Oriented x3.    VITAL SIGNS:   Maximum temperature 104.2, current 101.1; pulse initially 160, now 110; respirations 22, initially 35; saturation currently 99% on BiPAP.  HEENT:  Unremarkable.    NECK:  Difficult to assess neck veins but appear normal.  Carotids normal without bruits.  No thyromegaly.    LUNGS:  Congested.  HEART:  S1, S2 normal.  No pathologic murmur.  No S3.  ABDOMEN:  Soft and nontender.  EXTREMITIES:  Cool.  No edema.  Pulses diminished but present.    LABORATORY DATA:  Troponin initially 199; then on the second 2-hour sample 2249.  Procalcitonin 0.45.  ProBNP 520.  WBC 10.5, hemoglobin 14.5, platelets 79,000.  Lactic acid 4.6.  Sodium 139, potassium 3.9, bicarb 24, BUN 19, creatinine 1.04, GFR 52.      Chest x-ray shows elevation of the left hemidiaphragm, large hiatal hernia, scattered opacities.    ASSESSMENT:    1.   Respiratory insufficiency, hypoxia, and altered mental status, likely due to infectious process, lungs and/or urinary tract infection which she has had in the past.  Currently improved mental status and respiratory status.  2.   Significant elevation of troponin.  Repeat EKG shows sinus tachycardia with a rate of 105 versus previous of 165.  There are no acute ischemic changes.  No chest pain.  Suspect that this is a type 2 myocardial infarction secondary to increased O2 demands from tachycardia, hypoxia, etc.    PLAN:    1.   Continue to trend troponins.  2.   Given some uncertainty about underlying cardiac status, we will start heparin drip.  3.   Echocardiogram.    Thank you for this consultation.    Dictated By Tommie Felix MD  d: 01/15/2024 09:31:32  t: 01/15/2024 10:19:29  Job [id number]  [facility] [place]                [place] Summary - D/C Summary      Discharge Summary signed by Kathy Rao MD at 1/21/2024  3:26 PM  Version 1 of 1    Author: Kathy Rao MD Service: Internal Medicine Author Type: Physician    Filed: 1/21/2024  3:26 PM Date of Service: 1/21/2024  3:21 PM Status: Signed     : Kathy Rao MD (Physician)       [facility]    Discharge Summary    Mireya Wolfe Patient Status:  Inpatient    1935 MRN [id number]   Location [facility] 3W/SW Attending Aide Vu,    Hosp Day # 6 PCP Kathy Rao MD     Date of Admission: 1/15/2024   Date of Discharge:  2024    Admitting Diagnosis: Febrile illness [R50.9]  Acute respiratory failure with hypoxia (HCC) [J96.01]    Disposition: Transfer to Skilled Nursing Facility: [facility]    Discharge Diagnosis: .Principal Problem:    Acute respiratory failure with hypoxia (HCC)  Active Problems:    Febrile illness      Hospital Course:   Reason for Admission:   Sepsis 2/2 E.coli UTI/bacteremia  Acute on chronic hypoxic resp failure/COPD exacerbation  Type 2 MI 2/2 demand ischemia    Discharge Physical Exam:  Vital Signs:  Blood pressure 127/54, pulse 107, temperature 97.8 °F (36.6 °C), temperature source Oral, resp. rate 20, weight 125 lb 4.8 oz (56.8 kg), SpO2 92%.     See progress note    Hospital Course:     Acute on chronic hypoxic respiratory failure (HCC)  COPD exacerbation  -hypoxic on admission (sats 83% RA), altered mental status, fever to 104.2  -CXR 24 -- L perihilar and bibasilar parenchymal abnormality and/or atelec changes, sl progression.    -Clinically doubt pneumonia, more likely COPD exacerbation  -Extended resp panel, RSV, covid, Flu neg  -Initially placed on BiPAP; O2 weaned from 5L to 2L, now back up to 3L.  Will need O2 on discharge  -cont IS, acapella  -started on IV solumedrol 40mg/day on ; transition to prednisone taper x 5 more days     Sepsis with E.coli bacteremia  UTI  No significant hypotension  Fever to 104.2 on admission -- afebrile since then  Lactic acidosis 4.6, now normalized   Blood cx x 2 growing E.coli -- R to amp/levaquin/cipro, S to zosyn and cefazolin  S/p Zosyn x 3 days, then IV cefazolin (day 6 total abx); plan for 10 day course; change to cephalexin  500mg QID x 4 more days  cont po vanco for CDP x 7 more days  WBC 10.5>33.3>>>7.3  Repeat bcx (1/18/2024) NGTD x 3d     Elevated Troponins  Trop 199>2249>3228  EKG-sinus tachycardia  On heparin gtt -- checking HIPA Ab's given decreased platelets -- still pending  Echo with new depressed LV syst function (EF 45-50%) -- prev echo in 2019 (EF 55-60%)  Likely Type II MI --demand ischemia related to hypoxia/tachycardia/sepsis  ; started lipitor 40mg qhs  Pt to follow up as outpatient with Dr. Felix to discuss possible ischemic w/u.  Discussed with son John, and dtr Nelli at bedside     Thrombocytopenia  Platelet 79>110>95>72>76>117  Likely 2/2 sepsis  HIPA neg     Depression and anxiety  -pt with long hx of depression (and anxiety), exacerbated by marital strife/stress  -previously saw psychiatrist Dr. Friend who retired  -on Lexapro 15mg/day  -on prn xanax     Hypertension  Amlodipine 5mg daily  On hold this admission 2/2 borderline BP -- BP's now creeping up (140's-150's/70's-80's); restarted amlodipine.  BP's improved - now 127/54     Hx of iron deficiency anemia  Had EGD/C-scope in 2010 (Dr. Ferrari) -- negative. 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 Continue FeSO4 325mg/day.    Hgb down 14.5 > 11.3 but stable in the 11's --- suspect 2/2 sepsis  Cont to trend     Hx of frequent UTI   On methenamine as outpt; sees urology Dr. Munir Cullen     Eventration of left hemidiaphragm  Previously thought to be due to large HH  CT chest clarified 7/2023 - small HH but large eventration of left hemidiaphragm     Osteoporosis  DEXA in 7/2017- osteoporosis of left femoral neck (T -2.9).  Pt was briefly on fosamax in 2014. Declines restarting fosamax or other meds so would not check repeat DEXA.     Vitamin B12 deficiency    Level normal (1241) in 11/2023; cont B12 1000 mcg/day     Lumbar spinal stenosis, chronic back pain    Has seen Dr. Salgado (ortho spine at [facility]).  Referred to pain specialist, Dr. Deirdre Elizabeth at [facility]. Had facet injections in 8/2018 which helped tremendously.  Saw Dr. Elizabeth again 9/25/18; did PT.  MRI in 4/2019 did not appear to show anything acute.  No longer taking Norco.  Bilateral knee OA  Sees ortho Dr. Booth -- has had b/l cortisone injections with transient improvement.  Has been advised for TKR's but trying to avoid. Ambulates with a walker.       COPD  Essentially quit smoking in 8/2016.  Has been using the Breo.     Basal and squamous cell cancer, face  S/p Mohs surgeries in 4 areas of her face in 2018 (Dr. Jonas).      Post-herpetic neuralgia (dx'ed with shingles left upper back 6/8/22)  -still with pain but significantly improved  -on Lyrica 50mg BID      Cognitive decline, likely age-related, poss exacerbated by depression  -pt saw neurologist Dr. Lokesh Mcmullen in 9/2022. Was thought to have late onset Alzheimer's dementia (started on aricept), though pt had not shown obvious sx of dementia prior to this admission  Pt was referred for neuropsych testing and MRI brain in 2022 by Dr. Mcmullen though did not schedule.    -had delirium 7/2023 while hospitalized--zyprexa caused sedation; started on seroquel 25mg at bedtime, and abilify 0.5mg daily  -Pt does not want to take seroquel anymore due to daytime sedation; refused her dose on 1/19 and 1/20/24.  Will stop for now. Could consider 12.5mg in the future if needed     Advance directives  - POLST in chart,  DNR/select      Dispo   PT/OT rec ROSEMARIE  Pt has been accepted at [facility]  Stable for transfer today    Consultants         Provider   Role Specialty     Tommie Felix MD  Consulting Physician Interventional, Cardiology     Tao Magana MD  Consulting Physician PULMONARY DISEASES                Discharge Plan:   Discharge Condition: Stable    Current Discharge Medication List        New Orders    Details   atorvastatin 40 MG Oral Tab  Take 1 tablet (40 mg total) by mouth nightly.      ipratropium-albuterol 0.5-2.5 (3) MG/3ML Inhalation Solution Take 3 mL by nebulization every 6 (six) hours while awake.      vancomycin 125 MG Oral Cap Take 1 capsule (125 mg total) by mouth daily.           Home Meds - Unchanged    Details   cephalexin 500 MG Oral Cap Take 1 capsule (500 mg total) by mouth 4 (four) times daily.      predniSONE 20 MG Oral Tab Take 2 tablets (40 mg total) by mouth daily, THEN 1 tablet (20 mg total) daily.      PREGABALIN 50 MG Oral Cap TAKE 1 CAPSULE BY MOUTH TWICE DAILY      AMLODIPINE 5 MG Oral Tab TAKE 1 TABLET BY MOUTH EVERY DAY. HOLD FOR SYSTOLIC BLOOD PRESSURE< 110      escitalopram 5 MG Oral Tab Take 3 tablets (15 mg total) by mouth every morning.      ALPRAZolam 0.25 MG Oral Tab Take 1 tablet (0.25 mg total) by mouth nightly as needed for Anxiety.      FLUTICASONE FUROATE-VILANTEROL 100-25 MCG/ACT Inhalation Aerosol Powder, Breath Activated INHALE 1 PUFF INTO THE LUNGS DAILY      Cholecalciferol (VITAMIN D) 125 MCG (5000 UT) Oral Cap Take 1 capsule (5,000 Units total) by mouth daily.      ferrous sulfate 325 (65 FE) MG Oral Tab EC Take 1 tablet (325 mg total) by mouth daily with breakfast.      Vitamin B-12 1000 MCG Oral Tab Take 1 tablet (1,000 mcg total) by mouth daily.      TRIAMCINOLONE 0.1 % External Cream APPLY TOPICALLY TO THE AFFECTED AREA TWICE DAILY FOR RASH      ARIPiprazole 2 MG Oral Tab Take 0.5 tablets (1 mg total) by mouth daily.      polyethylene glycol, PEG 3350, 17 g Oral Powd Pack Take 17 g by mouth daily as needed.                 Discharge Diet: As tolerated    Discharge Activity: As tolerated    Follow up:      Follow-up Information       Kate Calloway NP Follow up in 2 week(s).    Specialty: Nurse Practitioner  Contact information:  [address]  [address]  [place] [address] [phone]               Tommie Felix MD Follow up in 1 month(s).    Specialties: Interventional, Cardiology,  CARDIOLOGY  Contact information:  [address]  [address]  [facility] [place] [address]  [phone]               Kathy Rao MD Follow up in 3 week(s).    Specialty: Internal Medicine  Why: see Dr. Rao after discharge from [facility]  Contact information:  [address]  [facility] [place] [address] [phone]                                   >30 min were spent counseling patient and coordinating care    Kathy Rao MD  1/21/2024    Electronically signed by Kathy Rao MD on 1/21/2024  3:26 PM           Physical Therapy Notes (last 72 hours)  Notes from 1/18/2024  4:25 PM through 1/21/2024  4:25 PM   No notes of this type exist for this encounter.        Occupational Therapy Notes (last 72 hours)      Occupational Therapy Note signed by Lanny Pressley OT at 1/19/2024  3:13 PM  Version 1 of 1    Author: Lanny Pressley OT Service: — Author Type: Occupational Therapist    Filed: 1/19/2024  3:13 PM Date of Service: 1/19/2024  3:12 PM Status: Signed    : Lanny Pressley OT (Occupational Therapist)       Patient received while lying in bed and politely declining to participate in OT treatment session. Reported she is upset because her son is taking her  to the hospital. OT will f/u on another date as appropriate.      Lanny Pressley OTR/L  [facility]  #[id number]               Video Swallow Study Notes    No notes of this type exist for this encounter.     SLP Notes    No notes of this type exist for this encounter.     Immunizations     Name Date      Covid-19 Moderna 11/06/21     Covid-19 Moderna 04/08/21     Covid-19 Moderna 03/11/21     Covid-19 Moderna Bivalent 12+ years 11/05/22     Fluad 0.5ml 11/05/22     Fluad 0.5ml 09/26/18     Fluad 0.5ml 01/29/18     INFLUENZA 11/09/23     INFLUENZA 09/10/20     INFLUENZA 11/22/16     INFLUENZA 11/22/16     INFLUENZA 12/30/13     INFLUENZA 12/13/12     INFLUENZA 12/13/12     Pneumococcal (Prevnar 13) 04/21/16     Pneumovax  23 07/05/17     Pneumovax 23 12/30/13     TDAP 11/30/13     Zoster Vaccine Recombinant Adjuvanted (Shingrix) 11/20/18       Multidisciplinary Problems     Active Goals        Problem: Patient/Family Goals    Goal Priority Disciplines Outcome Interventions   Patient/Family Long Term Goal     Interdisciplinary Progressing    Description: Patient's Long Term Goal: Get stronger    Interventions:  - PT/OT  - See additional Care Plan goals for specific interventions   Patient/Family Short Term Goal     Interdisciplinary Progressing    Description: Patient's Short Term Goal: Go home    Interventions:   - Take all my medications   - See additional Care Plan goals for specific interventions

## (undated) NOTE — IP AVS SNAPSHOT
Patient Demographics     Address  [address] [place] [address] Phone  [phone] [facility]  [phone] [facility] E-mail Address  [email or web address]      Emergency Contact(s)     Name Relation Home Work Mobile    Alphonse Wolfe Spouse [phone] Commonly known as:  BREO ELLIPTA  Next dose due: Tomorrow morning. Inhale 1 puff into the lungs daily.    Sharon Sutherland MD         HYDROcodone-acetaminophen 7.5-325 MG Tabs  Commonly known as:  Saad Saliva  Next dose due:  As needed every 6 hours for violet QUEtiapine Fumarate 25 MG Tabs           [id number] - MAR ACTION REPORT  (last 24 hrs)    ** SITE UNKNOWN **     Order ID Medication Name Action Time Action Reason Comments    [id number] ALPRAZolam Nimesh Dill) tab 0.25 mg 04/22/19 2118 Given      [id number] Fluti Resp  18 Filed at 04/23/2019 1358   Temp  98.3 °F (36.8 °C) Filed at 04/23/2019 1358   SpO2  92 % Filed at 04/23/2019 1358      Patient's Most Recent Weight       Most Recent Value   Patient Weight  56 kg (123 lb 6.4 oz)      Lab Results Last 24 Hours    N severe pain in her R lower back. The pain is severe and constant. No hx of recent trauma or falls.      Went to  on Saturday thru [facility] on 4/13/19. CT abd/pelvis -- showed renal stones but no ureterolithiasis.   There was a 10mm stone in the right kidney • Other and unspecified hyperlipidemia    • Recurrent UTI    • Scoliosis    • Spinal stenosis    • Squamous cell cancer of lip    • Thyroid nodule    • Urinary frequency    • Vitamin D deficiency      Past Surgical History:   Procedure Laterality Date   • Blood pressure 122/63, pulse 96, temperature 98.6 °F (37 °C), temperature source Temporal, resp. rate 20, height 5' 3\" (1.6 m), weight 128 lb (58.1 kg), SpO2 91 %.   Physical Exam  Cervical Papanicolaou contraindicated   GENERAL: well developed, well fahad musculature at L1-L2 compatible with high-grade muscle strain -- this would certainly correspond to her symptoms and physical findings on exam, as pt had tenderness of the right paraspinal muscles. Pt with severe pain on admission.   Received 4mg IV morphi Depression, anxiety  Sees psychiatry. On Lexapro. Knee OA  Sees ortho at [address] (Dr. Fitz Olson)  Basal and squamous cell cancer, face  S/p  Mohs surgeries in 4 areas of her face in 2018 (Dr. Jessy Christine).   Hx of Dyspnea on exertion  Pt with known C which has not helped.     Still with R low back pain.  Went to [facility] ER on Monday 4/15/19. Reed Anaya another CT abd/pelvis -- still no evidence of obstructing stone.     Saw her urologist the following day Tuesday (4/16/19).   Was told still no evide • CYSTOSCOPY/URETEROSCOPY/STONE EXTRACTION Right 10/29/2015     Performed by Nupur Parsons MD at [address] [place] [address]   • ESWL LITHOTRIPSY WITH CYSTOSCOPY, STENT PLACEMENT Right 9/24/2015     Performed by Nupur Parsons MD at [facility] Intake/Output Summary (Last 24 hours) at 4/23/2019 0931  Last data filed at 4/23/2019 0357  Gross per 24 hour   Intake 550 ml   Output 1500 ml   Net -950 ml       Wt Readings from Last 3 Encounters:  04/18/19 : 123 lb 6.4 oz (56 kg)  04/18/19 : 130 lb (59 (grade 1 diastolic dysfunction). 2. Aortic valve: Mild regurgitation. 3. Mitral valve: Mildly calcified annulus. Mildly thickened    leaflets. No previous study was available for comparison.  ----------------------------------------------------------------- ventricle: The cavity size was normal. Systolic function was normal. Pulmonic valve: The valve appears to be grossly normal. Doppler: There was no evidence for stenosis. Mild regurgitation. Tricuspid valve:   Structurally normal valve.    Mobility 7.5   cm/sec ---------  LV E/e&apos;, average                            9            ---------   Ventricular septum                          Value        Reference  IVS thickness, ED                           0.9   cm     0.6 - 0.9   LVOT ---------  Tricuspid peak RV-RA gradient               32    mm Hg  ---------   Systemic veins                              Value        Reference  Estimated CVP                               3     mm Hg  ---------   Right ventricle musculature at L1-L2 compatible with high-grade muscle strain -- this would certainly correspond to her symptoms and physical findings on exam, as pt had tenderness of the right paraspinal muscles.  Pt with severe pain on admission.  Received 4mg IV morphi Had facet injections in 8/2018 which helped tremendously.  Saw Dr. Rogelio Arechiga again 9/25/18; he ordered PT.  Dr. Rogelio Arechiga has been refilling her Norco -- only takes 1/2 tablet once a day.  MRI this admission does not appear to show anything acute, nor do pt's Quantity:  30 tablet  Refills:  0     Fluticasone Furoate-Vilanterol 100-25 MCG/INH Aepb  Commonly known as:  BREO ELLIPTA      Inhale 1 puff into the lungs daily.    Quantity:  1 each  Refills:  0     HYDROcodone-acetaminophen 7.5-325 MG Tabs  Commonly kn omeprazole 20 MG Cpdr  Commonly known as:  PRILOSEC      Take 20 mg by mouth every morning before breakfast.   Refills:  0     Vitamin B-12 ER 1000 MCG Tbcr      Take 1 tablet (1,000 mcg total) by mouth daily.    Quantity:  1 tablet  Refills:  0        STOP received seated in bedside chair, RN present providing medications, agreeable to therapy. Pt performed sit to stand transfer with rolling walker from chair at MIN A, 2 attempts needed d/t weakness, verbal cues for sequencing/hand placement.  Pt ambulated 14 Rating: (low)  Location: low back  Management Techniques: Activity promotion;Relaxation;Breathing techniques    BALANCE                                                                                                                     Static Sitting:  Kanika Ralph Goal #1 Patient is able to demonstrate supine - sit EOB @ level: modified independent      Goal #1   Current Status Not tested, received in chair   Goal #2 Patient is able to demonstrate transfers Sit to/from [facility] assistance level: modified independent Filed:  4/20/2019  4:02 PM Date of Service:  4/20/2019  3:52 PM Status:  Signed    :  Saad Schneider PT (Physical Therapist)       PHYSICAL THERAPY TREATMENT NOTE - INPATIENT     Room Number: 980/702-N       Presenting Problem: p/w acute R sided LB PAIN ASSESSMENT   Rating: Other (Comment)(did not rate)  Location: back  Management Techniques: Relaxation;Repositioning      AM-PAC '6-Clicks' INPATIENT SHORT FORM - BASIC MOBILITY  How much difficulty does the patient currently have. ..  -   Turning over Current Status NOT TESTED   Goal #4 Patient will negotiate 12 stairs/one curb w/ assistive device and supervision   Goal #4   Current Status NOT TESTED   Goal #5 Patient to demonstrate independence with home activity/exercise instructions provided to champ

## (undated) NOTE — Clinical Note
TCM call completed. A TCM-HFU appointment is scheduled for 8/9/2022. The patient has had difficulty with sleep and eating meals since discharge. The spouse reported continued issues with dementia and depression. Thank you.

## (undated) NOTE — Clinical Note
HORTENSIA, TCM call made, see [facility] notes. [facility] Attempted to schedule PCP office TCM appointment with patient's spouse, Alphonse states he will call to schedule Mireya's appointment with Dr Rao. Message sent to MD's office.

## (undated) NOTE — LETTER
4/3/2024          Mireya Wolfe        [address] [place] [id number]    Dear Yasmeen Gomes,     This is the [facility], office of Kathy Rao MD    Thank you for putting your trust in [facility].  Our goal is to deliver the highest quality healthcare and an exceptional patient experience. Upon reviewing of your medical record shows you are due for the following:     Annual Physical     Please call [phone] to schedule your appointment or schedule online via [employer].   If you changed to a new provider at another facility, please notify the clinic to update your records.  If you had any recent testing at another facility, please have your results faxed to our office at [phone].     Thank you and have a great day!  Sincerely,    Kathy Rao MD  [facility], [place] [address] [address] [facility] [id number] [phone]        Document electronically generated by:  Nicki Macias

## (undated) NOTE — IP AVS SNAPSHOT
[facility]            (For Outpatient Use Only) Initial Admit Date: 4/18/2019   Inpt/Obs Admit Date: Inpt: 4/20/19 / Obs: 04/18/19   Discharge Date:    Radha Cole:  [de-identified]   MRN: [de-identified]   CSN: [id number]   CEID: [id number] Subscriber ID:  Pt Rel to Subscriber:    Hospital Account Financial Class: Medicare    April 23, 2019